# Patient Record
Sex: MALE | Race: WHITE | NOT HISPANIC OR LATINO | Employment: OTHER | URBAN - METROPOLITAN AREA
[De-identification: names, ages, dates, MRNs, and addresses within clinical notes are randomized per-mention and may not be internally consistent; named-entity substitution may affect disease eponyms.]

---

## 2018-01-19 ENCOUNTER — CONVERSION ENCOUNTER (OUTPATIENT)
Dept: SURGERY | Facility: CLINIC | Age: 63
End: 2018-01-19

## 2018-01-19 ENCOUNTER — OFFICE VISIT CONVERTED (OUTPATIENT)
Dept: UROLOGY | Facility: CLINIC | Age: 63
End: 2018-01-19
Attending: UROLOGY

## 2018-07-03 ENCOUNTER — OFFICE VISIT CONVERTED (OUTPATIENT)
Dept: ORTHOPEDIC SURGERY | Facility: CLINIC | Age: 63
End: 2018-07-03
Attending: ORTHOPAEDIC SURGERY

## 2018-07-03 ENCOUNTER — CONVERSION ENCOUNTER (OUTPATIENT)
Dept: ORTHOPEDIC SURGERY | Facility: CLINIC | Age: 63
End: 2018-07-03

## 2018-07-23 ENCOUNTER — OFFICE VISIT CONVERTED (OUTPATIENT)
Dept: UROLOGY | Facility: CLINIC | Age: 63
End: 2018-07-23
Attending: UROLOGY

## 2018-08-27 ENCOUNTER — PROCEDURE VISIT CONVERTED (OUTPATIENT)
Dept: UROLOGY | Facility: CLINIC | Age: 63
End: 2018-08-27
Attending: UROLOGY

## 2018-09-07 ENCOUNTER — CONVERSION ENCOUNTER (OUTPATIENT)
Dept: SURGERY | Facility: CLINIC | Age: 63
End: 2018-09-07

## 2018-09-07 ENCOUNTER — OFFICE VISIT CONVERTED (OUTPATIENT)
Dept: UROLOGY | Facility: CLINIC | Age: 63
End: 2018-09-07
Attending: UROLOGY

## 2018-12-21 ENCOUNTER — OFFICE VISIT CONVERTED (OUTPATIENT)
Dept: UROLOGY | Facility: CLINIC | Age: 63
End: 2018-12-21
Attending: UROLOGY

## 2019-02-04 ENCOUNTER — OFFICE VISIT CONVERTED (OUTPATIENT)
Dept: GASTROENTEROLOGY | Facility: CLINIC | Age: 64
End: 2019-02-04
Attending: INTERNAL MEDICINE

## 2019-02-22 ENCOUNTER — HOSPITAL ENCOUNTER (OUTPATIENT)
Dept: GASTROENTEROLOGY | Facility: HOSPITAL | Age: 64
Setting detail: HOSPITAL OUTPATIENT SURGERY
Discharge: HOME | End: 2019-02-22
Attending: INTERNAL MEDICINE

## 2019-03-18 ENCOUNTER — HOSPITAL ENCOUNTER (OUTPATIENT)
Dept: LAB | Facility: HOSPITAL | Age: 64
Discharge: HOME | End: 2019-03-18
Attending: UROLOGY

## 2019-03-18 LAB
ALBUMIN SERPL-MCNC: 3.7 G/DL (ref 3.5–5)
ALBUMIN/GLOB SERPL: 1.3 {RATIO} (ref 1.4–2.6)
ALP SERPL-CCNC: 59 U/L (ref 56–155)
ALT SERPL-CCNC: 39 U/L (ref 10–40)
ANION GAP SERPL CALC-SCNC: 16 MMOL/L (ref 8–19)
AST SERPL-CCNC: 30 U/L (ref 15–50)
BASOPHILS # BLD AUTO: 0.05 10*3/UL (ref 0–0.2)
BASOPHILS NFR BLD AUTO: 0.6 % (ref 0–3)
BILIRUB SERPL-MCNC: 0.29 MG/DL (ref 0.2–1.3)
BUN SERPL-MCNC: 14 MG/DL (ref 5–25)
BUN/CREAT SERPL: 13 {RATIO} (ref 6–20)
CALCIUM SERPL-MCNC: 9.1 MG/DL (ref 8.7–10.4)
CHLORIDE SERPL-SCNC: 98 MMOL/L (ref 99–111)
CONV ABS IMM GRAN: 0.24 10*3/UL (ref 0–0.2)
CONV CO2: 26 MMOL/L (ref 22–32)
CONV IMMATURE GRAN: 3.1 % (ref 0–1.8)
CONV TOTAL PROTEIN: 6.6 G/DL (ref 6.3–8.2)
CREAT UR-MCNC: 1.11 MG/DL (ref 0.7–1.2)
DEPRECATED RDW RBC AUTO: 42.4 FL (ref 35.1–43.9)
EOSINOPHIL # BLD AUTO: 0.12 10*3/UL (ref 0–0.7)
EOSINOPHIL # BLD AUTO: 1.5 % (ref 0–7)
ERYTHROCYTE [DISTWIDTH] IN BLOOD BY AUTOMATED COUNT: 13.5 % (ref 11.6–14.4)
GFR SERPLBLD BASED ON 1.73 SQ M-ARVRAT: >60 ML/MIN/{1.73_M2}
GLOBULIN UR ELPH-MCNC: 2.9 G/DL (ref 2–3.5)
GLUCOSE SERPL-MCNC: 89 MG/DL (ref 70–99)
HBA1C MFR BLD: 15.5 G/DL (ref 14–18)
HCT VFR BLD AUTO: 47.1 % (ref 42–52)
LYMPHOCYTES # BLD AUTO: 2.42 10*3/UL (ref 1–5)
MCH RBC QN AUTO: 28.8 PG (ref 27–31)
MCHC RBC AUTO-ENTMCNC: 32.9 G/DL (ref 33–37)
MCV RBC AUTO: 87.5 FL (ref 80–96)
MONOCYTES # BLD AUTO: 1.13 10*3/UL (ref 0.2–1.2)
MONOCYTES NFR BLD AUTO: 14.4 % (ref 3–10)
NEUTROPHILS # BLD AUTO: 3.9 10*3/UL (ref 2–8)
NEUTROPHILS NFR BLD AUTO: 49.6 % (ref 30–85)
NRBC CBCN: 0 % (ref 0–0.7)
OSMOLALITY SERPL CALC.SUM OF ELEC: 280 MOSM/KG (ref 273–304)
PLATELET # BLD AUTO: 243 10*3/UL (ref 130–400)
PMV BLD AUTO: 10.1 FL (ref 9.4–12.4)
POTASSIUM SERPL-SCNC: 4.7 MMOL/L (ref 3.5–5.3)
PSA SERPL-MCNC: 4.01 NG/ML (ref 0–4)
RBC # BLD AUTO: 5.38 10*6/UL (ref 4.7–6.1)
SODIUM SERPL-SCNC: 135 MMOL/L (ref 135–147)
TESTOST SERPL-MCNC: 826 NG/DL (ref 193–740)
VARIANT LYMPHS NFR BLD MANUAL: 30.8 % (ref 20–45)
WBC # BLD AUTO: 7.86 10*3/UL (ref 4.8–10.8)

## 2019-03-22 ENCOUNTER — OFFICE VISIT CONVERTED (OUTPATIENT)
Dept: UROLOGY | Facility: CLINIC | Age: 64
End: 2019-03-22
Attending: UROLOGY

## 2019-06-17 ENCOUNTER — HOSPITAL ENCOUNTER (OUTPATIENT)
Dept: LAB | Facility: HOSPITAL | Age: 64
Discharge: HOME | End: 2019-06-17
Attending: UROLOGY

## 2019-06-17 LAB
PSA SERPL-MCNC: 4.28 NG/ML (ref 0–4)
TESTOST SERPL-MCNC: 504 NG/DL (ref 193–740)

## 2019-06-21 ENCOUNTER — OFFICE VISIT CONVERTED (OUTPATIENT)
Dept: UROLOGY | Facility: CLINIC | Age: 64
End: 2019-06-21
Attending: UROLOGY

## 2019-07-20 ENCOUNTER — HOSPITAL ENCOUNTER (OUTPATIENT)
Dept: OTHER | Facility: HOSPITAL | Age: 64
Discharge: HOME | End: 2019-07-20
Attending: UROLOGY

## 2019-07-20 LAB — TESTOST SERPL-MCNC: 928 NG/DL (ref 193–740)

## 2019-07-22 ENCOUNTER — OFFICE VISIT CONVERTED (OUTPATIENT)
Dept: UROLOGY | Facility: CLINIC | Age: 64
End: 2019-07-22
Attending: UROLOGY

## 2019-08-30 ENCOUNTER — HOSPITAL ENCOUNTER (OUTPATIENT)
Dept: LAB | Facility: HOSPITAL | Age: 64
Discharge: HOME | End: 2019-08-30
Attending: UROLOGY

## 2019-08-30 LAB — TESTOST SERPL-MCNC: 251 NG/DL (ref 193–740)

## 2019-09-06 ENCOUNTER — OFFICE VISIT CONVERTED (OUTPATIENT)
Dept: UROLOGY | Facility: CLINIC | Age: 64
End: 2019-09-06
Attending: UROLOGY

## 2019-10-07 ENCOUNTER — HOSPITAL ENCOUNTER (OUTPATIENT)
Dept: LAB | Facility: HOSPITAL | Age: 64
Discharge: HOME | End: 2019-10-07
Attending: UROLOGY

## 2019-10-07 LAB — TESTOST SERPL-MCNC: 995 NG/DL (ref 193–740)

## 2019-10-29 ENCOUNTER — OFFICE VISIT CONVERTED (OUTPATIENT)
Dept: NEUROSURGERY | Facility: CLINIC | Age: 64
End: 2019-10-29
Attending: PHYSICIAN ASSISTANT

## 2019-11-11 ENCOUNTER — HOSPITAL ENCOUNTER (OUTPATIENT)
Dept: GENERAL RADIOLOGY | Facility: HOSPITAL | Age: 64
Discharge: HOME | End: 2019-11-11
Attending: PHYSICIAN ASSISTANT

## 2019-11-15 ENCOUNTER — HOSPITAL ENCOUNTER (OUTPATIENT)
Dept: LAB | Facility: HOSPITAL | Age: 64
Discharge: HOME | End: 2019-11-15
Attending: UROLOGY

## 2019-11-15 LAB — TESTOST SERPL-MCNC: 683 NG/DL (ref 193–740)

## 2019-11-26 ENCOUNTER — OFFICE VISIT CONVERTED (OUTPATIENT)
Dept: NEUROSURGERY | Facility: CLINIC | Age: 64
End: 2019-11-26
Attending: PHYSICIAN ASSISTANT

## 2019-12-09 ENCOUNTER — TELEPHONE (OUTPATIENT)
Dept: ENDOCRINOLOGY | Age: 64
End: 2019-12-09

## 2019-12-09 NOTE — TELEPHONE ENCOUNTER
RECORDS FOR NEW PT ENDO REFERRAL HAVE BEEN GIVEN TO DR ROMAN FOR REVIEW. WAITING FOR APPROVAL FROM DR ROMAN TO SCHEDULE. PT REFERRED BY DR THALIA BAIRD FOR HYPOGONADISM. NEW PT RECORDS HAVE BEEN SCANNED INTO PT'S CHART.

## 2019-12-09 NOTE — TELEPHONE ENCOUNTER
FIRST ATTEMPT TO REACH PT TO SCHEDULE NEW PT ENDO APPT W/DR ROMAN. LEFT PT MSG. PER DR ROMAN LEVEL 3 OK TO SCHEDULE.

## 2019-12-13 ENCOUNTER — TELEPHONE (OUTPATIENT)
Dept: ENDOCRINOLOGY | Age: 64
End: 2019-12-13

## 2019-12-13 NOTE — TELEPHONE ENCOUNTER
I S/W PT'S WIFE AND GAVE HER NEW PT ENDO APPT INFO FOR DR ROMAN. PER DR ROMAN LEVEL 3 OK TO SCHEDULE. NEW PT APPT CONFIRMATION FAXED TO REFERRING OFFICE OF DR BAIRD [phone].

## 2020-01-24 ENCOUNTER — HOSPITAL ENCOUNTER (OUTPATIENT)
Dept: OTHER | Facility: HOSPITAL | Age: 65
Discharge: HOME | End: 2020-01-24
Attending: UROLOGY

## 2020-01-24 LAB — TESTOST SERPL-MCNC: 823 NG/DL (ref 193–740)

## 2020-01-31 ENCOUNTER — OFFICE VISIT (OUTPATIENT)
Dept: ENDOCRINOLOGY | Age: 65
End: 2020-01-31

## 2020-01-31 VITALS
SYSTOLIC BLOOD PRESSURE: 122 MMHG | HEIGHT: 71 IN | RESPIRATION RATE: 16 BRPM | DIASTOLIC BLOOD PRESSURE: 72 MMHG | BODY MASS INDEX: 33.94 KG/M2 | WEIGHT: 242.4 LBS

## 2020-01-31 DIAGNOSIS — E66.9 CLASS 1 OBESITY WITHOUT SERIOUS COMORBIDITY WITH BODY MASS INDEX (BMI) OF 33.0 TO 33.9 IN ADULT, UNSPECIFIED OBESITY TYPE: ICD-10-CM

## 2020-01-31 DIAGNOSIS — E55.9 VITAMIN D DEFICIENCY: ICD-10-CM

## 2020-01-31 DIAGNOSIS — R73.9 HYPERGLYCEMIA: ICD-10-CM

## 2020-01-31 DIAGNOSIS — Z78.9 STATIN INTOLERANCE: ICD-10-CM

## 2020-01-31 DIAGNOSIS — R79.89 LOW TESTOSTERONE IN MALE: Primary | ICD-10-CM

## 2020-01-31 DIAGNOSIS — E78.2 MIXED DYSLIPIDEMIA: ICD-10-CM

## 2020-01-31 DIAGNOSIS — I10 ESSENTIAL HYPERTENSION: ICD-10-CM

## 2020-01-31 PROBLEM — E66.811 CLASS 1 OBESITY WITHOUT SERIOUS COMORBIDITY WITH BODY MASS INDEX (BMI) OF 33.0 TO 33.9 IN ADULT: Status: ACTIVE | Noted: 2020-01-31

## 2020-01-31 PROCEDURE — 99204 OFFICE O/P NEW MOD 45 MIN: CPT | Performed by: INTERNAL MEDICINE

## 2020-01-31 RX ORDER — NEEDLES, DISPOSABLE 25GX5/8"
NEEDLE, DISPOSABLE MISCELLANEOUS
COMMUNITY
Start: 2019-10-31 | End: 2021-10-20

## 2020-01-31 RX ORDER — EVOLOCUMAB 420 MG/3.5
420 KIT SUBCUTANEOUS
Qty: 3 CARTRIDGE | Refills: 3 | Status: SHIPPED | OUTPATIENT
Start: 2020-01-31 | End: 2020-01-31 | Stop reason: SDUPTHER

## 2020-01-31 RX ORDER — VERAPAMIL HYDROCHLORIDE 360 MG/1
360 CAPSULE, DELAYED RELEASE PELLETS ORAL DAILY
COMMUNITY
Start: 2020-01-19 | End: 2021-11-19

## 2020-01-31 RX ORDER — TESTOSTERONE CYPIONATE 200 MG/ML
INJECTION, SOLUTION INTRAMUSCULAR
COMMUNITY
Start: 2020-01-19 | End: 2021-06-15

## 2020-01-31 RX ORDER — EVOLOCUMAB 420 MG/3.5
420 KIT SUBCUTANEOUS
Qty: 3 CARTRIDGE | Refills: 3 | Status: SHIPPED | OUTPATIENT
Start: 2020-01-31 | End: 2020-02-27 | Stop reason: SDUPTHER

## 2020-01-31 RX ORDER — ENALAPRIL MALEATE 20 MG/1
20 TABLET ORAL DAILY
COMMUNITY
Start: 2019-11-29 | End: 2021-07-13

## 2020-01-31 NOTE — PROGRESS NOTES
"Cat Jamil Jr. is a 64 y.o. male seen as a new patient for hypogonadism. He states that his urologist takes care of his testosterone and he would like to continue going to that provider. He also states that he was told his glucose was over 200 but there is no records so he thinks his PCP meant cholesterol. He has tried previous cholesterol medications and has joint pain and muscle aches.     History of Present Illness is a 64-year-old gentleman who is accompanied by his wife and is here for further evaluation of multiple medical and metabolic problems including hypogonadism dyslipidemia and statin intolerance.  He is also a known patient with hypertension and diastolic dysfunction.  His testosterone is being taken 0.25 mL every 4 days.  He is working and  [email or web address] and he is an RN [employer].  His family history is significant for his mother had a heart attack and  of Alzheimer's.  His father  of colon cancer however he also had a CVA.  For his dyslipidemia has taken Crestor which gave him severe myalgia as well as taken Livalo which also gave him bone pain.    /72   Resp 16   Ht 180.3 cm (71\")   Wt 110 kg (242 lb 6.4 oz)   BMI 33.81 kg/m²     Allergies   Allergen Reactions   • Beta Adrenergic Blockers Unknown - High Severity   • Crestor [Rosuvastatin Calcium] Myalgia   • Keflex [Cephalexin] Other (See Comments)     hiccups   • Livalo [Pitavastatin] Myalgia   • Naproxen Other (See Comments)     Chest pain       Current Outpatient Medications:   •  BD DISP NEEDLES 22G X 1-1/2\" misc, , Disp: , Rfl:   •  enalapril (VASOTEC) 20 MG tablet, Take 40 mg by mouth Daily., Disp: , Rfl:   •  Testosterone Cypionate (DEPOTESTOTERONE CYPIONATE) 200 MG/ML injection, INJECT 0.25 ML SUB Q EVERY 4 DAYS, Disp: , Rfl:   •  verapamil ER (VERELAN) 360 MG 24 hr capsule, Take 360 mg by mouth Daily., Disp: , Rfl:       The following portions of the patient's history were reviewed and updated " as appropriate: allergies, current medications, past family history, past medical history, past social history, past surgical history and problem list.    Review of Systems   Constitutional: Negative.    HENT: Negative.    Eyes: Negative.    Respiratory: Negative.    Cardiovascular: Negative.    Gastrointestinal: Negative.    Endocrine: Negative.    Genitourinary: Negative.    Musculoskeletal: Negative.    Skin: Negative.    Allergic/Immunologic: Negative.    Neurological: Negative.    Hematological: Negative.    Psychiatric/Behavioral: Negative.    The above-mentioned review of system was reviewed, corroborated and accepted.    Objective   Physical Exam   Constitutional: He is oriented to person, place, and time. He appears well-developed and well-nourished. No distress.   HENT:   Head: Normocephalic and atraumatic.   Right Ear: External ear normal.   Left Ear: External ear normal.   Nose: Nose normal.   Mouth/Throat: Oropharynx is clear and moist. No oropharyngeal exudate.   Eyes: Pupils are equal, round, and reactive to light. Conjunctivae and EOM are normal. Right eye exhibits no discharge. Left eye exhibits no discharge. No scleral icterus.   Neck: Normal range of motion. Neck supple. No JVD present. No tracheal deviation present. No thyromegaly present.   Cardiovascular: Normal rate, regular rhythm, normal heart sounds and intact distal pulses. Exam reveals no gallop and no friction rub.   No murmur heard.  Pulmonary/Chest: Effort normal and breath sounds normal. No stridor. No respiratory distress. He has no wheezes. He has no rales. He exhibits no tenderness.   Abdominal: Soft. Bowel sounds are normal. He exhibits no distension and no mass. There is no tenderness. There is no rebound and no guarding. No hernia.   Musculoskeletal: Normal range of motion. He exhibits no edema, tenderness or deformity.   Lymphadenopathy:     He has no cervical adenopathy.   Neurological: He is alert and oriented to person,  place, and time. He displays normal reflexes. No cranial nerve deficit or sensory deficit. He exhibits normal muscle tone. Coordination normal.   Skin: Skin is warm and dry. No rash noted. He is not diaphoretic. No erythema. No pallor.   Psychiatric: He has a normal mood and affect. His behavior is normal. Judgment and thought content normal.   Nursing note and vitals reviewed.        Assessment/Plan   Jimmie was seen today for hypogonadism.    Diagnoses and all orders for this visit:    Low testosterone in male  -     T4 & TSH ([facility])  -     T3, Free  -     T4, Free  -     Thyroglobulin With Anti-TG  -     TestT+TestF+SHBG  -     Uric Acid  -     Vitamin D 25 Hydroxy  -     CBC & Differential  -     Comprehensive Metabolic Panel  -     C-Peptide  -     Follicle Stimulating Hormone  -     Hemoglobin A1c  -     Cancel: Lipid Panel  -     Luteinizing Hormone  -     Prolactin  -     PSA DIAGNOSTIC  -     ACTH  -     Cortisol  -     T4 & TSH ([facility]); Future  -     TestT+TestF+SHBG; Future  -     Uric Acid; Future  -     Comprehensive Metabolic Panel; Future  -     C-Peptide; Future  -     Hemoglobin A1c; Future  -     NMR LipoProfile; Future  -     Hemoglobin & Hematocrit, Blood; Future  -     NMR LipoProfile    Essential hypertension  -     T4 & TSH ([facility])  -     T3, Free  -     T4, Free  -     Thyroglobulin With Anti-TG  -     TestT+TestF+SHBG  -     Uric Acid  -     Vitamin D 25 Hydroxy  -     CBC & Differential  -     Comprehensive Metabolic Panel  -     C-Peptide  -     Follicle Stimulating Hormone  -     Hemoglobin A1c  -     Cancel: Lipid Panel  -     Luteinizing Hormone  -     Prolactin  -     PSA DIAGNOSTIC  -     ACTH  -     Cortisol  -     T4 & TSH ([facility]); Future  -     TestT+TestF+SHBG; Future  -     Uric Acid; Future  -     Comprehensive Metabolic Panel; Future  -     C-Peptide; Future  -     Hemoglobin A1c; Future  -     NMR LipoProfile; Future  -     Hemoglobin & Hematocrit, Blood; Future  -      NMR LipoProfile    Mixed dyslipidemia  -     T4 & TSH ([facility])  -     T3, Free  -     T4, Free  -     Thyroglobulin With Anti-TG  -     TestT+TestF+SHBG  -     Uric Acid  -     Vitamin D 25 Hydroxy  -     CBC & Differential  -     Comprehensive Metabolic Panel  -     C-Peptide  -     Follicle Stimulating Hormone  -     Hemoglobin A1c  -     Cancel: Lipid Panel  -     Luteinizing Hormone  -     Prolactin  -     PSA DIAGNOSTIC  -     ACTH  -     Cortisol  -     T4 & TSH ([facility]); Future  -     TestT+TestF+SHBG; Future  -     Uric Acid; Future  -     Comprehensive Metabolic Panel; Future  -     C-Peptide; Future  -     Hemoglobin A1c; Future  -     NMR LipoProfile; Future  -     Hemoglobin & Hematocrit, Blood; Future  -     NMR LipoProfile    Hyperglycemia  -     T4 & TSH ([facility])  -     T3, Free  -     T4, Free  -     Thyroglobulin With Anti-TG  -     TestT+TestF+SHBG  -     Uric Acid  -     Vitamin D 25 Hydroxy  -     CBC & Differential  -     Comprehensive Metabolic Panel  -     C-Peptide  -     Follicle Stimulating Hormone  -     Hemoglobin A1c  -     Cancel: Lipid Panel  -     Luteinizing Hormone  -     Prolactin  -     PSA DIAGNOSTIC  -     ACTH  -     Cortisol  -     T4 & TSH ([facility]); Future  -     TestT+TestF+SHBG; Future  -     Uric Acid; Future  -     Comprehensive Metabolic Panel; Future  -     C-Peptide; Future  -     Hemoglobin A1c; Future  -     NMR LipoProfile; Future  -     Hemoglobin & Hematocrit, Blood; Future  -     NMR LipoProfile    Vitamin D deficiency  -     T4 & TSH ([facility])  -     T3, Free  -     T4, Free  -     Thyroglobulin With Anti-TG  -     TestT+TestF+SHBG  -     Uric Acid  -     Vitamin D 25 Hydroxy  -     CBC & Differential  -     Comprehensive Metabolic Panel  -     C-Peptide  -     Follicle Stimulating Hormone  -     Hemoglobin A1c  -     Cancel: Lipid Panel  -     Luteinizing Hormone  -     Prolactin  -     PSA DIAGNOSTIC  -     ACTH  -     Cortisol  -     T4 & TSH ([facility]);  Future  -     TestT+TestF+SHBG; Future  -     Uric Acid; Future  -     Comprehensive Metabolic Panel; Future  -     C-Peptide; Future  -     Hemoglobin A1c; Future  -     NMR LipoProfile; Future  -     Hemoglobin & Hematocrit, Blood; Future  -     NMR LipoProfile    Class 1 obesity without serious comorbidity with body mass index (BMI) of 33.0 to 33.9 in adult, unspecified obesity type  -     T4 & TSH ([facility])  -     T3, Free  -     T4, Free  -     Thyroglobulin With Anti-TG  -     TestT+TestF+SHBG  -     Uric Acid  -     Vitamin D 25 Hydroxy  -     CBC & Differential  -     Comprehensive Metabolic Panel  -     C-Peptide  -     Follicle Stimulating Hormone  -     Hemoglobin A1c  -     Cancel: Lipid Panel  -     Luteinizing Hormone  -     Prolactin  -     PSA DIAGNOSTIC  -     ACTH  -     Cortisol  -     T4 & TSH ([facility]); Future  -     TestT+TestF+SHBG; Future  -     Uric Acid; Future  -     Comprehensive Metabolic Panel; Future  -     C-Peptide; Future  -     Hemoglobin A1c; Future  -     NMR LipoProfile; Future  -     Hemoglobin & Hematocrit, Blood; Future  -     NMR LipoProfile    Statin intolerance  -     T4 & TSH ([facility]); Future  -     TestT+TestF+SHBG; Future  -     Uric Acid; Future  -     Comprehensive Metabolic Panel; Future  -     C-Peptide; Future  -     Hemoglobin A1c; Future  -     NMR LipoProfile; Future  -     Hemoglobin & Hematocrit, Blood; Future  -     NMR LipoProfile    Other orders  -     REPATHA PUSHTRONEX SYSTEM 420 MG/3.5ML solution cartridge; Inject 420 mg under the skin into the appropriate area as directed Every 30 (Thirty) Days.      In summary I saw and examined this 64-year-old gentleman for above-mentioned problems.  I reviewed his laboratory evaluations from 2016 until December 1, 2019 and went over that with him and his wife and at this time we will go ahead and order extensive laboratory evaluation and once the results come back we will go ahead and call for any possible  modification or new medications.  Because of his statin intolerance as well as a high risk of cardiovascular disease in both parents we will go ahead and request Repatha 420 mg once monthly.  He will see Ms. Starr Chen in 6 months or sooner if needed but laboratory evaluation prior to each office visit.

## 2020-02-03 LAB
25(OH)D3+25(OH)D2 SERPL-MCNC: 41.7 NG/ML (ref 30–100)
ACTH PLAS-MCNC: 19.8 PG/ML (ref 7.2–63.3)
ALBUMIN SERPL-MCNC: 4.4 G/DL (ref 3.5–5.2)
ALBUMIN/GLOB SERPL: 2.1 G/DL
ALP SERPL-CCNC: 68 U/L (ref 39–117)
ALT SERPL-CCNC: 31 U/L (ref 1–41)
AST SERPL-CCNC: 26 U/L (ref 1–40)
BASOPHILS # BLD AUTO: 0.04 10*3/MM3 (ref 0–0.2)
BASOPHILS NFR BLD AUTO: 0.5 % (ref 0–1.5)
BILIRUB SERPL-MCNC: 0.4 MG/DL (ref 0.2–1.2)
BUN SERPL-MCNC: 11 MG/DL (ref 8–23)
BUN/CREAT SERPL: 8 (ref 7–25)
C PEPTIDE SERPL-MCNC: 8.3 NG/ML (ref 1.1–4.4)
CALCIUM SERPL-MCNC: 9.7 MG/DL (ref 8.6–10.5)
CHLORIDE SERPL-SCNC: 101 MMOL/L (ref 98–107)
CHOLEST SERPL-MCNC: 291 MG/DL (ref 0–200)
CHOLEST SERPL-MCNC: 321 MG/DL (ref 100–199)
CO2 SERPL-SCNC: 28.7 MMOL/L (ref 22–29)
CORTIS SERPL-MCNC: 6.7 UG/DL
CREAT SERPL-MCNC: 1.37 MG/DL (ref 0.76–1.27)
EOSINOPHIL # BLD AUTO: 0.05 10*3/MM3 (ref 0–0.4)
EOSINOPHIL NFR BLD AUTO: 0.7 % (ref 0.3–6.2)
ERYTHROCYTE [DISTWIDTH] IN BLOOD BY AUTOMATED COUNT: 13.5 % (ref 12.3–15.4)
FSH SERPL-ACNC: 0.4 MIU/ML (ref 1.5–12.4)
GLOBULIN SER CALC-MCNC: 2.1 GM/DL
GLUCOSE SERPL-MCNC: 86 MG/DL (ref 65–99)
HBA1C MFR BLD: 5.5 % (ref 4.8–5.6)
HCT VFR BLD AUTO: 55.2 % (ref 37.5–51)
HDL SERPL-SCNC: 23.4 UMOL/L
HDLC SERPL-MCNC: 37 MG/DL (ref 40–60)
HDLC SERPL-MCNC: 43 MG/DL
HGB BLD-MCNC: 19.2 G/DL (ref 13–17.7)
IMM GRANULOCYTES # BLD AUTO: 0.1 10*3/MM3 (ref 0–0.05)
IMM GRANULOCYTES NFR BLD AUTO: 1.3 % (ref 0–0.5)
INTERPRETATION: NORMAL
LDL SERPL QN: 21.3 NM
LDL SERPL-SCNC: 2909 NMOL/L
LDL SMALL SERPL-SCNC: 999 NMOL/L
LDLC SERPL CALC-MCNC: 203 MG/DL (ref 0–100)
LDLC SERPL CALC-MCNC: 222 MG/DL (ref 0–99)
LH SERPL-ACNC: 0.1 MIU/ML (ref 1.7–8.6)
LYMPHOCYTES # BLD AUTO: 2.14 10*3/MM3 (ref 0.7–3.1)
LYMPHOCYTES NFR BLD AUTO: 28.6 % (ref 19.6–45.3)
Lab: NORMAL
MCH RBC QN AUTO: 29.3 PG (ref 26.6–33)
MCHC RBC AUTO-ENTMCNC: 34.8 G/DL (ref 31.5–35.7)
MCV RBC AUTO: 84.1 FL (ref 79–97)
MONOCYTES # BLD AUTO: 1.27 10*3/MM3 (ref 0.1–0.9)
MONOCYTES NFR BLD AUTO: 17 % (ref 5–12)
NEUTROPHILS # BLD AUTO: 3.89 10*3/MM3 (ref 1.7–7)
NEUTROPHILS NFR BLD AUTO: 51.9 % (ref 42.7–76)
NRBC BLD AUTO-RTO: 0 /100 WBC (ref 0–0.2)
PLATELET # BLD AUTO: 243 10*3/MM3 (ref 140–450)
POTASSIUM SERPL-SCNC: 4.7 MMOL/L (ref 3.5–5.2)
PROLACTIN SERPL-MCNC: 8 NG/ML (ref 4–15.2)
PROT SERPL-MCNC: 6.5 G/DL (ref 6–8.5)
PSA SERPL-MCNC: 4.89 NG/ML (ref 0–4)
RBC # BLD AUTO: 6.56 10*6/MM3 (ref 4.14–5.8)
SHBG SERPL-SCNC: 40 NMOL/L (ref 19.3–76.4)
SODIUM SERPL-SCNC: 139 MMOL/L (ref 136–145)
T3FREE SERPL-MCNC: 3.2 PG/ML (ref 2–4.4)
T4 FREE SERPL-MCNC: 1.03 NG/DL (ref 0.93–1.7)
T4 SERPL-MCNC: 4.89 MCG/DL (ref 4.5–11.7)
TESTOST FREE SERPL-MCNC: 7.1 PG/ML (ref 6.6–18.1)
TESTOST SERPL-MCNC: 306 NG/DL (ref 264–916)
THYROGLOB AB SERPL-ACNC: <1 IU/ML (ref 0–0.9)
THYROGLOB SERPL-MCNC: 9.2 NG/ML (ref 1.4–29.2)
TRIGL SERPL-MCNC: 256 MG/DL (ref 0–150)
TRIGL SERPL-MCNC: 280 MG/DL (ref 0–149)
TSH SERPL DL<=0.005 MIU/L-ACNC: 1.61 UIU/ML (ref 0.27–4.2)
URATE SERPL-MCNC: 4.9 MG/DL (ref 3.4–7)
VLDLC SERPL CALC-MCNC: 51.2 MG/DL
WBC # BLD AUTO: 7.49 10*3/MM3 (ref 3.4–10.8)

## 2020-02-04 DIAGNOSIS — D75.1 POLYCYTHEMIA: Primary | ICD-10-CM

## 2020-02-04 RX ORDER — ICOSAPENT ETHYL 1000 MG/1
2 CAPSULE ORAL 2 TIMES DAILY WITH MEALS
Qty: 360 CAPSULE | Refills: 3 | Status: SHIPPED | OUTPATIENT
Start: 2020-02-04

## 2020-02-06 PROBLEM — D75.1 POLYCYTHEMIA: Status: ACTIVE | Noted: 2020-02-06

## 2020-02-06 RX ORDER — SODIUM CHLORIDE 9 MG/ML
250 INJECTION, SOLUTION INTRAVENOUS ONCE
Status: CANCELLED | OUTPATIENT
Start: 2020-02-06

## 2020-02-14 ENCOUNTER — HOSPITAL ENCOUNTER (OUTPATIENT)
Dept: INFUSION THERAPY | Facility: HOSPITAL | Age: 65
Discharge: HOME | End: 2020-02-14
Admitting: INTERNAL MEDICINE

## 2020-02-14 VITALS
WEIGHT: 235 LBS | DIASTOLIC BLOOD PRESSURE: 68 MMHG | RESPIRATION RATE: 20 BRPM | TEMPERATURE: 96.9 F | SYSTOLIC BLOOD PRESSURE: 126 MMHG | BODY MASS INDEX: 32.78 KG/M2 | HEART RATE: 66 BPM | OXYGEN SATURATION: 96 %

## 2020-02-14 DIAGNOSIS — D75.1 POLYCYTHEMIA: Primary | ICD-10-CM

## 2020-02-14 LAB
FERRITIN SERPL-MCNC: 156 NG/ML (ref 30–400)
HCT VFR BLD AUTO: 58 % (ref 37.5–51)
HGB BLD-MCNC: 19.4 G/DL (ref 13–17.7)

## 2020-02-14 PROCEDURE — 82728 ASSAY OF FERRITIN: CPT | Performed by: INTERNAL MEDICINE

## 2020-02-14 PROCEDURE — 36415 COLL VENOUS BLD VENIPUNCTURE: CPT

## 2020-02-14 PROCEDURE — 99195 PHLEBOTOMY: CPT

## 2020-02-14 PROCEDURE — 85018 HEMOGLOBIN: CPT | Performed by: INTERNAL MEDICINE

## 2020-02-14 PROCEDURE — 85014 HEMATOCRIT: CPT | Performed by: INTERNAL MEDICINE

## 2020-02-14 NOTE — PROGRESS NOTES
Prior Phlebotomy: Yes ?  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  no    Lot # of Phlebotomy bag:[id number]    Start Time   1225    Stop Time:  1235    Amount removed: 571GMS    Grams / 1.06 =  538.6 cc’s   HGB result noted and phlebotomy indicated per physician order parameter.  VSS before and after procedure.  Tolerated treatment well.  PO water after.  Right arm wrapped with co-flex pressure dressing after procedure.   AVS printed and given.  Patient discharged ambulatory with his wife after appointment completed.

## 2020-02-14 NOTE — PATIENT INSTRUCTIONS
Therapeutic Phlebotomy, Care After  This sheet gives you information about how to care for yourself after your procedure. Your health care provider may also give you more specific instructions. If you have problems or questions, contact your health care provider.  What can I expect after the procedure?  After the procedure, it is common to have:  · Light-headedness or dizziness. You may feel faint.  · Nausea.  · Tiredness (fatigue).  Follow these instructions at home:  Eating and drinking  · Be sure to eat well-balanced meals for the next 24 hours.  · Drink enough fluid to keep your urine pale yellow.  · Avoid drinking alcohol on the day that you had the procedure.  Activity    · Return to your normal activities as told by your health care provider. Most people can go back to their normal activities right away.  · Avoid activities that take a lot of effort for about 5 hours after the procedure. Athletes should avoid strenuous exercise for at least 12 hours.  · Avoid heavy lifting or pulling for about 5 hours after the procedure. Do not lift anything that is heavier than 10 lb (4.5 kg).  · Change positions slowly for the remainder of the day. This will help to prevent light-headedness or fainting.  · If you feel light-headed, lie down until the feeling goes away.  Needle insertion site care    · Keep your bandage (dressing) dry. You can remove the bandage after about 5 hours or as told by your health care provider.  · If you have bleeding from the needle insertion site, raise (elevate) your arm and press firmly on the site until the bleeding stops.  · If you have bruising at the site, apply ice to the area:  ? Remove the dressing.  ? Put ice in a plastic bag.  ? Place a towel between your skin and the bag.  ? Leave the ice on for 20 minutes, 2-3 times a day for the first 24 hours.  · If the swelling does not go away after 24 hours, apply a warm, moist cloth (warm compress) to the area for 20 minutes, 2-3 times a  day.  General instructions  · Do not use any products that contain nicotine or tobacco, such as cigarettes and e-cigarettes, for at least 30 minutes after the procedure.  · Keep all follow-up visits as told by your health care provider. This is important. You may need to continue having regular therapeutic phlebotomy treatments as directed.  Contact a health care provider if you:  · Have redness, swelling, or pain at the needle insertion site.  · Have fluid or blood coming from the needle insertion site.  · Have pus or a bad smell coming from the needle insertion site.  · Notice that the needle insertion site feels warm to the touch.  · Feel light-headed, dizzy, or nauseous, and the feeling does not go away.  · Have new bruising at the needle insertion site.  · Feel weaker than normal.  · Have a fever or chills.  Get help right away if:  · You faint.  · You have chest pain.  · You have trouble breathing.  · You have severe nausea or vomiting.  Summary  · After the procedure, it is common to have some light-headedness, dizziness, nausea, or tiredness (fatigue).  · Be sure to eat well-balanced meals for the next 24 hours. Drink enough fluid to keep your urine pale yellow.  · Return to your normal activities as told by your health care provider.  · Keep all follow-up visits as told by your health care provider. You may need to continue having regular therapeutic phlebotomy treatments as directed.  This information is not intended to replace advice given to you by your health care provider. Make sure you discuss any questions you have with your health care provider.  Document Released: 05/21/2012 Document Revised: 01/03/2019 Document Reviewed: 01/03/2019  [employer] Interactive Patient Education © 2019 [employer]  Polycythemia Vera  Polycythemia vera (PV), or myeloproliferative disease, is a form of blood cancer in which the bone marrow makes too many (overproduces) red blood cells. The bone marrow may also make too  many clotting cells (platelets) and white blood cells. Bone marrow is the spongy center of bones where blood cells are produced. Sometimes, there may be an overproduction of blood cells in the liver and spleen, causing those organs to become enlarged. Additionally, people who have PV are at a higher risk for stroke or heart attack because their blood may clot more easily. PV is a long-term disease.  What are the causes?  Almost all people who have PV have an abnormal gene (genetic mutation) that causes changes in the way that the bone marrow makes blood cells. This gene, which is called JAK2, is not passed along from parent to child (is not hereditary). It is not known what triggers the genetic mutation that causes the body to produce too many red blood cells.  What increases the risk?  This condition is more likely to develop in:  · Males.  · People who are 60 years of age or older.  What are the signs or symptoms?  You may not have any symptoms in the early stage of PV. When symptoms develop, they may include:  · Shortness of breath.  · Dizziness.  · Hot and flushed skin.  · Itchy skin.  · Sweats, especially night sweats.  · Headache.  · Tiredness.  · Ringing in the ears.  · Blurred vision or blind spots.  · Bone pain.  · Weight loss.  · Fever.  · Blood-tinged vomit or bowel movements.  How is this diagnosed?  This condition may be diagnosed during a routine physical exam if you have a blood test called a complete blood count (CBC). Your health care provider also may suspect PV if you have symptoms. During the physical exam, your provider may find that you have an enlarged liver or spleen. You may also have tests to confirm the diagnosis. These may include:  · A procedure to remove a sample of bone marrow for testing (bone marrow biopsy).  · Blood tests to check for:  ? The JAK2 gene.  ? Low levels of a hormone that helps to regulate blood production (erythropoietin).  How is this treated?  There is no cure for  PV, but treatment can help to control the disease. There are several types of treatment. No single treatment works for everyone. You will need to work with a blood cancer specialist (hematologist) to find the treatment that is best for you. Options include:  · Periodically having some blood removed with a needle (drawn) to lower the number of red blood cells (phlebotomy).  · Medicine. Your health care provider may recommend:  ? Low-dose aspirin to lower your risk for blood clots.  ? A medicine to reduce red blood cell production (hydroxyurea).  ? A medicine to lower the number of red blood cells (interferon).  ? A medicine that slows down the effects of JAK2 (ruxolitinib).    Follow these instructions at home:  · Take over-the-counter and prescription medicines only as told by your health care provider.  · Return to your normal activities as told by your health care provider. Ask your health care provider what activities are safe for you.  · Do not use tobacco products, including cigarettes, chewing tobacco, or e-cigarettes. If you need help quitting, ask your health care provider.  · Keep all follow-up visits as told by your health care provider. This is important.  Contact a health care provider if:  · You have side effects from your medicines.  · Your symptoms change or get worse at home.  · You have blood in your stool or you vomit blood.  Get help right away if:  · You have sudden and severe pain in your abdomen.  · You have chest pain or difficulty breathing.  · You have signs of stroke, such as:  ? Sudden numbness.  ? Weakness of your face or arm.  ? Confusion.  ? Difficulty speaking or understanding speech.  These symptoms may represent a serious problem that is an emergency. Do not wait to see if the symptoms will go away. Get medical help right away. Call your local emergency services (911 in the [place] Do not drive yourself to the hospital.  This information is not intended to replace advice given to you  by your health care provider. Make sure you discuss any questions you have with your health care provider.  Document Released: 09/12/2002 Document Revised: 05/25/2017 Document Reviewed: 06/29/2016  [employer] Interactive Patient Education © 2019 [employer]

## 2020-02-15 DIAGNOSIS — D75.1 POLYCYTHEMIA, SECONDARY: Primary | ICD-10-CM

## 2020-02-27 ENCOUNTER — TELEPHONE (OUTPATIENT)
Dept: ENDOCRINOLOGY | Age: 65
End: 2020-02-27

## 2020-02-27 NOTE — TELEPHONE ENCOUNTER
pts wife called the PA for his repatha has not been sent in and he is due to get his next  injection on Saturday. We gave him the sample last month

## 2020-02-28 ENCOUNTER — HOSPITAL ENCOUNTER (OUTPATIENT)
Dept: OTHER | Facility: HOSPITAL | Age: 65
Discharge: HOME | End: 2020-02-28
Attending: UROLOGY

## 2020-02-28 ENCOUNTER — HOSPITAL ENCOUNTER (OUTPATIENT)
Dept: INFUSION THERAPY | Facility: HOSPITAL | Age: 65
Discharge: HOME | End: 2020-02-28
Admitting: INTERNAL MEDICINE

## 2020-02-28 VITALS
SYSTOLIC BLOOD PRESSURE: 132 MMHG | DIASTOLIC BLOOD PRESSURE: 75 MMHG | TEMPERATURE: 97.4 F | RESPIRATION RATE: 20 BRPM | OXYGEN SATURATION: 95 % | HEART RATE: 69 BPM

## 2020-02-28 DIAGNOSIS — D75.1 POLYCYTHEMIA: ICD-10-CM

## 2020-02-28 LAB
ALBUMIN SERPL-MCNC: 3.8 G/DL (ref 3.5–5)
ALBUMIN/GLOB SERPL: 1.3 {RATIO} (ref 1.4–2.6)
ALP SERPL-CCNC: 72 U/L (ref 56–155)
ALT SERPL-CCNC: 36 U/L (ref 10–40)
ANION GAP SERPL CALC-SCNC: 21 MMOL/L (ref 8–19)
AST SERPL-CCNC: 24 U/L (ref 15–50)
BASOPHILS # BLD AUTO: 0.07 10*3/UL (ref 0–0.2)
BASOPHILS NFR BLD AUTO: 0.6 % (ref 0–3)
BILIRUB SERPL-MCNC: 0.33 MG/DL (ref 0.2–1.3)
BUN SERPL-MCNC: 19 MG/DL (ref 5–25)
BUN/CREAT SERPL: 15 {RATIO} (ref 6–20)
CALCIUM SERPL-MCNC: 9.5 MG/DL (ref 8.7–10.4)
CHLORIDE SERPL-SCNC: 96 MMOL/L (ref 99–111)
CONV ABS IMM GRAN: 0.32 10*3/UL (ref 0–0.2)
CONV CO2: 22 MMOL/L (ref 22–32)
CONV IMMATURE GRAN: 2.6 % (ref 0–1.8)
CONV TOTAL PROTEIN: 6.8 G/DL (ref 6.3–8.2)
CREAT UR-MCNC: 1.28 MG/DL (ref 0.7–1.2)
DEPRECATED RDW RBC AUTO: 42.6 FL (ref 35.1–43.9)
EOSINOPHIL # BLD AUTO: 0.09 10*3/UL (ref 0–0.7)
EOSINOPHIL # BLD AUTO: 0.7 % (ref 0–7)
ERYTHROCYTE [DISTWIDTH] IN BLOOD BY AUTOMATED COUNT: 13.5 % (ref 11.6–14.4)
FERRITIN SERPL-MCNC: 114 NG/ML (ref 30–400)
GFR SERPLBLD BASED ON 1.73 SQ M-ARVRAT: 59 ML/MIN/{1.73_M2}
GLOBULIN UR ELPH-MCNC: 3 G/DL (ref 2–3.5)
GLUCOSE SERPL-MCNC: 134 MG/DL (ref 70–99)
HCT VFR BLD AUTO: 50.1 % (ref 37.5–51)
HCT VFR BLD AUTO: 52.5 % (ref 42–52)
HGB BLD-MCNC: 17.8 G/DL (ref 14–18)
HGB BLD-MCNC: 17.9 G/DL (ref 13–17.7)
LYMPHOCYTES # BLD AUTO: 3.01 10*3/UL (ref 1–5)
LYMPHOCYTES NFR BLD AUTO: 24.2 % (ref 20–45)
MCH RBC QN AUTO: 29.5 PG (ref 27–31)
MCHC RBC AUTO-ENTMCNC: 33.9 G/DL (ref 33–37)
MCV RBC AUTO: 86.9 FL (ref 80–96)
MONOCYTES # BLD AUTO: 1.23 10*3/UL (ref 0.2–1.2)
MONOCYTES NFR BLD AUTO: 9.9 % (ref 3–10)
NEUTROPHILS # BLD AUTO: 7.74 10*3/UL (ref 2–8)
NEUTROPHILS NFR BLD AUTO: 62 % (ref 30–85)
NRBC CBCN: 0 % (ref 0–0.7)
OSMOLALITY SERPL CALC.SUM OF ELEC: 284 MOSM/KG (ref 273–304)
PLATELET # BLD AUTO: 279 10*3/UL (ref 130–400)
PMV BLD AUTO: 10.1 FL (ref 9.4–12.4)
POTASSIUM SERPL-SCNC: 3.8 MMOL/L (ref 3.5–5.3)
RBC # BLD AUTO: 6.04 10*6/UL (ref 4.7–6.1)
SODIUM SERPL-SCNC: 135 MMOL/L (ref 135–147)
TESTOST SERPL-MCNC: 806 NG/DL (ref 193–740)
WBC # BLD AUTO: 12.46 10*3/UL (ref 4.8–10.8)

## 2020-02-28 PROCEDURE — 85018 HEMOGLOBIN: CPT | Performed by: INTERNAL MEDICINE

## 2020-02-28 PROCEDURE — 36415 COLL VENOUS BLD VENIPUNCTURE: CPT

## 2020-02-28 PROCEDURE — 82728 ASSAY OF FERRITIN: CPT | Performed by: INTERNAL MEDICINE

## 2020-02-28 PROCEDURE — 99195 PHLEBOTOMY: CPT

## 2020-02-28 PROCEDURE — 85014 HEMATOCRIT: CPT | Performed by: INTERNAL MEDICINE

## 2020-02-28 RX ORDER — EVOLOCUMAB 420 MG/3.5
420 KIT SUBCUTANEOUS
Qty: 3 CARTRIDGE | Refills: 3 | Status: SHIPPED | OUTPATIENT
Start: 2020-02-28 | End: 2021-10-20

## 2020-03-02 ENCOUNTER — TELEPHONE (OUTPATIENT)
Dept: ENDOCRINOLOGY | Age: 65
End: 2020-03-02

## 2020-03-02 NOTE — TELEPHONE ENCOUNTER
[facility] is calling concerning PA on Repatha. They will be sending clinical review fax today, needs to be completed and returned for approval/denial.

## 2020-03-06 ENCOUNTER — CONVERSION ENCOUNTER (OUTPATIENT)
Dept: SURGERY | Facility: CLINIC | Age: 65
End: 2020-03-06

## 2020-03-06 ENCOUNTER — OFFICE VISIT CONVERTED (OUTPATIENT)
Dept: UROLOGY | Facility: CLINIC | Age: 65
End: 2020-03-06
Attending: UROLOGY

## 2020-03-16 ENCOUNTER — HOSPITAL ENCOUNTER (OUTPATIENT)
Dept: INFUSION THERAPY | Facility: HOSPITAL | Age: 65
Discharge: HOME | End: 2020-03-16
Admitting: INTERNAL MEDICINE

## 2020-03-16 VITALS
OXYGEN SATURATION: 98 % | RESPIRATION RATE: 20 BRPM | HEART RATE: 59 BPM | SYSTOLIC BLOOD PRESSURE: 133 MMHG | DIASTOLIC BLOOD PRESSURE: 68 MMHG | TEMPERATURE: 97.5 F

## 2020-03-16 DIAGNOSIS — D75.1 POLYCYTHEMIA: Primary | ICD-10-CM

## 2020-03-16 LAB
FERRITIN SERPL-MCNC: 89.6 NG/ML (ref 30–400)
HCT VFR BLD AUTO: 48.3 % (ref 37.5–51)
HGB BLD-MCNC: 16.5 G/DL (ref 13–17.7)

## 2020-03-16 PROCEDURE — 82728 ASSAY OF FERRITIN: CPT | Performed by: INTERNAL MEDICINE

## 2020-03-16 PROCEDURE — 85018 HEMOGLOBIN: CPT | Performed by: INTERNAL MEDICINE

## 2020-03-16 PROCEDURE — 85014 HEMATOCRIT: CPT | Performed by: INTERNAL MEDICINE

## 2020-03-16 PROCEDURE — G0463 HOSPITAL OUTPT CLINIC VISIT: HCPCS

## 2020-03-16 PROCEDURE — 36415 COLL VENOUS BLD VENIPUNCTURE: CPT

## 2020-03-16 RX ORDER — ASPIRIN 81 MG/1
81 TABLET, CHEWABLE ORAL DAILY
COMMUNITY
End: 2021-11-12 | Stop reason: SDUPTHER

## 2020-03-16 RX ORDER — METAXALONE 800 MG/1
800 TABLET ORAL DAILY PRN
COMMUNITY

## 2020-03-16 RX ORDER — DICLOFENAC SODIUM 75 MG/1
75 TABLET, DELAYED RELEASE ORAL 2 TIMES DAILY PRN
COMMUNITY
Start: 2020-02-09

## 2020-03-16 NOTE — PROGRESS NOTES
Patient did not need therapeutic phlebotomy per MD parameters. Patient declined AVS. RN instructed patient that message was left at Dr. Charles's office regarding when to make next appointment per patient request. Patient requests call when MD returns call.     -Call placed to MD office again regarding when patient should return, message left at office.    -Message left via cell phone given to RN per patient to notify patient that message not yet received from MD office, message left for patient to follow-up with MD if he hasn't already gotten a message from MD office.

## 2020-05-26 ENCOUNTER — HOSPITAL ENCOUNTER (OUTPATIENT)
Dept: LAB | Facility: HOSPITAL | Age: 65
Discharge: HOME | End: 2020-05-26
Attending: UROLOGY

## 2020-05-26 LAB — TESTOST SERPL-MCNC: 710 NG/DL (ref 193–740)

## 2020-07-20 ENCOUNTER — RESULTS ENCOUNTER (OUTPATIENT)
Dept: ENDOCRINOLOGY | Age: 65
End: 2020-07-20

## 2020-07-20 DIAGNOSIS — Z78.9 STATIN INTOLERANCE: ICD-10-CM

## 2020-07-20 DIAGNOSIS — E55.9 VITAMIN D DEFICIENCY: ICD-10-CM

## 2020-07-20 DIAGNOSIS — E66.9 CLASS 1 OBESITY WITHOUT SERIOUS COMORBIDITY WITH BODY MASS INDEX (BMI) OF 33.0 TO 33.9 IN ADULT, UNSPECIFIED OBESITY TYPE: ICD-10-CM

## 2020-07-20 DIAGNOSIS — R79.89 LOW TESTOSTERONE IN MALE: ICD-10-CM

## 2020-07-20 DIAGNOSIS — E78.2 MIXED DYSLIPIDEMIA: ICD-10-CM

## 2020-07-20 DIAGNOSIS — R73.9 HYPERGLYCEMIA: ICD-10-CM

## 2020-07-20 DIAGNOSIS — I10 ESSENTIAL HYPERTENSION: ICD-10-CM

## 2020-08-04 ENCOUNTER — HOSPITAL ENCOUNTER (OUTPATIENT)
Dept: LAB | Facility: HOSPITAL | Age: 65
Discharge: HOME | End: 2020-08-04
Attending: UROLOGY

## 2020-08-05 LAB — TESTOST SERPL-MCNC: 668 NG/DL (ref 193–740)

## 2020-08-07 LAB
ALBUMIN SERPL-MCNC: 4.1 G/DL (ref 3.5–5.2)
ALBUMIN/GLOB SERPL: 2 G/DL
ALP SERPL-CCNC: 60 U/L (ref 39–117)
ALT SERPL-CCNC: 28 U/L (ref 1–41)
AST SERPL-CCNC: 21 U/L (ref 1–40)
BILIRUB SERPL-MCNC: 0.3 MG/DL (ref 0–1.2)
BUN SERPL-MCNC: 10 MG/DL (ref 8–23)
BUN/CREAT SERPL: 8.7 (ref 7–25)
C PEPTIDE SERPL-MCNC: 10.8 NG/ML (ref 1.1–4.4)
CALCIUM SERPL-MCNC: 9.2 MG/DL (ref 8.6–10.5)
CHLORIDE SERPL-SCNC: 102 MMOL/L (ref 98–107)
CHOLEST SERPL-MCNC: 229 MG/DL (ref 100–199)
CO2 SERPL-SCNC: 27 MMOL/L (ref 22–29)
CREAT SERPL-MCNC: 1.15 MG/DL (ref 0.76–1.27)
GLOBULIN SER CALC-MCNC: 2.1 GM/DL
GLUCOSE SERPL-MCNC: 78 MG/DL (ref 65–99)
HBA1C MFR BLD: 5.3 % (ref 4.8–5.6)
HCT VFR BLD AUTO: 52.4 % (ref 37.5–51)
HDL SERPL-SCNC: 27.2 UMOL/L
HDLC SERPL-MCNC: 40 MG/DL
HGB BLD-MCNC: 18.2 G/DL (ref 13–17.7)
LDL SERPL QN: 20.9 NM
LDL SERPL-SCNC: 2014 NMOL/L
LDL SMALL SERPL-SCNC: 788 NMOL/L
LDLC SERPL CALC-MCNC: 141 MG/DL (ref 0–99)
POTASSIUM SERPL-SCNC: 4.2 MMOL/L (ref 3.5–5.2)
PROT SERPL-MCNC: 6.2 G/DL (ref 6–8.5)
SHBG SERPL-SCNC: 39.2 NMOL/L (ref 19.3–76.4)
SODIUM SERPL-SCNC: 139 MMOL/L (ref 136–145)
T4 SERPL-MCNC: 5.57 MCG/DL (ref 4.5–11.7)
TESTOST FREE SERPL-MCNC: 11.6 PG/ML (ref 6.6–18.1)
TESTOST SERPL-MCNC: 624 NG/DL (ref 264–916)
TRIGL SERPL-MCNC: 238 MG/DL (ref 0–149)
TSH SERPL DL<=0.005 MIU/L-ACNC: 1.68 UIU/ML (ref 0.27–4.2)
URATE SERPL-MCNC: 4.5 MG/DL (ref 3.4–7)

## 2020-08-24 ENCOUNTER — TELEPHONE CONVERTED (OUTPATIENT)
Dept: UROLOGY | Facility: CLINIC | Age: 65
End: 2020-08-24
Attending: UROLOGY

## 2020-12-15 ENCOUNTER — HOSPITAL ENCOUNTER (OUTPATIENT)
Dept: OTHER | Facility: HOSPITAL | Age: 65
Discharge: HOME | End: 2020-12-15
Attending: UROLOGY

## 2020-12-15 LAB
ALBUMIN SERPL-MCNC: 4.1 G/DL (ref 3.5–5)
ALBUMIN/GLOB SERPL: 1.4 {RATIO} (ref 1.4–2.6)
ALP SERPL-CCNC: 72 U/L (ref 56–155)
ALT SERPL-CCNC: 28 U/L (ref 10–40)
AMPHETAMINES UR QL SCN: NEGATIVE
ANION GAP SERPL CALC-SCNC: 19 MMOL/L (ref 8–19)
AST SERPL-CCNC: 23 U/L (ref 15–50)
BARBITURATES UR QL SCN: NEGATIVE
BASOPHILS # BLD AUTO: 0.08 10*3/UL (ref 0–0.2)
BASOPHILS NFR BLD AUTO: 0.9 % (ref 0–3)
BENZODIAZ UR QL SCN: NEGATIVE
BILIRUB SERPL-MCNC: 0.31 MG/DL (ref 0.2–1.3)
BUN SERPL-MCNC: 16 MG/DL (ref 5–25)
BUN/CREAT SERPL: 12 {RATIO} (ref 6–20)
CALCIUM SERPL-MCNC: 9.4 MG/DL (ref 8.7–10.4)
CHLORIDE SERPL-SCNC: 102 MMOL/L (ref 99–111)
CONV ABS IMM GRAN: 0.15 10*3/UL (ref 0–0.2)
CONV CO2: 23 MMOL/L (ref 22–32)
CONV COCAINE, UR: NEGATIVE
CONV IMMATURE GRAN: 1.7 % (ref 0–1.8)
CONV TOTAL PROTEIN: 7.1 G/DL (ref 6.3–8.2)
CREAT UR-MCNC: 1.32 MG/DL (ref 0.7–1.2)
DEPRECATED RDW RBC AUTO: 39.9 FL (ref 35.1–43.9)
EOSINOPHIL # BLD AUTO: 0.1 10*3/UL (ref 0–0.7)
EOSINOPHIL # BLD AUTO: 1.1 % (ref 0–7)
ERYTHROCYTE [DISTWIDTH] IN BLOOD BY AUTOMATED COUNT: 13.4 % (ref 11.6–14.4)
GFR SERPLBLD BASED ON 1.73 SQ M-ARVRAT: 57 ML/MIN/{1.73_M2}
GLOBULIN UR ELPH-MCNC: 3 G/DL (ref 2–3.5)
GLUCOSE SERPL-MCNC: 73 MG/DL (ref 70–99)
HCT VFR BLD AUTO: 58.6 % (ref 42–52)
HGB BLD-MCNC: 19.4 G/DL (ref 14–18)
LYMPHOCYTES # BLD AUTO: 2.56 10*3/UL (ref 1–5)
LYMPHOCYTES NFR BLD AUTO: 28.3 % (ref 20–45)
MCH RBC QN AUTO: 28.4 PG (ref 27–31)
MCHC RBC AUTO-ENTMCNC: 33.1 G/DL (ref 33–37)
MCV RBC AUTO: 85.7 FL (ref 80–96)
METHADONE UR QL SCN: NEGATIVE
MONOCYTES # BLD AUTO: 1.59 10*3/UL (ref 0.2–1.2)
MONOCYTES NFR BLD AUTO: 17.6 % (ref 3–10)
NEUTROPHILS # BLD AUTO: 4.56 10*3/UL (ref 2–8)
NEUTROPHILS NFR BLD AUTO: 50.4 % (ref 30–85)
NRBC CBCN: 0 % (ref 0–0.7)
OPIATES TESTED UR SCN: NEGATIVE
OSMOLALITY SERPL CALC.SUM OF ELEC: 290 MOSM/KG (ref 273–304)
OXYCODONE UR QL SCN: NEGATIVE
PCP UR QL: NEGATIVE
PLATELET # BLD AUTO: 259 10*3/UL (ref 130–400)
PMV BLD AUTO: 9.9 FL (ref 9.4–12.4)
POTASSIUM SERPL-SCNC: 4.4 MMOL/L (ref 3.5–5.3)
PSA SERPL-MCNC: 5.46 NG/ML (ref 0–4)
RBC # BLD AUTO: 6.84 10*6/UL (ref 4.7–6.1)
SODIUM SERPL-SCNC: 140 MMOL/L (ref 135–147)
TESTOST SERPL-MCNC: 824 NG/DL (ref 193–740)
THC SERPLBLD CFM-MCNC: NEGATIVE NG/ML
WBC # BLD AUTO: 9.04 10*3/UL (ref 4.8–10.8)

## 2020-12-21 ENCOUNTER — OFFICE VISIT CONVERTED (OUTPATIENT)
Dept: UROLOGY | Facility: CLINIC | Age: 65
End: 2020-12-21
Attending: UROLOGY

## 2020-12-30 ENCOUNTER — HOSPITAL ENCOUNTER (OUTPATIENT)
Dept: LAB | Facility: HOSPITAL | Age: 65
Discharge: HOME | End: 2020-12-30
Attending: UROLOGY

## 2020-12-30 LAB
BASOPHILS # BLD AUTO: 0.09 10*3/UL (ref 0–0.2)
BASOPHILS NFR BLD AUTO: 0.8 % (ref 0–3)
CONV ABS IMM GRAN: 0.13 10*3/UL (ref 0–0.2)
CONV IMMATURE GRAN: 1.1 % (ref 0–1.8)
DEPRECATED RDW RBC AUTO: 39.3 FL (ref 35.1–43.9)
EOSINOPHIL # BLD AUTO: 0.06 10*3/UL (ref 0–0.7)
EOSINOPHIL # BLD AUTO: 0.5 % (ref 0–7)
ERYTHROCYTE [DISTWIDTH] IN BLOOD BY AUTOMATED COUNT: 12.8 % (ref 11.6–14.4)
HCT VFR BLD AUTO: 51.5 % (ref 42–52)
HGB BLD-MCNC: 17.4 G/DL (ref 14–18)
LYMPHOCYTES # BLD AUTO: 2.38 10*3/UL (ref 1–5)
LYMPHOCYTES NFR BLD AUTO: 20.5 % (ref 20–45)
MCH RBC QN AUTO: 28.6 PG (ref 27–31)
MCHC RBC AUTO-ENTMCNC: 33.8 G/DL (ref 33–37)
MCV RBC AUTO: 84.7 FL (ref 80–96)
MONOCYTES # BLD AUTO: 1.59 10*3/UL (ref 0.2–1.2)
MONOCYTES NFR BLD AUTO: 13.7 % (ref 3–10)
NEUTROPHILS # BLD AUTO: 7.35 10*3/UL (ref 2–8)
NEUTROPHILS NFR BLD AUTO: 63.4 % (ref 30–85)
NRBC CBCN: 0 % (ref 0–0.7)
PLATELET # BLD AUTO: 297 10*3/UL (ref 130–400)
PMV BLD AUTO: 10.6 FL (ref 9.4–12.4)
PSA SERPL-MCNC: 4.92 NG/ML (ref 0–4)
RBC # BLD AUTO: 6.08 10*6/UL (ref 4.7–6.1)
TESTOST SERPL-MCNC: 159 NG/DL (ref 193–740)
WBC # BLD AUTO: 11.6 10*3/UL (ref 4.8–10.8)

## 2021-01-08 ENCOUNTER — TELEPHONE CONVERTED (OUTPATIENT)
Dept: UROLOGY | Facility: CLINIC | Age: 66
End: 2021-01-08
Attending: UROLOGY

## 2021-02-01 ENCOUNTER — HOSPITAL ENCOUNTER (OUTPATIENT)
Dept: OTHER | Facility: HOSPITAL | Age: 66
Discharge: HOME | End: 2021-02-01

## 2021-02-08 ENCOUNTER — TELEPHONE CONVERTED (OUTPATIENT)
Dept: UROLOGY | Facility: CLINIC | Age: 66
End: 2021-02-08
Attending: UROLOGY

## 2021-04-22 ENCOUNTER — CONVERSION ENCOUNTER (OUTPATIENT)
Dept: NEUROLOGY | Facility: CLINIC | Age: 66
End: 2021-04-22

## 2021-04-22 ENCOUNTER — OFFICE VISIT CONVERTED (OUTPATIENT)
Dept: NEUROSURGERY | Facility: CLINIC | Age: 66
End: 2021-04-22
Attending: PHYSICIAN ASSISTANT

## 2021-05-05 ENCOUNTER — HOSPITAL ENCOUNTER (OUTPATIENT)
Dept: GENERAL RADIOLOGY | Facility: HOSPITAL | Age: 66
Discharge: HOME | End: 2021-05-05
Attending: PHYSICIAN ASSISTANT

## 2021-05-13 NOTE — PROGRESS NOTES
"   Progress Note      Patient Name: Jimmie Jamil   Patient ID: [id number]   Sex: Male   YOB: 1955    Primary Care Provider: Emelyn Santillan MD   Referring Provider: Emelyn Santillan MD    Visit Date: August 24, 2020    Provider: Zechariah Clay MD   Location: [facility]   Location Address: [address] [place] [address]  [id number]   Location Phone: [phone]          Chief Complaint  · pt here for urologic issues      History Of Present Illness  TELEHEALTH TELEPHONE VISIT  Jimmie Jamil is a 65 year old /White male who is presenting for evaluation via telehealth telephone visit. Verbal consent obtained before beginning visit.   Provider spent 11 minutes with the patient during the telehealth visit.   The following staff were present during this visit: tavares kelly   Past Medical History/ Overview of Patient Symptoms     1500 -  dose was cut in half    PSA    6/19  4.28  3/19  4.0  8/18 prostate biopsy 43 g negative  7/18  6.75  2/16  2.54\">65-year-old  gentleman who follows up for hypogonadism, BPH and ED    on 0.25 mL Depo testosterone subcu every 4 days.  No labs today.    Nocturia X 2-3, post void dribbing, not bothering him too bad.  Flomax did help in the past but could not tolerate    No GH.    Recently saw endocrinologist had labs drawn, H/H was 18.2/52, was counseled to give blood.    Previous    Was on 0.5 mL IM Depo-Testosterone every 9 days before.     Patient is seeing endocrinology and getting periodic phlebotomy to increase H/H    Flomax-  Patient did notice a stronger stream and felt he was doing better with urination, but he cannot tolerate secondary to side effects.  Nasal stuffiness, unusual feeling    using Cialis 20 mg.  Has not needed recently.    Has been on injections for several years    Sildenafil could not tolerate secondary to  side effects    No FH of prostate CA      No cardiopulmonary history.  Patient does not smoke.  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  Removed.  Normal bladder otherwise and normal retrogrades  Pathology negative    started initially for decreased libido and fatigue.  Did try AndroGel and patches in the past and did not like these b/c of side effects.    Total testosterone    8/20    668  2/20    806  10/19  995  11/19 683  8/19   251  7/19   928  6/19   504 - every 10 days  3/19   826  12/18  713  7/18  1069  7/18  933      6/17 563  6/16 246  2/16 >1500 -  dose was cut in half    PSA    6/19  4.28  3/19  4.0  8/18 prostate biopsy 43 g negative  7/18  6.75  2/16  2.54       Past Medical History  Bladder problem; Cervical radiculopathy; Cervical spinal stenosis; Cervicalgia; Cervicalgia; ED; Greater trochanteric bursitis of right hip; High blood pressure; High cholesterol; Hyperlipidemia; Hypertension, Benign Essential; Leg pain; Leg swelling; Lumbago; Paresthesia; Right hip pain; Urinary bladder incontinence         Past Surgical History  Cervical fusion using anterior approach; Colonoscopy; Cystoscopy; Prostate Biopsy; Shoulder surgery; Vasectomy         Medication List  aspirin 81 mg oral tablet,delayed release (DR/EC); enalapril maleate 20 mg oral tablet; Fish Oil 1,000 mg (120 mg-180 mg) oral capsule; Syringe 3cc/25Gx1\" 3 mL 25 gauge x 1\" miscellaneous syringe; testosterone cypionate 200 mg/mL intramuscular oil; verapamil 360 mg oral capsule,ext rel. pellets 24 hr         Allergy List  BETA-BLOCKERS (BETA-ADRENERGIC BLOCKING AGTS); Keflex; losartan; naproxen         Family Medical History  Colon Neoplasm, Malignant; Alzheimer's Disease; Hypertension; Family history of cancer; Family history of stroke         Social History  Alcohol (Current some day); lives with spouse; ; Tobacco (Never); Working         Review of Systems  · Constitutional  o Denies  o : chills, fever  · Gastrointestinal  o Denies  o : nausea, " "vomiting      Vitals  Date Time BP Position Site L\R Cuff Size HR RR TEMP (F) WT  HT  BMI kg/m2 BSA m2 O2 Sat HC       03/06/2020 01:05 PM       17  241lbs 16oz 5'  11\" 33.75 2.34                   Assessment  · Hypogonadism in male     257.2/E29.1  · Erectile dysfunction, unspecified erectile dysfunction type     607.84/N52.9  · Elevated PSA     790.93/R97.20  · Therapeutic drug monitoring     V58.83/Z51.81            Problems Reconciled  Plan  · Orders  o Physician Telephone Evaluation, 11-20 minutes (97248) - 257.2/E29.1, 607.84/N52.9, 790.93/R97.20 - 08/24/2020  o CMP Non-fasting [facility] (01279) - 257.2/E29.1 - 01/24/2021  o CBC with Auto Diff [facility] (34584) - 257.2/E29.1 - 01/24/2021  o Total testosterone (66440) - 257.2/E29.1 - 01/24/2021  o Urine Drug Screen ([facility]) (96763) - V58.83/Z51.81 - 01/24/2021  o PSA ultrasensitive DIAGNOSTIC [facility] (08349) - 790.93/R97.20 - 01/24/2021  · Medications  o Medications have been Reconciled  o Transition of Care or Provider Policy  · Instructions  o Plan Of Care:   o Electronically Identified Patient Education Materials Provided Electronically     Continue Depo-Testosterone 0.25 mL subcutaneously every 4 days.    10 mL vial get him about 5 months.      Patient had labs done reviewed, within normal limits other than a/H little high, he was going to get blood per endocrinology recommendations.    Follow-up in 5 months with CMP, CBC, total testosterone and PSA             Electronically Signed by: Zechariah Clay MD -Author on August 24, 2020 09:45:33 AM  "

## 2021-05-14 VITALS
SYSTOLIC BLOOD PRESSURE: 141 MMHG | RESPIRATION RATE: 73 BRPM | OXYGEN SATURATION: 100 % | BODY MASS INDEX: 34.19 KG/M2 | HEIGHT: 71 IN | WEIGHT: 244.19 LBS | TEMPERATURE: 97.5 F | DIASTOLIC BLOOD PRESSURE: 67 MMHG | HEART RATE: 83 BPM

## 2021-05-14 VITALS — RESPIRATION RATE: 16 BRPM | WEIGHT: 235.12 LBS | HEIGHT: 71 IN | BODY MASS INDEX: 32.92 KG/M2

## 2021-05-14 NOTE — PROGRESS NOTES
Progress Note      Patient Name: Jimmie Jamil   Patient ID: [id number]   Sex: Male   YOB: 1955    Primary Care Provider: Emelyn Santillan MD   Referring Provider: Emelyn Santillan MD    Visit Date: April 22, 2021    Provider: Zoë Welch PA-C   Location: [facility]   Location Address: [address] [place] [address]  [id number]   Location Phone: [phone]          Chief Complaint     Patient following up with chronic low back pain. Was referred to pain management at last appointment in 11/2019 and has kept treatment there since. Patient states after his last ablation he did not feel any improvement and has started having a significant increase in his pain. No new imaging       History Of Present Illness     Has had two lumbar RFAs since his last visit here in 2019.  Has not had a new MRI since 2019 that showed disc/osteophyte complexes and ddd with facet arthropathy.  Lifting makes pain worse up to a 7/10 and pain several days afterwards.  Pain is worse in the lower lumbar region.  Sitting up straight causes right leg pain in the posterior thigh down to the knee and then stands up and pain improves.  Pain causes his gait to be altered.  Just retired and pain effects quality of life.  Taking Skelaxin and diclofenac as needed, which helps some.  Home PT exercises given to him by his daughter who is an OT.       Past Medical History  Bladder problem; Cervical radiculopathy; Cervical spinal stenosis; Cervicalgia; Cervicalgia; ED; Greater trochanteric bursitis of right hip; High blood pressure; High cholesterol; Hyperlipidemia; Hypertension, Benign Essential; Leg pain; Leg swelling; Lumbago; Paresthesia; Right hip pain; Urinary bladder incontinence         Past Surgical History  Cervical fusion using anterior approach; Colonoscopy; Cystoscopy; Prostate Biopsy; Shoulder surgery; Vasectomy         Medication List  aspirin 81 mg oral tablet,delayed release (DR/EC);  "enalapril maleate 20 mg oral tablet; Fish Oil 1,000 mg (120 mg-180 mg) oral capsule; Syringe 3cc/25Gx1\" 3 mL 25 gauge x 1\" miscellaneous syringe; testosterone cypionate 200 mg/mL intramuscular oil; verapamil 360 mg oral capsule,ext rel. pellets 24 hr         Allergy List  BETA-BLOCKERS (BETA-ADRENERGIC BLOCKING AGTS); Keflex; losartan; naproxen       Allergies Reconciled  Family Medical History  Colon Neoplasm, Malignant; Alzheimer's Disease; Hypertension; Family history of cancer; Family history of stroke         Social History  Alcohol (Current some day); lives with spouse; ; Tobacco (Never); Working         Review of Systems  · Constitutional  o Denies  o : chills, excessive sweating, fatigue, fever, sycope/passing out, weight gain, weight loss  · Eyes  o Denies  o : changes in vision, blurry vision, double vision  · HENT  o Denies  o : loss of hearing, ringing in the ears, ear aches, sore throat, nasal congestion, sinus pain, nose bleeds, seasonal allergies  · Cardiovascular  o Denies  o : blood clots, swollen legs, anemia, easy burising or bleeding, transfusions  · Respiratory  o Denies  o : shortness of breath, dry cough, productive cough, pneumonia, COPD  · Gastrointestinal  o Denies  o : difficulty swallowing, reflux  · Genitourinary  o Denies  o : incontinence  · Neurologic  o Denies  o : headache, seizure, stroke, tremor, loss of balance, falls, dizziness/vertigo, difficulty with sleep, numbness/tingling/paresthesia , difficulty with coordination, difficulty with dexterity, weakness  · Musculoskeletal  o Admits  o : weakness, sciatica, pain radiating in leg, low back pain  o Denies  o : neck stiffness/pain, swollen lymph nodes, muscle aches, joint pain, spasms, pain radiating in arm  · Endocrine  o Denies  o : diabetes, thyroid disorder  · Psychiatric  o Denies  o : anxiety, depression  · All Others Negative      Vitals  Date Time BP Position Site L\R Cuff Size HR RR TEMP (F) WT  HT  BMI kg/m2 BSA " "m2 O2 Sat FR L/min FiO2 HC       04/22/2021 01:01 /67 Sitting    83 - R 73 97.5 244lbs 3oz 5'  11\" 34.06 2.36 100 %            Physical Examination  · Constitutional  o Appearance  o : well-nourished, well developed, alert, in no acute distress  · Respiratory  o Respiratory Effort  o : breathing unlabored  · Cardiovascular  o Peripheral Vascular System  o :   § Extremities  § : no cyanosis, clubbing or edema; less than 2 second refill noted  · Neurologic  o Mental Status Examination  o :   § Orientation  § : grossly oriented to person, place and time  o Motor Examination  o :   § RLE Strength  § : strength normal  § RLE Motor Function  § : tone normal, no atrophy, no abnormal movements noted  § LLE Strength  § : strength normal  § LLE Motor Function  § : tone normal, no atrophy, no abnormal movements noted  o Reflexes  o :   § RLE  § : 1/4  § LLE  § : 1/4  o Sensation  o :   § Light Touch  § : sensation intact to light touch in extremities  · Psychiatric  o Mood and Affect  o : mood normal, affect appropriate     ttp in the lower lumbar region           Assessment  · Lumbago     724.2/M54.5  · Lumbar facet arthropathy     721.3/M47.816  · Lumbar degenerative disc disease     722.52/M51.36      Plan  · Orders  o MRI lumbar spine wo contrast ([id number]) - 724.2/M54.5, 721.3/M47.816, 722.52/M51.36 - 04/22/2021   CHELSEA  · Medications  o Medications have been Reconciled  o Transition of Care or Provider Policy  · Instructions  o He will have a new MRI lumbar spine and f/u to discuss results. The lumbar RFA is no longer helping the pain.             Electronically Signed by: Zoë Welch PA-C -Author on April 22, 2021 01:16:21 PM  "

## 2021-05-14 NOTE — PROGRESS NOTES
"   Progress Note      Patient Name: Jimmie Jamil   Patient ID: [id number]   Sex: Male   YOB: 1955    Primary Care Provider: Emelyn Santillan MD   Referring Provider: Emelyn Santillan MD    Visit Date: January 8, 2021    Provider: Zechariah Clay MD   Location: [facility]   Location Address: [address] [place] [address]  [id number]   Location Phone: [phone]          Chief Complaint  · pt here for urologic issues      History Of Present Illness     1500 -  dose was cut in half    PSA    12/20  4.92  12/20 5.46  6/19  4.28  3/19  4.0  8/18 prostate biopsy 43 g - negative  7/18  6.75  2/16  2.54\">Telephone call    12 minutes used.    A [place] present.    65-year-old  gentleman who follows up for hypogonadism, BPH and ED    pt donated blood.      12/30/20  H/H  17/51  12/20 H/H     19.4/58    on 0.25 mL Depo testosterone subcu every 4 days.  Gets a 10 mL vial about every 5 months.      Has held this since we called him the other day    Nocturia X 1, improved., post void dribbing, not bothering him too bad.     Endocrinologist that he saw earlier that  had recommended therapeutic phlebotomy has retired.  He had recommend he do this every so often, but at this point has not done this in a while because of Covid.    No GH.    Previous    Flomax  - could not tolerate    Was on 0.5 mL IM Depo-Testosterone every 9 days before.     Patient is seeing endocrinology and getting periodic phlebotomy to increase H/H    Flomax-  Patient did notice a stronger stream and felt he was doing better with urination, but he cannot tolerate secondary to side effects.  Nasal stuffiness, unusual feeling    using Cialis 20 mg.  Has not needed recently.    Has been on injections for several years    Sildenafil could not tolerate secondary to  side effects    No FH of prostate CA      No cardiopulmonary history.  Patient does not smoke.  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  Removed.  Normal bladder otherwise and normal retrogrades  Pathology negative    started initially for decreased libido and fatigue.  Did try AndroGel and patches in the past and did not like these b/c of side effects.    Total testosterone    12/30/20  159  12/20  824  8/20    668  2/20    806  10/19  995  11/19 683  8/19   251  7/19   928  6/19   504 - every 10 days  3/19   826  12/18  713  7/18  1069  7/18  933      6/17 563  6/16 246  2/16 >1500 -  dose was cut in half    PSA    12/20  4.92  12/20 5.46  6/19  4.28  3/19  4.0  8/18 prostate biopsy 43 g - negative  7/18  6.75  2/16  2.54       Past Medical History  Bladder problem; Cervical radiculopathy; Cervical spinal stenosis; Cervicalgia; Cervicalgia; ED; Greater trochanteric bursitis of right hip; High blood pressure; High cholesterol; Hyperlipidemia; Hypertension, Benign Essential; Leg pain; Leg swelling; Lumbago; Paresthesia; Right hip pain; Urinary bladder incontinence         Past Surgical History  Cervical fusion using anterior approach; Colonoscopy; Cystoscopy; Prostate Biopsy; Shoulder surgery; Vasectomy         Medication List  aspirin 81 mg oral tablet,delayed release (DR/EC); enalapril maleate 20 mg oral tablet; Fish Oil 1,000 mg (120 mg-180 mg) oral capsule; Syringe 3cc/25Gx1\" 3 mL 25 gauge x 1\" miscellaneous syringe; testosterone cypionate 200 mg/mL intramuscular oil; verapamil 360 mg oral capsule,ext rel. pellets 24 hr         Allergy List  BETA-BLOCKERS (BETA-ADRENERGIC BLOCKING AGTS); Keflex; losartan; naproxen         Family Medical History  Colon Neoplasm, Malignant; Alzheimer's Disease; Hypertension; Family history of cancer; Family history of stroke         Social History  Alcohol (Current some day); lives with spouse; ; Tobacco (Never); Working         Review of Systems  · Constitutional  o Denies  o : chills, fever  · Gastrointestinal  o Denies  o : nausea, " vomiting          Assessment  · Hypogonadism in male     257.2/E29.1  · Erectile dysfunction, unspecified erectile dysfunction type     607.84/N52.9  · Elevated PSA     790.93/R97.20  · Therapeutic drug monitoring     V58.83/Z51.81              Plan  · Orders  o Physician Telephone Evaluation, 11-20 minutes (30018) - 257.2/E29.1, 607.84/N52.9, 790.93/R97.20 - 01/08/2021  o MRI prostate wo then w contrast (64976) - 257.2/E29.1, 790.93/R97.20 - 01/20/2021   Scheduled 1/20/2021 at 4:40PM, arrive at 4:10PM at [facility]. [address] [facility], 2nd floor registration desk  · Medications  o Medications have been Reconciled  o Transition of Care or Provider Policy  · Instructions  o Electronically Identified Patient Education Materials Provided Electronically       Hypogonadism    Patient will spread out  his doses because of a recent increase in H/H.  Patient will start doing 0.25 mL Depo testosterone subcu every 5 days.  Patient likes to use 1  10 mL vial and get a refill after    Increased H/H    This has improved, we will recheck in a few months.    Patient is still supposed to follow-up with endocrinology to see if he can do therapeutic phlebotomy    Elevated PSA    PSA still elevated, we did discuss her some risk of the prostate cancer after discussion  -  MRI prostate.  Patient understands we are ruling out a malignancy and he must follow-up.                 Electronically Signed by: Zechariah Clay MD -Author on January 9, 2021 07:31:43 AM

## 2021-05-14 NOTE — PROGRESS NOTES
"   Progress Note      Patient Name: Jimmie Jamil   Patient ID: [id number]   Sex: Male   YOB: 1955    Primary Care Provider: Emelyn Santillan MD   Referring Provider: Emelyn Santillan MD    Visit Date: December 21, 2020    Provider: Zechariah Clay MD   Location: [facility]   Location Address: [address] [place] [address]  [id number]   Location Phone: [phone]          Chief Complaint  · urologic issues      History Of Present Illness     1500 -  dose was cut in half    PSA    12/20 5.46  6/19  4.28  3/19  4.0  8/18 prostate biopsy 43 g negative  7/18  6.75  2/16  2.54\">65-year-old  gentleman who follows up for hypogonadism, BPH and ED    Patient comes in today after having had elevated H/H on recent labs, also testosterone was high    12/20 H/H   19.4/58    on 0.25 mL Depo testosterone subcu every 4 days.  No labs today.  Gets a 10 mL vial about every 5 months.    Has held this since we called him the other day    Nocturia X 1, improved., post void dribbing, not bothering him too bad.     Endocrinologist that he saw earlier that  had recommended therapeutic phlebotomy has retired.  He had recommend he do this every so often, but at this point has not done this in a while because of Covid.    No GH.    Previous    Flomax  - could not tolerate    Was on 0.5 mL IM Depo-Testosterone every 9 days before.     Patient is seeing endocrinology and getting periodic phlebotomy to increase H/H    Flomax-  Patient did notice a stronger stream and felt he was doing better with urination, but he cannot tolerate secondary to side effects.  Nasal stuffiness, unusual feeling    using Cialis 20 mg.  Has not needed recently.    Has been on injections for several years    Sildenafil could not tolerate secondary to  side effects    No FH of prostate CA      No cardiopulmonary history.  Patient does not smoke.  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  Removed.  Normal bladder otherwise and normal retrogrades  Pathology negative    started initially for decreased libido and fatigue.  Did try AndroGel and patches in the past and did not like these b/c of side effects.    Total testosterone    12/20  824  8/20    668  2/20    806  10/19  995  11/19 683  8/19   251  7/19   928  6/19   504 - every 10 days  3/19   826  12/18  713  7/18  1069  7/18  933      6/17 563  6/16 246  2/16 >1500 -  dose was cut in half    PSA    12/20 5.46  6/19  4.28  3/19  4.0  8/18 prostate biopsy 43 g negative  7/18  6.75  2/16  2.54       Past Medical History  Bladder problem; Cervical radiculopathy; Cervical spinal stenosis; Cervicalgia; Cervicalgia; ED; Greater trochanteric bursitis of right hip; High blood pressure; High cholesterol; Hyperlipidemia; Hypertension, Benign Essential; Leg pain; Leg swelling; Lumbago; Paresthesia; Right hip pain; Urinary bladder incontinence         Past Surgical History  Cervical fusion using anterior approach; Colonoscopy; Cystoscopy; Prostate Biopsy; Shoulder surgery; Vasectomy         Medication List  aspirin 81 mg oral tablet,delayed release (DR/EC); enalapril maleate 20 mg oral tablet; Fish Oil 1,000 mg (120 mg-180 mg) oral capsule; Syringe 3cc/25Gx1\" 3 mL 25 gauge x 1\" miscellaneous syringe; testosterone cypionate 200 mg/mL intramuscular oil; verapamil 360 mg oral capsule,ext rel. pellets 24 hr         Allergy List  BETA-BLOCKERS (BETA-ADRENERGIC BLOCKING AGTS); Keflex; losartan; naproxen         Family Medical History  Colon Neoplasm, Malignant; Alzheimer's Disease; Hypertension; Family history of cancer; Family history of stroke         Social History  Alcohol (Current some day); lives with spouse; ; Tobacco (Never); Working         Review of Systems  · Constitutional  o Denies  o : chills  · Respiratory  o Denies  o : cough  · Gastrointestinal  o Denies  o : nausea      Vitals  Date Time BP " "Position Site L\R Cuff Size HR RR TEMP (F) WT  HT  BMI kg/m2 BSA m2 O2 Sat FR L/min FiO2        12/21/2020 10:40 AM       16  235lbs 2oz 5'  11\" 32.79 2.31             Physical Examination  · Constitutional  o Appearance  o : Well-appearing, well-developed, in no acute distress  · Respiratory  o Respiratory Effort  o : Unlabored breathing  · Cardiovascular  o Heart  o :   § Auscultation of Heart  § : Regular rate and rhythm, no murmurs  · Neurologic  o Mental Status Examination  o :   § Orientation  § : Grossly oriented to person, place and time, judgment and insight intact, normal mood and affect              Assessment  · Hypogonadism in male     257.2/E29.1  · Erectile dysfunction, unspecified erectile dysfunction type     607.84/N52.9  · Elevated PSA     790.93/R97.20  · Therapeutic drug monitoring     V58.83/Z51.81              Plan  · Orders  o CBC with Auto Diff [facility] (50082) - 257.2/E29.1 - 01/04/2021  o PSA ultrasensitive DIAGNOSTIC [facility] (52951) - 790.93/R97.20 - 01/04/2021  o Total testosterone (64031) - 257.2/E29.1 - 01/04/2021  · Medications  o Medications have been Reconciled  o Transition of Care or Provider Policy  · Instructions  o Electronically Identified Patient Education Materials Provided Electronically     Hypogonadism    Patient will stop testosterone at this time, we discussed that continuing testosterone replacement with a high H&H can be detrimental to his health or cause death.  Patient has  voiced understanding and will stop T replacement at this time    Did discuss with the patient once he starts back on testosterone replacement we will consider him doing 0.25 mL subcu every 5 days instead of every 4 days as testosterone has been running borderline high at times    Elevated PSA    Since PSA higher than last check, I will repeat in a few days and we will discuss, if this is this high we may consider MRI prostate.  Did discuss being on testosterone with elevated PSA can increase risk " theoretically of prostate cancer or worsening prostate cancer.    Patient will donate blood and we will repeat his labs.  Because his endocrinologist has retired he will go ahead make an appoint with one of the partners to get in to see if this is something that he can continue to do long-term.  I did discuss with the patient that I am not comfortable giving him order for therapeutic phlebotomy, this must come through an endocrinologist or hematologist/oncologist.    Follow-up after labs.    Greater than 15 minutes was used in counseling and coordination of care, with greater than 51% of this in face-to-face counseling                 Electronically Signed by: Zechariah Clay MD -Author on December 21, 2020 12:35:27 PM

## 2021-05-14 NOTE — PROGRESS NOTES
"   Progress Note      Patient Name: Jimmie Jamil   Patient ID: [id number]   Sex: Male   YOB: 1955    Primary Care Provider: Emelyn Santillan MD   Referring Provider: Emelyn Santillan MD    Visit Date: February 8, 2021    Provider: Zechariah Clay MD   Location: [facility]   Location Address: [address] [place] [address]  [id number]   Location Phone: [phone]          Chief Complaint  · urological issues      History Of Present Illness     1500 -  dose was cut in half    PSA    2/21 MRI prostate -49 g -negative, chronic prostatitis  12/20  4.92  12/20 5.46  6/19  4.28  3/19  4.0  8/18 prostate biopsy 43 g - negative  7/18  6.75  2/16  2.54\">Telephone call    8 minutes used.    A Giovanny present.    65-year-old  gentleman who follows up for hypogonadism, BPH and ED    Patient has had some trouble with increased testosterone and also increased H/H.    We decreased his doses last visit.  He also donated blood.  Since then his HSA just come back lower    12/30/20  H/H  17/51  12/20 H/H     19.4/58    changed to doing 0.25 mL Depo testosterone subcu every 5 days in 1/21.  Gets a 10 mL vial about every 5 months.    Testosterone was running high.    Patient cannot really tell any difference as far as libido when fatigue.  He is doing okay    Voiding okay at this time.  Nocturia X 1, not bothersome    Previous    Endocrinologist that he saw earlier that  had recommended therapeutic phlebotomy has retired.    Flomax  - could not tolerate, helped urination.  Nasal stuffiness, unusual feeling    Was on 0.5 mL IM Depo-Testosterone every 9 days before.     using Cialis 20 mg.  Has not needed recently.    Has been on injections for several years    Sildenafil could not tolerate secondary to  side effects    No FH of prostate CA      No cardiopulmonary history.  Patient does not smoke.  Aspirin 325 daily    1/14  cystoscopy with bilateral retrograde pyelograms and urethral " Telephone Encounter by Hiral Guerra at 08/02/17 04:55 PM     Author:  Hiral Guerra Service:  (none) Author Type:       Filed:  08/02/17 04:55 PM Encounter Date:  8/1/2017 Status:  Signed     :  Hiral Guerra ()            Patient returning call below. 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  Removed.  Normal bladder otherwise and normal retrogrades  Pathology negative    started initially for decreased libido and fatigue.  Did try AndroGel and patches in the past and did not like these b/c of side effects.    Total testosterone    12/30/20  159  12/20  824  8/20    668  2/20    806  10/19  995  11/19 683  8/19   251  7/19   928  6/19   504 - every 10 days  3/19   826  12/18  713  7/18  1069  7/18  933      6/17 563  6/16 246  2/16 >1500 -  dose was cut in half    PSA    2/21 MRI prostate -49 g -negative, chronic prostatitis  12/20  4.92  12/20 5.46  6/19  4.28  3/19  4.0  8/18 prostate biopsy 43 g - negative  7/18  6.75  2/16  2.54       Past Medical History  Bladder problem; Cervical radiculopathy; Cervical spinal stenosis; Cervicalgia; Cervicalgia; ED; Greater trochanteric bursitis of right hip; High blood pressure; High cholesterol; Hyperlipidemia; Hypertension, Benign Essential; Leg pain; Leg swelling; Lumbago; Paresthesia; Right hip pain; Urinary bladder incontinence         Past Surgical History  Cervical fusion using anterior approach; Colonoscopy; Cystoscopy; Prostate Biopsy; Shoulder surgery; Vasectomy         Medication List  aspirin 81 mg oral tablet,delayed release (DR/EC); enalapril maleate 20 mg oral tablet; Fish Oil 1,000 mg (120 mg-180 mg) oral capsule; Syringe 3cc/25Gx1\" 3 mL 25 gauge x 1\" miscellaneous syringe; testosterone cypionate 200 mg/mL intramuscular oil; verapamil 360 mg oral capsule,ext rel. pellets 24 hr         Allergy List  BETA-BLOCKERS (BETA-ADRENERGIC BLOCKING AGTS); Keflex; losartan; naproxen         Family Medical History  Colon Neoplasm, Malignant; Alzheimer's Disease; Hypertension; Family history of cancer; Family history of stroke         Social History  Alcohol (Current some day); lives with spouse; ; Tobacco (Never); Working         Review of Systems  · Constitutional  o Denies  o : fatigue, night " sweats  · Eyes  o Denies  o : double vision, blurred vision  · HENT  o Denies  o : vertigo, recent head injury  · Breasts  o Denies  o : abnormal changes in breast size, additional breast symptoms except as noted in the HPI  · Cardiovascular  o Denies  o : chest pain, irregular heart beats  · Respiratory  o Denies  o : shortness of breath, productive cough  · Gastrointestinal  o Denies  o : nausea, vomiting  · Genitourinary  o Denies  o : dysuria, urinary retention  · Integument  o Denies  o : hair growth change, new skin lesions  · Neurologic  o Denies  o : altered mental status, seizures  · Musculoskeletal  o Denies  o : joint swelling, limitation of motion  · Endocrine  o Denies  o : cold intolerance, heat intolerance  · Heme-Lymph  o Denies  o : petechiae, lymph node enlargement or tenderness  · Allergic-Immunologic  o Denies  o : frequent illnesses          Assessment  · Hypogonadism in male     257.2/E29.1  · Erectile dysfunction, unspecified erectile dysfunction type     607.84/N52.9  · Elevated PSA     790.93/R97.20  · Therapeutic drug monitoring     V58.83/Z51.81              Plan  · Orders  o Physican Telephone evaluation, 5-10 min (71644) - 257.2/E29.1, 607.84/N52.9, 790.93/R97.20 - 02/08/2021  o Total testosterone (29594) - 257.2/E29.1, V58.83/Z51.81 - 05/08/2021  o Total testosterone (32214) - 257.2/E29.1, V58.83/Z51.81 - 08/08/2021  o CBC with Auto Diff [facility] (06734) - 257.2/E29.1, 607.84/N52.9, 790.93/R97.20 - 08/08/2021  o CMP Non-fasting [facility] (54761) - 257.2/E29.1, 607.84/N52.9, 790.93/R97.20 - 08/08/2021  o PSA ultrasensitive DIAGNOSTIC [facility] (67278) - 790.93/R97.20 - 08/08/2021  · Medications  o Medications have been Reconciled  o Transition of Care or Provider Policy  · Instructions  o Electronically Identified Patient Education Materials Provided Electronically     Hypogonadism    Continue 0.25 mL Depo testosterone subcu every 5 days.  Patient likes to use 1 - 10 mL vial and get refill  after    Patient will do total testosterone in 3 months, he will follow-up in the office in 6 months with total testosterone, CBC, CMP and PSA    Elevated PSA    MRI negative, patient given reassurance.  At this time we will follow his PSA conservatively.  We discussed this is likely from asymptomatic prostatitis.             Electronically Signed by: Zechariah Clay MD -Author on February 8, 2021 08:19:07 AM

## 2021-05-15 VITALS
HEIGHT: 71 IN | BODY MASS INDEX: 33.1 KG/M2 | WEIGHT: 236.44 LBS | SYSTOLIC BLOOD PRESSURE: 132 MMHG | DIASTOLIC BLOOD PRESSURE: 64 MMHG

## 2021-05-15 VITALS — HEIGHT: 71 IN | RESPIRATION RATE: 16 BRPM | BODY MASS INDEX: 33.04 KG/M2 | WEIGHT: 236 LBS

## 2021-05-15 VITALS — BODY MASS INDEX: 33.46 KG/M2 | HEIGHT: 71 IN | RESPIRATION RATE: 16 BRPM | WEIGHT: 239 LBS

## 2021-05-15 VITALS — BODY MASS INDEX: 33.04 KG/M2 | HEIGHT: 71 IN | WEIGHT: 236 LBS | RESPIRATION RATE: 16 BRPM

## 2021-05-15 VITALS
SYSTOLIC BLOOD PRESSURE: 133 MMHG | BODY MASS INDEX: 32.98 KG/M2 | HEIGHT: 71 IN | WEIGHT: 235.56 LBS | DIASTOLIC BLOOD PRESSURE: 76 MMHG

## 2021-05-15 VITALS — WEIGHT: 242 LBS | RESPIRATION RATE: 17 BRPM | HEIGHT: 71 IN | BODY MASS INDEX: 33.88 KG/M2

## 2021-05-15 VITALS — WEIGHT: 245 LBS | HEIGHT: 71 IN | RESPIRATION RATE: 16 BRPM | BODY MASS INDEX: 34.3 KG/M2

## 2021-05-16 VITALS — HEIGHT: 71 IN | BODY MASS INDEX: 35.42 KG/M2 | WEIGHT: 253 LBS | RESPIRATION RATE: 16 BRPM

## 2021-05-16 VITALS — BODY MASS INDEX: 35.56 KG/M2 | WEIGHT: 254 LBS | RESPIRATION RATE: 14 BRPM | HEIGHT: 71 IN

## 2021-05-16 VITALS — BODY MASS INDEX: 35.56 KG/M2 | RESPIRATION RATE: 14 BRPM | HEIGHT: 71 IN | WEIGHT: 254 LBS

## 2021-05-16 VITALS — BODY MASS INDEX: 35 KG/M2 | WEIGHT: 250 LBS | HEIGHT: 71 IN | RESPIRATION RATE: 16 BRPM

## 2021-05-16 VITALS — RESPIRATION RATE: 14 BRPM | WEIGHT: 254 LBS | BODY MASS INDEX: 35.56 KG/M2 | HEIGHT: 71 IN

## 2021-05-16 VITALS — BODY MASS INDEX: 35 KG/M2 | RESPIRATION RATE: 16 BRPM | HEIGHT: 71 IN | WEIGHT: 250 LBS

## 2021-06-01 ENCOUNTER — OFFICE VISIT CONVERTED (OUTPATIENT)
Dept: NEUROLOGY | Facility: CLINIC | Age: 66
End: 2021-06-01
Attending: NURSE PRACTITIONER

## 2021-06-05 NOTE — PROGRESS NOTES
Progress Note      Patient Name: Jimmie Jamil   Patient ID: [id number]   Sex: Male   YOB: 1955    Primary Care Provider: Emelyn Santillan MD   Referring Provider: Emelyn Santillan MD    Visit Date: June 1, 2021    Provider: HAN Salgado   Location: [facility]   Location Address: [address] [place] [address]  [id number]   Location Phone: [phone]          Chief Complaint     Patient following up after recent MRI of Lumbar Spine conducted at [facility]       History Of Present Illness     MRI Lumbar Spine shows severe DDD at L5/S1 without canal stenosis, mild bilateral foraminal narrowing. Disc bulging and facet arthropathy causing mild canal and foraminal narrowing at L2/3 and L3/4.    His pain is primarily across the low back, worse with heavy lifting and bending and twisting. He is also having some right lateral thigh pain and hip pain.    Interval History with SUZANNE Lerma  Has had two lumbar RFAs since his last visit here in 2019.  Has not had a new MRI since 2019 that showed disc/osteophyte complexes and ddd with facet arthropathy.  Lifting makes pain worse up to a 7/10 and pain several days afterwards.  Pain is worse in the lower lumbar region.  Sitting up straight causes right leg pain in the posterior thigh down to the knee and then stands up and pain improves.  Pain causes his gait to be altered.  Just retired and pain effects quality of life.  Taking Skelaxin and diclofenac as needed, which helps some.  Home PT exercises given to him by his daughter who is an OT.       Past Medical History  Bladder problem; Cervical radiculopathy; Cervical spinal stenosis; Cervicalgia; Cervicalgia; ED; Greater trochanteric bursitis of right hip; High blood pressure; High cholesterol; Hyperlipidemia; Hypertension, Benign Essential; Leg pain; Leg swelling; Lumbago; Paresthesia; Right hip pain; Urinary bladder incontinence         Past Surgical  "History  Cervical fusion using anterior approach; Colonoscopy; Cystoscopy; Prostate Biopsy; Shoulder surgery; Vasectomy         Medication List  aspirin 81 mg oral tablet,delayed release (DR/EC); diclofenac sodium 50 mg oral tablet,delayed release (DR/EC); enalapril maleate 20 mg oral tablet; Fish Oil 1,000 mg (120 mg-180 mg) oral capsule; Skelaxin 800 mg oral tablet; Syringe 3cc/25Gx1\" 3 mL 25 gauge x 1\" miscellaneous syringe; testosterone cypionate 200 mg/mL intramuscular oil; Vascepa 1 gram oral capsule; verapamil 360 mg oral capsule,ext rel. pellets 24 hr         Allergy List  BETA-BLOCKERS (BETA-ADRENERGIC BLOCKING AGTS); Keflex; losartan; naproxen       Allergies Reconciled  Family Medical History  Colon Neoplasm, Malignant; Alzheimer's Disease; Hypertension; Family history of cancer; Family history of stroke         Social History  Alcohol (Current some day); lives with spouse; ; Tobacco (Never); Working         Review of Systems  · Constitutional  o Denies  o : chills, excessive sweating, fatigue, fever, sycope/passing out, weight gain, weight loss  · Eyes  o Denies  o : changes in vision, blurry vision, double vision  · HENT  o Denies  o : loss of hearing, ringing in the ears, ear aches, sore throat, nasal congestion, sinus pain, nose bleeds, seasonal allergies  · Cardiovascular  o Denies  o : blood clots, swollen legs, anemia, easy burising or bleeding, transfusions  · Respiratory  o Denies  o : shortness of breath, dry cough, productive cough, pneumonia, COPD  · Gastrointestinal  o Denies  o : difficulty swallowing, reflux  · Genitourinary  o Denies  o : incontinence  · Neurologic  o Denies  o : headache, seizure, stroke, tremor, loss of balance, falls, dizziness/vertigo, difficulty with sleep, numbness/tingling/paresthesia , difficulty with coordination, difficulty with dexterity, weakness  · Musculoskeletal  o Admits  o : weakness, sciatica, pain radiating in leg, low back pain  o Denies  o : " "neck stiffness/pain, swollen lymph nodes, muscle aches, joint pain, spasms, pain radiating in arm  · Endocrine  o Denies  o : diabetes, thyroid disorder  · Psychiatric  o Denies  o : anxiety, depression  · All Others Negative      Vitals  Date Time BP Position Site L\R Cuff Size HR RR TEMP (F) WT  HT  BMI kg/m2 BSA m2 O2 Sat FR L/min FiO2 HC       06/01/2021 10:57 /68 Sitting    61 - R 84 97.5 235lbs 7oz 5'  11\" 32.84 2.31 98 %            Physical Examination  · Constitutional  o Appearance  o : well-nourished, well developed, alert, in no acute distress  · Respiratory  o Respiratory Effort  o : breathing unlabored  · Cardiovascular  o Peripheral Vascular System  o :   § Extremities  § : no cyanosis, clubbing or edema; less than 2 second refill noted  · Neurologic  o Mental Status Examination  o :   § Orientation  § : grossly oriented to person, place and time  o Motor Examination  o :   § RLE Strength  § : strength normal  § RLE Motor Function  § : tone normal, no atrophy, no abnormal movements noted  § LLE Strength  § : strength normal  § LLE Motor Function  § : tone normal, no atrophy, no abnormal movements noted  o Reflexes  o :   § RLE  § : 0/4  § LLE  § : 0/4  o Sensation  o :   § Light Touch  § : sensation intact to light touch in extremities  · Psychiatric  o Mood and Affect  o : mood normal, affect appropriate     ttp in the lower lumbar region  TTP in the right hip           Assessment  · Lumbago     724.2/M54.5  · Lumbar facet arthropathy     721.3/M47.816  · Lumbar degenerative disc disease     722.52/M51.36  · Hip pain     719.45/M25.559  · Lumbosacral radiculopathy at S1     724.4/M54.17      Plan  · Orders  o PAIN MANAGEMENT CONSULTATION (PAINM) - 724.2/M54.5, 721.3/M47.816, 722.52/M51.36, 724.4/M54.17 - 06/01/2021   [facility] in [place]. Eval for LESI L5/S1.  · Medications  o Medrol (Gonzalo) 4 mg oral tablets,dose pack   SIG: take by oral route as directed per package instructions   DISP: (1) Blister with " 0 refills  Prescribed on 06/01/2021     o Skelaxin 800 mg oral tablet   SIG: take 1 tablet (800 mg) by oral route 3-4 times daily as needed   DISP: (120) Tablet with 2 refills  Prescribed on 06/01/2021     · Instructions  o Refer to Pain Management of LESI L5/S1.  o Refill Skelaxin 800 mg 3-4 times a day as needed.  o Medrol Dospak.  o Follow up in 6 weeks.            Electronically Signed by: HAN Salgado -Author on June 1, 2021 11:50:56 AM

## 2021-06-14 NOTE — TELEPHONE ENCOUNTER
Patient wife Stephanie called and said pt needs a refill on the testosterone please. One bottle short til his appt. Pharmacy right in chart. Appt 8-16-21.

## 2021-06-15 ENCOUNTER — LAB (OUTPATIENT)
Dept: LAB | Facility: HOSPITAL | Age: 66
End: 2021-06-15

## 2021-06-15 ENCOUNTER — TRANSCRIBE ORDERS (OUTPATIENT)
Dept: LAB | Facility: HOSPITAL | Age: 66
End: 2021-06-15

## 2021-06-15 DIAGNOSIS — Z51.81 ENCOUNTER FOR THERAPEUTIC DRUG MONITORING: ICD-10-CM

## 2021-06-15 DIAGNOSIS — E29.1 HYPOGONADISM MALE: ICD-10-CM

## 2021-06-15 DIAGNOSIS — N52.9 ERECTILE DYSFUNCTION, UNSPECIFIED ERECTILE DYSFUNCTION TYPE: ICD-10-CM

## 2021-06-15 DIAGNOSIS — R97.20 ELEVATED PSA: ICD-10-CM

## 2021-06-15 DIAGNOSIS — E29.1 HYPOGONADISM MALE: Primary | ICD-10-CM

## 2021-06-15 DIAGNOSIS — E29.1 HYPOGONADISM IN MALE: Primary | ICD-10-CM

## 2021-06-15 LAB
ALBUMIN SERPL-MCNC: 4.1 G/DL (ref 3.5–5.2)
ALBUMIN/GLOB SERPL: 1.6 G/DL
ALP SERPL-CCNC: 70 U/L (ref 39–117)
ALT SERPL W P-5'-P-CCNC: 27 U/L (ref 1–41)
ANION GAP SERPL CALCULATED.3IONS-SCNC: 9.3 MMOL/L (ref 5–15)
AST SERPL-CCNC: 26 U/L (ref 1–40)
BASOPHILS # BLD AUTO: 0.05 10*3/MM3 (ref 0–0.2)
BASOPHILS NFR BLD AUTO: 0.8 % (ref 0–1.5)
BILIRUB SERPL-MCNC: 0.4 MG/DL (ref 0–1.2)
BUN SERPL-MCNC: 14 MG/DL (ref 8–23)
BUN/CREAT SERPL: 9.9 (ref 7–25)
CALCIUM SPEC-SCNC: 9.2 MG/DL (ref 8.6–10.5)
CHLORIDE SERPL-SCNC: 100 MMOL/L (ref 98–107)
CO2 SERPL-SCNC: 28.7 MMOL/L (ref 22–29)
CREAT SERPL-MCNC: 1.42 MG/DL (ref 0.76–1.27)
DEPRECATED RDW RBC AUTO: 39.9 FL (ref 37–54)
EOSINOPHIL # BLD AUTO: 0.07 10*3/MM3 (ref 0–0.4)
EOSINOPHIL NFR BLD AUTO: 1.1 % (ref 0.3–6.2)
ERYTHROCYTE [DISTWIDTH] IN BLOOD BY AUTOMATED COUNT: 13.3 % (ref 12.3–15.4)
GFR SERPL CREATININE-BSD FRML MDRD: 50 ML/MIN/1.73
GLOBULIN UR ELPH-MCNC: 2.6 GM/DL
GLUCOSE SERPL-MCNC: 81 MG/DL (ref 65–99)
HCT VFR BLD AUTO: 50.3 % (ref 37.5–51)
HGB BLD-MCNC: 17.5 G/DL (ref 13–17.7)
IMM GRANULOCYTES # BLD AUTO: 0.08 10*3/MM3 (ref 0–0.05)
IMM GRANULOCYTES NFR BLD AUTO: 1.2 % (ref 0–0.5)
LYMPHOCYTES # BLD AUTO: 1.56 10*3/MM3 (ref 0.7–3.1)
LYMPHOCYTES NFR BLD AUTO: 24.3 % (ref 19.6–45.3)
MCH RBC QN AUTO: 29.2 PG (ref 26.6–33)
MCHC RBC AUTO-ENTMCNC: 34.8 G/DL (ref 31.5–35.7)
MCV RBC AUTO: 83.8 FL (ref 79–97)
MONOCYTES # BLD AUTO: 1.02 10*3/MM3 (ref 0.1–0.9)
MONOCYTES NFR BLD AUTO: 15.9 % (ref 5–12)
NEUTROPHILS NFR BLD AUTO: 3.64 10*3/MM3 (ref 1.7–7)
NEUTROPHILS NFR BLD AUTO: 56.7 % (ref 42.7–76)
NRBC BLD AUTO-RTO: 0.2 /100 WBC (ref 0–0.2)
PLATELET # BLD AUTO: 246 10*3/MM3 (ref 140–450)
PMV BLD AUTO: 10.7 FL (ref 6–12)
POTASSIUM SERPL-SCNC: 4.1 MMOL/L (ref 3.5–5.2)
PROT SERPL-MCNC: 6.7 G/DL (ref 6–8.5)
PSA SERPL-MCNC: 6.15 NG/ML (ref 0–4)
RBC # BLD AUTO: 6 10*6/MM3 (ref 4.14–5.8)
SODIUM SERPL-SCNC: 138 MMOL/L (ref 136–145)
TESTOST SERPL-MCNC: 506 NG/DL (ref 193–740)
WBC # BLD AUTO: 6.42 10*3/MM3 (ref 3.4–10.8)

## 2021-06-15 PROCEDURE — 84403 ASSAY OF TOTAL TESTOSTERONE: CPT

## 2021-06-15 PROCEDURE — 80053 COMPREHEN METABOLIC PANEL: CPT

## 2021-06-15 PROCEDURE — 84153 ASSAY OF PSA TOTAL: CPT

## 2021-06-15 PROCEDURE — 85025 COMPLETE CBC W/AUTO DIFF WBC: CPT

## 2021-06-15 PROCEDURE — 36415 COLL VENOUS BLD VENIPUNCTURE: CPT

## 2021-06-15 RX ORDER — TESTOSTERONE CYPIONATE 200 MG/ML
INJECTION, SOLUTION INTRAMUSCULAR
Qty: 10 ML | Refills: 0 | Status: SHIPPED | OUTPATIENT
Start: 2021-06-15 | End: 2021-09-28 | Stop reason: SDUPTHER

## 2021-07-13 ENCOUNTER — OFFICE VISIT (OUTPATIENT)
Dept: NEUROSURGERY | Facility: CLINIC | Age: 66
End: 2021-07-13

## 2021-07-13 VITALS
DIASTOLIC BLOOD PRESSURE: 75 MMHG | BODY MASS INDEX: 33.77 KG/M2 | HEART RATE: 66 BPM | WEIGHT: 241.2 LBS | HEIGHT: 71 IN | SYSTOLIC BLOOD PRESSURE: 157 MMHG | TEMPERATURE: 97.8 F | OXYGEN SATURATION: 98 %

## 2021-07-13 DIAGNOSIS — M54.50 CHRONIC BILATERAL LOW BACK PAIN WITHOUT SCIATICA: ICD-10-CM

## 2021-07-13 DIAGNOSIS — M51.36 DEGENERATIVE DISC DISEASE, LUMBAR: ICD-10-CM

## 2021-07-13 DIAGNOSIS — M47.816 LUMBAR FACET ARTHROPATHY: Primary | ICD-10-CM

## 2021-07-13 DIAGNOSIS — G89.29 CHRONIC BILATERAL LOW BACK PAIN WITHOUT SCIATICA: ICD-10-CM

## 2021-07-13 PROCEDURE — 99212 OFFICE O/P EST SF 10 MIN: CPT | Performed by: NURSE PRACTITIONER

## 2021-07-13 RX ORDER — LOSARTAN POTASSIUM 25 MG/1
25 TABLET ORAL DAILY
COMMUNITY
End: 2021-07-13

## 2021-07-13 RX ORDER — TADALAFIL 20 MG/1
TABLET ORAL
COMMUNITY
End: 2021-10-20

## 2021-07-13 RX ORDER — SIMVASTATIN 40 MG
TABLET ORAL
COMMUNITY
End: 2021-07-13

## 2021-07-13 RX ORDER — ROSUVASTATIN CALCIUM 40 MG/1
TABLET, COATED ORAL
COMMUNITY
End: 2021-07-13

## 2021-07-13 RX ORDER — TESTOSTERONE 100 MG
PELLET (EA) IMPLANTATION
COMMUNITY
End: 2021-10-14 | Stop reason: HOSPADM

## 2021-07-13 RX ORDER — LISINOPRIL 40 MG/1
TABLET ORAL
COMMUNITY
End: 2021-07-13

## 2021-07-13 RX ORDER — INDOMETHACIN 75 MG/1
75 CAPSULE, EXTENDED RELEASE ORAL
COMMUNITY
End: 2021-07-13

## 2021-07-13 NOTE — PROGRESS NOTES
"Chief Complaint  Back Pain and Follow-up (has not seen pain management)    Subjective          Jimmie Jamil who is a 65 y.o. year old male who presents to [facility] for his back pain.    His pain continues to be primarily across the low back pain. At his last visit we referred him to pain management, although he has not seen them yet. He is scheduled to see pain management for trial for RFA vs LESI tomorrow.       Interval History 6/1/21  MRI Lumbar Spine shows severe DDD at L5/S1 without canal stenosis, mild bilateral foraminal narrowing. Disc bulging and facet arthropathy causing mild canal and foraminal narrowing at L2/3 and L3/4.    His pain is primarily across the low back, worse with heavy lifting and bending and twisting. He is also having some right lateral thigh pain and hip pain.    Recent Interventions: prior RFAs      Review of Systems   Musculoskeletal: Positive for arthralgias, back pain, myalgias, neck pain and neck stiffness.   Neurological: Positive for weakness, numbness and headache.   All other systems reviewed and are negative.       Objective   Vital Signs:   /75   Pulse 66   Temp 97.8 °F (36.6 °C)   Ht 180.3 cm (71\")   Wt 109 kg (241 lb 3.2 oz)   SpO2 98%   BMI 33.64 kg/m²       Physical Exam  Vitals reviewed.   Constitutional:       Appearance: Normal appearance.   Neurological:      Mental Status: He is alert and oriented to person, place, and time.      Gait: Gait is intact.        Neurologic Exam     Mental Status   Oriented to person, place, and time.   Level of consciousness: alert    Motor Exam   Muscle bulk: normal  Overall muscle tone: normal    Gait, Coordination, and Reflexes     Gait  Gait: normal       Result Review :                 Assessment and Plan    Diagnoses and all orders for this visit:    1. Lumbar facet arthropathy (Primary)    2. Chronic bilateral low back pain without sciatica    3. Degenerative disc " disease, lumbar    He is scheduled to see pain management. No surgical recommendations at this time. He will follow up in 3 months.       Follow Up   Return in about 3 months (around 10/13/2021).  Patient was given instructions and counseling regarding his condition or for health maintenance advice.

## 2021-07-15 VITALS
WEIGHT: 235.44 LBS | BODY MASS INDEX: 32.96 KG/M2 | SYSTOLIC BLOOD PRESSURE: 144 MMHG | HEART RATE: 61 BPM | HEIGHT: 71 IN | TEMPERATURE: 97.5 F | OXYGEN SATURATION: 98 % | DIASTOLIC BLOOD PRESSURE: 68 MMHG

## 2021-07-16 PROBLEM — M48.02 CERVICAL SPINAL STENOSIS: Status: ACTIVE | Noted: 2017-03-16

## 2021-07-16 PROBLEM — I10 HIGH BLOOD PRESSURE: Status: ACTIVE | Noted: 2021-07-16

## 2021-07-16 PROBLEM — M54.50 LUMBAGO: Status: ACTIVE | Noted: 2017-03-16

## 2021-07-16 PROBLEM — N52.9 ED (ERECTILE DYSFUNCTION): Status: ACTIVE | Noted: 2021-07-16

## 2021-07-16 PROBLEM — M70.61 GREATER TROCHANTERIC BURSITIS OF RIGHT HIP: Status: ACTIVE | Noted: 2018-07-19

## 2021-07-16 PROBLEM — R32 URINARY BLADDER INCONTINENCE: Status: ACTIVE | Noted: 2017-03-16

## 2021-07-16 PROBLEM — M54.2 CERVICALGIA: Status: ACTIVE | Noted: 2017-03-16

## 2021-07-16 PROBLEM — R20.2 PARESTHESIA: Status: ACTIVE | Noted: 2017-03-16

## 2021-07-16 PROBLEM — E78.00 HIGH CHOLESTEROL: Status: ACTIVE | Noted: 2021-07-16

## 2021-07-16 PROBLEM — M54.12 CERVICAL RADICULOPATHY: Status: ACTIVE | Noted: 2017-03-16

## 2021-09-20 ENCOUNTER — LAB (OUTPATIENT)
Dept: LAB | Facility: HOSPITAL | Age: 66
End: 2021-09-20

## 2021-09-20 ENCOUNTER — TRANSCRIBE ORDERS (OUTPATIENT)
Dept: UROLOGY | Facility: CLINIC | Age: 66
End: 2021-09-20

## 2021-09-20 DIAGNOSIS — Z51.81 ENCOUNTER FOR THERAPEUTIC DRUG MONITORING: ICD-10-CM

## 2021-09-20 DIAGNOSIS — N52.9 ERECTILE DYSFUNCTION, UNSPECIFIED ERECTILE DYSFUNCTION TYPE: ICD-10-CM

## 2021-09-20 DIAGNOSIS — R97.20 ELEVATED PROSTATE SPECIFIC ANTIGEN (PSA): ICD-10-CM

## 2021-09-20 DIAGNOSIS — E29.1 TESTICULAR HYPOGONADISM: ICD-10-CM

## 2021-09-20 DIAGNOSIS — E29.1 TESTICULAR HYPOGONADISM: Primary | ICD-10-CM

## 2021-09-20 LAB
BASOPHILS # BLD AUTO: 0.06 10*3/MM3 (ref 0–0.2)
BASOPHILS NFR BLD AUTO: 0.7 % (ref 0–1.5)
DEPRECATED RDW RBC AUTO: 43.3 FL (ref 37–54)
EOSINOPHIL # BLD AUTO: 0.11 10*3/MM3 (ref 0–0.4)
EOSINOPHIL NFR BLD AUTO: 1.2 % (ref 0.3–6.2)
ERYTHROCYTE [DISTWIDTH] IN BLOOD BY AUTOMATED COUNT: 13.2 % (ref 12.3–15.4)
HCT VFR BLD AUTO: 52.1 % (ref 37.5–51)
HGB BLD-MCNC: 17 G/DL (ref 13–17.7)
IMM GRANULOCYTES # BLD AUTO: 0.18 10*3/MM3 (ref 0–0.05)
IMM GRANULOCYTES NFR BLD AUTO: 2 % (ref 0–0.5)
LYMPHOCYTES # BLD AUTO: 2.87 10*3/MM3 (ref 0.7–3.1)
LYMPHOCYTES NFR BLD AUTO: 31.7 % (ref 19.6–45.3)
MCH RBC QN AUTO: 28.9 PG (ref 26.6–33)
MCHC RBC AUTO-ENTMCNC: 32.6 G/DL (ref 31.5–35.7)
MCV RBC AUTO: 88.6 FL (ref 79–97)
MONOCYTES # BLD AUTO: 1.41 10*3/MM3 (ref 0.1–0.9)
MONOCYTES NFR BLD AUTO: 15.6 % (ref 5–12)
NEUTROPHILS NFR BLD AUTO: 4.41 10*3/MM3 (ref 1.7–7)
NEUTROPHILS NFR BLD AUTO: 48.8 % (ref 42.7–76)
NRBC BLD AUTO-RTO: 0 /100 WBC (ref 0–0.2)
PLATELET # BLD AUTO: 240 10*3/MM3 (ref 140–450)
PMV BLD AUTO: 11 FL (ref 6–12)
RBC # BLD AUTO: 5.88 10*6/MM3 (ref 4.14–5.8)
WBC # BLD AUTO: 9.04 10*3/MM3 (ref 3.4–10.8)

## 2021-09-20 PROCEDURE — 85025 COMPLETE CBC W/AUTO DIFF WBC: CPT

## 2021-09-20 PROCEDURE — 84410 TESTOSTERONE BIOAVAILABLE: CPT

## 2021-09-20 PROCEDURE — 80053 COMPREHEN METABOLIC PANEL: CPT

## 2021-09-20 PROCEDURE — 84153 ASSAY OF PSA TOTAL: CPT

## 2021-09-20 PROCEDURE — 36415 COLL VENOUS BLD VENIPUNCTURE: CPT

## 2021-09-21 LAB
ALBUMIN SERPL-MCNC: 4 G/DL (ref 3.5–5.2)
ALBUMIN/GLOB SERPL: 1.5 G/DL
ALP SERPL-CCNC: 62 U/L (ref 39–117)
ALT SERPL W P-5'-P-CCNC: 28 U/L (ref 1–41)
ANION GAP SERPL CALCULATED.3IONS-SCNC: 10.6 MMOL/L (ref 5–15)
AST SERPL-CCNC: 46 U/L (ref 1–40)
BILIRUB SERPL-MCNC: 0.2 MG/DL (ref 0–1.2)
BUN SERPL-MCNC: 12 MG/DL (ref 8–23)
BUN/CREAT SERPL: 8.5 (ref 7–25)
CALCIUM SPEC-SCNC: 8.9 MG/DL (ref 8.6–10.5)
CHLORIDE SERPL-SCNC: 98 MMOL/L (ref 98–107)
CO2 SERPL-SCNC: 26.4 MMOL/L (ref 22–29)
CREAT SERPL-MCNC: 1.41 MG/DL (ref 0.76–1.27)
GFR SERPL CREATININE-BSD FRML MDRD: 51 ML/MIN/1.73
GLOBULIN UR ELPH-MCNC: 2.6 GM/DL
GLUCOSE SERPL-MCNC: 75 MG/DL (ref 65–99)
POTASSIUM SERPL-SCNC: 4.4 MMOL/L (ref 3.5–5.2)
PROT SERPL-MCNC: 6.6 G/DL (ref 6–8.5)
PSA SERPL-MCNC: 4.73 NG/ML (ref 0–4)
SODIUM SERPL-SCNC: 135 MMOL/L (ref 136–145)

## 2021-09-27 PROBLEM — R97.20 ELEVATED PSA: Status: ACTIVE | Noted: 2021-09-27

## 2021-09-27 PROBLEM — E29.1 HYPOGONADISM IN MALE: Status: ACTIVE | Noted: 2021-09-27

## 2021-09-27 NOTE — PROGRESS NOTES
Chief Complaint    Urologic complaint    Subjective          Jimmie Jamil presents to [facility]  History of Present Illness        66-year-old  gentleman who follows up for hypogonadism, BPH and ED    Patient has had some trouble with increased testosterone and also increased H/H.    Patient has been doing well, no trouble with fatigue libido.    Voiding without issue.  No change nocturia x1.    We decreased his doses last visit.  He also donated blood.  Since then his HSA just come back lower    He is doing phlebotomy/donating blood about every 6 months.    H/H    9/21 17/52 12/30/20 17/51 12/20  19.4/58    doing 0.25 mL Depo testosterone subcu every 5 days in 1/21.  Gets a 10 mL vial about every 5 months.      Has used Cialis 20 mg in the past, is having no trouble currently.    Previous    Testosterone has run high in the past.  We have decreased his dose from 0.5 x 1 point.    Endocrinologist that he saw earlier that  had recommended therapeutic phlebotomy has retired.    Flomax  - could not tolerate, helped urination.  Nasal stuffiness, unusual feeling    Was on 0.5 mL IM Depo-Testosterone every 9 days before.     Has been on injections for several years    Sildenafil could not tolerate secondary to  side effects    No FH of prostate CA      No cardiopulmonary history.  Patient does not smoke.  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  Removed.  Normal bladder otherwise and normal retrogrades  Pathology negative    started initially for decreased libido and fatigue.  Did try AndroGel and patches in the past and did not like these b/c of side effects.    9/21 creatinine 1.4, GFR 51    Total testosterone    9/21    596  12/30/20  159  12/20  824  8/20    668  2/20    806  10/19  995  11/19 683  8/19   251  7/19   928  6/19   504 - every 10 days  3/19   826  12/18  713  7/18   "1069    933       563   246   >1500 -  dose was cut in half    PSA       4.7   MRI prostate -49 g -negative, chronic prostatitis    4.92   5.46    4.28  3/19  4.0   prostate biopsy 43 g - negative    6.75    2.54          Past History:  Medical History: has a past medical history of Benign essential hypertension, Bladder problem, Cervical radiculopathy (2017), Cervical spinal stenosis (2017), Cervicalgia (2017), Coronary artery disease, ED (erectile dysfunction), Greater trochanteric bursitis of right hip (2018), High blood pressure, Hyperlipidemia, Hypertension, Leg pain, Leg swelling, Low back pain, Lumbago (2017), Neck pain, Paresthesia (2017), Polycythemia, Right hip pain, and Urinary bladder incontinence (2017).   Surgical History: has a past surgical history that includes Cardiac catheterization; Cervical disc surgery; Lumbar epidural injection; Vasectomy; Circumcision, non-; Shoulder arthroscopy w/ labral repair (Left); Colonoscopy; [place] tooth extraction; Anterior Cervical Fusion (2017); Cystoscopy (01/10/2014); and Prostate biopsy ().   Family History: family history includes Alzheimer's disease (age of onset: 80) in his mother; Arthritis in his father and mother; Cancer in his father; Colon cancer (age of onset: 85) in his father; Heart disease in his father; Hypertension (age of onset: 80) in his father; Stroke in his father.   Social History: reports that he has never smoked. He has never used smokeless tobacco. He reports current alcohol use. He reports that he does not use drugs.  Allergies: Beta adrenergic blockers, Crestor [rosuvastatin calcium], Keflex [cephalexin], Livalo [pitavastatin], Losartan, and Naproxen       Current Outpatient Medications:   •  aspirin 81 MG chewable tablet, Chew 81 mg Daily., Disp: , Rfl:   •  BD DISP NEEDLES 22G X 1-1/2\" misc, , Disp: , Rfl:   •  diclofenac " (VOLTAREN) 75 MG EC tablet, Take 75 mg by mouth 2 (Two) Times a Day As Needed., Disp: , Rfl:   •  metaxalone (SKELAXIN) 800 MG tablet, Take 800 mg by mouth Daily As Needed., Disp: , Rfl:   •  REPATHA PUSHTRONEX SYSTEM 420 MG/3.5ML solution cartridge, Inject 420 mg under the skin into the appropriate area as directed Every 30 (Thirty) Days., Disp: 3 cartridge., Rfl: 3  •  tadalafil (Cialis) 20 MG tablet, Cialis 20 mg oral tablet take 1 tablet (20 mg) by oral route once daily   Suspended, Disp: , Rfl:   •  Testosterone 100 MG pellet, by Other route., Disp: , Rfl:   •  Testosterone Cypionate (DEPOTESTOTERONE CYPIONATE) 200 MG/ML injection, 0.25 mL IM q. 5 days, Disp: 10 mL, Rfl: 0  •  VASCEPA 1 g capsule capsule, Take 2 g by mouth 2 (Two) Times a Day With Meals., Disp: 360 capsule, Rfl: 3  •  verapamil ER (VERELAN) 360 MG 24 hr capsule, Take 360 mg by mouth Daily., Disp: , Rfl:      Physical exam       Alert and orient x3  Well appearing, well developed, in no acute distress   Unlabored respirations      Grossly oriented to person, place and time, judgment is intact, normal mood and affect         Objective     Vital Signs:   There were no vitals taken for this visit.             Assessment and Plan    Diagnoses and all orders for this visit:    1. Elevated PSA (Primary)    2. Hypogonadism in male      Hypogonadism    Continue 0.25 mL Depo testosterone subcu every 5 days.  Patient likes to use 1 - 10 mL vial and get refill after.  Refilled today     follow-up in 6 months with Tot testosterone, CBC, CMP      Elevated PSA     PSA stable, will go back to yearly prostate cancer screening.

## 2021-09-28 ENCOUNTER — OFFICE VISIT (OUTPATIENT)
Dept: UROLOGY | Facility: CLINIC | Age: 66
End: 2021-09-28

## 2021-09-28 VITALS — WEIGHT: 241 LBS | HEIGHT: 71 IN | BODY MASS INDEX: 33.74 KG/M2 | RESPIRATION RATE: 17 BRPM

## 2021-09-28 DIAGNOSIS — R97.20 ELEVATED PSA: Primary | ICD-10-CM

## 2021-09-28 DIAGNOSIS — E29.1 HYPOGONADISM IN MALE: ICD-10-CM

## 2021-09-28 LAB
TESTOST SERPL-MCNC: 596 NG/DL
TESTOSTERONE.FREE+WB MFR SERPL: 30.2 %
TESTOSTERONE.FREE+WB SERPL-MCNC: 180 NG/DL

## 2021-09-28 PROCEDURE — 99214 OFFICE O/P EST MOD 30 MIN: CPT | Performed by: UROLOGY

## 2021-09-28 RX ORDER — TESTOSTERONE CYPIONATE 200 MG/ML
INJECTION, SOLUTION INTRAMUSCULAR
Qty: 10 ML | Refills: 0 | Status: SHIPPED | OUTPATIENT
Start: 2021-09-28 | End: 2021-10-18

## 2021-10-12 ENCOUNTER — HOSPITAL ENCOUNTER (OUTPATIENT)
Facility: HOSPITAL | Age: 66
Discharge: HOME | End: 2021-10-14
Attending: EMERGENCY MEDICINE | Admitting: STUDENT IN AN ORGANIZED HEALTH CARE EDUCATION/TRAINING PROGRAM

## 2021-10-12 ENCOUNTER — APPOINTMENT (OUTPATIENT)
Dept: GENERAL RADIOLOGY | Facility: HOSPITAL | Age: 66
End: 2021-10-12

## 2021-10-12 ENCOUNTER — APPOINTMENT (OUTPATIENT)
Dept: CT IMAGING | Facility: HOSPITAL | Age: 66
End: 2021-10-12

## 2021-10-12 DIAGNOSIS — R55 SYNCOPE AND COLLAPSE: Primary | ICD-10-CM

## 2021-10-12 DIAGNOSIS — R07.9 CHEST PAIN, UNSPECIFIED TYPE: ICD-10-CM

## 2021-10-12 DIAGNOSIS — R56.9 SEIZURE (HCC): ICD-10-CM

## 2021-10-12 LAB
ALBUMIN SERPL-MCNC: 4.2 G/DL (ref 3.5–5.2)
ALBUMIN/GLOB SERPL: 1.5 G/DL
ALP SERPL-CCNC: 70 U/L (ref 39–117)
ALT SERPL W P-5'-P-CCNC: 41 U/L (ref 1–41)
ANION GAP SERPL CALCULATED.3IONS-SCNC: 11.3 MMOL/L (ref 5–15)
AST SERPL-CCNC: 27 U/L (ref 1–40)
BASOPHILS # BLD AUTO: 0.12 10*3/MM3 (ref 0–0.2)
BASOPHILS NFR BLD AUTO: 0.8 % (ref 0–1.5)
BILIRUB SERPL-MCNC: 0.5 MG/DL (ref 0–1.2)
BILIRUB UR QL STRIP: NEGATIVE
BUN SERPL-MCNC: 19 MG/DL (ref 8–23)
BUN/CREAT SERPL: 13.4 (ref 7–25)
CALCIUM SPEC-SCNC: 9.8 MG/DL (ref 8.6–10.5)
CHLORIDE SERPL-SCNC: 104 MMOL/L (ref 98–107)
CLARITY UR: CLEAR
CO2 SERPL-SCNC: 21.7 MMOL/L (ref 22–29)
COLOR UR: YELLOW
CREAT SERPL-MCNC: 1.42 MG/DL (ref 0.76–1.27)
D-LACTATE SERPL-SCNC: 0.9 MMOL/L (ref 0.5–2)
DEPRECATED RDW RBC AUTO: 41.5 FL (ref 37–54)
EOSINOPHIL # BLD AUTO: 0.1 10*3/MM3 (ref 0–0.4)
EOSINOPHIL NFR BLD AUTO: 0.7 % (ref 0.3–6.2)
ERYTHROCYTE [DISTWIDTH] IN BLOOD BY AUTOMATED COUNT: 13.7 % (ref 12.3–15.4)
GFR SERPL CREATININE-BSD FRML MDRD: 50 ML/MIN/1.73
GLOBULIN UR ELPH-MCNC: 2.8 GM/DL
GLUCOSE SERPL-MCNC: 97 MG/DL (ref 65–99)
GLUCOSE UR STRIP-MCNC: NEGATIVE MG/DL
HCT VFR BLD AUTO: 54.9 % (ref 37.5–51)
HGB BLD-MCNC: 18.4 G/DL (ref 13–17.7)
HGB UR QL STRIP.AUTO: NEGATIVE
HOLD SPECIMEN: NORMAL
HOLD SPECIMEN: NORMAL
IMM GRANULOCYTES # BLD AUTO: 0.22 10*3/MM3 (ref 0–0.05)
IMM GRANULOCYTES NFR BLD AUTO: 1.6 % (ref 0–0.5)
KETONES UR QL STRIP: NEGATIVE
LEUKOCYTE ESTERASE UR QL STRIP.AUTO: NEGATIVE
LYMPHOCYTES # BLD AUTO: 2.41 10*3/MM3 (ref 0.7–3.1)
LYMPHOCYTES NFR BLD AUTO: 17 % (ref 19.6–45.3)
MCH RBC QN AUTO: 28.7 PG (ref 26.6–33)
MCHC RBC AUTO-ENTMCNC: 33.5 G/DL (ref 31.5–35.7)
MCV RBC AUTO: 85.6 FL (ref 79–97)
MONOCYTES # BLD AUTO: 2.05 10*3/MM3 (ref 0.1–0.9)
MONOCYTES NFR BLD AUTO: 14.5 % (ref 5–12)
NEUTROPHILS NFR BLD AUTO: 65.4 % (ref 42.7–76)
NEUTROPHILS NFR BLD AUTO: 9.25 10*3/MM3 (ref 1.7–7)
NITRITE UR QL STRIP: NEGATIVE
NRBC BLD AUTO-RTO: 0 /100 WBC (ref 0–0.2)
PH UR STRIP.AUTO: 5.5 [PH] (ref 5–8)
PLATELET # BLD AUTO: 274 10*3/MM3 (ref 140–450)
PMV BLD AUTO: 9.9 FL (ref 6–12)
POTASSIUM SERPL-SCNC: 4.5 MMOL/L (ref 3.5–5.2)
PROCALCITONIN SERPL-MCNC: 0.09 NG/ML (ref 0–0.25)
PROT SERPL-MCNC: 7 G/DL (ref 6–8.5)
PROT UR QL STRIP: NEGATIVE
QT INTERVAL: 373 MS
RBC # BLD AUTO: 6.41 10*6/MM3 (ref 4.14–5.8)
SARS-COV-2 ORF1AB RESP QL NAA+PROBE: NOT DETECTED
SODIUM SERPL-SCNC: 137 MMOL/L (ref 136–145)
SP GR UR STRIP: 1.01 (ref 1–1.03)
TROPONIN T SERPL-MCNC: <0.01 NG/ML (ref 0–0.03)
TROPONIN T SERPL-MCNC: <0.01 NG/ML (ref 0–0.03)
UROBILINOGEN UR QL STRIP: NORMAL
WBC # BLD AUTO: 14.15 10*3/MM3 (ref 3.4–10.8)
WHOLE BLOOD HOLD SPECIMEN: NORMAL
WHOLE BLOOD HOLD SPECIMEN: NORMAL

## 2021-10-12 PROCEDURE — 84145 PROCALCITONIN (PCT): CPT | Performed by: NURSE PRACTITIONER

## 2021-10-12 PROCEDURE — C9803 HOPD COVID-19 SPEC COLLECT: HCPCS

## 2021-10-12 PROCEDURE — 36415 COLL VENOUS BLD VENIPUNCTURE: CPT

## 2021-10-12 PROCEDURE — 87040 BLOOD CULTURE FOR BACTERIA: CPT | Performed by: NURSE PRACTITIONER

## 2021-10-12 PROCEDURE — 93005 ELECTROCARDIOGRAM TRACING: CPT

## 2021-10-12 PROCEDURE — 99284 EMERGENCY DEPT VISIT MOD MDM: CPT

## 2021-10-12 PROCEDURE — 93005 ELECTROCARDIOGRAM TRACING: CPT | Performed by: EMERGENCY MEDICINE

## 2021-10-12 PROCEDURE — 81003 URINALYSIS AUTO W/O SCOPE: CPT | Performed by: NURSE PRACTITIONER

## 2021-10-12 PROCEDURE — 84484 ASSAY OF TROPONIN QUANT: CPT

## 2021-10-12 PROCEDURE — 84484 ASSAY OF TROPONIN QUANT: CPT | Performed by: EMERGENCY MEDICINE

## 2021-10-12 PROCEDURE — 83605 ASSAY OF LACTIC ACID: CPT | Performed by: NURSE PRACTITIONER

## 2021-10-12 PROCEDURE — G0378 HOSPITAL OBSERVATION PER HR: HCPCS

## 2021-10-12 PROCEDURE — 0 IOPAMIDOL PER 1 ML: Performed by: EMERGENCY MEDICINE

## 2021-10-12 PROCEDURE — U0004 COV-19 TEST NON-CDC HGH THRU: HCPCS | Performed by: NURSE PRACTITIONER

## 2021-10-12 PROCEDURE — 70450 CT HEAD/BRAIN W/O DYE: CPT

## 2021-10-12 PROCEDURE — 85025 COMPLETE CBC W/AUTO DIFF WBC: CPT

## 2021-10-12 PROCEDURE — 71046 X-RAY EXAM CHEST 2 VIEWS: CPT

## 2021-10-12 PROCEDURE — 74177 CT ABD & PELVIS W/CONTRAST: CPT

## 2021-10-12 PROCEDURE — 71275 CT ANGIOGRAPHY CHEST: CPT

## 2021-10-12 PROCEDURE — 80053 COMPREHEN METABOLIC PANEL: CPT

## 2021-10-12 PROCEDURE — 93010 ELECTROCARDIOGRAM REPORT: CPT | Performed by: INTERNAL MEDICINE

## 2021-10-12 RX ORDER — DEXTROSE MONOHYDRATE 25 G/50ML
25 INJECTION, SOLUTION INTRAVENOUS
Status: DISCONTINUED | OUTPATIENT
Start: 2021-10-12 | End: 2021-10-14 | Stop reason: HOSPADM

## 2021-10-12 RX ORDER — ONDANSETRON 4 MG/1
4 TABLET, FILM COATED ORAL EVERY 6 HOURS PRN
Status: DISCONTINUED | OUTPATIENT
Start: 2021-10-12 | End: 2021-10-14 | Stop reason: HOSPADM

## 2021-10-12 RX ORDER — SODIUM CHLORIDE 0.9 % (FLUSH) 0.9 %
10 SYRINGE (ML) INJECTION AS NEEDED
Status: DISCONTINUED | OUTPATIENT
Start: 2021-10-12 | End: 2021-10-14 | Stop reason: HOSPADM

## 2021-10-12 RX ORDER — ACETAMINOPHEN 325 MG/1
650 TABLET ORAL EVERY 4 HOURS PRN
Status: DISCONTINUED | OUTPATIENT
Start: 2021-10-12 | End: 2021-10-14 | Stop reason: SDUPTHER

## 2021-10-12 RX ORDER — NICOTINE POLACRILEX 4 MG
15 LOZENGE BUCCAL
Status: DISCONTINUED | OUTPATIENT
Start: 2021-10-12 | End: 2021-10-14 | Stop reason: HOSPADM

## 2021-10-12 RX ORDER — ONDANSETRON 2 MG/ML
4 INJECTION INTRAMUSCULAR; INTRAVENOUS EVERY 6 HOURS PRN
Status: DISCONTINUED | OUTPATIENT
Start: 2021-10-12 | End: 2021-10-14 | Stop reason: HOSPADM

## 2021-10-12 RX ORDER — ENALAPRIL MALEATE 10 MG/1
10 TABLET ORAL DAILY
COMMUNITY
End: 2021-11-12 | Stop reason: SDUPTHER

## 2021-10-12 RX ORDER — INSULIN LISPRO 100 [IU]/ML
0-9 INJECTION, SOLUTION INTRAVENOUS; SUBCUTANEOUS
Status: DISCONTINUED | OUTPATIENT
Start: 2021-10-13 | End: 2021-10-14 | Stop reason: HOSPADM

## 2021-10-12 RX ORDER — NITROGLYCERIN 0.4 MG/1
0.4 TABLET SUBLINGUAL
Status: DISCONTINUED | OUTPATIENT
Start: 2021-10-12 | End: 2021-10-14 | Stop reason: HOSPADM

## 2021-10-12 RX ORDER — MULTIVIT WITH MINERALS/LUTEIN
250 TABLET ORAL DAILY
COMMUNITY
End: 2021-11-12 | Stop reason: SDUPTHER

## 2021-10-12 RX ORDER — UREA 10 %
3 LOTION (ML) TOPICAL NIGHTLY PRN
Status: DISCONTINUED | OUTPATIENT
Start: 2021-10-12 | End: 2021-10-14 | Stop reason: HOSPADM

## 2021-10-12 RX ADMIN — SODIUM CHLORIDE 1000 ML: 9 INJECTION, SOLUTION INTRAVENOUS at 16:43

## 2021-10-12 RX ADMIN — IOPAMIDOL 95 ML: 755 INJECTION, SOLUTION INTRAVENOUS at 16:08

## 2021-10-12 NOTE — ED TRIAGE NOTES
Pt had a syncopal episode this am.  Wife reports he had a 2 minute sz.  He was diaphoretic andpale this am.  He is having chest pressure and soa since this am.  He also c/o abd pain.      Patient was placed in face mask during first look triage.  Patient was wearing a face mask throughout encounter.  I wore personal protective equipment throughout the encounter.  Hand hygiene was performed before and after patient encounter.

## 2021-10-12 NOTE — ED NOTES
"Pt reports syncopal episode while eating breakfast this AM. Pt reports soa worse on exertion and cp. Wife reports pt was \"having a seizure\" and it took him 10 minutes to start talking to her again. Pt denies hitting his head. Pt has no history of seizures.    Pt given mask in triage, staff in PPE.       Joanne Titus, RN  10/12/21 [id number]    "

## 2021-10-12 NOTE — ED PROVIDER NOTES
Pt presents to the ED from home after having left-sided chest pain that started around 9 AM while eating breakfast.  He states he went to sit down and had a witnessed syncopal episode with approximately 10 seconds of seizure-like activity.  His wife states that he was incontinent of urine during this time, diaphoretic and pale.  Patient states she has no history of seizure disorder.  Patient states she did have a single cassette about 8 years ago.  Since that time the patient is continue to describe left-sided chest discomfort mild headache, shortness of breath and abdominal discomfort.    On exam, pt is A&Ox3. NAD  PERRL, moist mucous membranes.  Normocephalic and atraumatic  Heart is RRR. Lungs are CTAB.   Abd is soft, nontender, nondistended, bowel sounds positive.   No pedal edema.  No calf tenderness.  Nonfocal neuro exam      I agree with midlevel plan to check EKG, chest x-ray, labs, head CT, CTA chest, CT abdomen pelvis and place the patient on a monitor    PPE  Pt does not present with symptoms for COVID19; however, I was wearing a 95 mask and goggles throughout all patient interaction.    EKG    EKG time: 1220  Rhythm/Rate: Normal sinus rhythm at 72  No Acute Ischemia  Non-Specific ST-T changes  No old EKG    Interpreted Contemporaneously by me.  Independently viewed by me      The patient's 2 troponins are negative.  His lactic acid is normal.  The patient's CT head, CTA chest and CT abdomen pelvis are all negative except for a possible costochondral fracture at his fifth left rib and some cholelithiasis but no signs of cholecystitis.  On repeat examination the patient has no tenderness in his chest wall or right upper quadrant.  He has just walked to the bathroom and feels fine.  His vital signs are stable.  I advised him that his EKG, 2 troponins, CT head, CTA chest and CT abdomen pelvis did not show stroke, tumor, dissection, aneurysm, pulmonary embolism, pneumonia, esophageal rupture abdominal  aneurysm rupture.  I believe the patient's seizure-like activity is secondary to the chest pain, shortness of breath and syncopal episode that he had.  I advised him and his wife that we will admit him to the hospital and have the hospitalist consult cardiology and neurology.  The patient and wife understand and agree with the plan.    The CAMERON and I have discussed this patient's history, physical exam, and treatment plan.  I have reviewed the documentation and personally had a face to face interaction with the patient. I affirm the documentation and agree with the treatment and plan.  The attached note describes my personal findings.           Ben Sherman MD  10/12/21 9175

## 2021-10-12 NOTE — ED PROVIDER NOTES
EMERGENCY DEPARTMENT ENCOUNTER    Room Number:  06/06  Date of encounter:  10/12/2021  PCP: Samanta Martínez APRN  Historian: Patient      PPE    Patient was placed in face mask in first look. Patient was wearing facemask when I entered the room and throughout our encounter. I wore full protective equipment throughout this patient encounter including a CAPR face mask, and gloves. Hand hygiene was performed before donning protective equipment and after removal when leaving the room.          HPI:  Chief Complaint: Syncopal episode  A complete HPI/ROS/PMH/PSH/SH/FH are unobtainable due to: Nothing    Context: Jimmie Jamil is a 66 y.o. male who arrives to the ED via private vehicle from home.  Patient presents with c/o left-sided chest pressure that began this morning and has been pretty constant since.  Patient states that around 9 AM this morning he was eating breakfast when he started having the left-sided chest pressure, he states he sat his way down until was like he was really down for a minute.  His wife states that he had a syncopal episode during this time with approximately 10 seconds of seizure-like activity.  His wife states that he was incontinent of urine during this episode, diaphoretic and pale.  Patient states he is never had a seizure in the past he has had a syncopal episode but it has been 8 years ago.  He states when he woke up he did feel short of breath, still had left-sided chest pressure and still has a mild headache.  Patient denies ever, chills, cough, nausea, vomiting.  Patient states that the left-sided chest pressure is better at this time but it is still there.  He states that he did have a cardiac cath about 6 years ago prior to having a cervical fusion that was normal.  Patient states that nothing makes the symptoms better and nothing worsens symptoms.          PAST MEDICAL HISTORY  Active Ambulatory Problems     Diagnosis Date Noted   • Class 1 obesity without serious  comorbidity with body mass index (BMI) of 33.0 to 33.9 in adult 01/31/2020   • Vitamin D deficiency 01/31/2020   • Hyperglycemia 01/31/2020   • Essential hypertension 01/31/2020   • Mixed dyslipidemia 01/31/2020   • Low testosterone in male 01/31/2020   • Statin intolerance 01/31/2020   • Polycythemia 02/06/2020   • Cervical radiculopathy 03/16/2017   • Cervical spinal stenosis 03/16/2017   • Cervicalgia 03/16/2017   • ED (erectile dysfunction) 07/16/2021   • Greater trochanteric bursitis of right hip 07/19/2018   • High blood pressure 07/16/2021   • High cholesterol 07/16/2021   • Lumbago 03/16/2017   • Paresthesia 03/16/2017   • Urinary bladder incontinence 03/16/2017   • Elevated PSA 09/27/2021   • Hypogonadism in male 09/27/2021     Resolved Ambulatory Problems     Diagnosis Date Noted   • No Resolved Ambulatory Problems     Past Medical History:   Diagnosis Date   • Benign essential hypertension    • Bladder problem    • Coronary artery disease    • Hyperlipidemia    • Hypertension    • Leg pain    • Leg swelling    • Low back pain    • Neck pain    • Right hip pain          PAST SURGICAL HISTORY  Past Surgical History:   Procedure Laterality Date   • ANTERIOR CERVICAL FUSION  04/12/2017    C3-4   • CARDIAC CATHETERIZATION      everything okay clearance for neck surgery   • CERVICAL DISC SURGERY      C2-3 fusion   • CIRCUMCISION     • COLONOSCOPY     • CYSTOSCOPY  01/10/2014    Cysto ivan retrograde pyelogram urthral bx.   • LUMBAR EPIDURAL INJECTION      L2-S2  going to try ablasion in future   • PROSTATE BIOPSY  2018   • SHOULDER ARTHROSCOPY W/ LABRAL REPAIR Left    • VASECTOMY     • WISDOM TOOTH EXTRACTION           FAMILY HISTORY  Family History   Problem Relation Age of Onset   • Arthritis Mother    • Alzheimer's disease Mother 80   • Arthritis Father    • Heart disease Father    • Colon cancer Father 85   • Hypertension Father 80   • Cancer Father    • Stroke Father          SOCIAL HISTORY  Social History      Socioeconomic History   • Marital status:    Tobacco Use   • Smoking status: Never Smoker   • Smokeless tobacco: Never Used   Substance and Sexual Activity   • Alcohol use: Yes     Comment: rare   • Drug use: Never   • Sexual activity: Yes     Partners: Female         ALLERGIES  Beta adrenergic blockers, Crestor [rosuvastatin calcium], Keflex [cephalexin], Livalo [pitavastatin], Losartan, and Naproxen        REVIEW OF SYSTEMS  Review of Systems     All systems reviewed and negative except for those discussed in HPI.        PHYSICAL EXAM    ED Triage Vitals   Temp Heart Rate Resp BP SpO2   10/12/21 1211 10/12/21 1211 10/12/21 1211 10/12/21 1236 10/12/21 1211   97.7 °F (36.5 °C) 79 16 128/70 96 %       Physical Exam  GENERAL: Well appearing, nontoxic appearing, not distressed  HENT: normocephalic, atraumatic  EYES: no scleral icterus, PERRL  CV: regular rhythm, regular rate, no murmur  RESPIRATORY: normal effort, CTAB  ABDOMEN: soft, nontender  MUSCULOSKELETAL: no deformity  NEURO: alert, moves all extremities, follows commands, mental status normal/baseline  Recent and remote memory functions are normal  Patient is attentive with normal concentration  Language is fluent  Speech is clear  Speech is non-dysarthric  Symmetric smile with no facial droop  Eyes close shut strongly bilaterally  Symmetric eyebrow raise bilaterally  EOMI, PERRL  CN II-XII grossly normal otherwise  5/5 strength to bilateral upper and lower extremities  No pronator drift  Intact FNF   SKIN: warm, dry, no rash   Psych: Appropriate mood and affect  Nursing notes and vital signs reviewed      LAB RESULTS  Recent Results (from the past 24 hour(s))   ECG 12 Lead    Collection Time: 10/12/21 12:20 PM   Result Value Ref Range    QT Interval 373 ms   Comprehensive Metabolic Panel    Collection Time: 10/12/21 12:43 PM    Specimen: Blood   Result Value Ref Range    Glucose 97 65 - 99 mg/dL    BUN 19 8 - 23 mg/dL    Creatinine 1.42 (H) 0.76 -  1.27 mg/dL    Sodium 137 136 - 145 mmol/L    Potassium 4.5 3.5 - 5.2 mmol/L    Chloride 104 98 - 107 mmol/L    CO2 21.7 (L) 22.0 - 29.0 mmol/L    Calcium 9.8 8.6 - 10.5 mg/dL    Total Protein 7.0 6.0 - 8.5 g/dL    Albumin 4.20 3.50 - 5.20 g/dL    ALT (SGPT) 41 1 - 41 U/L    AST (SGOT) 27 1 - 40 U/L    Alkaline Phosphatase 70 39 - 117 U/L    Total Bilirubin 0.5 0.0 - 1.2 mg/dL    eGFR Non African Amer 50 (L) >60 mL/min/1.73    Globulin 2.8 gm/dL    A/G Ratio 1.5 g/dL    BUN/Creatinine Ratio 13.4 7.0 - 25.0    Anion Gap 11.3 5.0 - 15.0 mmol/L   Troponin    Collection Time: 10/12/21 12:43 PM    Specimen: Blood   Result Value Ref Range    Troponin T <0.010 0.000 - 0.030 ng/mL   Green Top (Gel)    Collection Time: 10/12/21 12:43 PM   Result Value Ref Range    Extra Tube Hold for add-ons.    Lavender Top    Collection Time: 10/12/21 12:43 PM   Result Value Ref Range    Extra Tube hold for add-on    Gold Top - SST    Collection Time: 10/12/21 12:43 PM   Result Value Ref Range    Extra Tube Hold for add-ons.    Light Blue Top    Collection Time: 10/12/21 12:43 PM   Result Value Ref Range    Extra Tube hold for add-on    CBC Auto Differential    Collection Time: 10/12/21 12:43 PM    Specimen: Blood   Result Value Ref Range    WBC 14.15 (H) 3.40 - 10.80 10*3/mm3    RBC 6.41 (H) 4.14 - 5.80 10*6/mm3    Hemoglobin 18.4 (H) 13.0 - 17.7 g/dL    Hematocrit 54.9 (H) 37.5 - 51.0 %    MCV 85.6 79.0 - 97.0 fL    MCH 28.7 26.6 - 33.0 pg    MCHC 33.5 31.5 - 35.7 g/dL    RDW 13.7 12.3 - 15.4 %    RDW-SD 41.5 37.0 - 54.0 fl    MPV 9.9 6.0 - 12.0 fL    Platelets 274 140 - 450 10*3/mm3    Neutrophil % 65.4 42.7 - 76.0 %    Lymphocyte % 17.0 (L) 19.6 - 45.3 %    Monocyte % 14.5 (H) 5.0 - 12.0 %    Eosinophil % 0.7 0.3 - 6.2 %    Basophil % 0.8 0.0 - 1.5 %    Immature Grans % 1.6 (H) 0.0 - 0.5 %    Neutrophils, Absolute 9.25 (H) 1.70 - 7.00 10*3/mm3    Lymphocytes, Absolute 2.41 0.70 - 3.10 10*3/mm3    Monocytes, Absolute 2.05 (H) 0.10 - 0.90  10*3/mm3    Eosinophils, Absolute 0.10 0.00 - 0.40 10*3/mm3    Basophils, Absolute 0.12 0.00 - 0.20 10*3/mm3    Immature Grans, Absolute 0.22 (H) 0.00 - 0.05 10*3/mm3    nRBC 0.0 0.0 - 0.2 /100 WBC   Troponin    Collection Time: 10/12/21  3:21 PM    Specimen: Blood   Result Value Ref Range    Troponin T <0.010 0.000 - 0.030 ng/mL   Procalcitonin    Collection Time: 10/12/21  3:21 PM    Specimen: Blood   Result Value Ref Range    Procalcitonin 0.09 0.00 - 0.25 ng/mL   Urinalysis With Microscopic If Indicated (No Culture) - Urine, Clean Catch    Collection Time: 10/12/21  3:31 PM    Specimen: Urine, Clean Catch   Result Value Ref Range    Color, UA Yellow Yellow, Straw    Appearance, UA Clear Clear    pH, UA 5.5 5.0 - 8.0    Specific Gravity, UA 1.008 1.005 - 1.030    Glucose, UA Negative Negative    Ketones, UA Negative Negative    Bilirubin, UA Negative Negative    Blood, UA Negative Negative    Protein, UA Negative Negative    Leuk Esterase, UA Negative Negative    Nitrite, UA Negative Negative    Urobilinogen, UA 0.2 E.U./dL 0.2 - 1.0 E.U./dL   Lactic Acid, Plasma    Collection Time: 10/12/21  4:41 PM    Specimen: Blood   Result Value Ref Range    Lactate 0.9 0.5 - 2.0 mmol/L       Ordered the above labs and independently reviewed the results.      RADIOLOGY  XR Chest 2 View    Result Date: 10/12/2021  PA AND LATERAL CHEST  CLINICAL HISTORY: Chest pain  The lungs are well-expanded and appear free of focal infiltrates. There are no pleural effusions. The cardiomediastinal silhouette is unremarkable.  IMPRESSIONS: No evidence of active disease within the chest.  This report was finalized on 10/12/2021 2:13 PM by Dr. Ramesh Agee M.D.      CT Head Without Contrast    Result Date: 10/12/2021  CT HEAD WITHOUT CONTRAST  CLINICAL HISTORY: Syncopal episode.  TECHNIQUE: CT scan of the head was obtained with 3 mm axial images. No intravenous contrast was administered. Sagittal and coronal reconstructions were obtained.   COMPARISON: No previous similar studies are available for comparison.  FINDINGS:   The ventricles, sulci, and cisterns are age-appropriate. The gray-white matter differentiation is within normal limits. The basal ganglia and thalami are unremarkable. The posterior fossa structures are within normal limits. Incidental atherosclerotic changes are noted within the intracranial vasculature.  Note is made of subtotal opacification of the right mastoid air cells and middle ear cavity.       No CT evidence for acute intracranial pathology.  Note is made of subtotal opacification of the right mastoid air cells and middle ear cavity.    Radiation dose reduction techniques were utilized, including automated exposure control and exposure modulation based on body size.  This report was finalized on 10/12/2021 4:08 PM by Dr. Rio Dominguez M.D.      CT Angiogram Chest, CT Abdomen Pelvis With Contrast    Result Date: 10/12/2021  CT ANGIOGRAM CHEST WITH IV CONTRAST, CT ABDOMEN AND PELVIS WITH IV CONTRAST  HISTORY: Shortness of air, chest pain, syncopal episode. Evaluate for aortic dissection.  TECHNIQUE: CT angiogram chest includes axial imaging from the thoracic inlet to the upper abdomen with IV contrast and data reconstructed in coronal and sagittal planes and 3-D volume rendering was performed. CT abdomen and pelvis includes axial imaging from the lung base through the trochanters with IV contrast.  COMPARISON: None.  FINDINGS: CT ANGIOGRAM CHEST: Sinus of Valsalva measures approximately 4 cm. The ascending thoracic aorta measures 3.5 cm and tapers smoothly to the level of the top of the aortic arch. The ascending thoracic aorta measures 2.2 cm. There is no aortic dissection. There are mild coronary arterial calcifications. Calcified mediastinal and bilateral hilar lymph nodes are present associated with old granulomatous disease. There is no pericardial effusion. There is no evidence for pulmonary embolus. No infiltrate or  pleural effusion is evident. There can be a great deal of variability in the pattern of calcification of costal cartilage though there are 2 linear defects within the anterior left 5th rib costal cartilage  by 6 cm suspicious for a left costal cartilage fracture. There is a blind-ending 4.6 cm segment costicartilage between the 3rd and 4th ribs that articulates with the sternum though is blind ending laterally.  ABDOMEN/PELVIS: Small gallstones layer dependently within the gallbladder. The liver, spleen, adrenal glands, pancreas appear within normal limits. There are tiny renal low-density foci that are most likely cysts though too small to definitely characterize. There is no hydronephrosis.  Within the mid appendix there is an appendicolith measuring approximately 15 mm in diameter distending the appendix though there is no evidence for surrounding inflammation or appendicitis. There is no bowel dilatation or evidence for bowel obstruction. There is no ascites. Mild prostate gland enlargement is present. Degenerative disc disease is present L5-S1.      1. No evidence for dissection 2. Potential nondisplaced left 5th costochondral cartilage segmented fracture. 3. Cholelithiasis. 4. Prostate gland enlargement with median lobe hypertrophy. 5. 1.5 cm appendicolith without evidence for appendicitis.  Discussed with Smiley Sanchez in the ED on 10/12/2021 at 5:05 PM.  This report was finalized on 10/12/2021 5:08 PM by Dr. Ben Grover M.D.        I ordered the above noted radiological studies and viewed the images on the PACS system.       EKG      Independently viewed by me and interpreted by Dr Sherman         MEDICAL RECORD REVIEW  No relevant medical records reviewed in epic      PROCEDURES    Procedures        DIFFERENTIAL DIAGNOSIS  Differential Diagnosis for Syncope include but are not limited to the following:    Vasovagal Reflex- Situational stimulus, defecation, cough, sneezing,  swallowing    Vascular- Prolonged Recumbence, Sudden postural changes, prolonged standing, Hypovolemia, Vasodilator drugs, PE    Cardiac- Arrhythmia, Heart Block, MI, Aortic Dissection, Pacemaker Failure    CNS- Seizure, Hypoxia, Hypoglycemia, TIA, Hydrocephalus           PROGRESS, DATA ANALYSIS, CONSULTS, AND MEDICAL DECISION MAKING        ED Course as of 10/12/21 1846   e Oct 12, 2021   1515 Discussed with patient that we will get a CT of the head, CTA chest and CT Abdomen and Pelvis to evaluate for dissection/PE or other abnormalities that could have caused his syncopal episode.  His first troponin was negative, his chest x-ray does not show any abnormalities.  His BUN and creatinine are baseline for him.  He verbalized understanding and is agreeable fall to this plan. [MS]   1638 Reviewed pt's history and workup with Dr. Sherman.  After a bedside evaluation, they agree with the plan of care.       [MS]   1700 Specific Gravity, UA: 1.008 [MS]   1700  Discussed with Dr. Grover regarding the CTA Chest and CT Abdomen and Pelvis results which revealed no dissection, nondisplaced 5th costochondral cartilage fracture, abdomen showed no acute abnormalities  See dictation for official radiology interpretation.     [MS]   1747 Consult Note    Discussed care with Dr Haddad  Reviewed patient's history, exam, results and need for admission secondary to syncope, seizure, chest pain  Dr. Haddad accepts the patient to be admitted to telemetry inpatient bed.     [MS]      ED Course User Index  [MS] Smiley Sanchez, APRN     ADMISSION    Discussed treatment plan and reason for admission with pt/family and admitting physician.  Pt/family voiced understanding of the plan for admission for further testing/treatment as needed.      DIAGNOSIS  Final diagnoses:   Syncope and collapse   Seizure (HCC)   Chest pain, unspecified type           MEDICATIONS GIVEN IN ED    Medications   sodium chloride 0.9 % flush 10 mL (has no  administration in time range)   sodium chloride 0.9 % bolus 1,000 mL (0 mL Intravenous Stopped 10/12/21 8555)   iopamidol (ISOVUE-370) 76 % injection 100 mL (95 mL Intravenous Given 10/12/21 1608)           COURSE & MEDICAL DECISION MAKING  Any/All labs and Any/All Imaging studies that were ordered were reviewed and are noted above.  Results were reviewed/discussed with the patient and they were also made aware of online access.    Pt also made aware that some labs, such as cultures, will not be resulted during ER visit and follow up with PMD is necessary.        Smiley Sanchez, APRN  10/12/21 9133

## 2021-10-13 ENCOUNTER — APPOINTMENT (OUTPATIENT)
Dept: CARDIOLOGY | Facility: HOSPITAL | Age: 66
End: 2021-10-13

## 2021-10-13 LAB
ANION GAP SERPL CALCULATED.3IONS-SCNC: 11.1 MMOL/L (ref 5–15)
AORTIC DIMENSIONLESS INDEX: 0.5 (DI)
BH CV ECHO MEAS - AI DEC SLOPE: 123 CM/SEC^2
BH CV ECHO MEAS - AI MAX PG: 62.7 MMHG
BH CV ECHO MEAS - AI MAX VEL: 396 CM/SEC
BH CV ECHO MEAS - AI P1/2T: 943 MSEC
BH CV ECHO MEAS - AO MAX PG (FULL): 17.6 MMHG
BH CV ECHO MEAS - AO MAX PG: 22.7 MMHG
BH CV ECHO MEAS - AO MEAN PG (FULL): 8 MMHG
BH CV ECHO MEAS - AO MEAN PG: 10 MMHG
BH CV ECHO MEAS - AO ROOT AREA (BSA CORRECTED): 1.5
BH CV ECHO MEAS - AO ROOT AREA: 9.1 CM^2
BH CV ECHO MEAS - AO ROOT DIAM: 3.4 CM
BH CV ECHO MEAS - AO V2 MAX: 238 CM/SEC
BH CV ECHO MEAS - AO V2 MEAN: 150 CM/SEC
BH CV ECHO MEAS - AO V2 VTI: 45.6 CM
BH CV ECHO MEAS - ASC AORTA: 3.6 CM
BH CV ECHO MEAS - AVA(I,A): 1.8 CM^2
BH CV ECHO MEAS - AVA(I,D): 1.8 CM^2
BH CV ECHO MEAS - AVA(V,A): 1.6 CM^2
BH CV ECHO MEAS - AVA(V,D): 1.6 CM^2
BH CV ECHO MEAS - BSA(HAYCOCK): 2.3 M^2
BH CV ECHO MEAS - BSA: 2.3 M^2
BH CV ECHO MEAS - BZI_BMI: 32.8 KILOGRAMS/M^2
BH CV ECHO MEAS - BZI_METRIC_HEIGHT: 180.3 CM
BH CV ECHO MEAS - BZI_METRIC_WEIGHT: 106.6 KG
BH CV ECHO MEAS - EDV(CUBED): 97.3 ML
BH CV ECHO MEAS - EDV(MOD-SP2): 140 ML
BH CV ECHO MEAS - EDV(MOD-SP4): 145 ML
BH CV ECHO MEAS - EDV(TEICH): 97.3 ML
BH CV ECHO MEAS - EF(CUBED): 66.3 %
BH CV ECHO MEAS - EF(MOD-BP): 57.8 %
BH CV ECHO MEAS - EF(MOD-SP2): 59.3 %
BH CV ECHO MEAS - EF(MOD-SP4): 57.9 %
BH CV ECHO MEAS - EF(TEICH): 57.9 %
BH CV ECHO MEAS - ESV(CUBED): 32.8 ML
BH CV ECHO MEAS - ESV(MOD-SP2): 57 ML
BH CV ECHO MEAS - ESV(MOD-SP4): 61 ML
BH CV ECHO MEAS - ESV(TEICH): 41 ML
BH CV ECHO MEAS - FS: 30.4 %
BH CV ECHO MEAS - IVS/LVPW: 1.1
BH CV ECHO MEAS - IVSD: 1.1 CM
BH CV ECHO MEAS - LAT PEAK E' VEL: 8.3 CM/SEC
BH CV ECHO MEAS - LV DIASTOLIC VOL/BSA (35-75): 64.2 ML/M^2
BH CV ECHO MEAS - LV MASS(C)D: 169.9 GRAMS
BH CV ECHO MEAS - LV MASS(C)DI: 75.2 GRAMS/M^2
BH CV ECHO MEAS - LV MAX PG: 5 MMHG
BH CV ECHO MEAS - LV MEAN PG: 2 MMHG
BH CV ECHO MEAS - LV SYSTOLIC VOL/BSA (12-30): 27 ML/M^2
BH CV ECHO MEAS - LV V1 MAX: 112 CM/SEC
BH CV ECHO MEAS - LV V1 MEAN: 71.6 CM/SEC
BH CV ECHO MEAS - LV V1 VTI: 23.3 CM
BH CV ECHO MEAS - LVIDD: 4.6 CM
BH CV ECHO MEAS - LVIDS: 3.2 CM
BH CV ECHO MEAS - LVLD AP2: 9.1 CM
BH CV ECHO MEAS - LVLD AP4: 9.1 CM
BH CV ECHO MEAS - LVLS AP2: 7.6 CM
BH CV ECHO MEAS - LVLS AP4: 7.2 CM
BH CV ECHO MEAS - LVOT AREA (M): 3.5 CM^2
BH CV ECHO MEAS - LVOT AREA: 3.5 CM^2
BH CV ECHO MEAS - LVOT DIAM: 2.1 CM
BH CV ECHO MEAS - LVPWD: 1 CM
BH CV ECHO MEAS - MED PEAK E' VEL: 7.7 CM/SEC
BH CV ECHO MEAS - MV A DUR: 0.11 SEC
BH CV ECHO MEAS - MV A MAX VEL: 96 CM/SEC
BH CV ECHO MEAS - MV DEC SLOPE: 385.5 CM/SEC^2
BH CV ECHO MEAS - MV DEC TIME: 223 SEC
BH CV ECHO MEAS - MV E MAX VEL: 77.1 CM/SEC
BH CV ECHO MEAS - MV E/A: 0.8
BH CV ECHO MEAS - MV MAX PG: 4.8 MMHG
BH CV ECHO MEAS - MV MEAN PG: 2 MMHG
BH CV ECHO MEAS - MV P1/2T MAX VEL: 96 CM/SEC
BH CV ECHO MEAS - MV P1/2T: 72.9 MSEC
BH CV ECHO MEAS - MV V2 MAX: 110 CM/SEC
BH CV ECHO MEAS - MV V2 MEAN: 66.9 CM/SEC
BH CV ECHO MEAS - MV V2 VTI: 35.7 CM
BH CV ECHO MEAS - MVA P1/2T LCG: 2.3 CM^2
BH CV ECHO MEAS - MVA(P1/2T): 3 CM^2
BH CV ECHO MEAS - MVA(VTI): 2.3 CM^2
BH CV ECHO MEAS - PA ACC TIME: 0.11 SEC
BH CV ECHO MEAS - PA MAX PG (FULL): 4 MMHG
BH CV ECHO MEAS - PA MAX PG: 5.3 MMHG
BH CV ECHO MEAS - PA PR(ACCEL): 30.4 MMHG
BH CV ECHO MEAS - PA V2 MAX: 115 CM/SEC
BH CV ECHO MEAS - PULM A REVS DUR: 0.13 SEC
BH CV ECHO MEAS - PULM A REVS VEL: 31.5 CM/SEC
BH CV ECHO MEAS - PULM DIAS VEL: 36.3 CM/SEC
BH CV ECHO MEAS - PULM S/D: 1.3
BH CV ECHO MEAS - PULM SYS VEL: 45.7 CM/SEC
BH CV ECHO MEAS - PVA(V,A): 3.3 CM^2
BH CV ECHO MEAS - PVA(V,D): 3.3 CM^2
BH CV ECHO MEAS - QP/QS: 1
BH CV ECHO MEAS - RAP SYSTOLE: 3 MMHG
BH CV ECHO MEAS - RV MAX PG: 1.3 MMHG
BH CV ECHO MEAS - RV MEAN PG: 1 MMHG
BH CV ECHO MEAS - RV V1 MAX: 57.4 CM/SEC
BH CV ECHO MEAS - RV V1 MEAN: 40.6 CM/SEC
BH CV ECHO MEAS - RV V1 VTI: 12.5 CM
BH CV ECHO MEAS - RVOT AREA: 6.6 CM^2
BH CV ECHO MEAS - RVOT DIAM: 2.9 CM
BH CV ECHO MEAS - SI(AO): 183.3 ML/M^2
BH CV ECHO MEAS - SI(CUBED): 28.6 ML/M^2
BH CV ECHO MEAS - SI(LVOT): 35.7 ML/M^2
BH CV ECHO MEAS - SI(MOD-SP2): 36.7 ML/M^2
BH CV ECHO MEAS - SI(MOD-SP4): 37.2 ML/M^2
BH CV ECHO MEAS - SI(TEICH): 25 ML/M^2
BH CV ECHO MEAS - SV(AO): 414 ML
BH CV ECHO MEAS - SV(CUBED): 64.6 ML
BH CV ECHO MEAS - SV(LVOT): 80.7 ML
BH CV ECHO MEAS - SV(MOD-SP2): 83 ML
BH CV ECHO MEAS - SV(MOD-SP4): 84 ML
BH CV ECHO MEAS - SV(RVOT): 82.6 ML
BH CV ECHO MEAS - SV(TEICH): 56.4 ML
BH CV ECHO MEAS - TAPSE (>1.6): 2.4 CM
BH CV ECHO MEASUREMENTS AVERAGE E/E' RATIO: 9.64
BH CV XLRA - RV BASE: 3.7 CM
BH CV XLRA - RV LENGTH: 7.7 CM
BH CV XLRA - RV MID: 2.9 CM
BH CV XLRA - TDI S': 11.5 CM/SEC
BUN SERPL-MCNC: 18 MG/DL (ref 8–23)
BUN/CREAT SERPL: 14.3 (ref 7–25)
CALCIUM SPEC-SCNC: 9.2 MG/DL (ref 8.6–10.5)
CHLORIDE SERPL-SCNC: 103 MMOL/L (ref 98–107)
CO2 SERPL-SCNC: 23.9 MMOL/L (ref 22–29)
CREAT SERPL-MCNC: 1.26 MG/DL (ref 0.76–1.27)
DEPRECATED RDW RBC AUTO: 41.6 FL (ref 37–54)
ERYTHROCYTE [DISTWIDTH] IN BLOOD BY AUTOMATED COUNT: 13.6 % (ref 12.3–15.4)
GFR SERPL CREATININE-BSD FRML MDRD: 58 ML/MIN/1.73
GLUCOSE BLDC GLUCOMTR-MCNC: 147 MG/DL (ref 70–130)
GLUCOSE BLDC GLUCOMTR-MCNC: 94 MG/DL (ref 70–130)
GLUCOSE SERPL-MCNC: 67 MG/DL (ref 65–99)
HBA1C MFR BLD: 5.3 % (ref 4.8–5.6)
HCT VFR BLD AUTO: 52.7 % (ref 37.5–51)
HGB BLD-MCNC: 17.5 G/DL (ref 13–17.7)
LEFT ATRIUM VOLUME INDEX: 22.9 ML/M2
MCH RBC QN AUTO: 28.6 PG (ref 26.6–33)
MCHC RBC AUTO-ENTMCNC: 33.2 G/DL (ref 31.5–35.7)
MCV RBC AUTO: 86.1 FL (ref 79–97)
PLATELET # BLD AUTO: 252 10*3/MM3 (ref 140–450)
PMV BLD AUTO: 10.3 FL (ref 6–12)
POTASSIUM SERPL-SCNC: 4.4 MMOL/L (ref 3.5–5.2)
QT INTERVAL: 372 MS
RBC # BLD AUTO: 6.12 10*6/MM3 (ref 4.14–5.8)
SODIUM SERPL-SCNC: 138 MMOL/L (ref 136–145)
TROPONIN T SERPL-MCNC: <0.01 NG/ML (ref 0–0.03)
WBC # BLD AUTO: 11.13 10*3/MM3 (ref 3.4–10.8)

## 2021-10-13 PROCEDURE — 93458 L HRT ARTERY/VENTRICLE ANGIO: CPT | Performed by: INTERNAL MEDICINE

## 2021-10-13 PROCEDURE — 25010000002 HEPARIN (PORCINE) PER 1000 UNITS: Performed by: INTERNAL MEDICINE

## 2021-10-13 PROCEDURE — 99205 OFFICE O/P NEW HI 60 MIN: CPT | Performed by: INTERNAL MEDICINE

## 2021-10-13 PROCEDURE — 82962 GLUCOSE BLOOD TEST: CPT

## 2021-10-13 PROCEDURE — 93306 TTE W/DOPPLER COMPLETE: CPT | Performed by: INTERNAL MEDICINE

## 2021-10-13 PROCEDURE — 84484 ASSAY OF TROPONIN QUANT: CPT | Performed by: INTERNAL MEDICINE

## 2021-10-13 PROCEDURE — C1769 GUIDE WIRE: HCPCS | Performed by: INTERNAL MEDICINE

## 2021-10-13 PROCEDURE — 85027 COMPLETE CBC AUTOMATED: CPT | Performed by: INTERNAL MEDICINE

## 2021-10-13 PROCEDURE — 96372 THER/PROPH/DIAG INJ SC/IM: CPT

## 2021-10-13 PROCEDURE — 83036 HEMOGLOBIN GLYCOSYLATED A1C: CPT | Performed by: INTERNAL MEDICINE

## 2021-10-13 PROCEDURE — G0378 HOSPITAL OBSERVATION PER HR: HCPCS

## 2021-10-13 PROCEDURE — 25010000002 ENOXAPARIN PER 10 MG: Performed by: INTERNAL MEDICINE

## 2021-10-13 PROCEDURE — C1894 INTRO/SHEATH, NON-LASER: HCPCS | Performed by: INTERNAL MEDICINE

## 2021-10-13 PROCEDURE — 25010000002 PERFLUTREN (DEFINITY) 8.476 MG IN SODIUM CHLORIDE (PF) 0.9 % 10 ML INJECTION: Performed by: INTERNAL MEDICINE

## 2021-10-13 PROCEDURE — 99204 OFFICE O/P NEW MOD 45 MIN: CPT | Performed by: INTERNAL MEDICINE

## 2021-10-13 PROCEDURE — 93005 ELECTROCARDIOGRAM TRACING: CPT | Performed by: INTERNAL MEDICINE

## 2021-10-13 PROCEDURE — 80048 BASIC METABOLIC PNL TOTAL CA: CPT | Performed by: INTERNAL MEDICINE

## 2021-10-13 PROCEDURE — 0 IOPAMIDOL PER 1 ML: Performed by: INTERNAL MEDICINE

## 2021-10-13 PROCEDURE — 99204 OFFICE O/P NEW MOD 45 MIN: CPT | Performed by: STUDENT IN AN ORGANIZED HEALTH CARE EDUCATION/TRAINING PROGRAM

## 2021-10-13 PROCEDURE — 93010 ELECTROCARDIOGRAM REPORT: CPT | Performed by: INTERNAL MEDICINE

## 2021-10-13 PROCEDURE — 93306 TTE W/DOPPLER COMPLETE: CPT

## 2021-10-13 RX ORDER — SODIUM CHLORIDE 9 MG/ML
75 INJECTION, SOLUTION INTRAVENOUS CONTINUOUS
Status: DISCONTINUED | OUTPATIENT
Start: 2021-10-13 | End: 2021-10-14

## 2021-10-13 RX ORDER — ENALAPRIL MALEATE 10 MG/1
10 TABLET ORAL DAILY
Status: DISCONTINUED | OUTPATIENT
Start: 2021-10-13 | End: 2021-10-14 | Stop reason: HOSPADM

## 2021-10-13 RX ORDER — ICOSAPENT ETHYL 1000 MG/1
2 CAPSULE ORAL 2 TIMES DAILY WITH MEALS
Status: DISCONTINUED | OUTPATIENT
Start: 2021-10-13 | End: 2021-10-14 | Stop reason: HOSPADM

## 2021-10-13 RX ORDER — SODIUM CHLORIDE 9 MG/ML
50 INJECTION, SOLUTION INTRAVENOUS CONTINUOUS
Status: DISCONTINUED | OUTPATIENT
Start: 2021-10-13 | End: 2021-10-14

## 2021-10-13 RX ORDER — ASPIRIN 81 MG/1
81 TABLET, CHEWABLE ORAL DAILY
Status: DISCONTINUED | OUTPATIENT
Start: 2021-10-13 | End: 2021-10-14 | Stop reason: HOSPADM

## 2021-10-13 RX ORDER — ASCORBIC ACID 500 MG
250 TABLET ORAL DAILY
Status: DISCONTINUED | OUTPATIENT
Start: 2021-10-13 | End: 2021-10-14 | Stop reason: HOSPADM

## 2021-10-13 RX ORDER — METAXALONE 800 MG/1
800 TABLET ORAL DAILY PRN
Status: DISCONTINUED | OUTPATIENT
Start: 2021-10-13 | End: 2021-10-14 | Stop reason: HOSPADM

## 2021-10-13 RX ORDER — LIDOCAINE HYDROCHLORIDE 20 MG/ML
INJECTION, SOLUTION INFILTRATION; PERINEURAL AS NEEDED
Status: DISCONTINUED | OUTPATIENT
Start: 2021-10-13 | End: 2021-10-13 | Stop reason: HOSPADM

## 2021-10-13 RX ADMIN — NITROGLYCERIN 0.4 MG: 0.4 TABLET SUBLINGUAL at 11:58

## 2021-10-13 RX ADMIN — ACETAMINOPHEN 650 MG: 325 TABLET, FILM COATED ORAL at 09:34

## 2021-10-13 RX ADMIN — OXYCODONE HYDROCHLORIDE AND ACETAMINOPHEN 250 MG: 500 TABLET ORAL at 09:12

## 2021-10-13 RX ADMIN — SODIUM CHLORIDE 50 ML/HR: 9 INJECTION, SOLUTION INTRAVENOUS at 11:59

## 2021-10-13 RX ADMIN — PERFLUTREN 2 ML: 6.52 INJECTION, SUSPENSION INTRAVENOUS at 12:22

## 2021-10-13 RX ADMIN — ASPIRIN 81 MG: 81 TABLET, CHEWABLE ORAL at 09:11

## 2021-10-13 RX ADMIN — ENOXAPARIN SODIUM 40 MG: 40 INJECTION SUBCUTANEOUS at 09:11

## 2021-10-13 RX ADMIN — ENALAPRIL MALEATE 10 MG: 10 TABLET ORAL at 09:11

## 2021-10-13 NOTE — H&P
HISTORY AND PHYSICAL   [facility]        Date of Admission: 10/12/2021  Patient Identification:  Name: Jimmie Jamil  Age: 66 y.o.  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    History of Present Illness:   As above    Past Medical History:  Past Medical History:   Diagnosis Date   • Benign essential hypertension    • Bladder problem    • Cervical radiculopathy 2017   • Cervical spinal stenosis 2017   • Cervicalgia 2017   • Coronary artery disease     diastolic dysfunction   • ED (erectile dysfunction)    • Greater trochanteric bursitis of right hip 2018   • High blood pressure    • Hyperlipidemia    • Hypertension    • Leg pain    • Leg swelling    • Low back pain    • Lumbago 2017   • Neck pain    • Paresthesia 2017    left hand/leg   • Polycythemia    • Right hip pain    • Urinary bladder incontinence 2017     Past Surgical History:  Past Surgical History:   Procedure Laterality Date   • ANTERIOR CERVICAL FUSION  2017    C3-4   • CARDIAC CATHETERIZATION      everything okay clearance for neck surgery   • CERVICAL DISC SURGERY      C2-3 fusion   • CIRCUMCISION     • COLONOSCOPY     • CYSTOSCOPY  01/10/2014    Cysto ivan retrograde pyelogram urthral bx.   • LUMBAR EPIDURAL INJECTION      L2-S2  going to try ablasion in future   • PROSTATE BIOPSY     • SHOULDER ARTHROSCOPY W/ LABRAL REPAIR Left    • VASECTOMY     • WISDOM TOOTH EXTRACTION        Home Meds:  (Not in a hospital  admission)      Allergies:  Allergies   Allergen Reactions   • Beta Adrenergic Blockers Unknown - High Severity   • Crestor [Rosuvastatin Calcium] Myalgia   • Keflex [Cephalexin] Other (See Comments)     hiccups   • Livalo [Pitavastatin] Myalgia   • Losartan Other (See Comments)     Intolerance due to fatigue, depression   • Naproxen Other (See Comments)     Chest pain     Immunizations:  Immunization History   Administered Date(s) Administered   • COVID-19 (PFIZER) 03/18/2021, 04/13/2021   • FluLaval/Fluarix/Fluzone >6 09/20/2019   • Pneumococcal Polysaccharide (PPSV23) 09/20/2019   • Shingrix 09/20/2019, 11/15/2019     Social History:   Social History     Social History Narrative   • Not on file     Social History     Socioeconomic History   • Marital status:    Tobacco Use   • Smoking status: Never Smoker   • Smokeless tobacco: Never Used   Substance and Sexual Activity   • Alcohol use: Yes     Comment: rare   • Drug use: Never   • Sexual activity: Yes     Partners: Female       Family History:  Family History   Problem Relation Age of Onset   • Arthritis Mother    • Alzheimer's disease Mother 80   • Arthritis Father    • Heart disease Father    • Colon cancer Father 85   • Hypertension Father 80   • Cancer Father    • Stroke Father         Review of Systems  See history of present illness and past medical history.  Patient denies headache, dizziness, syncope, falls, trauma, change in vision, change in hearing, change in taste, changes in weight, changes in appetite, focal weakness, numbness, or paresthesia.  Patient denies chest pain, palpitations, dyspnea, orthopnea, PND, cough, sinus pressure, rhinorrhea, epistaxis, hemoptysis, nausea, vomiting,hematemesis, diarrhea, constipation or hematchezia.  Denies cold or heat intolerance, polydipsia, polyuria, polyphagia. Denies hematuria, pyuria, dysuria, hesitancy, frequency or urgency. Denies consumption of raw and under cooked meats foods or change in water  "source.  Denies fever, chills, sweats, night sweats.  Denies missing any routine medications. Remainder of ROS is negative.    Objective:  T Max 24 hrs: Temp (24hrs), Av.7 °F (36.5 °C), Min:97.7 °F (36.5 °C), Max:97.7 °F (36.5 °C)    Vitals Ranges:   Temp:  [97.7 °F (36.5 °C)] 97.7 °F (36.5 °C)  Heart Rate:  [68-79] 68  Resp:  [16] 16  BP: (128-141)/(70-76) 141/76      Exam:  /76   Pulse 68   Temp 97.7 °F (36.5 °C) (Tympanic)   Resp 16   Ht 180.3 cm (71\")   Wt 107 kg (235 lb)   SpO2 94%   BMI 32.78 kg/m²     General Appearance:    Alert, cooperative, no distress, appears stated age   Head:    Normocephalic, without obvious abnormality, atraumatic   Eyes:    PERRL, conjunctivae/corneas clear, EOM's intact, both eyes   Ears:    Normal external ear canals, both ears   Nose:   Nares normal, septum midline, mucosa normal, no drainage    or sinus tenderness   Throat:   Lips, mucosa, and tongue normal   Neck:   Supple, symmetrical, trachea midline, no adenopathy;     thyroid:  no enlargement/tenderness/nodules; no carotid    bruit or JVD   Back:     Symmetric, no curvature, ROM normal, no CVA tenderness   Lungs:     Clear to auscultation bilaterally, respirations unlabored   Chest Wall:    No tenderness or deformity    Heart:    Regular rate and rhythm, S1 and S2 normal, no murmur, rub   or gallop   Abdomen:     Soft, nontender, bowel sounds active all four quadrants,     no masses, no hepatomegaly, no splenomegaly   Extremities:   Extremities normal, atraumatic, no cyanosis or edema   Pulses:   2+ and symmetric all extremities   Skin:   Skin color, texture, turgor normal, no rashes or lesions   Lymph nodes:   Cervical, supraclavicular, and axillary nodes normal   Neurologic:   CNII-XII intact, normal strength, sensation intact throughout      .    Data Review:  Labs in chart were reviewed.  WBC   Date Value Ref Range Status   10/12/2021 14.15 (H) 3.40 - 10.80 10*3/mm3 Final     Hemoglobin   Date Value Ref " Range Status   10/12/2021 18.4 (H) 13.0 - 17.7 g/dL Final     Hematocrit   Date Value Ref Range Status   10/12/2021 54.9 (H) 37.5 - 51.0 % Final     Platelets   Date Value Ref Range Status   10/12/2021 274 140 - 450 10*3/mm3 Final     Sodium   Date Value Ref Range Status   10/12/2021 137 136 - 145 mmol/L Final     Potassium   Date Value Ref Range Status   10/12/2021 4.5 3.5 - 5.2 mmol/L Final     Chloride   Date Value Ref Range Status   10/12/2021 104 98 - 107 mmol/L Final     CO2   Date Value Ref Range Status   10/12/2021 21.7 (L) 22.0 - 29.0 mmol/L Final     BUN   Date Value Ref Range Status   10/12/2021 19 8 - 23 mg/dL Final     Creatinine   Date Value Ref Range Status   10/12/2021 1.42 (H) 0.76 - 1.27 mg/dL Final     Glucose   Date Value Ref Range Status   10/12/2021 97 65 - 99 mg/dL Final     Calcium   Date Value Ref Range Status   10/12/2021 9.8 8.6 - 10.5 mg/dL Final     AST (SGOT)   Date Value Ref Range Status   10/12/2021 27 1 - 40 U/L Final     ALT (SGPT)   Date Value Ref Range Status   10/12/2021 41 1 - 41 U/L Final     Alkaline Phosphatase   Date Value Ref Range Status   10/12/2021 70 39 - 117 U/L Final                Imaging Results (All)     Procedure Component Value Units Date/Time    CT Angiogram Chest [[id number]] Collected: 10/12/21 1700     Updated: 10/12/21 1711    Narrative:      CT ANGIOGRAM CHEST WITH IV CONTRAST, CT ABDOMEN AND PELVIS WITH IV  CONTRAST     HISTORY: Shortness of air, chest pain, syncopal episode. Evaluate for  aortic dissection.     TECHNIQUE: CT angiogram chest includes axial imaging from the thoracic  inlet to the upper abdomen with IV contrast and data reconstructed in  coronal and sagittal planes and 3-D volume rendering was performed. CT  abdomen and pelvis includes axial imaging from the lung base through the  trochanters with IV contrast.     COMPARISON: None.     FINDINGS:  CT ANGIOGRAM CHEST: Sinus of Valsalva measures approximately 4 cm. The  ascending thoracic aorta  measures 3.5 cm and tapers smoothly to the  level of the top of the aortic arch. The ascending thoracic aorta  measures 2.2 cm. There is no aortic dissection. There are mild coronary  arterial calcifications. Calcified mediastinal and bilateral hilar lymph  nodes are present associated with old granulomatous disease. There is no  pericardial effusion. There is no evidence for pulmonary embolus. No  infiltrate or pleural effusion is evident. There can be a great deal of  variability in the pattern of calcification of costal cartilage though  there are 2 linear defects within the anterior left 5th rib costal  cartilage  by 6 cm suspicious for a left costal cartilage  fracture. There is a blind-ending 4.6 cm segment costicartilage between  the 3rd and 4th ribs that articulates with the sternum though is blind  ending laterally.     ABDOMEN/PELVIS: Small gallstones layer dependently within the  gallbladder. The liver, spleen, adrenal glands, pancreas appear within  normal limits. There are tiny renal low-density foci that are most  likely cysts though too small to definitely characterize. There is no  hydronephrosis.     Within the mid appendix there is an appendicolith measuring  approximately 15 mm in diameter distending the appendix though there is  no evidence for surrounding inflammation or appendicitis. There is no  bowel dilatation or evidence for bowel obstruction. There is no ascites.  Mild prostate gland enlargement is present. Degenerative disc disease is  present L5-S1.       Impression:      1. No evidence for dissection  2. Potential nondisplaced left 5th costochondral cartilage segmented  fracture.  3. Cholelithiasis.  4. Prostate gland enlargement with median lobe hypertrophy.  5. 1.5 cm appendicolith without evidence for appendicitis.     Discussed with Smiley Sanchez in the ED on 10/12/2021 at 5:05 PM.     This report was finalized on 10/12/2021 5:08 PM by Dr. Ben Grover M.D.        CT Abdomen Pelvis With Contrast [[id number]] Collected: 10/12/21 1700     Updated: 10/12/21 1711    Narrative:      CT ANGIOGRAM CHEST WITH IV CONTRAST, CT ABDOMEN AND PELVIS WITH IV  CONTRAST     HISTORY: Shortness of air, chest pain, syncopal episode. Evaluate for  aortic dissection.     TECHNIQUE: CT angiogram chest includes axial imaging from the thoracic  inlet to the upper abdomen with IV contrast and data reconstructed in  coronal and sagittal planes and 3-D volume rendering was performed. CT  abdomen and pelvis includes axial imaging from the lung base through the  trochanters with IV contrast.     COMPARISON: None.     FINDINGS:  CT ANGIOGRAM CHEST: Sinus of Valsalva measures approximately 4 cm. The  ascending thoracic aorta measures 3.5 cm and tapers smoothly to the  level of the top of the aortic arch. The ascending thoracic aorta  measures 2.2 cm. There is no aortic dissection. There are mild coronary  arterial calcifications. Calcified mediastinal and bilateral hilar lymph  nodes are present associated with old granulomatous disease. There is no  pericardial effusion. There is no evidence for pulmonary embolus. No  infiltrate or pleural effusion is evident. There can be a great deal of  variability in the pattern of calcification of costal cartilage though  there are 2 linear defects within the anterior left 5th rib costal  cartilage  by 6 cm suspicious for a left costal cartilage  fracture. There is a blind-ending 4.6 cm segment costicartilage between  the 3rd and 4th ribs that articulates with the sternum though is blind  ending laterally.     ABDOMEN/PELVIS: Small gallstones layer dependently within the  gallbladder. The liver, spleen, adrenal glands, pancreas appear within  normal limits. There are tiny renal low-density foci that are most  likely cysts though too small to definitely characterize. There is no  hydronephrosis.     Within the mid appendix there is an appendicolith  measuring  approximately 15 mm in diameter distending the appendix though there is  no evidence for surrounding inflammation or appendicitis. There is no  bowel dilatation or evidence for bowel obstruction. There is no ascites.  Mild prostate gland enlargement is present. Degenerative disc disease is  present L5-S1.       Impression:      1. No evidence for dissection  2. Potential nondisplaced left 5th costochondral cartilage segmented  fracture.  3. Cholelithiasis.  4. Prostate gland enlargement with median lobe hypertrophy.  5. 1.5 cm appendicolith without evidence for appendicitis.     Discussed with Smiley Sanchez in the ED on 10/12/2021 at 5:05 PM.     This report was finalized on 10/12/2021 5:08 PM by Dr. Ben Grover M.D.       CT Head Without Contrast [[id number]] Collected: 10/12/21 1601     Updated: 10/12/21 1614    Narrative:      CT HEAD WITHOUT CONTRAST     CLINICAL HISTORY: Syncopal episode.     TECHNIQUE: CT scan of the head was obtained with 3 mm axial images. No  intravenous contrast was administered. Sagittal and coronal  reconstructions were obtained.     COMPARISON: No previous similar studies are available for comparison.     FINDINGS:       The ventricles, sulci, and cisterns are age-appropriate. The gray-white  matter differentiation is within normal limits. The basal ganglia and  thalami are unremarkable. The posterior fossa structures are within  normal limits. Incidental atherosclerotic changes are noted within the  intracranial vasculature.     Note is made of subtotal opacification of the right mastoid air cells  and middle ear cavity.       Impression:         No CT evidence for acute intracranial pathology.     Note is made of subtotal opacification of the right mastoid air cells  and middle ear cavity.           Radiation dose reduction techniques were utilized, including automated  exposure control and exposure modulation based on body size.     This report was finalized on  10/12/2021 4:08 PM by Dr. Rio Dominguez M.D.       XR Chest 2 View [[id number]] Collected: 10/12/21 1412     Updated: 10/12/21 1416    Narrative:      PA AND LATERAL CHEST     CLINICAL HISTORY: Chest pain     The lungs are well-expanded and appear free of focal infiltrates. There  are no pleural effusions. The cardiomediastinal silhouette is  unremarkable.     IMPRESSIONS: No evidence of active disease within the chest.     This report was finalized on 10/12/2021 2:13 PM by Dr. Ramesh Agee M.D.           Patient Active Problem List   Diagnosis Code   • Class 1 obesity without serious comorbidity with body mass index (BMI) of 33.0 to 33.9 in adult E66.9, Z68.33   • Vitamin D deficiency E55.9   • Hyperglycemia R73.9   • Essential hypertension I10   • Mixed dyslipidemia E78.2   • Low testosterone in male R79.89   • Statin intolerance Z78.9   • Polycythemia D75.1   • Cervical radiculopathy M54.12   • Cervical spinal stenosis M48.02   • Cervicalgia M54.2   • ED (erectile dysfunction) N52.9   • Greater trochanteric bursitis of right hip M70.61   • High blood pressure I10   • High cholesterol E78.00   • Lumbago M54.50   • Paresthesia R20.2   • Urinary bladder incontinence R32   • Elevated PSA R97.20   • Hypogonadism in male E29.1   • Syncope and collapse R55       Assessment:    Syncope and collapse  hypertension  hyperglycemia  ckd3  Obesity  Seizure  Hyperlipidemia  polycythemia    Plan:  Will monitor on telemetry  Cardiology to see  Neurology to see  Trend wbc  Seizure precautions  Consult hematology about polycythemia may be contributing  D.w patient and ED Provider    Shantel Haddad MD  10/12/2021  21:36 EDT

## 2021-10-13 NOTE — PROGRESS NOTES
To patient and wife regarding echo results also cleared by neurology and hematology.  Aortic stenosis not causing chest pain.  Will repeat echo in 1 year to follow-up on this.  Recommend he proceed with cardiac catheterization.  Risks and benefits of this including risk of myocardial infarction, death, renal failure, stroke, local injury to artery nerve vein and hand were discussed in detail.  In addition I reviewed increased risk of nephrotoxicity with prior underlying renal insufficiency.  He understands and wishes to proceed.  We will continue with IV fluids.  Nikki Duncan MD

## 2021-10-13 NOTE — CONSULTS
"Neurology Consult Note    Consult Date: 10/13/2021    Referring MD: Shantel Haddad, *    Reason for Consult I have been asked to see the patient in neurological consultation to render advice and opinion regarding syncope    Jimmie Jamil is a 66 y.o. white male with past medical history of CAD, hypertension, hyperlipidemia who presented to the hospital after an episode of syncope that happened yesterday while taking breakfast with his wife.  Patient stated that he felt chest heaviness, dizzy lightheaded then he does not remember what happened.  His wife witnessed the episode and she stated that the patient was profusely sweating with pallor, loss consciousness with the eyes closed and start shaking for about 1 minute and followed by 10 minutes of confusion, during this episode patient lost control on bladder.  Denied fecal incontinence or tongue bite.  Denied prior history of epilepsy.  Denied prior similar episode.    Past Medical History:   Diagnosis Date   • Benign essential hypertension    • Bladder problem    • Cervical radiculopathy 03/16/2017   • Cervical spinal stenosis 03/16/2017   • Cervicalgia 03/16/2017   • Coronary artery disease     diastolic dysfunction   • ED (erectile dysfunction)    • Greater trochanteric bursitis of right hip 07/09/2018   • High blood pressure    • Hyperlipidemia    • Hypertension    • Leg pain    • Leg swelling    • Low back pain    • Lumbago 03/16/2017   • Neck pain    • Paresthesia 03/16/2017    left hand/leg   • Polycythemia    • Right hip pain    • Urinary bladder incontinence 03/16/2017       ROS:  No fevers, chills  No weakness, numbness    Exam  /79 (BP Location: Left arm, Patient Position: Lying)   Pulse 80   Temp 98.2 °F (36.8 °C) (Oral)   Resp 24   Ht 180.3 cm (71\")   Wt 107 kg (235 lb)   SpO2 93%   BMI 32.78 kg/m²   Gen: NAD, vitals reviewed  MS: oriented x3, recent/remote memory intact, normal attention/concentration, language intact, no " neglect.  CN: visual acuity grossly normal, PERRL, EOMI, no facial droop, no dysarthria  Motor: 5/5 throughout upper and lower extremities, normal tone    DATA:    Lab Results   Component Value Date    GLUCOSE 67 10/13/2021    CALCIUM 9.2 10/13/2021     10/13/2021    K 4.4 10/13/2021    CO2 23.9 10/13/2021     10/13/2021    BUN 18 10/13/2021    CREATININE 1.26 10/13/2021    EGFRIFAFRI 78 08/05/2020    EGFRIFNONA 58 (L) 10/13/2021    BCR 14.3 10/13/2021    ANIONGAP 11.1 10/13/2021     Lab Results   Component Value Date    WBC 11.13 (H) 10/13/2021    HGB 17.5 10/13/2021    HCT 52.7 (H) 10/13/2021    MCV 86.1 10/13/2021     10/13/2021       Lab review: Glucose 67, sodium 138, BUN 18, creatinine 1.26    Imaging review: Reviewed his CT head which showed no acute abnormality    Diagnoses:  Syncopal convulsion less likely to be epilepsy        PLAN:  1.  We will get 1 hour EEG  2.  Okay from neurology standpoint to get the cardiac cath per cardiology.    We will peripherally follow up on the EEG results.  No further work-up needed from neurology standpoint.    Discussed with the patient, wife and nursing staff.

## 2021-10-13 NOTE — PROGRESS NOTES
I attempted to see the patient x2, while he was in echo/Cath Lab.  Chart and labs reviewed, events noted.  A will continue to follow the patient tomorrow.

## 2021-10-13 NOTE — CONSULTS
[facility]  Consult Note                                                                              10/13/2021  Shantel Haddad MD    Patient Identification:  Jimmie Jamil:   66 y.o.  male  1955     Date of Admission:10/12/2021    CC:   Consult requested by Dr Mathews for syncopal episode and chest pain    History of Present Illness:   Jimmie Jamil is a 66 year old pt with a history of rheumatic fever, HTN, HLD, CKD III, and obesity.  He has a history of rheumatic fever at age 14 and was treated with penicillin to age 18.  He was not restricted in his activities.  He was noted to have a murmur at that time which subsequently was felt to have resolved.  He did undergo cardiac catheterization approximately 6 years ago when he had an abnormal preoperative stress test.  This was in [place].  It was negative.  He did have an echocardiogram and was told he had diastolic dysfunction.  He was started on verapamil and aspirin at the time.  He states he is usually very active around his home lifting carrying heavy equipment and has not felt well.  His blood pressure he believes has been under control.  He has not missed any medications he is not had any fever chills or shortness of breath.  He does have a chronic cough with lisinopril.    He notes that 2 days prior to admission he started having dyspnea on exertion.  Yesterday he woke up feeling like he usually does.  Had no chest pain or shortness of breath.  His wife brought him breakfast while he was sitting at the couch.  After he took 3 bites he developed severe chest pain and shortness of breath.  He appears pale.  His wife took his plate away he laid down and then passed out as best he can tell.  There was a bladder incontinence without bowel incontinence.  Wife who is a retired nurse felt he had seizures. According to his wife he had a syncopal episode with approximately 10 seconds of seizure like activity.  He reported when he woke  up he felt short of breath, had left chest pressure and mild headache.  He describes midsternal chest pressure that was 8 out of 10 associate with severe shortness of breath at onset after the syncopal episode yesterday.  Since then it has improved slowly and it is now 1 out of 10 chest pressure.  It has not been radiating pressure.  He does still have quite severe shortness of breath with walking to the bathroom and back.  In ER CR 1.42, troponin negative x2, EKG without acute changes, WBC 14.15, HGB 18.4, CXR showed nothing active, CT of head showed nothing acute. CTA of chest, ABD/pelvis showed no pulmonary emboli borderline dilated aortic root and ascending aorta.  There was no aortic dissection.  Mild coronary artery calcification was noted.  There was felt to be a potential nondisplaced left 5th costochondral cartilage segmented fracture.  Cholelithiasis and enlarged prostate was noted.      Patient notes that 8 years ago had a syncopal episodes but specifics not available.  Has also had elevated PSA but has ha follow-up with his PCP and has had biopsies that were negative.  He has a history of elevated hemoglobin attributed to testosterone injections which he has been on for several years.  He states he gets phlebotomies every 2 years for this.  His hemoglobin had been up to 20 recently.  He had 1 unit blood 3 weeks ago and a week later his hemoglobin was 15.  On admission today it is a fair bit higher.  He stays well-hydrated.    Past Medical History:  Past Medical History:   Diagnosis Date   • Benign essential hypertension    • Bladder problem    • Cervical radiculopathy 03/16/2017   • Cervical spinal stenosis 03/16/2017   • Cervicalgia 03/16/2017   • Coronary artery disease     diastolic dysfunction   • ED (erectile dysfunction)    • Greater trochanteric bursitis of right hip 07/09/2018   • High blood pressure    • Hyperlipidemia    • Hypertension    • Leg pain    • Leg swelling    • Low back pain    •  Lumbago 03/16/2017   • Neck pain    • Paresthesia 03/16/2017    left hand/leg   • Polycythemia    • Right hip pain    • Urinary bladder incontinence 03/16/2017       Past Surgical History:  Past Surgical History:   Procedure Laterality Date   • ANTERIOR CERVICAL FUSION  04/12/2017    C3-4   • CARDIAC CATHETERIZATION      everything okay clearance for neck surgery   • CERVICAL DISC SURGERY      C2-3 fusion   • CIRCUMCISION     • COLONOSCOPY     • CYSTOSCOPY  01/10/2014    Cysto ivan retrograde pyelogram urthral bx.   • LUMBAR EPIDURAL INJECTION      L2-S2  going to try ablasion in future   • PROSTATE BIOPSY  2018   • SHOULDER ARTHROSCOPY W/ LABRAL REPAIR Left    • VASECTOMY     • WISDOM TOOTH EXTRACTION         Allergies:  Allergies   Allergen Reactions   • Beta Adrenergic Blockers Unknown - High Severity   • Crestor [Rosuvastatin Calcium] Myalgia   • Keflex [Cephalexin] Other (See Comments)     hiccups   • Livalo [Pitavastatin] Myalgia   • Losartan Other (See Comments)     Intolerance due to fatigue, depression   • Naproxen Other (See Comments)     Chest pain       Home Meds:  Medications Prior to Admission   Medication Sig Dispense Refill Last Dose   • aspirin 81 MG chewable tablet Chew 81 mg Daily.   10/11/2021 at 2100   • diclofenac (VOLTAREN) 75 MG EC tablet Take 75 mg by mouth 2 (Two) Times a Day As Needed.   Past Month at Unknown time   • enalapril (VASOTEC) 10 MG tablet Take 10 mg by mouth Daily.   10/11/2021 at 2100   • metaxalone (SKELAXIN) 800 MG tablet Take 800 mg by mouth Daily As Needed.   Past Month at Unknown time   • Testosterone Cypionate (DEPOTESTOTERONE CYPIONATE) 200 MG/ML injection 0.25 mL IM q. 5 days (Patient taking differently: 0.25 mL IM q. 5 days. DUE TOMORROW FOR DOSE) 10 mL 0 10/8/21 at Unknown time   • VASCEPA 1 g capsule capsule Take 2 g by mouth 2 (Two) Times a Day With Meals. 360 capsule 3 10/11/2021 at 2100   • verapamil ER (VERELAN) 360 MG 24 hr capsule Take 360 mg by mouth Daily.    "10/11/2021 at 2100   • vitamin C (ASCORBIC ACID) 250 MG tablet Take 250 mg by mouth Daily.   10/11/2021 at 2100   • BD DISP NEEDLES 22G X 1-1/2\" misc       • REPATHA PUSHTRONEX SYSTEM 420 MG/3.5ML solution cartridge Inject 420 mg under the skin into the appropriate area as directed Every 30 (Thirty) Days. 3 cartridge. 3    • tadalafil (Cialis) 20 MG tablet Cialis 20 mg oral tablet take 1 tablet (20 mg) by oral route once daily   Suspended      • Testosterone 100 MG pellet by Other route.          Current Meds  Scheduled Meds:vitamin C, 250 mg, Oral, Daily  aspirin, 81 mg, Oral, Daily  enalapril, 10 mg, Oral, Daily  enoxaparin, 40 mg, Subcutaneous, Q24H  icosapent ethyl, 2 g, Oral, BID With Meals  insulin lispro, 0-9 Units, Subcutaneous, TID With Meals  verapamil SR, 360 mg, Oral, Daily      Continuous Infusions:Pharmacy to Dose enoxaparin (LOVENOX),       PRN Meds:.•  acetaminophen  •  dextrose  •  dextrose  •  glucagon (human recombinant)  •  melatonin  •  metaxalone  •  nitroglycerin  •  ondansetron **OR** ondansetron  •  Pharmacy to Dose enoxaparin (LOVENOX)  •  sodium chloride    Social History:   Social History     Socioeconomic History   • Marital status:    Tobacco Use   • Smoking status: Never Smoker   • Smokeless tobacco: Never Used   Substance and Sexual Activity   • Alcohol use: Yes     Comment: rare   • Drug use: Never   • Sexual activity: Yes     Partners: Female       Family History:  Family History   Problem Relation Age of Onset   • Arthritis Mother    • Alzheimer's disease Mother 80   • Arthritis Father    • Heart disease Father    • Colon cancer Father 85   • Hypertension Father 80   • Cancer Father    • Stroke Father        REVIEW OF SYSTEMS:   CONSTITUTIONAL: No weight loss, fever, chills, weakness or fatigue.   HEENT: Eyes: No visual loss, blurred vision, double vision or yellow sclerae. Ears, Nose, Throat: No hearing loss, sneezing, congestion, runny nose or sore throat.   SKIN: No rash " "or itching.     RESPIRATORY: No shortness of breath, hemoptysis, cough or sputum.   GASTROINTESTINAL: No anorexia, nausea, vomiting or diarrhea. No abdominal pain, bright red blood per rectum or melena.  GENITOURINARY: No burning on urination, hematuria or increased frequency.  NEUROLOGICAL: No paralysis, ataxia, numbness or tingling in the extremities. No change in bowel or bladder control.   MUSCULOSKELETAL: No muscle, back pain, joint pain or stiffness.   HEMATOLOGIC: No anemia, bleeding or bruising.   ENDOCRINOLOGIC: No reports of sweating, cold or heat intolerance. No polyuria or polydipsia.     Physical Exam    /69 (BP Location: Right arm, Patient Position: Lying)   Pulse 70   Temp 98.3 °F (36.8 °C) (Oral)   Resp 18   Ht 180.3 cm (71\")   Wt 107 kg (235 lb)   SpO2 92%   BMI 32.78 kg/m²     General Appearance Well developed, cooperative and well nourished and no acute distress   Head Normocephalic, without abnormality, atraumatic   Ears Ears appear intact with no abnormalities noted   Throat No oral lesions, no thrush, oral mucosa moist   Neck No adenopathy, supple, trachea midline, no thyromegaly, no carotid bruit, no JVD   Back No skin lesions, erythema or scars, no tenderness to percussion or palpation,range of motion is normal   Lungs Clear to auscultation,respirations regular, even and unlabored   Heart Regular rhythm and normal rate, normal S1 and S2, 2 out of 6 systolic ejection murmur that increases slightly with Valsalva present, no gallop, no rub, no click   Chest wall No abnormalities observed   Abdomen Normal bowel sounds, no masses, no hepatomegaly,    Extremities Moves all extremities well, no edema, no cyanosis, no redness   Pulses Pulses palpable and equal bilaterally. Normal radial, carotid, femoral, dorsalis pedis and posterior tibial pulses bilaterally. Normal abdominal aorta   Skin No bleeding, bruising or rash   Psychiatric Alert and oriented x 3, normal mood and affect "     Results from last 7 days   Lab Units 10/12/21  1243   SODIUM mmol/L 137   POTASSIUM mmol/L 4.5   CHLORIDE mmol/L 104   CO2 mmol/L 21.7*   BUN mg/dL 19   CREATININE mg/dL 1.42*   CALCIUM mg/dL 9.8   BILIRUBIN mg/dL 0.5   ALK PHOS U/L 70   ALT (SGPT) U/L 41   AST (SGOT) U/L 27   GLUCOSE mg/dL 97     Results from last 7 days   Lab Units 10/12/21  1521 10/12/21  1243   TROPONIN T ng/mL <0.010 <0.010     Results from last 7 days   Lab Units 10/12/21  1243   WBC 10*3/mm3 14.15*   HEMOGLOBIN g/dL 18.4*   HEMATOCRIT % 54.9*   PLATELETS 10*3/mm3 274           I personally viewed and interpreted the patient's EKG/Telemetry data  I have reviewed HPI and ROS above.    Assessment and plan:  1.  Chest chest discomfort and dyspnea.  Was quite severe and associated with severe dyspnea.  No pulmonary emboli on CT angiogram.  Borderline dilated aortic root that should not be creating his symptoms.  We will check an echocardiogram especially as he does have a murmur.  We will give nitroglycerin.  Once we have neurology consult I think it is best to proceed with cardiac catheterization given severity of his symptoms.  Risks and benefits of cardiac catheterization close to risk of microinfarction death renal failure and trauma stroke were discussed he understands and wished to proceed.  Will need to hydrate with underlying renal insufficiency.  2.  Syncope concerning for seizure type activity.  As above in addition agree with neurology evaluation certainly sounds like he did have a seizure.  At dismissal would consider Zio patch depending on results of inpatient testing  3.  History of rheumatic fever with murmur suspicious for some degree of outflow tract obstruction.  Await echocardiogram results.    4.  Hypertension.  Observe on home regimen.  5.  Coronary artery disease.  Mild calcification noted on CT scan performed recently.  Patient denies any prior history with negative cath several years 6 years ago.  However with symptoms  above needs further coronary evaluation as above  6.  Borderline dilated aortic root measuring 4 cm by CT scan.  7.  Polycythemia.  Hematology consult pending  8.  Renal insufficiency.  Continue with hydration with plans for probable  cath next 24 hours  9.  History of elevated PSA and an enlarged prostate.  Followed by urology with biopsy negative previously  10.  Dyslipidemia.  Did not tolerate multiple statins.  Now on Vascepa.  He is not taking Repatha      Mini Duncan  10/13/2021  07:17 EDT    65min spent in reviewing records, discussion and examination of the patient and discussion with other members of the patient's medical team.     Dictated utilizing Dragon dictation

## 2021-10-13 NOTE — PLAN OF CARE
Goal Outcome Evaluation:              Outcome Summary: Pt is a 65 y/o male admitted to unit for syncope, chest pain, seizure activity.  Pt conscious, alert, oriented, no obvious distress.  Pt stated chest pain left of sternum 3/10 pressure in nature.  Pt stated sharp pain near xyphoid process (pt stated this had all but resolved at 0400 neuro check).  Vital signs stable.  Pt slept majority of shift w/o incident or change.  Will continue to monitor.

## 2021-10-13 NOTE — PROGRESS NOTES
"Pharmacy Consult - Enoxaparin Dosing    Jimmie Jamil has been consulted for pharmacy to dose enoxaparin.    Indication: VTE prophylaxis  Consulting Provider: Dr. Haddad    Relevant clinical data and objective history reviewed:  66 y.o. male 180.3 cm (71\") 107 kg (235 lb)    Estimated Creatinine Clearance: 65.4 mL/min (A) (by C-G formula based on SCr of 1.42 mg/dL (H)).  Creatinine   Date Value Ref Range Status   10/12/2021 1.42 (H) 0.76 - 1.27 mg/dL Final     Platelets   Date Value Ref Range Status   10/12/2021 274 140 - 450 10*3/mm3 Final     Lab Results   Component Value Date    HGB 18.4 (H) 10/12/2021    HGB 17.0 09/20/2021    HGB 17.5 06/15/2021     No results found for: INR    Assessment/Plan:  Estimated CrCl >30 mL/min at this time;  kg; BMI 32.78  Will start enoxaparin 40 mg SC every 24 hours  Monitor renal function and s/sx of HIT or bleeding      Pharmacy will continue to follow daily and adjust as needed.   Thank you for this consult.    Lucio \"Alvaro\" Raúl GREGORY, PharmD  Clinical Pharmacist  10/12/21 22:51 EDT    "

## 2021-10-13 NOTE — CONSULTS
Subjective     REASON FOR CONSULTATION: Chronic mild polycythemia  Provide an opinion on any further workup or treatment                             REQUESTING PHYSICIAN:  Catie    RECORDS OBTAINED:  Records of the patients history including those obtained from the referring provider were reviewed and summarized in detail.    HISTORY OF PRESENT ILLNESS:  The patient is a 66 y.o. year old male who is here for an opinion about the above issue.    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  CT angiogram of the chest in the ER showed no evidence of dissection or pulmonary embolism.    The patient on admission was noted to be polycythemic and this is a fact well-known to the patient.  His hematocrit tends to run anywhere from normal to as high as 58.6% on 12/15/2020.  He is on testosterone therapy for hypogonadism approximately 10 years and as a side effect of testosterone has had fluctuating, intermittent levels of polycythemia.  He typically donates blood at the [facility] 2-3 times annually.  He reports that he was evaluated for obstructive sleep apnea a few years ago with negative results.  The patient had mild leukocytosis on admission with neutrophilia 9.25, monocytes 2.05, no lymphocytosis and mild increase in immature granulocytes 0.2.    Past Medical History:   Diagnosis Date   • Benign essential hypertension    • Bladder problem    • Cervical radiculopathy 03/16/2017   • Cervical spinal stenosis 03/16/2017   • Cervicalgia 03/16/2017   • Coronary artery disease     diastolic dysfunction   • ED (erectile dysfunction)    • Greater trochanteric bursitis of right hip 07/09/2018   • High blood pressure    • Hyperlipidemia    • Hypertension    • Leg pain    • Leg swelling    • Low back pain    • Lumbago 03/16/2017   • Neck  "pain    • Paresthesia 03/16/2017    left hand/leg   • Polycythemia    • Right hip pain    • Urinary bladder incontinence 03/16/2017        Past Surgical History:   Procedure Laterality Date   • ANTERIOR CERVICAL FUSION  04/12/2017    C3-4   • CARDIAC CATHETERIZATION      everything okay clearance for neck surgery   • CERVICAL DISC SURGERY      C2-3 fusion   • CIRCUMCISION     • COLONOSCOPY     • CYSTOSCOPY  01/10/2014    Cysto ivan retrograde pyelogram urthral bx.   • LUMBAR EPIDURAL INJECTION      L2-S2  going to try ablasion in future   • PROSTATE BIOPSY  2018   • SHOULDER ARTHROSCOPY W/ LABRAL REPAIR Left    • VASECTOMY     • WISDOM TOOTH EXTRACTION          No current facility-administered medications on file prior to encounter.     Current Outpatient Medications on File Prior to Encounter   Medication Sig Dispense Refill   • aspirin 81 MG chewable tablet Chew 81 mg Daily.     • diclofenac (VOLTAREN) 75 MG EC tablet Take 75 mg by mouth 2 (Two) Times a Day As Needed.     • enalapril (VASOTEC) 10 MG tablet Take 10 mg by mouth Daily.     • metaxalone (SKELAXIN) 800 MG tablet Take 800 mg by mouth Daily As Needed.     • Testosterone Cypionate (DEPOTESTOTERONE CYPIONATE) 200 MG/ML injection 0.25 mL IM q. 5 days (Patient taking differently: 0.25 mL IM q. 5 days. DUE TOMORROW FOR DOSE) 10 mL 0   • VASCEPA 1 g capsule capsule Take 2 g by mouth 2 (Two) Times a Day With Meals. 360 capsule 3   • verapamil ER (VERELAN) 360 MG 24 hr capsule Take 360 mg by mouth Daily.     • vitamin C (ASCORBIC ACID) 250 MG tablet Take 250 mg by mouth Daily.     • BD DISP NEEDLES 22G X 1-1/2\" misc      • REPATHA PUSHTRONEX SYSTEM 420 MG/3.5ML solution cartridge Inject 420 mg under the skin into the appropriate area as directed Every 30 (Thirty) Days. 3 cartridge. 3   • tadalafil (Cialis) 20 MG tablet Cialis 20 mg oral tablet take 1 tablet (20 mg) by oral route once daily   Suspended     • Testosterone 100 MG pellet by Other route.      " "    ALLERGIES:    Allergies   Allergen Reactions   • Beta Adrenergic Blockers Unknown - High Severity   • Crestor [Rosuvastatin Calcium] Myalgia   • Keflex [Cephalexin] Other (See Comments)     hiccups   • Livalo [Pitavastatin] Myalgia   • Losartan Other (See Comments)     Intolerance due to fatigue, depression   • Naproxen Other (See Comments)     Chest pain        Social History     Socioeconomic History   • Marital status:    Tobacco Use   • Smoking status: Never Smoker   • Smokeless tobacco: Never Used   Substance and Sexual Activity   • Alcohol use: Yes     Comment: rare   • Drug use: Never   • Sexual activity: Yes     Partners: Female        Family History   Problem Relation Age of Onset   • Arthritis Mother    • Alzheimer's disease Mother 80   • Arthritis Father    • Heart disease Father    • Colon cancer Father 85   • Hypertension Father 80   • Cancer Father    • Stroke Father         Review of Systems   Constitutional: Negative.    HENT: Negative.    Respiratory: Positive for chest tightness.    Cardiovascular: Negative.    Gastrointestinal: Negative.    Genitourinary: Negative.         Nocturia   Musculoskeletal: Negative.    Skin: Negative.    Neurological: Positive for syncope.   Hematological: Negative.    Psychiatric/Behavioral: Negative.           Objective     Vitals:    10/12/21 1236 10/12/21 1406 10/13/21 0546 10/13/21 0908   BP: 128/70 141/76 134/69 148/79   BP Location:   Right arm Left arm   Patient Position:   Lying Lying   Pulse:  68 70 80   Resp:   18 24   Temp:   98.3 °F (36.8 °C) 98.2 °F (36.8 °C)   TempSrc:   Oral Oral   SpO2:  94% 92% 93%   Weight: 107 kg (235 lb)      Height: 180.3 cm (71\")        No flowsheet data found.    Physical Exam    CONSTITUTIONAL: pleasant well-developed adult man  HEENT: no icterus, no thrush, moist membranes  NECK: no jvd  LYMPH: no cervical or supraclavicular lad  CV: RRR, S1S2, no murmur  RESP: cta bilat, no wheezing, no rales  GI: soft, non-tender, " no splenomegaly, +bs  MUSC: no edema, normal gait  NEURO: alert and oriented x3, normal strength  PSYCH: normal mood and affect      RECENT LABS:  Hematology WBC   Date Value Ref Range Status   10/13/2021 11.13 (H) 3.40 - 10.80 10*3/mm3 Final     RBC   Date Value Ref Range Status   10/13/2021 6.12 (H) 4.14 - 5.80 10*6/mm3 Final     Hemoglobin   Date Value Ref Range Status   10/13/2021 17.5 13.0 - 17.7 g/dL Final     Hematocrit   Date Value Ref Range Status   10/13/2021 52.7 (H) 37.5 - 51.0 % Final     Platelets   Date Value Ref Range Status   10/13/2021 252 140 - 450 10*3/mm3 Final        Lab Results   Component Value Date    GLUCOSE 67 10/13/2021    BUN 18 10/13/2021    CREATININE 1.26 10/13/2021    EGFRIFNONA 58 (L) 10/13/2021    EGFRIFAFRI 78 08/05/2020    BCR 14.3 10/13/2021    K 4.4 10/13/2021    CO2 23.9 10/13/2021    CALCIUM 9.2 10/13/2021    PROTENTOTREF 6.2 08/05/2020    ALBUMIN 4.20 10/12/2021    LABIL2 1.4 12/15/2020    AST 27 10/12/2021    ALT 41 10/12/2021     CT angiogram chest no dissection or PE    Assessment/Plan     *Chronic mild to moderate polycythemia  · The patient has been on testosterone therapy for approximately 10 years and polycythemia most likely is secondary to testosterone.  The patient undergoes phlebotomy at the [facility] 2-3 times a year.  I will check an erythropoietin level just to make sure not elevated or alternatively significantly suppressed to suggest PV but my suspicion for myeloproliferative disorder is low.  Current hematocrit is 52.7%.  I do not suspect the patient's mild polycythemia contributed to his admitting symptoms.    *Hypogonadism on testosterone replacement    *Chest pressure, syncope/?  Seizure undergoing cardiology and awaiting neurology evaluation

## 2021-10-13 NOTE — DISCHARGE INSTRUCTIONS
[facility]  [address] [place] [address]    Coronary Angiogram (Radial/Ulnar Approach) After Care    Refer to this sheet in the next few weeks. These instructions provide you with information on caring for yourself after your procedure. Your caregiver may also give you more specific instructions. Your treatment has been planned according to current medical practices, but problems sometimes occur. Call your caregiver if you have any problems or questions after your procedure.    Home Care Instructions:  · You may shower the day after the procedure. Remove the bandage (dressing) and gently wash the site with plain soap and water. Gently pat the site dry. You may apply a band aid daily for 2 days if desired.    · Do not apply powder or lotion to the site.  · Do not submerge the affected site in water for 3 to 5 days or until the site is completely healed.   · Do not lift, push or pull anything over 5 pounds for 5 days after your procedure or as directed by your physician.  As a reference, a gallon of milk weighs 8 pounds.   · Inspect the site at least twice daily. You may notice some bruising at the site and it may be tender for 1 to 2 weeks.     · Increase your fluid intake for the next 2 days.    · Keep arm elevated for 24 hours. For the remainder of the day, keep your arm in “Pledge of Allegiance” position when up and about.     · You may drive 24 hours after the procedure unless otherwise instructed by your caregiver.  · Do not operate machinery or power tools for 24 hours.  · A responsible adult should be with you for the first 24 hours after you arrive home. Do not make any important legal decisions or sign legal papers for 24 hours.  Do not drink alcohol for 24 hours.    · Metformin or any medications containing Metformin should not be taken for 48 hours after your procedure.      Call Your Doctor if:   · You have unusual pain at the radial/ulnar (wrist) site.  · You have redness, warmth,  swelling, or pain at the radial/ulnar (wrist) site.  · You have drainage (other than a small amount of blood on the dressing).  · `You have chills or a fever > 101.  · Your arm becomes pale or dark, cool, tingly, or numb.  · You develop chest pain, shortness of breath, feel faint or pass out.    · You have any symptoms of a stroke.  Remember BE FAST  · B-balance. Sudden trouble walking or loss of balance.  · E-eyes.  Sudden changes in how you see or a sudden onset of a very bad headache.   · F-face. Sudden weakness or loss of feeling of the face or facial droop on one side.   · A-arms Sudden weakness or numbness in one arm.  One arm drifts down if they are both held out in front of you.   · S-speech.  Sudden trouble speaking, slurred speech or trouble understanding what are saying.   · T-time  Time to call emergency services.  Write down the symptoms and the time they started.   of loss of feeling in an arm.  This happens suddenly and usually on one side of the body.  · You have heavy bleeding from the site, hold pressure on the site for 20 minutes.  If the bleeding stops, apply a fresh bandage and call your cardiologist.  However, if you continue to have bleeding, call 911.

## 2021-10-14 ENCOUNTER — APPOINTMENT (OUTPATIENT)
Dept: NEUROLOGY | Facility: HOSPITAL | Age: 66
End: 2021-10-14

## 2021-10-14 VITALS
RESPIRATION RATE: 18 BRPM | HEIGHT: 71 IN | WEIGHT: 249.12 LBS | BODY MASS INDEX: 34.88 KG/M2 | HEART RATE: 70 BPM | DIASTOLIC BLOOD PRESSURE: 79 MMHG | SYSTOLIC BLOOD PRESSURE: 147 MMHG | OXYGEN SATURATION: 97 % | TEMPERATURE: 98.2 F

## 2021-10-14 LAB
ANION GAP SERPL CALCULATED.3IONS-SCNC: 10.5 MMOL/L (ref 5–15)
BASOPHILS # BLD AUTO: 0.06 10*3/MM3 (ref 0–0.2)
BASOPHILS NFR BLD AUTO: 0.8 % (ref 0–1.5)
BUN SERPL-MCNC: 16 MG/DL (ref 8–23)
BUN/CREAT SERPL: 15.1 (ref 7–25)
CALCIUM SPEC-SCNC: 8.8 MG/DL (ref 8.6–10.5)
CHLORIDE SERPL-SCNC: 104 MMOL/L (ref 98–107)
CO2 SERPL-SCNC: 23.5 MMOL/L (ref 22–29)
CREAT SERPL-MCNC: 1.06 MG/DL (ref 0.76–1.27)
DEPRECATED RDW RBC AUTO: 41.5 FL (ref 37–54)
EOSINOPHIL # BLD AUTO: 0.15 10*3/MM3 (ref 0–0.4)
EOSINOPHIL NFR BLD AUTO: 2.1 % (ref 0.3–6.2)
ERYTHROCYTE [DISTWIDTH] IN BLOOD BY AUTOMATED COUNT: 13.4 % (ref 12.3–15.4)
GFR SERPL CREATININE-BSD FRML MDRD: 70 ML/MIN/1.73
GLUCOSE BLDC GLUCOMTR-MCNC: 89 MG/DL (ref 70–130)
GLUCOSE BLDC GLUCOMTR-MCNC: 92 MG/DL (ref 70–130)
GLUCOSE SERPL-MCNC: 148 MG/DL (ref 65–99)
HCT VFR BLD AUTO: 48 % (ref 37.5–51)
HGB BLD-MCNC: 16.2 G/DL (ref 13–17.7)
IMM GRANULOCYTES # BLD AUTO: 0.1 10*3/MM3 (ref 0–0.05)
IMM GRANULOCYTES NFR BLD AUTO: 1.4 % (ref 0–0.5)
LYMPHOCYTES # BLD AUTO: 1.97 10*3/MM3 (ref 0.7–3.1)
LYMPHOCYTES NFR BLD AUTO: 27.7 % (ref 19.6–45.3)
MAGNESIUM SERPL-MCNC: 2.2 MG/DL (ref 1.6–2.4)
MCH RBC QN AUTO: 29.1 PG (ref 26.6–33)
MCHC RBC AUTO-ENTMCNC: 33.8 G/DL (ref 31.5–35.7)
MCV RBC AUTO: 86.3 FL (ref 79–97)
MONOCYTES # BLD AUTO: 0.93 10*3/MM3 (ref 0.1–0.9)
MONOCYTES NFR BLD AUTO: 13.1 % (ref 5–12)
NEUTROPHILS NFR BLD AUTO: 3.91 10*3/MM3 (ref 1.7–7)
NEUTROPHILS NFR BLD AUTO: 54.9 % (ref 42.7–76)
NRBC BLD AUTO-RTO: 0 /100 WBC (ref 0–0.2)
PHOSPHATE SERPL-MCNC: 3.2 MG/DL (ref 2.5–4.5)
PLATELET # BLD AUTO: 211 10*3/MM3 (ref 140–450)
PMV BLD AUTO: 10 FL (ref 6–12)
POTASSIUM SERPL-SCNC: 4 MMOL/L (ref 3.5–5.2)
RBC # BLD AUTO: 5.56 10*6/MM3 (ref 4.14–5.8)
SODIUM SERPL-SCNC: 138 MMOL/L (ref 136–145)
WBC # BLD AUTO: 7.12 10*3/MM3 (ref 3.4–10.8)

## 2021-10-14 PROCEDURE — 95819 EEG AWAKE AND ASLEEP: CPT

## 2021-10-14 PROCEDURE — 80048 BASIC METABOLIC PNL TOTAL CA: CPT | Performed by: INTERNAL MEDICINE

## 2021-10-14 PROCEDURE — 25010000002 ENOXAPARIN PER 10 MG: Performed by: INTERNAL MEDICINE

## 2021-10-14 PROCEDURE — 84100 ASSAY OF PHOSPHORUS: CPT | Performed by: STUDENT IN AN ORGANIZED HEALTH CARE EDUCATION/TRAINING PROGRAM

## 2021-10-14 PROCEDURE — 99213 OFFICE O/P EST LOW 20 MIN: CPT | Performed by: INTERNAL MEDICINE

## 2021-10-14 PROCEDURE — 85025 COMPLETE CBC W/AUTO DIFF WBC: CPT | Performed by: STUDENT IN AN ORGANIZED HEALTH CARE EDUCATION/TRAINING PROGRAM

## 2021-10-14 PROCEDURE — 82962 GLUCOSE BLOOD TEST: CPT

## 2021-10-14 PROCEDURE — 83735 ASSAY OF MAGNESIUM: CPT | Performed by: STUDENT IN AN ORGANIZED HEALTH CARE EDUCATION/TRAINING PROGRAM

## 2021-10-14 PROCEDURE — 95819 EEG AWAKE AND ASLEEP: CPT | Performed by: PSYCHIATRY & NEUROLOGY

## 2021-10-14 PROCEDURE — G0378 HOSPITAL OBSERVATION PER HR: HCPCS

## 2021-10-14 PROCEDURE — 99215 OFFICE O/P EST HI 40 MIN: CPT | Performed by: INTERNAL MEDICINE

## 2021-10-14 PROCEDURE — 82668 ASSAY OF ERYTHROPOIETIN: CPT | Performed by: INTERNAL MEDICINE

## 2021-10-14 RX ORDER — HYDROCODONE BITARTRATE AND ACETAMINOPHEN 5; 325 MG/1; MG/1
1 TABLET ORAL EVERY 4 HOURS PRN
Status: DISCONTINUED | OUTPATIENT
Start: 2021-10-14 | End: 2021-10-14 | Stop reason: HOSPADM

## 2021-10-14 RX ORDER — ALUMINA, MAGNESIA, AND SIMETHICONE 2400; 2400; 240 MG/30ML; MG/30ML; MG/30ML
15 SUSPENSION ORAL ONCE
Status: COMPLETED | OUTPATIENT
Start: 2021-10-14 | End: 2021-10-14

## 2021-10-14 RX ORDER — SODIUM CHLORIDE 9 MG/ML
75 INJECTION, SOLUTION INTRAVENOUS CONTINUOUS
Status: DISCONTINUED | OUTPATIENT
Start: 2021-10-14 | End: 2021-10-14 | Stop reason: SDUPTHER

## 2021-10-14 RX ORDER — ONDANSETRON 4 MG/1
4 TABLET, FILM COATED ORAL EVERY 6 HOURS PRN
Status: DISCONTINUED | OUTPATIENT
Start: 2021-10-14 | End: 2021-10-14 | Stop reason: SDUPTHER

## 2021-10-14 RX ORDER — NALOXONE HCL 0.4 MG/ML
0.4 VIAL (ML) INJECTION
Status: DISCONTINUED | OUTPATIENT
Start: 2021-10-14 | End: 2021-10-14 | Stop reason: HOSPADM

## 2021-10-14 RX ORDER — MORPHINE SULFATE 2 MG/ML
1 INJECTION, SOLUTION INTRAMUSCULAR; INTRAVENOUS EVERY 4 HOURS PRN
Status: DISCONTINUED | OUTPATIENT
Start: 2021-10-14 | End: 2021-10-14 | Stop reason: HOSPADM

## 2021-10-14 RX ORDER — ONDANSETRON 2 MG/ML
4 INJECTION INTRAMUSCULAR; INTRAVENOUS EVERY 6 HOURS PRN
Status: DISCONTINUED | OUTPATIENT
Start: 2021-10-14 | End: 2021-10-14 | Stop reason: SDUPTHER

## 2021-10-14 RX ORDER — ACETAMINOPHEN 325 MG/1
650 TABLET ORAL EVERY 4 HOURS PRN
Status: DISCONTINUED | OUTPATIENT
Start: 2021-10-14 | End: 2021-10-14 | Stop reason: HOSPADM

## 2021-10-14 RX ADMIN — ASPIRIN 81 MG: 81 TABLET, CHEWABLE ORAL at 10:27

## 2021-10-14 RX ADMIN — VERAPAMIL HYDROCHLORIDE 360 MG: 180 TABLET, FILM COATED, EXTENDED RELEASE ORAL at 10:27

## 2021-10-14 RX ADMIN — ENALAPRIL MALEATE 10 MG: 10 TABLET ORAL at 10:27

## 2021-10-14 RX ADMIN — OXYCODONE HYDROCHLORIDE AND ACETAMINOPHEN 250 MG: 500 TABLET ORAL at 10:26

## 2021-10-14 RX ADMIN — ENOXAPARIN SODIUM 40 MG: 40 INJECTION SUBCUTANEOUS at 10:27

## 2021-10-14 RX ADMIN — MAGNESIUM HYDROXIDE,ALUMINUM HYDROXICE,SIMETHICONE 15 ML: 240; 2400; 2400 SUSPENSION ORAL at 16:10

## 2021-10-14 RX ADMIN — SODIUM CHLORIDE 50 ML/HR: 9 INJECTION, SOLUTION INTRAVENOUS at 00:33

## 2021-10-14 NOTE — CASE MANAGEMENT/SOCIAL WORK
Discharge Planning Assessment  [facility]     Patient Name: Jimmie Jamil  MRN: [id number]  Today's Date: 10/14/2021    Admit Date: 10/12/2021     Discharge Needs Assessment     Row Name 10/14/21 1534       Living Environment    Lives With spouse    Current Living Arrangements home/apartment/condo    Potentially Unsafe Housing Conditions other (see comments)  no concerns    Primary Care Provided by self    Provides Primary Care For no one    Family Caregiver if Needed spouse    Quality of Family Relationships helpful; involved       Transition Planning    Patient/Family Anticipates Transition to home    Patient/Family Anticipated Services at Transition none    Transportation Anticipated family or friend will provide       Discharge Needs Assessment    Readmission Within the Last 30 Days no previous admission in last 30 days    Equipment Currently Used at Home none               Discharge Plan     Row Name 10/14/21 1535       Plan    Plan Home    Plan Comments CCP met with patient at bedside. CCP role explained. Face sheet verified. Patient is independent with ADLs. Patient has no history of HH/SNF and does not use DME. Patient plans to return home and does not anticipate any d.c needs. CCP will follow for discharge home. Anna VILLEGAS              Continued Care and Services - Admitted Since 10/12/2021    Coordination has not been started for this encounter.       Expected Discharge Date and Time     Expected Discharge Date Expected Discharge Time    Oct 14, 2021          Demographic Summary     Row Name 10/14/21 1534       General Information    Admission Type observation    Referral Source admission list    Reason for Consult discharge planning    Preferred Language English     Used During This Interaction no               Functional Status     Row Name 10/14/21 1534       Functional Status    Usual Activity Tolerance good    Current Activity Tolerance good       Functional Status, IADL     Medications independent    Meal Preparation independent    Housekeeping independent    Laundry independent    Shopping independent       Mental Status    General Appearance WDL WDL       Mental Status Summary    Recent Changes in Mental Status/Cognitive Functioning no changes               Psychosocial    No documentation.                Abuse/Neglect    No documentation.                Legal    No documentation.                Substance Abuse    No documentation.                Patient Forms    No documentation.                   NELLY Qiu

## 2021-10-14 NOTE — PLAN OF CARE
Goal Outcome Evaluation:     Outcome Summary: VSS. No c/o pain or SOA. R rad dressing clean, dry, and intact. NS @ 50 mL/hr. To go for EEG. Will continue to monitor.

## 2021-10-14 NOTE — PROGRESS NOTES
[facility]  Progress note: 10/14/2021    Patient Identification:  Name:Jimmie Jamil  Age:66 y.o.  Sex: male  :  1955  MRN: [id number]           CC:  Chest pain and syncope.    Interval history:  No no arrhythmias overnight, no chest pain or shortness of breath.  Vital stable and blood pressure improved    Vital Signs:   Temp:  [97.2 °F (36.2 °C)-98.4 °F (36.9 °C)] 98.2 °F (36.8 °C)  Heart Rate:  [63-77] 70  Resp:  [16-19] 18  BP: (102-153)/(66-83) 120/69    Intake/Output Summary (Last 24 hours) at 10/14/2021 1314  Last data filed at 10/14/2021 0520  Gross per 24 hour   Intake 1210 ml   Output 400 ml   Net 810 ml       Physical Examination:    General Appearance No acute distress   Neck No adenopathy, supple, trachea midline, no thyromegaly, no carotid bruit, no JVD   Lungs Clear to auscultation,respirations regular, even and unlabored   Heart Regular rhythm and normal rate, normal S1 and S2, no murmur, no gallop, no rub, no click   Chest wall No abnormalities observed   Abdomen Normal bowel sounds, no masses, no hepatomegaly, soft   Extremities  right radial artery cath site stable.  Moves all extremities well, no edema, no cyanosis, no redness   Neurological Alert and oriented x 3     Lab Review:  Personally reviewed the labs, radiology imaging and other cardiac procedures.   Results from last 7 days   Lab Units 10/14/21  0538 10/13/21  0557 10/12/21  1243   SODIUM mmol/L 138   < > 137   POTASSIUM mmol/L 4.0   < > 4.5   CHLORIDE mmol/L 104   < > 104   CO2 mmol/L 23.5   < > 21.7*   BUN mg/dL 16   < > 19   CREATININE mg/dL 1.06   < > 1.42*   CALCIUM mg/dL 8.8   < > 9.8   BILIRUBIN mg/dL  --   --  0.5   ALK PHOS U/L  --   --  70   ALT (SGPT) U/L  --   --  41   AST (SGOT) U/L  --   --  27   GLUCOSE mg/dL 148*   < > 97    < > = values in this interval not displayed.     Results from last 7 days   Lab Units 10/13/21  0557 10/12/21  1521 10/12/21  1243   TROPONIN T ng/mL <0.010 <0.010 <0.010      Results from last 7 days   Lab Units 10/14/21  0538 10/13/21  0557 10/12/21  1243   WBC 10*3/mm3 7.12 11.13* 14.15*   HEMOGLOBIN g/dL 16.2 17.5 18.4*   HEMATOCRIT % 48.0 52.7* 54.9*   PLATELETS 10*3/mm3 211 252 274         Medication Review:   Meds reviewed  Scheduled Meds:vitamin C, 250 mg, Oral, Daily  aspirin, 81 mg, Oral, Daily  enalapril, 10 mg, Oral, Daily  enoxaparin, 40 mg, Subcutaneous, Q24H  icosapent ethyl, 2 g, Oral, BID With Meals  insulin lispro, 0-9 Units, Subcutaneous, TID With Meals  verapamil SR, 360 mg, Oral, Daily      Continuous Infusions:sodium chloride, 50 mL/hr, Last Rate: 50 mL/hr (10/14/21 0800)  sodium chloride, 75 mL/hr, Last Rate: 75 mL/hr (10/13/21 1528)      I personally viewed and interpreted the patient's EKG/Telemetry data    Assessment and Plan  1.  Syncope.  Likely due to vasovagal etiology but given this would place a 2-week Zio patch at dismissal.  2.  Chest pain.  Cardiac cath with normal coronary arteries and echocardiogram without significant disease.  Emboli on CT scan.  Recommend further valuation for noncardiac etiology.  3.  Coronary artery disease by CT scan.  With no obstructive lesion.  Continue risk factor modification.  4.  Aortic valve disease with mild stenosis, planning follow-up in 1 year with repeat echo  5.  Seizure type activity  6.  Hypertension, controlled  7.  Diabetes  8.  Polycythemia felt to be due to testerone shots  9.  Borderline dilated aortic root.  Plan on repeat CT imaging in 6 months  10.  Renal insufficiency  11.  Repatha  11.  Elevated PSA    CV status stable.  Will sign off.  Please arrange for follow-up with me in 4 weeks    Nikki Duncan  10/14/074422:14 EDT  25min spent in reviewing records, discussion and examination of the patient and discussion with other members of the patient's medical team.     Dictated utilizing Dragon dictation

## 2021-10-14 NOTE — PROGRESS NOTES
See neurology consult note from 10/13.     Low suspicion for neurological source of spell/seizure. >1 hour EEG normal. Neurology will sign off. Please call with questions/concerns.

## 2021-10-14 NOTE — PROGRESS NOTES
REASON FOR FOLLOWUP/CHIEF COMPLAINT:    Chronic mild polycythemia  HISTORY OF PRESENT ILLNESS:   No new events overnight.  He hopes to go home today.  HCT down to 48 today.  No complaints of bleeding or other issues.    Past Medical History, Past Surgical History, Social History, Family History have been reviewed and are without significant changes except as mentioned.    Review of Systems   Review of Systems   Constitutional: Negative for activity change.   HENT: Negative for nosebleeds and trouble swallowing.    Respiratory: Negative for shortness of breath and wheezing.    Cardiovascular: Negative for chest pain and palpitations.   Gastrointestinal: Negative for constipation, diarrhea and nausea.   Genitourinary: Negative for dysuria and hematuria.   Musculoskeletal: Negative for arthralgias and myalgias.   Neurological: Negative for seizures and syncope.   Hematological: Negative for adenopathy. Does not bruise/bleed easily.   Psychiatric/Behavioral: Negative for confusion.       Medications:  The current medication list was reviewed in the EMR    ALLERGIES:    Allergies   Allergen Reactions   • Beta Adrenergic Blockers Unknown - High Severity   • Crestor [Rosuvastatin Calcium] Myalgia   • Keflex [Cephalexin] Other (See Comments)     hiccups   • Livalo [Pitavastatin] Myalgia   • Losartan Other (See Comments)     Intolerance due to fatigue, depression   • Naproxen Other (See Comments)     Chest pain              Vitals:    10/13/21 1952 10/13/21 2337 10/14/21 0520 10/14/21 0728   BP: 144/74 130/69  120/69   BP Location: Left arm Left arm  Left arm   Patient Position: Sitting Sitting  Lying   Pulse: 69 72  70   Resp: 18 16  18   Temp: 97.4 °F (36.3 °C) 97.5 °F (36.4 °C)  98.2 °F (36.8 °C)   TempSrc: Oral Oral  Oral   SpO2: 98% 97%  97%   Weight:   113 kg (249 lb 1.9 oz)    Height:         Physical Exam    CONSTITUTIONAL:  Vital signs reviewed.  No distress, looks comfortable.  EYES:  Conjunctivae and lids  unremarkable.  PERRLA  EARS,NOSE,MOUTH,THROAT:  Ears and nose appear unremarkable.  Lips, teeth, gums appear unremarkable.  RESPIRATORY:  Normal respiratory effort.  Lungs clear to auscultation bilaterally.  CARDIOVASCULAR:  Normal S1, S2.  No murmurs rubs or gallops.  No significant lower extremity edema.  GASTROINTESTINAL: Abdomen appears unremarkable.  Nontender.  No hepatomegaly.  No splenomegaly.  NEURO: cranial nerves 2-12 grossly intact.  No focal deficits.  Appears to have equal strength all 4 extremities.  MUSCULOSKELETAL:  Unremarkable digits/nails.  No cyanosis or clubbing.  SKIN:  Warm.  No rashes.  PSYCHIATRIC:  Normal judgment and insight.  Normal mood and affect.       RECENT LABS:  WBC   Date Value Ref Range Status   10/14/2021 7.12 3.40 - 10.80 10*3/mm3 Final   10/13/2021 11.13 (H) 3.40 - 10.80 10*3/mm3 Final   10/12/2021 14.15 (H) 3.40 - 10.80 10*3/mm3 Final     Hemoglobin   Date Value Ref Range Status   10/14/2021 16.2 13.0 - 17.7 g/dL Final   10/13/2021 17.5 13.0 - 17.7 g/dL Final   10/12/2021 18.4 (H) 13.0 - 17.7 g/dL Final     Platelets   Date Value Ref Range Status   10/14/2021 211 140 - 450 10*3/mm3 Final   10/13/2021 252 140 - 450 10*3/mm3 Final   10/12/2021 274 140 - 450 10*3/mm3 Final       ASSESSMENT/PLAN:  Jimmie Jamil E452/1        *Chronic mild to moderate polycythemia  · Testosterone x10 years  · He donates blood 2-3 times per year  · With this history, Dr. Barber who saw him in consultation at a low level of suspicion for myeloproliferative disorder.  Therefore, he just ordered an erythropoietin level which is pending currently.  · HCT better today, down to 48 (normal).     *Hypogonadism on testosterone replacement     *Chest pressure, syncope/?  Seizure undergoing cardiology and awaiting neurology evaluation    *Class I obesity.  Being overweight can lead to cytopenias through hepatic steatosis.  Ideally, lose weight  Body mass index is 34.75 kg/m².  BMI 25 to <30 is  overweight  BMI 30 to <35 is class 1 obesity  BMI 35 to <40 is class 2 obesity  BMI 40 or higher is class 3 obesity     Plan  Await erythropoietin level  If he goes home before the erythropoietin level results, he can follow-up with either me or Dr. Barber in the office    Chart reviewed and summarized including Dr. Estee Hilton note from yesterday, recommending cardiac cath.  This was my first time meeting the patient.  All issues new to me today.

## 2021-10-14 NOTE — DISCHARGE SUMMARY
"    Patient Name: Jimmie Jamil  : 1955  MRN: [id number]    Date of Admission: 10/12/2021  Date of Discharge:  10/14/2021  Primary Care Physician: Samanta Martínez APRN      Chief Complaint:   Shortness of Breath and Chest Pain      Discharge Diagnoses     Active Hospital Problems    Diagnosis  POA   • Syncope and collapse [R55]  Yes      Resolved Hospital Problems   No resolved problems to display.        Hospital Course     Mr. Jamil is a 66 y.o. male with a history of prior rheumatic fever, HTN, HLD who presented to [facility] initially complaining of syncope and chest pain.  Please see the admitting history and physical for further details.  He was admitted to the hospital for further evaluation and treatment.  Cardiology consulted for chest pain evaluation/syncope. He underwent an echo which revealed an LVEF of 57%, mild aortic stenosis, moderate aortic calcification. He underwent cath which showed normal coronary arteries. Neurology also consulted for syncope, a 1 hour EEG was ordered and normal indicating a low suspicion for neurologic cause of syncope. Oncology consulted for polycythemia, workup in progress and patient can follow up with Dr. Tello or Dr. Barber as an outpatient. Of note patient did have a CT which showed possible \"nondisplaced left 5th costochondral cartilage segmented fracture\" overlying the area of chest pressure.    Day of Discharge     Subjective:  Doing well today. Patient is relieved he has normal coronary arteries, still complaining of chest pressure and shortness of breath but is on room air and would like to go home.     Review of Systems   Constitutional: Negative for fatigue and fever.   HENT: Negative for sinus pressure and sneezing.    Eyes: Negative for pain and redness.   Respiratory: Positive for shortness of breath. Negative for cough.    Cardiovascular: Negative for chest pain and leg swelling.        Chest pressure.    Gastrointestinal: " Negative for abdominal pain, nausea and vomiting.   Skin: Negative for rash and wound.   Neurological: Negative for seizures and headaches.       Physical Exam:  Temp:  [97.4 °F (36.3 °C)-98.4 °F (36.9 °C)] 98.2 °F (36.8 °C)  Heart Rate:  [69-73] 70  Resp:  [16-18] 18  BP: (120-153)/(69-82) 147/79  Body mass index is 34.75 kg/m².  Physical Exam  Constitutional:       General: He is not in acute distress.     Appearance: Normal appearance. He is not toxic-appearing.   HENT:      Head: Normocephalic and atraumatic.      Mouth/Throat:      Mouth: Mucous membranes are moist.   Eyes:      Extraocular Movements: Extraocular movements intact.      Conjunctiva/sclera: Conjunctivae normal.      Pupils: Pupils are equal, round, and reactive to light.   Cardiovascular:      Rate and Rhythm: Normal rate and regular rhythm.      Pulses: Normal pulses.      Heart sounds: No murmur heard.      Pulmonary:      Effort: Pulmonary effort is normal. No respiratory distress.      Breath sounds: Normal breath sounds. No wheezing.   Abdominal:      General: Abdomen is flat. Bowel sounds are normal. There is no distension.      Palpations: Abdomen is soft.      Tenderness: There is no abdominal tenderness.   Musculoskeletal:         General: No swelling or tenderness. Normal range of motion.      Cervical back: Normal range of motion and neck supple.      Right lower leg: No edema.      Left lower leg: No edema.   Skin:     General: Skin is warm and dry.   Neurological:      General: No focal deficit present.      Mental Status: He is alert and oriented to person, place, and time. Mental status is at baseline.      Motor: No weakness.   Psychiatric:         Mood and Affect: Mood normal.         Behavior: Behavior normal.         Thought Content: Thought content normal.         Judgment: Judgment normal.         Consultants     Consult Orders (all) (From admission, onward)     Start     Ordered    10/12/21 6250  Inpatient Hematology &  Oncology Consult  Once        Specialty:  Hematology and Oncology  Provider:  Jitendra Deleon MD    10/12/21 2144    10/12/21 2137  Inpatient Neurology Consult General  Once        Specialty:  Neurology  Provider:  Jerson Fortune MD    10/12/21 2136    10/12/21 2136  Inpatient Cardiology Consult  Once        Specialty:  Cardiology  Provider:  Emelyn Burns MD    10/12/21 2136    10/12/21 1733  [facility] (on-call MD unless specified) Details  Once,   Status:  Canceled        Specialty:  Hospitalist  Provider:  (Not yet assigned)    10/12/21 1732              Procedures     Imaging Results (All)     Procedure Component Value Units Date/Time    CT Angiogram Chest [[id number]] Collected: 10/12/21 1700     Updated: 10/12/21 1711    Narrative:      CT ANGIOGRAM CHEST WITH IV CONTRAST, CT ABDOMEN AND PELVIS WITH IV  CONTRAST     HISTORY: Shortness of air, chest pain, syncopal episode. Evaluate for  aortic dissection.     TECHNIQUE: CT angiogram chest includes axial imaging from the thoracic  inlet to the upper abdomen with IV contrast and data reconstructed in  coronal and sagittal planes and 3-D volume rendering was performed. CT  abdomen and pelvis includes axial imaging from the lung base through the  trochanters with IV contrast.     COMPARISON: None.     FINDINGS:  CT ANGIOGRAM CHEST: Sinus of Valsalva measures approximately 4 cm. The  ascending thoracic aorta measures 3.5 cm and tapers smoothly to the  level of the top of the aortic arch. The ascending thoracic aorta  measures 2.2 cm. There is no aortic dissection. There are mild coronary  arterial calcifications. Calcified mediastinal and bilateral hilar lymph  nodes are present associated with old granulomatous disease. There is no  pericardial effusion. There is no evidence for pulmonary embolus. No  infiltrate or pleural effusion is evident. There can be a great deal of  variability in the pattern of calcification of costal cartilage though  there  are 2 linear defects within the anterior left 5th rib costal  cartilage  by 6 cm suspicious for a left costal cartilage  fracture. There is a blind-ending 4.6 cm segment costicartilage between  the 3rd and 4th ribs that articulates with the sternum though is blind  ending laterally.     ABDOMEN/PELVIS: Small gallstones layer dependently within the  gallbladder. The liver, spleen, adrenal glands, pancreas appear within  normal limits. There are tiny renal low-density foci that are most  likely cysts though too small to definitely characterize. There is no  hydronephrosis.     Within the mid appendix there is an appendicolith measuring  approximately 15 mm in diameter distending the appendix though there is  no evidence for surrounding inflammation or appendicitis. There is no  bowel dilatation or evidence for bowel obstruction. There is no ascites.  Mild prostate gland enlargement is present. Degenerative disc disease is  present L5-S1.       Impression:      1. No evidence for dissection  2. Potential nondisplaced left 5th costochondral cartilage segmented  fracture.  3. Cholelithiasis.  4. Prostate gland enlargement with median lobe hypertrophy.  5. 1.5 cm appendicolith without evidence for appendicitis.     Discussed with Smiley Sanchez in the ED on 10/12/2021 at 5:05 PM.     This report was finalized on 10/12/2021 5:08 PM by Dr. Ben Grover M.D.       CT Abdomen Pelvis With Contrast [[id number]] Collected: 10/12/21 1700     Updated: 10/12/21 1711    Narrative:      CT ANGIOGRAM CHEST WITH IV CONTRAST, CT ABDOMEN AND PELVIS WITH IV  CONTRAST     HISTORY: Shortness of air, chest pain, syncopal episode. Evaluate for  aortic dissection.     TECHNIQUE: CT angiogram chest includes axial imaging from the thoracic  inlet to the upper abdomen with IV contrast and data reconstructed in  coronal and sagittal planes and 3-D volume rendering was performed. CT  abdomen and pelvis includes axial imaging from  the lung base through the  trochanters with IV contrast.     COMPARISON: None.     FINDINGS:  CT ANGIOGRAM CHEST: Sinus of Valsalva measures approximately 4 cm. The  ascending thoracic aorta measures 3.5 cm and tapers smoothly to the  level of the top of the aortic arch. The ascending thoracic aorta  measures 2.2 cm. There is no aortic dissection. There are mild coronary  arterial calcifications. Calcified mediastinal and bilateral hilar lymph  nodes are present associated with old granulomatous disease. There is no  pericardial effusion. There is no evidence for pulmonary embolus. No  infiltrate or pleural effusion is evident. There can be a great deal of  variability in the pattern of calcification of costal cartilage though  there are 2 linear defects within the anterior left 5th rib costal  cartilage  by 6 cm suspicious for a left costal cartilage  fracture. There is a blind-ending 4.6 cm segment costicartilage between  the 3rd and 4th ribs that articulates with the sternum though is blind  ending laterally.     ABDOMEN/PELVIS: Small gallstones layer dependently within the  gallbladder. The liver, spleen, adrenal glands, pancreas appear within  normal limits. There are tiny renal low-density foci that are most  likely cysts though too small to definitely characterize. There is no  hydronephrosis.     Within the mid appendix there is an appendicolith measuring  approximately 15 mm in diameter distending the appendix though there is  no evidence for surrounding inflammation or appendicitis. There is no  bowel dilatation or evidence for bowel obstruction. There is no ascites.  Mild prostate gland enlargement is present. Degenerative disc disease is  present L5-S1.       Impression:      1. No evidence for dissection  2. Potential nondisplaced left 5th costochondral cartilage segmented  fracture.  3. Cholelithiasis.  4. Prostate gland enlargement with median lobe hypertrophy.  5. 1.5 cm appendicolith  without evidence for appendicitis.     Discussed with Smiley Sanchez in the ED on 10/12/2021 at 5:05 PM.     This report was finalized on 10/12/2021 5:08 PM by Dr. Ben Grover M.D.       CT Head Without Contrast [[id number]] Collected: 10/12/21 1601     Updated: 10/12/21 1614    Narrative:      CT HEAD WITHOUT CONTRAST     CLINICAL HISTORY: Syncopal episode.     TECHNIQUE: CT scan of the head was obtained with 3 mm axial images. No  intravenous contrast was administered. Sagittal and coronal  reconstructions were obtained.     COMPARISON: No previous similar studies are available for comparison.     FINDINGS:       The ventricles, sulci, and cisterns are age-appropriate. The gray-white  matter differentiation is within normal limits. The basal ganglia and  thalami are unremarkable. The posterior fossa structures are within  normal limits. Incidental atherosclerotic changes are noted within the  intracranial vasculature.     Note is made of subtotal opacification of the right mastoid air cells  and middle ear cavity.       Impression:         No CT evidence for acute intracranial pathology.     Note is made of subtotal opacification of the right mastoid air cells  and middle ear cavity.           Radiation dose reduction techniques were utilized, including automated  exposure control and exposure modulation based on body size.     This report was finalized on 10/12/2021 4:08 PM by Dr. Rio Dominguez M.D.       XR Chest 2 View [[id number]] Collected: 10/12/21 1412     Updated: 10/12/21 1416    Narrative:      PA AND LATERAL CHEST     CLINICAL HISTORY: Chest pain     The lungs are well-expanded and appear free of focal infiltrates. There  are no pleural effusions. The cardiomediastinal silhouette is  unremarkable.     IMPRESSIONS: No evidence of active disease within the chest.     This report was finalized on 10/12/2021 2:13 PM by Dr. Ramesh Agee M.D.             Pertinent Labs     Results from last 7  "days   Lab Units 10/14/21  0538 10/13/21  0557 10/12/21  1243   WBC 10*3/mm3 7.12 11.13* 14.15*   HEMOGLOBIN g/dL 16.2 17.5 18.4*   PLATELETS 10*3/mm3 211 252 274     Results from last 7 days   Lab Units 10/14/21  0538 10/13/21  0557 10/12/21  1243   SODIUM mmol/L 138 138 137   POTASSIUM mmol/L 4.0 4.4 4.5   CHLORIDE mmol/L 104 103 104   CO2 mmol/L 23.5 23.9 21.7*   BUN mg/dL 16 18 19   CREATININE mg/dL 1.06 1.26 1.42*   GLUCOSE mg/dL 148* 67 97   Estimated Creatinine Clearance: 90 mL/min (by C-G formula based on SCr of 1.06 mg/dL).  Results from last 7 days   Lab Units 10/12/21  1243   ALBUMIN g/dL 4.20   BILIRUBIN mg/dL 0.5   ALK PHOS U/L 70   AST (SGOT) U/L 27   ALT (SGPT) U/L 41     Results from last 7 days   Lab Units 10/14/21  0538 10/13/21  0557 10/12/21  1243   CALCIUM mg/dL 8.8 9.2 9.8   ALBUMIN g/dL  --   --  4.20   MAGNESIUM mg/dL 2.2  --   --    PHOSPHORUS mg/dL 3.2  --   --        Results from last 7 days   Lab Units 10/13/21  0557 10/12/21  1521 10/12/21  1243   TROPONIN T ng/mL <0.010 <0.010 <0.010           Invalid input(s): LDLCALC  Results from last 7 days   Lab Units 10/12/21  1642 10/12/21  1641   BLOODCX  No growth at 2 days No growth at 2 days       Test Results Pending at Discharge     Pending Labs     Order Current Status    Erythropoietin In process    Blood Culture - Blood, Arm, Left Preliminary result    Blood Culture - Blood, Arm, Right Preliminary result          Discharge Details        Discharge Medications      Changes to Medications      Instructions Start Date   Testosterone Cypionate 200 MG/ML injection  Commonly known as: DEPOTESTOTERONE CYPIONATE  What changed: additional instructions   0.25 mL IM q. 5 days         Continue These Medications      Instructions Start Date   aspirin 81 MG chewable tablet   81 mg, Oral, Daily      BD Disp Needles 22G X 1-1/2\" misc  Generic drug: Needle (Disp)   No dose, route, or frequency recorded.      Cialis 20 MG tablet  Generic drug: tadalafil   " Cialis 20 mg oral tablet take 1 tablet (20 mg) by oral route once daily   Suspended      diclofenac 75 MG EC tablet  Commonly known as: VOLTAREN   75 mg, Oral, 2 Times Daily PRN      enalapril 10 MG tablet  Commonly known as: VASOTEC   10 mg, Oral, Daily      metaxalone 800 MG tablet  Commonly known as: SKELAXIN   800 mg, Oral, Daily PRN      Repatha Pushtronex System solution cartridge  Generic drug: Evolocumab with Infusor   420 mg, Subcutaneous, Every 30 Days      Vascepa 1 g capsule capsule  Generic drug: icosapent ethyl   2 g, Oral, 2 Times Daily With Meals      verapamil  MG 24 hr capsule  Commonly known as: VERELAN   360 mg, Oral, Daily      vitamin C 250 MG tablet  Commonly known as: ASCORBIC ACID   250 mg, Oral, Daily         Stop These Medications    Testosterone 100 MG pellet            Allergies   Allergen Reactions   • Beta Adrenergic Blockers Unknown - High Severity   • Crestor [Rosuvastatin Calcium] Myalgia   • Keflex [Cephalexin] Other (See Comments)     hiccups   • Livalo [Pitavastatin] Myalgia   • Losartan Other (See Comments)     Intolerance due to fatigue, depression   • Naproxen Other (See Comments)     Chest pain         Discharge Disposition:  Home or Self Care    Discharge Diet:  Diet Order   Procedures   • Diet Regular; Consistent Carbohydrate, Cardiac       Discharge Activity:       CODE STATUS:    Code Status and Medical Interventions:   Ordered at: 10/12/21 2135     Code Status:    CPR     Medical Interventions (Level of Support Prior to Arrest):    Full       Future Appointments   Date Time Provider Department Center   10/20/2021  1:15 PM Misa Hameed APRN [facility] JOSE D CHÁVEZ [facility]   3/28/2022  9:45 AM Zechariah Clay MD [facility] U VALERIY [facility]      Follow-up Information     Samanta Martínez APRN .    Specialty: Nurse Practitioner  Contact information:  [facility] [address] [address] [phone]                         Time Spent on Discharge:  Greater  than 30 minutes      Nicolasa Mathews MD  [facility]  10/14/21  18:42 EDT

## 2021-10-15 ENCOUNTER — TELEPHONE (OUTPATIENT)
Dept: CARDIOLOGY | Facility: CLINIC | Age: 66
End: 2021-10-15

## 2021-10-15 DIAGNOSIS — R55 SYNCOPE AND COLLAPSE: Primary | ICD-10-CM

## 2021-10-15 LAB — EPO SERPL-ACNC: 11.1 MIU/ML (ref 2.6–18.5)

## 2021-10-15 NOTE — TELEPHONE ENCOUNTER
Pt's wife (Dayanara) at [phone] called and LMM re: Discharged last night and was told she needed Zio Patch.  Reviewed [facility] and it was listed in her plan.      I called Marj and she will be able mail the pt a Zio patch as the pt [place] away.  She will watch for the order.      I have placed the order for Dr Duncan and will have her sign off.      Spoke with pt and advised her that Marj will watch for the order and have Zio mail to them.  Ok with pt's wife.  (Done)    Clau and Dr Perales: JUST MARIA FERNANDA Mcmahan: I will get MKD to sign off but order should be available.   Let me know if you need anything.    Dr Duncan: Please co-sign.

## 2021-10-15 NOTE — CASE MANAGEMENT/SOCIAL WORK
Case Management Discharge Note      Final Note: Home; self-care. Annaurban Whitten CSW         Selected Continued Care - Discharged on 10/14/2021 Admission date: 10/12/2021 - Discharge disposition: Home or Self Care    Destination    No services have been selected for the patient.              Durable Medical Equipment    No services have been selected for the patient.              Dialysis/Infusion    No services have been selected for the patient.              Home Medical Care    No services have been selected for the patient.              Therapy    No services have been selected for the patient.              Community Resources    No services have been selected for the patient.              Community & DME    No services have been selected for the patient.                       Final Discharge Disposition Code: 01 - home or self-care

## 2021-10-17 DIAGNOSIS — E29.1 HYPOGONADISM IN MALE: ICD-10-CM

## 2021-10-17 LAB
BACTERIA SPEC AEROBE CULT: NORMAL
BACTERIA SPEC AEROBE CULT: NORMAL

## 2021-10-18 RX ORDER — TESTOSTERONE CYPIONATE 200 MG/ML
INJECTION, SOLUTION INTRAMUSCULAR
Qty: 10 ML | Refills: 0 | Status: SHIPPED | OUTPATIENT
Start: 2021-10-18 | End: 2021-10-20

## 2021-10-19 DIAGNOSIS — E29.1 HYPOGONADISM IN MALE: ICD-10-CM

## 2021-10-20 ENCOUNTER — OFFICE VISIT (OUTPATIENT)
Dept: NEUROSURGERY | Facility: CLINIC | Age: 66
End: 2021-10-20

## 2021-10-20 VITALS — BODY MASS INDEX: 35 KG/M2 | HEIGHT: 71 IN | WEIGHT: 250 LBS | HEART RATE: 87 BPM

## 2021-10-20 DIAGNOSIS — M47.816 LUMBAR FACET ARTHROPATHY: ICD-10-CM

## 2021-10-20 DIAGNOSIS — M51.36 DEGENERATIVE DISC DISEASE, LUMBAR: Primary | ICD-10-CM

## 2021-10-20 DIAGNOSIS — M54.50 CHRONIC BILATERAL LOW BACK PAIN WITHOUT SCIATICA: ICD-10-CM

## 2021-10-20 DIAGNOSIS — G89.29 CHRONIC BILATERAL LOW BACK PAIN WITHOUT SCIATICA: ICD-10-CM

## 2021-10-20 PROCEDURE — 99212 OFFICE O/P EST SF 10 MIN: CPT | Performed by: NURSE PRACTITIONER

## 2021-10-20 RX ORDER — TESTOSTERONE CYPIONATE 200 MG/ML
INJECTION, SOLUTION INTRAMUSCULAR
Qty: 10 ML | Refills: 0 | Status: SHIPPED | OUTPATIENT
Start: 2021-10-20 | End: 2022-03-21 | Stop reason: SDUPTHER

## 2021-10-20 NOTE — PROGRESS NOTES
"Chief Complaint  Back Pain    Subjective          Jimmie Jamil who is a 66 y.o. year old male who presents to [facility] for follow up of his low back pain.     Pt received first LESB one month ago. He is unsure how much this helped his pain. Plan is to receive a series of three injections. Pain is primarily across the low back. No longer having radiating pain symptoms into the legs. Last Lumbar RFA in February of this year.       Interval History 7/13/21    Jimmie Jamil who is a 65 y.o. year old male who presents to [facility] for his back pain.     His pain continues to be primarily across the low back pain. At his last visit we referred him to pain management, although he has not seen them yet. He is scheduled to see pain management for trial for RFA vs LESI tomorrow.         Interval History 6/1/21  MRI Lumbar Spine shows severe DDD at L5/S1 without canal stenosis, mild bilateral foraminal narrowing. Disc bulging and facet arthropathy causing mild canal and foraminal narrowing at L2/3 and L3/4.    His pain is primarily across the low back, worse with heavy lifting and bending and twisting. He is also having some right lateral thigh pain and hip pain.     Recent Interventions: LESB, Lumbar RFA      Review of Systems   Musculoskeletal: Positive for arthralgias and back pain.   All other systems reviewed and are negative.       Objective   Vital Signs:   Pulse 87   Ht 180.3 cm (71\")   Wt 113 kg (250 lb)   BMI 34.87 kg/m²       Physical Exam  Vitals reviewed.   Constitutional:       Appearance: Normal appearance.   Neurological:      Mental Status: He is alert and oriented to person, place, and time.      Gait: Gait is intact.      Deep Tendon Reflexes: Strength normal.        Neurologic Exam     Mental Status   Oriented to person, place, and time.   Level of consciousness: alert    Motor Exam   Muscle bulk: " normal  Overall muscle tone: normal    Strength   Strength 5/5 throughout.     Gait, Coordination, and Reflexes     Gait  Gait: normal       Result Review :                 Assessment and Plan    Diagnoses and all orders for this visit:    1. Degenerative disc disease, lumbar (Primary)    2. Chronic bilateral low back pain without sciatica    3. Lumbar facet arthropathy    Pt with chronic low back pain. He is being followed by pain management for interventional management. No surgical recommendations at this time. He can follow up in our office on an as needed basis.       Follow Up   No follow-ups on file.  Patient was given instructions and counseling regarding his condition or for health maintenance advice.     -Continue with Pain management  -Follow up as needed

## 2021-10-23 DIAGNOSIS — R55 SYNCOPE AND COLLAPSE: Primary | ICD-10-CM

## 2021-11-12 RX ORDER — ENALAPRIL MALEATE AND HYDROCHLOROTHIAZIDE 10; 25 MG/1; MG/1
TABLET ORAL
COMMUNITY
Start: 2021-10-27 | End: 2022-03-28

## 2021-11-12 RX ORDER — ENALAPRIL MALEATE 20 MG/1
40 TABLET ORAL DAILY
COMMUNITY
Start: 2021-10-20 | End: 2022-12-30 | Stop reason: SDUPTHER

## 2021-11-12 RX ORDER — ASPIRIN 81 MG/1
81 TABLET ORAL DAILY
COMMUNITY

## 2021-11-17 ENCOUNTER — TELEPHONE (OUTPATIENT)
Dept: CARDIOLOGY | Facility: CLINIC | Age: 66
End: 2021-11-17

## 2021-11-17 NOTE — TELEPHONE ENCOUNTER
Can you let the patient know that his heart monitor did not show any evidence of any arrhythmias.  No changes to be made at this time based off of this study.  He also is due for a follow-up appointment if we can set this up for him.

## 2021-11-19 ENCOUNTER — OFFICE VISIT (OUTPATIENT)
Dept: OTOLARYNGOLOGY | Facility: CLINIC | Age: 66
End: 2021-11-19

## 2021-11-19 VITALS — TEMPERATURE: 97.3 F | WEIGHT: 256.2 LBS | HEIGHT: 71 IN | BODY MASS INDEX: 35.87 KG/M2

## 2021-11-19 DIAGNOSIS — H69.81 ETD (EUSTACHIAN TUBE DYSFUNCTION), RIGHT: ICD-10-CM

## 2021-11-19 DIAGNOSIS — H65.21 CHRONIC SEROUS OTITIS MEDIA OF RIGHT EAR: Primary | ICD-10-CM

## 2021-11-19 PROCEDURE — 69433 CREATE EARDRUM OPENING: CPT | Performed by: OTOLARYNGOLOGY

## 2021-11-19 PROCEDURE — 99203 OFFICE O/P NEW LOW 30 MIN: CPT | Performed by: OTOLARYNGOLOGY

## 2021-11-19 RX ORDER — VERAPAMIL HYDROCHLORIDE 360 MG/1
360 CAPSULE, DELAYED RELEASE PELLETS ORAL NIGHTLY
COMMUNITY
Start: 2021-10-31 | End: 2022-12-30 | Stop reason: SDUPTHER

## 2021-11-19 RX ORDER — OFLOXACIN 3 MG/ML
SOLUTION/ DROPS OPHTHALMIC
Qty: 10 ML | Refills: 0 | Status: SHIPPED | OUTPATIENT
Start: 2021-11-19 | End: 2022-03-28

## 2021-11-19 NOTE — PROGRESS NOTES
Patient Name: Jimmie Jamil   Visit Date: 11/19/2021   Patient ID: [id number]  Provider: Frank Solomon MD    Sex: male  Location: [facility]   YOB: 1955  Location Address: [address] [place] [address], [address] [place] [address],?[place]?[id number]    Primary Care Provider Samanta Martínez APRN  Location Phone: [phone]    Referring Provider: HAN Helm        Chief Complaint  Otitis Media    History of Present Illness  Jimmie Jamil is a 66 y.o. male who presents to [facility] today as a consult from HAN Helm for evaluation of his ears.  He tells me that he has had issues with predominantly left-sided serous effusions over the last 10 to 15 years.  They typically occur after upper respiratory tract infection.  He has seen 2 separate ENTs in the past and has undergone multiple ear tube placements.  He currently has a tube present in the left ear.  He tells me that 2 to 3 months ago he noticed his right ear became muffled and has felt like there was fluid in the right side since.  He denies any otalgia, otorrhea, tinnitus, or vertigo.  He denies any baseline hearing loss.  He has been tried on azithromycin and prednisone without improvement.  He has tried a nasal steroid spray in the past which was not helpful. CT scan of the head without contrast on 10/12/2021 revealed a partial right mastoid middle ear effusion.  The sinuses were unremarkable.     Past Medical History:   Diagnosis Date   • Benign essential hypertension    • Bladder problem    • Cervical radiculopathy 03/16/2017   • Cervical spinal stenosis 03/16/2017   • Cervicalgia 03/16/2017   • Coronary artery disease     diastolic dysfunction   • ED (erectile dysfunction)    • Greater trochanteric bursitis of right hip 07/09/2018   • High blood pressure    • Hyperlipidemia    • Hypertension    • Leg pain    • Leg swelling    • Low back pain    • Lumbago 03/16/2017   •  Neck pain    • Paresthesia 03/16/2017    left hand/leg   • Polycythemia    • Right hip pain    • Urinary bladder incontinence 03/16/2017       Past Surgical History:   Procedure Laterality Date   • ANTERIOR CERVICAL FUSION  04/12/2017    C3-4   • CARDIAC CATHETERIZATION      everything okay clearance for neck surgery   • CARDIAC CATHETERIZATION N/A 10/13/2021    Procedure: Left Heart Cath;  Surgeon: Anahi Perales MD;  Location:  [facility];  Service: Cardiovascular;  Laterality: N/A;   • CARDIAC CATHETERIZATION N/A 10/13/2021    Procedure: Coronary angiography;  Surgeon: Anahi Perales MD;  Location:  [facility];  Service: Cardiovascular;  Laterality: N/A;   • CARDIAC CATHETERIZATION N/A 10/13/2021    Procedure: Left ventriculography;  Surgeon: Anahi Perales MD;  Location:  [facility];  Service: Cardiovascular;  Laterality: N/A;   • CERVICAL DISC SURGERY      C2-3 fusion   • CIRCUMCISION     • COLONOSCOPY     • CYSTOSCOPY  01/10/2014    Cysto ivan retrograde pyelogram urthral bx.   • LUMBAR EPIDURAL INJECTION      L2-S2  going to try ablasion in future   • PROSTATE BIOPSY  2018   • SHOULDER ARTHROSCOPY W/ LABRAL REPAIR Left    • VASECTOMY     • WISDOM TOOTH EXTRACTION           Current Outpatient Medications:   •  ascorbic acid (VITAMIN C) 1000 MG tablet, Take 1,000 mg by mouth Daily., Disp: , Rfl:   •  aspirin (aspirin) 81 MG EC tablet, Take 81 mg by mouth Daily., Disp: , Rfl:   •  diclofenac (VOLTAREN) 75 MG EC tablet, Take 75 mg by mouth 2 (Two) Times a Day As Needed., Disp: , Rfl:   •  enalapril (VASOTEC) 20 MG tablet, , Disp: , Rfl:   •  enalapril-hydrochlorothiazide (VASERETIC) 10-25 MG per tablet, , Disp: , Rfl:   •  metaxalone (SKELAXIN) 800 MG tablet, Take 800 mg by mouth Daily As Needed., Disp: , Rfl:   •  Testosterone Cypionate (DEPOTESTOTERONE CYPIONATE) 200 MG/ML injection, INJECT 0.25 ML INTRAMUSCULARLY EVERY 5 DAYS, Disp: 10 mL, Rfl: 0  •   "VASCEPA 1 g capsule capsule, Take 2 g by mouth 2 (Two) Times a Day With Meals., Disp: 360 capsule, Rfl: 3  •  verapamil SR (CALAN-SR) 180 MG CR tablet, , Disp: , Rfl:      Allergies   Allergen Reactions   • Beta Adrenergic Blockers Unknown - High Severity   • Crestor [Rosuvastatin Calcium] Myalgia   • Keflex [Cephalexin] Other (See Comments)     hiccups   • Livalo [Pitavastatin] Myalgia   • Losartan Other (See Comments)     Intolerance due to fatigue, depression   • Naproxen Other (See Comments)     Chest pain       Social History     Tobacco Use   • Smoking status: Never Smoker   • Smokeless tobacco: Never Used   Substance Use Topics   • Alcohol use: Yes     Comment: rare   • Drug use: Never        Objective     Vital Signs:   Temp 97.3 °F (36.3 °C) (Temporal)   Ht 180.3 cm (71\")   Wt 116 kg (256 lb 3.2 oz)   BMI 35.73 kg/m²       Physical Exam    General: Well developed, well nourished patient of stated age in no acute distress. Voice is strong and clear.   Head: Normocephalic and atraumatic.  Face: No lesions.  Bilateral parotid and submandibular glands are unremarkable.  Stensen's and Warthin's ducts are productive of clear saliva bilaterally.  House-Brackmann I/VI     bilaterally.   muscles and temporomandibular joint nontender to palpation.  No TMJ crepitus.  Eyes: PERRLA, sclerae anicteric, no conjunctival injection. Extra ocular movements are intact and full. No nystagmus.   Ears: Auricles are normal in appearance. Bilateral external auditory canals are unremarkable.  Right tympanic membrane is slightly retracted with a serous effusion.  Left tympanic membrane with T-tube in place and patent.  Hearing normal to conversational voice.   Nose: External nose is normal in appearance. Bilateral nares are patent with normal appearing mucosa. Septum midline. Turbinates are unremarkable. No lesions.   Oral Cavity: Lips are normal in appearance. Oral mucosa is unremarkable. Gingiva is unremarkable. Normal " dentition for age. Tongue is unremarkable with good movement. Hard palate is unremarkable.   Oropharynx: Soft palate is unremarkable with full movement. Uvula is unremarkable. Bilateral tonsils are unremarkable. Posterior oropharynx is unremarkable.    Larynx and hypopharynx: Deferred secondary to gag reflex.  Neck: Supple.  No mass.  Nontender to palpation.  Trachea midline. Thyroid normal size and without nodules to palpation.   Lymphatic: No lymphadenopathy upon palpation.  Respiratory: Clear to auscultation bilaterally, nonlabored respirations    Cardiovascular: RRR, no murmurs, rubs, or gallops,   Psychiatric: Appropriate affect, cooperative   Neurologic: Oriented x 3, strength symmetric in all extremities, Cranial Nerves II-XII are grossly intact to confrontation   Skin: Warm and dry. No rashes.    Procedures     Right myringotomy and tube placement:    Indications: Chronic right serous otitis media.    The patient was brought back to the microscope room and placed reclined in the chair. The microscope was moved into position to view the patient's right ear revealing a normal-appearing external auditory canal and tympanic membrane with serous effusion. Phenol was used to anesthetize the tympanic membrane and a radial myringotomy performed in the posterioinferior quadrant. A serous effusion was suctioned and a pressure equalization tube was placed without difficulty. The patient tolerated the procedure well.      Result Review :               Assessment and Plan    Diagnoses and all orders for this visit:    1. Chronic serous otitis media of right ear (Primary)    2. ETD (Eustachian tube dysfunction), right    Impressions and findings were discussed.  Currently, he is seen for evaluation of chronic serous otitis media and eustachian tube dysfunction for which he has previously undergone left T-tube placement.  His right ear has been bothering him now for 2 to 3 months without improvement.  Examination today  reveals a slightly retracted right tympanic membrane with a serous effusion and a left T-tube in place and patent.  We discussed the pathophysiology and natural history of this condition.  Options for management including continued medical management versus myringotomy and tube placement bilaterally was discussed. The risks, benefits, and alternatives were discussed. The risks include persistent drainage from the tubes occasionally requiring removal, blockage of the tubes by drainage, early displacement of the tubes, and tympanic membrane perforation.  After thorough discussion he elected to proceed right myringotomy and tube placement which was performed today in clinic.  He will follow up in 1 year or sooner if needed.      Follow Up   No follow-ups on file.  Patient was given instructions and counseling regarding his condition or for health maintenance advice. Please see specific information pulled into the AVS if appropriate.

## 2021-12-20 ENCOUNTER — TELEPHONE (OUTPATIENT)
Dept: UROLOGY | Facility: CLINIC | Age: 66
End: 2021-12-20

## 2021-12-20 NOTE — TELEPHONE ENCOUNTER
----- Message from Lita Lagunas sent at 12/20/2021  1:56 PM EST -----  Regarding: FW: Rezum  Can you get patient scheduled for a cystoscopy late January for Rezum, put him in a 15 minute spot, but make a note for him not to be double booked please, as we will have to get his Rezum scheduled  ----- Message -----  From: Zechariah Clay MD  Sent: 12/20/2021   1:55 PM EST  To: Lita Lagunas  Subject: Rezum                                              He can be set up for cystoscopy in preparation and we will get him scheduled that day  ----- Message -----  From: Lita Lagunas  Sent: 12/17/2021   2:10 PM EST  To: Zechariah Clay MD    Patients insurance approved Rezum for patient, do we need to set him up for an office visit or just a cysto to then be scheduled for rezum? He cannot be scheduled until 1/11/22 at the earliest.

## 2022-01-05 ENCOUNTER — OFFICE VISIT (OUTPATIENT)
Dept: ORTHOPEDIC SURGERY | Facility: CLINIC | Age: 67
End: 2022-01-05

## 2022-01-05 VITALS — BODY MASS INDEX: 35 KG/M2 | HEIGHT: 71 IN | OXYGEN SATURATION: 96 % | WEIGHT: 250 LBS | HEART RATE: 72 BPM

## 2022-01-05 DIAGNOSIS — M19.012 PRIMARY OSTEOARTHRITIS OF LEFT SHOULDER: Primary | ICD-10-CM

## 2022-01-05 PROCEDURE — 99203 OFFICE O/P NEW LOW 30 MIN: CPT | Performed by: STUDENT IN AN ORGANIZED HEALTH CARE EDUCATION/TRAINING PROGRAM

## 2022-01-05 PROCEDURE — 20610 DRAIN/INJ JOINT/BURSA W/O US: CPT | Performed by: STUDENT IN AN ORGANIZED HEALTH CARE EDUCATION/TRAINING PROGRAM

## 2022-01-05 RX ADMIN — TRIAMCINOLONE ACETONIDE 40 MG: 40 INJECTION, SUSPENSION INTRA-ARTICULAR; INTRAMUSCULAR at 13:50

## 2022-01-05 RX ADMIN — BUPIVACAINE HYDROCHLORIDE 5 ML: 2.5 INJECTION, SOLUTION INFILTRATION; PERINEURAL at 13:50

## 2022-01-05 NOTE — PROGRESS NOTES
"Chief Complaint  Pain of the Left Shoulder    Subjective          Jimmie Jamil presents to [facility] for his left shoulder     History of Present Illness    Patient is here today for evaluation for his left shoulder. He reports no injury to it but reports he had SLAP tear surgery over 15 years ago. He reports some burning in the shoulder and pain. Patient has already had an MRI done, which was done at [facility]. He states he has dropped things from time to time. He states he was helping his daughter and believes that caused his shoulder to flare up.     Allergies   Allergen Reactions   • Beta Adrenergic Blockers Unknown - High Severity   • Crestor [Rosuvastatin Calcium] Myalgia   • Keflex [Cephalexin] Other (See Comments)     hiccups   • Livalo [Pitavastatin] Myalgia   • Losartan Other (See Comments)     Intolerance due to fatigue, depression   • Naproxen Other (See Comments)     Chest pain        Social History     Socioeconomic History   • Marital status:    Tobacco Use   • Smoking status: Never Smoker   • Smokeless tobacco: Never Used   Substance and Sexual Activity   • Alcohol use: Yes     Alcohol/week: 4.0 standard drinks     Types: 4 Cans of beer per week     Comment: rare   • Drug use: Never   • Sexual activity: Yes     Partners: Female     Birth control/protection: Surgical        I reviewed the patient's chief complaint, history of present illness, review of systems, past medical history, surgical history, family history, social history, medications, and allergy list.     REVIEW OF SYSTEMS    Constitutional: Denies fevers, chills, weight loss  Cardiovascular: Denies chest pain, shortness of breath  Skin: Denies rashes, acute skin changes  Neurologic: Denies headache, loss of consciousness  MSK: Left shoulder pain       Objective   Vital Signs:   Pulse 72   Ht 180.3 cm (71\")   Wt 113 kg (250 lb)   SpO2 96%   BMI 34.87 kg/m²     Body mass index is 34.87 " kg/m².    Physical Exam    General: Alert. No acute distress.   Left upper extremity: active elevation 145 degrees, external rotation 45 internal rotation to the lower lumbar spine. 5/5 rotator cuff testing. Mild pain with impigment testing. Non tender over the biceps. Sensation intact medial, radial, ulnar and axillary. Palpable radial pulse.        Large Joint Arthrocentesis  Date/Time: 1/5/2022 1:50 PM  Consent given by: patient  Site marked: site marked  Timeout: Immediately prior to procedure a time out was called to verify the correct patient, procedure, equipment, support staff and site/side marked as required   Supporting Documentation  Indications: pain   Procedure Details  Location: shoulder (LEFT) -   Needle gauge: 21 G.  Medications administered: 40 mg triamcinolone acetonide 40 MG/ML; 5 mL bupivacaine 0.25 %  Patient tolerance: patient tolerated the procedure well with no immediate complications          Imaging Results (Most Recent)     None                   Assessment and Plan    Diagnoses and all orders for this visit:    1. Primary osteoarthritis of left shoulder (Primary)        Patient was evaluated for his left shoulder. His MRI was reviewed today with the patient. We discussed anti inflammatories, however, he is already taking Diclofenac. We informed him he could also take Tylenol as well. We discussed the risks and benefits with the patient regarding left shoulder steroid injection. Patient tolerated injection well with no complications. We will provide him with a home exercise program.  We will follow up with him in 6 weeks. He will call us with any problems.       Call or return if symptoms worsen or patient has any concerns.       Scribed for Peter Gomes MD by Kathy Lawson  01/05/2022   13:38 EST         Follow Up   Return in about 6 weeks (around 2/16/2022).  Patient was given instructions and counseling regarding his condition or for health maintenance advice. Please see specific  information pulled into the AVS if appropriate.       I have personally performed the services described in this document as scribed by the above individual and it is both accurate and complete.     Peter Gomes MD  01/05/22  13:46 EST

## 2022-01-06 RX ORDER — BUPIVACAINE HYDROCHLORIDE 2.5 MG/ML
5 INJECTION, SOLUTION INFILTRATION; PERINEURAL
Status: COMPLETED | OUTPATIENT
Start: 2022-01-05 | End: 2022-01-05

## 2022-01-06 RX ORDER — TRIAMCINOLONE ACETONIDE 40 MG/ML
40 INJECTION, SUSPENSION INTRA-ARTICULAR; INTRAMUSCULAR
Status: COMPLETED | OUTPATIENT
Start: 2022-01-05 | End: 2022-01-05

## 2022-01-23 NOTE — PROGRESS NOTES
Procedures       Urinalysis was checked today and was negative for signs of infection      Cytoscopy Procedure:     Procedure: Flexible cytoscope was passed per urethra into the bladder without difficulty after proper consent. The bladder was inspected in a systematic meridian fashion.     4 cm prostate    Large bladder with some minor trabeculations.      There were no tumors, lesions, stones, or other abnormalities noted within the bladder. Of note, there was no increased vascularity as well. Both ureteral orifices were identified and were normal in appearance. The flexible cytoscope was removed. The patient tolerated the procedure well.       PLAN      Hypogonadism    Continue 0.25 mL Depo testosterone subcu every 5 days.  Patient likes to use 1 - 10 mL vial and get refill after.  Refilled today     follow-up   3/22   months with Tot testosterone, CBC, CMP       Elevated PSA     PSA stable, will go back to yearly prostate cancer screening.       BPH    Wanting Rezum, risk and benefits discussed today. Patient would like to proceed.  Patient will start ciprofloxacin 500 mg p.o. twice daily 3 days before procedure for a  total of 5 days.    Stop anticoagulation 5 days before          This document has been electronically signed by Zechariah Clay MD  January 23, 2022 06:32 EST

## 2022-01-24 ENCOUNTER — OFFICE VISIT (OUTPATIENT)
Dept: UROLOGY | Facility: CLINIC | Age: 67
End: 2022-01-24

## 2022-01-24 VITALS — HEIGHT: 71 IN | BODY MASS INDEX: 35 KG/M2 | RESPIRATION RATE: 18 BRPM | WEIGHT: 250 LBS

## 2022-01-24 DIAGNOSIS — N40.1 BENIGN PROSTATIC HYPERPLASIA WITH URINARY FREQUENCY: Primary | ICD-10-CM

## 2022-01-24 DIAGNOSIS — R35.0 BENIGN PROSTATIC HYPERPLASIA WITH URINARY FREQUENCY: Primary | ICD-10-CM

## 2022-01-24 PROCEDURE — 52000 CYSTOURETHROSCOPY: CPT | Performed by: UROLOGY

## 2022-01-24 RX ORDER — CIPROFLOXACIN 500 MG/1
500 TABLET, FILM COATED ORAL 2 TIMES DAILY
Qty: 10 TABLET | Refills: 0 | Status: SHIPPED | OUTPATIENT
Start: 2022-02-12 | End: 2022-02-17

## 2022-02-14 PROBLEM — R35.0 BENIGN PROSTATIC HYPERPLASIA WITH URINARY FREQUENCY: Status: ACTIVE | Noted: 2022-02-14

## 2022-02-14 PROBLEM — N40.1 BENIGN PROSTATIC HYPERPLASIA WITH URINARY FREQUENCY: Status: ACTIVE | Noted: 2022-02-14

## 2022-02-15 ENCOUNTER — OFFICE VISIT (OUTPATIENT)
Dept: UROLOGY | Facility: CLINIC | Age: 67
End: 2022-02-15

## 2022-02-15 DIAGNOSIS — N40.1 BENIGN PROSTATIC HYPERPLASIA WITH URINARY FREQUENCY: Primary | ICD-10-CM

## 2022-02-15 DIAGNOSIS — R35.0 BENIGN PROSTATIC HYPERPLASIA WITH URINARY FREQUENCY: Primary | ICD-10-CM

## 2022-02-15 PROCEDURE — 53854 TRURL DSTRJ PRST8 TISS RF WV: CPT | Performed by: UROLOGY

## 2022-02-15 NOTE — PROGRESS NOTES
REZUM PROCEDURE    After informed consent patient was taken to the procedure room.      Patient was laid with his left side down in fetal position.  Ultrasound probe was placed into the rectum.  Prostate was identified and found to be       37    cm³.  1% lidocaine plain was used to give a prostatic block.  I gave 20 mL around the prostatic junction between the seminal vesicle on the prostate right around the nerves divided between the sides for a prostatic block.  Patient tolerated this part of the procedure well.  Ultrasound probe was removed      Patient was then laid in dorsolithotomy position he was prepped and draped in normal sterile fashion.  Rezum scope was cycled at the bedside and worked well.  Rezum scope was placed into the urethra.  Anterior urethra was normal.  Prostatic urethra was 4 cm long.  At this point I went ahead and did     3    treatments on the left side and then    3   treatments on the right side.  I made sure treatments were 1 cm away from the bladder neck and 1 cm apart.  I made sure to stay up above the verumontanum.    Patient tolerated procedure well, scope was removed.       On passing the rigid cystoscope for the Rezum procedure I did make a false passage anteriorly mid penile urethra.  He had a wide caliber stricture in this area.    I could not get the catheter the past ended up using a flexible cystoscope the scope and a wire without issue and then an 18 [place] tip catheter was passed in  10 cc sterile water placed in balloon displaced straight drainage.    Patient will remove his catheter at home in 1 week.    Follow-up in clinic in 1 month      PVR follow-up

## 2022-02-16 ENCOUNTER — OFFICE VISIT (OUTPATIENT)
Dept: ORTHOPEDIC SURGERY | Facility: CLINIC | Age: 67
End: 2022-02-16

## 2022-02-16 VITALS — OXYGEN SATURATION: 97 % | HEART RATE: 89 BPM

## 2022-02-16 DIAGNOSIS — M19.012 PRIMARY OSTEOARTHRITIS OF LEFT SHOULDER: Primary | ICD-10-CM

## 2022-02-16 PROCEDURE — 99213 OFFICE O/P EST LOW 20 MIN: CPT | Performed by: STUDENT IN AN ORGANIZED HEALTH CARE EDUCATION/TRAINING PROGRAM

## 2022-02-16 RX ORDER — CHLORHEXIDINE GLUCONATE 0.12 MG/ML
RINSE ORAL
COMMUNITY
Start: 2022-02-14 | End: 2022-03-28

## 2022-02-16 NOTE — PROGRESS NOTES
Chief Complaint  Pain of the Left Shoulder    Subjective          Jimmie Jamil presents to [facility] for   History of Present Illness    Patient is here today for follow up evaluation for his left shoulder. He reports no injury to it but reports he had SLAP tear surgery over 15 years ago. He reports some burning in the shoulder and pain. The patient had a steroid injection about 6 weeks ago. He takes diclofenac. He reports he got about 70% of relief with the injection. He takes diclofenac as needed. He has no new complaints.     Allergies   Allergen Reactions   • Beta Adrenergic Blockers Unknown - High Severity   • Crestor [Rosuvastatin Calcium] Myalgia   • Keflex [Cephalexin] Other (See Comments)     hiccups   • Livalo [Pitavastatin] Myalgia   • Losartan Other (See Comments)     Intolerance due to fatigue, depression   • Naproxen Other (See Comments)     Chest pain        Social History     Socioeconomic History   • Marital status:    Tobacco Use   • Smoking status: Never Smoker   • Smokeless tobacco: Never Used   Vaping Use   • Vaping Use: Never used   Substance and Sexual Activity   • Alcohol use: Yes     Alcohol/week: 4.0 standard drinks     Types: 4 Cans of beer per week     Comment: rare   • Drug use: Never   • Sexual activity: Yes     Partners: Female     Birth control/protection: Surgical        I reviewed the patient's chief complaint, history of present illness, review of systems, past medical history, surgical history, family history, social history, medications, and allergy list.     REVIEW OF SYSTEMS    Constitutional: Denies fevers, chills, weight loss  Cardiovascular: Denies chest pain, shortness of breath  Skin: Denies rashes, acute skin changes  Neurologic: Denies headache, loss of consciousness  MSK: Left shoulder pain      Objective   Vital Signs:   Pulse 89   SpO2 97%     There is no height or weight on file to calculate BMI.    Physical  Exam    General: Alert. No acute distress.   Left shoulder- non-tender. Forward elevation 180. External Rotation 60. Internal rotation to lower lumbar spine. Neurovascularly intact. Sensation to light touch median, radial, ulnar nerve. Positive AIN, PIN, ulnar nerve motor function. Positive pulses. 5/5 rotator cuff testing. Negative impingement testing. Full active finger ROM.     Procedures    Imaging Results (Most Recent)     None                   Assessment and Plan    Diagnoses and all orders for this visit:    1. Primary osteoarthritis of left shoulder (Primary)        Discussed the treatment plan with the patient.  Plan to continue with conservative treatment. Plan to continue with home exercises and diclofenac as needed. He can follow up for periodic injections.      Call or return if symptoms worsen or patient has any concerns.       Scribed for Peter Gomes MD by Mell Duran  02/16/2022   13:41 EST         Follow Up   Return if symptoms worsen or fail to improve.  Patient was given instructions and counseling regarding his condition or for health maintenance advice. Please see specific information pulled into the AVS if appropriate.       I have personally performed the services described in this document as scribed by the above individual and it is both accurate and complete.     Peter Gomes MD  02/16/22  13:54 EST

## 2022-03-16 ENCOUNTER — LAB (OUTPATIENT)
Dept: LAB | Facility: HOSPITAL | Age: 67
End: 2022-03-16

## 2022-03-16 DIAGNOSIS — E29.1 HYPOGONADISM IN MALE: ICD-10-CM

## 2022-03-16 LAB
ALBUMIN SERPL-MCNC: 4.2 G/DL (ref 3.5–5.2)
ALBUMIN/GLOB SERPL: 1.6 G/DL
ALP SERPL-CCNC: 66 U/L (ref 39–117)
ALT SERPL W P-5'-P-CCNC: 40 U/L (ref 1–41)
AMPHET+METHAMPHET UR QL: NEGATIVE
ANION GAP SERPL CALCULATED.3IONS-SCNC: 8.1 MMOL/L (ref 5–15)
AST SERPL-CCNC: 30 U/L (ref 1–40)
BARBITURATES UR QL SCN: NEGATIVE
BASOPHILS # BLD AUTO: 0.06 10*3/MM3 (ref 0–0.2)
BASOPHILS NFR BLD AUTO: 0.6 % (ref 0–1.5)
BENZODIAZ UR QL SCN: NEGATIVE
BILIRUB SERPL-MCNC: 0.5 MG/DL (ref 0–1.2)
BUN SERPL-MCNC: 11 MG/DL (ref 8–23)
BUN/CREAT SERPL: 8.7 (ref 7–25)
CALCIUM SPEC-SCNC: 9.4 MG/DL (ref 8.6–10.5)
CANNABINOIDS SERPL QL: NEGATIVE
CHLORIDE SERPL-SCNC: 103 MMOL/L (ref 98–107)
CO2 SERPL-SCNC: 24.9 MMOL/L (ref 22–29)
COCAINE UR QL: NEGATIVE
CREAT SERPL-MCNC: 1.27 MG/DL (ref 0.76–1.27)
DEPRECATED RDW RBC AUTO: 40.9 FL (ref 37–54)
EGFRCR SERPLBLD CKD-EPI 2021: 63.1 ML/MIN/1.73
EOSINOPHIL # BLD AUTO: 0.09 10*3/MM3 (ref 0–0.4)
EOSINOPHIL NFR BLD AUTO: 0.9 % (ref 0.3–6.2)
ERYTHROCYTE [DISTWIDTH] IN BLOOD BY AUTOMATED COUNT: 13.5 % (ref 12.3–15.4)
GLOBULIN UR ELPH-MCNC: 2.6 GM/DL
GLUCOSE SERPL-MCNC: 90 MG/DL (ref 65–99)
HCT VFR BLD AUTO: 53.2 % (ref 37.5–51)
HGB BLD-MCNC: 18.3 G/DL (ref 13–17.7)
IMM GRANULOCYTES # BLD AUTO: 0.19 10*3/MM3 (ref 0–0.05)
IMM GRANULOCYTES NFR BLD AUTO: 2 % (ref 0–0.5)
LYMPHOCYTES # BLD AUTO: 2.32 10*3/MM3 (ref 0.7–3.1)
LYMPHOCYTES NFR BLD AUTO: 24.2 % (ref 19.6–45.3)
MCH RBC QN AUTO: 29 PG (ref 26.6–33)
MCHC RBC AUTO-ENTMCNC: 34.4 G/DL (ref 31.5–35.7)
MCV RBC AUTO: 84.2 FL (ref 79–97)
METHADONE UR QL SCN: NEGATIVE
MONOCYTES # BLD AUTO: 1.73 10*3/MM3 (ref 0.1–0.9)
MONOCYTES NFR BLD AUTO: 18 % (ref 5–12)
NEUTROPHILS NFR BLD AUTO: 5.2 10*3/MM3 (ref 1.7–7)
NEUTROPHILS NFR BLD AUTO: 54.3 % (ref 42.7–76)
NRBC BLD AUTO-RTO: 0 /100 WBC (ref 0–0.2)
OPIATES UR QL: NEGATIVE
OXYCODONE UR QL SCN: NEGATIVE
PLATELET # BLD AUTO: 293 10*3/MM3 (ref 140–450)
PMV BLD AUTO: 10.1 FL (ref 6–12)
POTASSIUM SERPL-SCNC: 4.3 MMOL/L (ref 3.5–5.2)
PROT SERPL-MCNC: 6.8 G/DL (ref 6–8.5)
RBC # BLD AUTO: 6.32 10*6/MM3 (ref 4.14–5.8)
SODIUM SERPL-SCNC: 136 MMOL/L (ref 136–145)
WBC NRBC COR # BLD: 9.59 10*3/MM3 (ref 3.4–10.8)

## 2022-03-16 PROCEDURE — 80307 DRUG TEST PRSMV CHEM ANLYZR: CPT

## 2022-03-16 PROCEDURE — 36415 COLL VENOUS BLD VENIPUNCTURE: CPT

## 2022-03-16 PROCEDURE — 84403 ASSAY OF TOTAL TESTOSTERONE: CPT

## 2022-03-16 PROCEDURE — 84402 ASSAY OF FREE TESTOSTERONE: CPT

## 2022-03-16 PROCEDURE — 85025 COMPLETE CBC W/AUTO DIFF WBC: CPT

## 2022-03-16 PROCEDURE — 80053 COMPREHEN METABOLIC PANEL: CPT

## 2022-03-19 LAB
TESTOST FREE SERPL-MCNC: 9 PG/ML (ref 6.6–18.1)
TESTOST SERPL-MCNC: 587 NG/DL (ref 264–916)

## 2022-03-20 NOTE — PROGRESS NOTES
Chief Complaint    Urologic complaint    Subjective          Jimmie Jamil presents to [facility] UROLOGY  History of Present Illness        66-year-old  gentleman     Hypogonadism   BPH   ED    Patient voiding a little better each day.  Still having little burning.  No gross hematuria.    has had some trouble with increased testosterone and also increased H/H.    Patient H&H was a little increased at this trial, he did feel like he was little dehydrated at the blood draw.    Patient has been doing well, no trouble with fatigue libido.    Voiding okay not straining.    We decreased his doses last visit.  He also donated blood.  Since then his HSA just come back lower    doing phlebotomy/donating blood about every 6 months.    2/15/2022  Rezum - 4 cm prostate    H/H    3/22 H/H   18/53, increased from 16/48 5 months ago  9/21 17/52 12/30/20 17/51 12/20  19.4/58    doing 0.25 mL Depo testosterone subcu every 5 days in 1/21.  Gets a 10 mL vial about every 5 months.      Has used Cialis 20 mg in the past, is having no trouble currently.    Previous    Testosterone has run high in the past.  We have decreased his dose from 0.5 x 1 point.    Endocrinologist that he saw earlier that  had recommended therapeutic phlebotomy has retired.    Flomax  - could not tolerate, helped urination.  Nasal stuffiness, unusual feeling    Was on 0.5 mL IM Depo-Testosterone every 9 days before.     Has been on injections for several years    Sildenafil could not tolerate secondary to  side effects    No FH of prostate CA      No cardiopulmonary history.  Patient does not smoke.  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  Removed.  Normal bladder otherwise and normal retrogrades  Pathology negative    started initially for decreased libido and fatigue.  Did try AndroGel and patches in the past and did not like these b/c  of side effects.     creatinine 1.4, GFR 51    Total testosterone    3/22   587      596  20  159    824      668      806  10/19  995   683     251     928     504 - every 10 days  3/19   826    713    1069    933       563   246   >1500 -  dose was cut in half    PSA       4.7   MRI prostate -49 g -negative, chronic prostatitis    4.92   5.46    4.28  3/19  4.0   prostate biopsy 43 g - negative    6.75    2.54          Past History:  Medical History: has a past medical history of Arthritis of back (), Benign essential hypertension, Bladder problem, Cervical disc disorder (), Cervical radiculopathy (2017), Cervical spinal stenosis (2017), Cervicalgia (2017), Coronary artery disease, ED (erectile dysfunction), Greater trochanteric bursitis of right hip (2018), High blood pressure, Hyperlipidemia, Hypertension, Leg pain, Leg swelling, Low back pain, Low back strain (), Lumbago (2017), Lumbosacral disc disease (), Neck pain, Paresthesia (2017), Periarthritis of shoulder (), Polycythemia, Right hip pain, Rotator cuff syndrome (2021), and Urinary bladder incontinence (2017).   Surgical History: has a past surgical history that includes Cardiac catheterization; Cervical disc surgery; Lumbar epidural injection; Vasectomy; Circumcision, non-; Shoulder arthroscopy w/ labral repair (Left); Colonoscopy; Webster tooth extraction; Anterior Cervical Fusion (2017); Cystoscopy (01/10/2014); Prostate biopsy (); Cardiac catheterization (N/A, 10/13/2021); Cardiac catheterization (N/A, 10/13/2021); Cardiac catheterization (N/A, 10/13/2021); Neck surgery (2018?); Shoulder surgery (); and Trigger point injection ().   Family History: family history includes Alzheimer's disease (age of onset: 80) in his mother; Arthritis in his father and mother;  Cancer in his father; Colon cancer (age of onset: 85) in his father; Heart disease in his father; Hypertension (age of onset: 80) in his father; Stroke in his father.   Social History: reports that he has never smoked. He has never used smokeless tobacco. He reports current alcohol use of about 4.0 standard drinks of alcohol per week. He reports that he does not use drugs.  Allergies: Beta adrenergic blockers, Crestor [rosuvastatin calcium], Keflex [cephalexin], Livalo [pitavastatin], Losartan, and Naproxen       Current Outpatient Medications:   •  ascorbic acid (VITAMIN C) 1000 MG tablet, Take 1,000 mg by mouth Daily., Disp: , Rfl:   •  aspirin (aspirin) 81 MG EC tablet, Take 81 mg by mouth Daily., Disp: , Rfl:   •  chlorhexidine (PERIDEX) 0.12 % solution, , Disp: , Rfl:   •  diclofenac (VOLTAREN) 75 MG EC tablet, Take 75 mg by mouth 2 (Two) Times a Day As Needed., Disp: , Rfl:   •  enalapril (VASOTEC) 20 MG tablet, , Disp: , Rfl:   •  enalapril-hydrochlorothiazide (VASERETIC) 10-25 MG per tablet, , Disp: , Rfl:   •  metaxalone (SKELAXIN) 800 MG tablet, Take 800 mg by mouth Daily As Needed., Disp: , Rfl:   •  ofloxacin (Ocuflox) 0.3 % ophthalmic solution, 5 drops to both ears twice daily x 7 day, Disp: 10 mL, Rfl: 0  •  Testosterone Cypionate (DEPOTESTOTERONE CYPIONATE) 200 MG/ML injection, INJECT 0.25 ML INTRAMUSCULARLY EVERY 5 DAYS, Disp: 10 mL, Rfl: 0  •  VASCEPA 1 g capsule capsule, Take 2 g by mouth 2 (Two) Times a Day With Meals., Disp: 360 capsule, Rfl: 3  •  verapamil SR (CALAN-SR) 180 MG CR tablet, , Disp: , Rfl:      Physical exam       Alert and orient x3  Well appearing, well developed, in no acute distress   Unlabored respirations      Grossly oriented to person, place and time, judgment is intact, normal mood and affect         Objective     Vital Signs:   There were no vitals taken for this visit.             Assessment and Plan    Diagnoses and all orders for this visit:    1. Benign prostatic  hyperplasia with urinary frequency (Primary)    2. Hypogonadism in male      BPH    Doing ok after Rezum.    Hypogonadism    H/H is up, discussed this can be dangerous to his health and cause harm if this continue to get higher, I will have patient to repeat labs in next few days    Continue 0.25 mL Depo testosterone subcu every 5 days.  Patient likes to use 1 - 10 mL vial and get refill after.  Refilled today     follow-up in 6 months with Tot testosterone, CBC, CMP, PSA      Elevated PSA     PSA stable, will go back to yearly prostate cancer screening.

## 2022-03-21 ENCOUNTER — OFFICE VISIT (OUTPATIENT)
Dept: UROLOGY | Facility: CLINIC | Age: 67
End: 2022-03-21

## 2022-03-21 VITALS — RESPIRATION RATE: 18 BRPM | BODY MASS INDEX: 35 KG/M2 | WEIGHT: 250 LBS | HEIGHT: 71 IN

## 2022-03-21 DIAGNOSIS — E29.1 HYPOGONADISM IN MALE: ICD-10-CM

## 2022-03-21 DIAGNOSIS — R35.0 BENIGN PROSTATIC HYPERPLASIA WITH URINARY FREQUENCY: Primary | ICD-10-CM

## 2022-03-21 DIAGNOSIS — N40.1 BENIGN PROSTATIC HYPERPLASIA WITH URINARY FREQUENCY: Primary | ICD-10-CM

## 2022-03-21 LAB — SPECIMEN VOL 24H UR: NORMAL L

## 2022-03-21 PROCEDURE — 99024 POSTOP FOLLOW-UP VISIT: CPT | Performed by: UROLOGY

## 2022-03-21 PROCEDURE — 51798 US URINE CAPACITY MEASURE: CPT | Performed by: UROLOGY

## 2022-03-21 RX ORDER — TESTOSTERONE CYPIONATE 200 MG/ML
INJECTION, SOLUTION INTRAMUSCULAR
Qty: 10 ML | Refills: 0 | Status: SHIPPED | OUTPATIENT
Start: 2022-03-21 | End: 2022-06-20

## 2022-03-28 ENCOUNTER — OFFICE VISIT (OUTPATIENT)
Dept: CARDIOLOGY | Facility: CLINIC | Age: 67
End: 2022-03-28

## 2022-03-28 VITALS
BODY MASS INDEX: 35.28 KG/M2 | SYSTOLIC BLOOD PRESSURE: 124 MMHG | HEIGHT: 71 IN | DIASTOLIC BLOOD PRESSURE: 78 MMHG | WEIGHT: 252 LBS | HEART RATE: 63 BPM

## 2022-03-28 DIAGNOSIS — E78.2 HYPERLIPEMIA, MIXED: ICD-10-CM

## 2022-03-28 DIAGNOSIS — I10 ESSENTIAL HYPERTENSION: ICD-10-CM

## 2022-03-28 DIAGNOSIS — I35.0 AORTIC VALVE STENOSIS, ETIOLOGY OF CARDIAC VALVE DISEASE UNSPECIFIED: Primary | ICD-10-CM

## 2022-03-28 PROCEDURE — 99214 OFFICE O/P EST MOD 30 MIN: CPT | Performed by: NURSE PRACTITIONER

## 2022-03-28 PROCEDURE — 93000 ELECTROCARDIOGRAM COMPLETE: CPT | Performed by: NURSE PRACTITIONER

## 2022-03-28 RX ORDER — VITAMIN B COMPLEX
TABLET ORAL
COMMUNITY
Start: 2021-10-01

## 2022-03-28 NOTE — PROGRESS NOTES
[facility] Cardiology   [address], [address]  [place], [place], [address]    [phone]  [email or web address]           OUTPATIENT CLINIC FOLLOW UP NOTE    Patient Care Team:  Patient Care Team:  Nicko Fontaine APRN as PCP - General (Nurse Practitioner)    Subjective:      Chief Complaint   Patient presents with   • Syncope   • Chest Pain       HPI:    Jimmie Jamil is a 66 y.o. male.  Problem list:  1. Chest pain  a. Evaluated at [facility] in 10/2021.  b. LHC 10/2021: Normal coronary arteries  2. Aortic stenosis  a. TTE 10/2021: EF 57.8%, moderately calcified aortic valve.  Mild aortic stenosis.  Normal diastolic function.  3. Syncope  a. 10/2021 felt likely due to vasovagal etiology.  2-week cardiac monitor was without significant arrhythmia.  4. Rheumatic fever  a. Occurred at age 14 and was treated with penicillin until age 18.  5. Abnormal preoperative stress test.  a. Status post cardiac catheterization in approximately 2016 that was negative.  b. TTE at that time with diastolic dysfunction.  6. Hypertension  7. Hyperlipidemia  8. CKD stage III  9. Obesity  10. Diabetes mellitus    The patient presents today for follow-up.  His primary cardiologist is Dr. Duncan.    Since the patient was discharged from the hospital he reports that his been doing well from a cardiac standpoint.  He denies chest pain, palpitations, lower extremity edema, or syncope.  Does have complaints of some dyspnea with exertion.  He notes he recently had COVID-19 and feels like he is recovering from this.  He also has intermittent lightheadedness that has been unchanged for many years.  Blood pressure has been at goal at home.    Review of Systems:  Positive for dyspnea with exertion, lightheadedness  Negative for exertional chest pain, lower extremity edema, palpitations, syncope.     PFSH:  Patient Active Problem List   Diagnosis   • Class 1 obesity without serious comorbidity with body mass  index (BMI) of 33.0 to 33.9 in adult   • Vitamin D deficiency   • Hyperglycemia   • Essential hypertension   • Mixed dyslipidemia   • Low testosterone in male   • Statin intolerance   • Polycythemia   • Cervical radiculopathy   • Cervical spinal stenosis   • Cervicalgia   • ED (erectile dysfunction)   • Greater trochanteric bursitis of right hip   • High blood pressure   • High cholesterol   • Lumbago   • Paresthesia   • Urinary bladder incontinence   • Elevated PSA   • Hypogonadism in male   • Syncope and collapse   • Benign prostatic hyperplasia with urinary frequency         Current Outpatient Medications:   •  ascorbic acid (VITAMIN C) 1000 MG tablet, Take 1,000 mg by mouth Daily., Disp: , Rfl:   •  aspirin 81 MG EC tablet, Take 81 mg by mouth Daily., Disp: , Rfl:   •  Coenzyme Q10 (CoQ10) 100 MG capsule, , Disp: , Rfl:   •  diclofenac (VOLTAREN) 75 MG EC tablet, Take 75 mg by mouth 2 (Two) Times a Day As Needed., Disp: , Rfl:   •  enalapril (VASOTEC) 20 MG tablet, Take 40 mg by mouth Daily., Disp: , Rfl:   •  metaxalone (SKELAXIN) 800 MG tablet, Take 800 mg by mouth Daily As Needed., Disp: , Rfl:   •  Testosterone Cypionate (DEPOTESTOTERONE CYPIONATE) 200 MG/ML injection, Inject 0.25 mL subcu every 5 days, Disp: 10 mL, Rfl: 0  •  VASCEPA 1 g capsule capsule, Take 2 g by mouth 2 (Two) Times a Day With Meals., Disp: 360 capsule, Rfl: 3  •  verapamil ER (VERELAN) 360 MG 24 hr capsule, Take 360 mg by mouth Every Night., Disp: , Rfl:     Allergies   Allergen Reactions   • Beta Adrenergic Blockers Unknown - High Severity   • Crestor [Rosuvastatin Calcium] Myalgia   • Keflex [Cephalexin] Other (See Comments)     hiccups   • Livalo [Pitavastatin] Myalgia   • Losartan Other (See Comments)     Intolerance due to fatigue, depression   • Naproxen Other (See Comments)     Chest pain        reports that he has never smoked. He has never used smokeless tobacco.      Objective:   Physical exam:  /78   Pulse 63   Ht  "180.3 cm (71\")   Wt 114 kg (252 lb)   BMI 35.15 kg/m²   CONSTITUTIONAL: No acute distress  RESPIRATORY: Normal effort. Clear to auscultation bilaterally without wheezing or rales  CARDIOVASCULAR: Carotids with normal upstrokes without bruits.  Regular rate and rhythm with normal S1 and S2. Without murmur, gallop or rub. Normal radial pulse. There is no lower extremity edema bilaterally.    Labs:    BUN   Date Value Ref Range Status   03/16/2022 11 8 - 23 mg/dL Final     Creatinine   Date Value Ref Range Status   03/16/2022 1.27 0.76 - 1.27 mg/dL Final     Potassium   Date Value Ref Range Status   03/16/2022 4.3 3.5 - 5.2 mmol/L Final     ALT (SGPT)   Date Value Ref Range Status   03/16/2022 40 1 - 41 U/L Final     AST (SGOT)   Date Value Ref Range Status   03/16/2022 30 1 - 40 U/L Final       No results found for: CHOL  Lab Results   Component Value Date    TRIG 238 (H) 08/05/2020     Lab Results   Component Value Date    HDL 37 (L) 01/31/2020     Lab Results   Component Value Date     (H) 01/31/2020     No components found for: LDLDIRECTC    Diagnostic Data:      ECG 12 Lead    Date/Time: 3/28/2022 9:43 AM  Performed by: Elodia Knight APRN  Authorized by: Elodia Knight APRN   Comparison: compared with previous ECG from 10/13/2021  Similar to previous ECG  Rhythm: sinus rhythm  Rate: normal  BPM: 63  Comments:  ms,  ms            Results for orders placed during the hospital encounter of 10/12/21    Adult Transthoracic Echo Complete W/ Cont if Necessary Per Protocol    Interpretation Summary  · Calculated left ventricular EF = 57.8% Estimated left ventricular EF was in agreement with the calculated left ventricular EF. Left ventricular systolic function is normal.  · Left ventricular diastolic function was normal.  · Moderately calcified aortic valve noted with restricted mobility of non-coronary cusp. Doppler findings suggestive of presence of mild aortic valve stenosis.Aortic valve " area is 1.8 cm2..Aortic valve mean pressure gradient is 10 mmHg. Aortic valve dimensionless index is 0.5 .    [facility] 10/2021  1. Normal coronary arteries    Cardiac event monitor 10/2021  · A normal monitor study.    Assessment and Plan:   Diagnoses and all orders for this visit:    Aortic valve stenosis, etiology of cardiac valve disease unspecified (Primary)  -Mild aortic stenosis per TTE 10/2021.  -Continue to monitor clinically for now.    Essential hypertension  -At goal, continue enalapril and verapamil.    Hyperlipemia, mixed  -LDL was significantly elevated in 12/2021.  -Patient has been intolerant to multiple statins in the past due to myalgias.  He also notes he trialed Repatha and also had myalgias with that.  -Continue Vascepa  -Encourage continued dietary and lifestyle modifications.      - Return in about 6 months (around 9/28/2022) for Next scheduled follow up with Dr. Duncan.    Electronically signed by HAN Blancas, 03/28/22, 9:46 AM EDT.

## 2022-03-30 ENCOUNTER — LAB (OUTPATIENT)
Dept: LAB | Facility: HOSPITAL | Age: 67
End: 2022-03-30

## 2022-03-30 DIAGNOSIS — N40.1 BENIGN PROSTATIC HYPERPLASIA WITH URINARY FREQUENCY: ICD-10-CM

## 2022-03-30 DIAGNOSIS — R35.0 BENIGN PROSTATIC HYPERPLASIA WITH URINARY FREQUENCY: ICD-10-CM

## 2022-03-30 DIAGNOSIS — E29.1 HYPOGONADISM IN MALE: ICD-10-CM

## 2022-03-30 LAB
BASOPHILS # BLD AUTO: 0.08 10*3/MM3 (ref 0–0.2)
BASOPHILS NFR BLD AUTO: 0.6 % (ref 0–1.5)
DEPRECATED RDW RBC AUTO: 41.6 FL (ref 37–54)
EOSINOPHIL # BLD AUTO: 0.09 10*3/MM3 (ref 0–0.4)
EOSINOPHIL NFR BLD AUTO: 0.7 % (ref 0.3–6.2)
ERYTHROCYTE [DISTWIDTH] IN BLOOD BY AUTOMATED COUNT: 13.4 % (ref 12.3–15.4)
HCT VFR BLD AUTO: 45.6 % (ref 37.5–51)
HGB BLD-MCNC: 15.3 G/DL (ref 13–17.7)
IMM GRANULOCYTES # BLD AUTO: 0.23 10*3/MM3 (ref 0–0.05)
IMM GRANULOCYTES NFR BLD AUTO: 1.9 % (ref 0–0.5)
LYMPHOCYTES # BLD AUTO: 2.5 10*3/MM3 (ref 0.7–3.1)
LYMPHOCYTES NFR BLD AUTO: 20.1 % (ref 19.6–45.3)
MCH RBC QN AUTO: 29 PG (ref 26.6–33)
MCHC RBC AUTO-ENTMCNC: 33.6 G/DL (ref 31.5–35.7)
MCV RBC AUTO: 86.4 FL (ref 79–97)
MONOCYTES # BLD AUTO: 2.07 10*3/MM3 (ref 0.1–0.9)
MONOCYTES NFR BLD AUTO: 16.7 % (ref 5–12)
NEUTROPHILS NFR BLD AUTO: 60 % (ref 42.7–76)
NEUTROPHILS NFR BLD AUTO: 7.44 10*3/MM3 (ref 1.7–7)
NRBC BLD AUTO-RTO: 0.1 /100 WBC (ref 0–0.2)
PLATELET # BLD AUTO: 297 10*3/MM3 (ref 140–450)
PMV BLD AUTO: 10.4 FL (ref 6–12)
RBC # BLD AUTO: 5.28 10*6/MM3 (ref 4.14–5.8)
WBC NRBC COR # BLD: 12.41 10*3/MM3 (ref 3.4–10.8)

## 2022-03-30 PROCEDURE — 36415 COLL VENOUS BLD VENIPUNCTURE: CPT

## 2022-03-30 PROCEDURE — 85025 COMPLETE CBC W/AUTO DIFF WBC: CPT

## 2022-05-06 DIAGNOSIS — E29.1 HYPOGONADISM IN MALE: ICD-10-CM

## 2022-06-15 DIAGNOSIS — E29.1 HYPOGONADISM IN MALE: ICD-10-CM

## 2022-06-20 RX ORDER — TESTOSTERONE CYPIONATE 200 MG/ML
INJECTION, SOLUTION INTRAMUSCULAR
Qty: 10 ML | Refills: 0 | Status: SHIPPED | OUTPATIENT
Start: 2022-06-20 | End: 2022-09-26 | Stop reason: SDUPTHER

## 2022-09-22 ENCOUNTER — TELEPHONE (OUTPATIENT)
Dept: UROLOGY | Facility: CLINIC | Age: 67
End: 2022-09-22

## 2022-09-22 NOTE — TELEPHONE ENCOUNTER
Called patients wife to see if he plans to do labs prior to appt Monday. She said they are going tomorrow.

## 2022-09-23 ENCOUNTER — LAB (OUTPATIENT)
Dept: LAB | Facility: HOSPITAL | Age: 67
End: 2022-09-23

## 2022-09-23 ENCOUNTER — OFFICE VISIT (OUTPATIENT)
Dept: OTOLARYNGOLOGY | Facility: CLINIC | Age: 67
End: 2022-09-23

## 2022-09-23 VITALS — WEIGHT: 242.4 LBS | HEIGHT: 71 IN | BODY MASS INDEX: 33.94 KG/M2 | TEMPERATURE: 98 F

## 2022-09-23 DIAGNOSIS — R35.0 BENIGN PROSTATIC HYPERPLASIA WITH URINARY FREQUENCY: ICD-10-CM

## 2022-09-23 DIAGNOSIS — H69.81 ETD (EUSTACHIAN TUBE DYSFUNCTION), RIGHT: ICD-10-CM

## 2022-09-23 DIAGNOSIS — H65.21 CHRONIC SEROUS OTITIS MEDIA OF RIGHT EAR: Primary | ICD-10-CM

## 2022-09-23 DIAGNOSIS — N40.1 BENIGN PROSTATIC HYPERPLASIA WITH URINARY FREQUENCY: ICD-10-CM

## 2022-09-23 DIAGNOSIS — E29.1 HYPOGONADISM IN MALE: ICD-10-CM

## 2022-09-23 LAB
ALBUMIN SERPL-MCNC: 4.1 G/DL (ref 3.5–5.2)
ALBUMIN/GLOB SERPL: 1.6 G/DL
ALP SERPL-CCNC: 70 U/L (ref 39–117)
ALT SERPL W P-5'-P-CCNC: 29 U/L (ref 1–41)
ANION GAP SERPL CALCULATED.3IONS-SCNC: 9.2 MMOL/L (ref 5–15)
AST SERPL-CCNC: 29 U/L (ref 1–40)
BASOPHILS # BLD AUTO: 0.05 10*3/MM3 (ref 0–0.2)
BASOPHILS NFR BLD AUTO: 0.6 % (ref 0–1.5)
BILIRUB SERPL-MCNC: 0.4 MG/DL (ref 0–1.2)
BUN SERPL-MCNC: 11 MG/DL (ref 8–23)
BUN/CREAT SERPL: 9.6 (ref 7–25)
CALCIUM SPEC-SCNC: 8.9 MG/DL (ref 8.6–10.5)
CHLORIDE SERPL-SCNC: 103 MMOL/L (ref 98–107)
CO2 SERPL-SCNC: 25.8 MMOL/L (ref 22–29)
CREAT SERPL-MCNC: 1.15 MG/DL (ref 0.76–1.27)
DEPRECATED RDW RBC AUTO: 43.9 FL (ref 37–54)
EGFRCR SERPLBLD CKD-EPI 2021: 70.6 ML/MIN/1.73
EOSINOPHIL # BLD AUTO: 0.06 10*3/MM3 (ref 0–0.4)
EOSINOPHIL NFR BLD AUTO: 0.8 % (ref 0.3–6.2)
ERYTHROCYTE [DISTWIDTH] IN BLOOD BY AUTOMATED COUNT: 15.9 % (ref 12.3–15.4)
GLOBULIN UR ELPH-MCNC: 2.5 GM/DL
GLUCOSE SERPL-MCNC: 87 MG/DL (ref 65–99)
HCT VFR BLD AUTO: 49 % (ref 37.5–51)
HGB BLD-MCNC: 16.7 G/DL (ref 13–17.7)
IMM GRANULOCYTES # BLD AUTO: 0.08 10*3/MM3 (ref 0–0.05)
IMM GRANULOCYTES NFR BLD AUTO: 1 % (ref 0–0.5)
LYMPHOCYTES # BLD AUTO: 2.31 10*3/MM3 (ref 0.7–3.1)
LYMPHOCYTES NFR BLD AUTO: 29.8 % (ref 19.6–45.3)
MCH RBC QN AUTO: 27.1 PG (ref 26.6–33)
MCHC RBC AUTO-ENTMCNC: 34.1 G/DL (ref 31.5–35.7)
MCV RBC AUTO: 79.5 FL (ref 79–97)
MONOCYTES # BLD AUTO: 1.39 10*3/MM3 (ref 0.1–0.9)
MONOCYTES NFR BLD AUTO: 17.9 % (ref 5–12)
NEUTROPHILS NFR BLD AUTO: 3.87 10*3/MM3 (ref 1.7–7)
NEUTROPHILS NFR BLD AUTO: 49.9 % (ref 42.7–76)
NRBC BLD AUTO-RTO: 0 /100 WBC (ref 0–0.2)
PLATELET # BLD AUTO: 298 10*3/MM3 (ref 140–450)
PMV BLD AUTO: 10.5 FL (ref 6–12)
POTASSIUM SERPL-SCNC: 4 MMOL/L (ref 3.5–5.2)
PROT SERPL-MCNC: 6.6 G/DL (ref 6–8.5)
PSA SERPL-MCNC: 6.43 NG/ML (ref 0–4)
RBC # BLD AUTO: 6.16 10*6/MM3 (ref 4.14–5.8)
SODIUM SERPL-SCNC: 138 MMOL/L (ref 136–145)
TESTOST SERPL-MCNC: 469 NG/DL (ref 193–740)
WBC NRBC COR # BLD: 7.76 10*3/MM3 (ref 3.4–10.8)

## 2022-09-23 PROCEDURE — 84403 ASSAY OF TOTAL TESTOSTERONE: CPT

## 2022-09-23 PROCEDURE — 85025 COMPLETE CBC W/AUTO DIFF WBC: CPT

## 2022-09-23 PROCEDURE — 69433 CREATE EARDRUM OPENING: CPT | Performed by: OTOLARYNGOLOGY

## 2022-09-23 PROCEDURE — 99212 OFFICE O/P EST SF 10 MIN: CPT | Performed by: OTOLARYNGOLOGY

## 2022-09-23 PROCEDURE — 80053 COMPREHEN METABOLIC PANEL: CPT

## 2022-09-23 PROCEDURE — 84153 ASSAY OF PSA TOTAL: CPT

## 2022-09-23 PROCEDURE — 36415 COLL VENOUS BLD VENIPUNCTURE: CPT

## 2022-09-23 RX ORDER — OFLOXACIN 3 MG/ML
4 SOLUTION/ DROPS OPHTHALMIC 2 TIMES DAILY
Qty: 10 ML | Refills: 2 | Status: SHIPPED | OUTPATIENT
Start: 2022-09-23 | End: 2022-09-30

## 2022-09-23 NOTE — PROGRESS NOTES
Patient Name: Jimmie Jamil   Visit Date: 09/23/2022   Patient ID: [id number]  Provider: Frank Solomon MD    Sex: male  Location[place] [facility]   YOB: 1955  Location Address: [address] [place] [address], [address] [place] [address],?[place]?[id number]    Primary Care Provider Nicko Fontaine APRN  Location Phone: [phone]    Referring Provider: No ref. provider found        Chief Complaint  No chief complaint on file.    History of Present Illness  Jimmie Jamil is a 67 y.o. male who returns today for follow-up of chronic serous otitis media and eustachian tube dysfunction.  He was originally seen on 11/19/2021 at which time he reported predominantly left-sided serous effusions over the last 10 to 15 years.  They typically occur after upper respiratory tract infection.  He has seen 2 separate ENTs in the past and has undergone multiple ear tube placements. CT scan of the head without contrast on 10/12/2021 revealed a partial right mastoid middle ear effusion.  The sinuses were unremarkable.  Examination that day revealed a slightly retracted right tympanic membrane with serous effusion and a left T-tube in place and patent.  After a thorough discussion he elected to pursue right myringotomy and tube placement which was performed in clinic.    He returns today for follow-up. He tells me that around 3 months ago his wife was concerned about a change in his hearing. He has since had covid which was associated with a few days of otorrhea. He tried using ototopical antibiotics without improvement.  He is not having any issues with otalgia, tinnitus, or vertigo.       Past Medical History:   Diagnosis Date   • Arthritis of back 2015   • Benign essential hypertension    • Bladder problem    • Cervical disc disorder 2018   • Cervical radiculopathy 03/16/2017   • Cervical spinal stenosis 03/16/2017   • Cervicalgia 03/16/2017   • Coronary artery disease     diastolic dysfunction   • ED (erectile  dysfunction)    • Greater trochanteric bursitis of right hip 07/09/2018   • High blood pressure    • Hyperlipidemia    • Hypertension    • Leg pain    • Leg swelling    • Low back pain    • Low back strain 2015   • Lumbago 03/16/2017   • Lumbosacral disc disease 2018   • Neck pain    • Paresthesia 03/16/2017    left hand/leg   • Periarthritis of shoulder 2021   • Polycythemia    • Right hip pain    • Rotator cuff syndrome 09/01/2021   • Urinary bladder incontinence 03/16/2017       Past Surgical History:   Procedure Laterality Date   • ANTERIOR CERVICAL FUSION  04/12/2017    C3-4   • CARDIAC CATHETERIZATION      everything okay clearance for neck surgery   • CARDIAC CATHETERIZATION N/A 10/13/2021    Procedure: Left Heart Cath;  Surgeon: Anahi Perales MD;  Location:  [facility];  Service: Cardiovascular;  Laterality: N/A;   • CARDIAC CATHETERIZATION N/A 10/13/2021    Procedure: Coronary angiography;  Surgeon: Anahi Perales MD;  Location:  [facility];  Service: Cardiovascular;  Laterality: N/A;   • CARDIAC CATHETERIZATION N/A 10/13/2021    Procedure: Left ventriculography;  Surgeon: Anahi Perales MD;  Location:  [facility];  Service: Cardiovascular;  Laterality: N/A;   • CERVICAL DISC SURGERY      C2-3 fusion   • CIRCUMCISION     • COLONOSCOPY     • CYSTOSCOPY  01/10/2014    Cysto ivan retrograde pyelogram urthral bx.   • LUMBAR EPIDURAL INJECTION      L2-S2  going to try ablasion in future   • NECK SURGERY  2018?   • PROSTATE BIOPSY  2018   • SHOULDER ARTHROSCOPY W/ LABRAL REPAIR Left    • SHOULDER SURGERY  2008   • TRIGGER POINT INJECTION  2021    L5-S1   • VASECTOMY     • WISDOM TOOTH EXTRACTION           Current Outpatient Medications:   •  ascorbic acid (VITAMIN C) 1000 MG tablet, Take 1,000 mg by mouth Daily., Disp: , Rfl:   •  aspirin 81 MG EC tablet, Take 81 mg by mouth Daily., Disp: , Rfl:   •  Coenzyme Q10 (CoQ10) 100 MG capsule, , Disp: , Rfl:    •  diclofenac (VOLTAREN) 75 MG EC tablet, Take 75 mg by mouth 2 (Two) Times a Day As Needed., Disp: , Rfl:   •  enalapril (VASOTEC) 20 MG tablet, Take 40 mg by mouth Daily., Disp: , Rfl:   •  metaxalone (SKELAXIN) 800 MG tablet, Take 800 mg by mouth Daily As Needed., Disp: , Rfl:   •  Testosterone Cypionate (DEPOTESTOTERONE CYPIONATE) 200 MG/ML injection, INJECT 0.25ML UNDER THE SKIN Q 5 DAYS AS DIRECTED, Disp: 10 mL, Rfl: 0  •  VASCEPA 1 g capsule capsule, Take 2 g by mouth 2 (Two) Times a Day With Meals., Disp: 360 capsule, Rfl: 3  •  verapamil ER (VERELAN) 360 MG 24 hr capsule, Take 360 mg by mouth Every Night., Disp: , Rfl:      Allergies   Allergen Reactions   • Beta Adrenergic Blockers Unknown - High Severity   • Crestor [Rosuvastatin Calcium] Myalgia   • Keflex [Cephalexin] Other (See Comments)     hiccups   • Livalo [Pitavastatin] Myalgia   • Losartan Other (See Comments)     Intolerance due to fatigue, depression   • Naproxen Other (See Comments)     Chest pain       Social History     Tobacco Use   • Smoking status: Never Smoker   • Smokeless tobacco: Never Used   Vaping Use   • Vaping Use: Never used   Substance Use Topics   • Alcohol use: Yes     Alcohol/week: 4.0 standard drinks     Types: 4 Cans of beer per week     Comment: rare   • Drug use: Never        Objective     Vital Signs:   There were no vitals taken for this visit.      Physical Exam    General: Well developed, well nourished patient of stated age in no acute distress. Voice is strong and clear.   Head: Normocephalic and atraumatic.  Face: No lesions.  Bilateral parotid and submandibular glands are unremarkable.  Stensen's and Warthin's ducts are productive of clear saliva bilaterally.  House-Brackmann I/VI     bilaterally.   muscles and temporomandibular joint nontender to palpation.  No TMJ crepitus.  Eyes: PERRLA, sclerae anicteric, no conjunctival injection. Extra ocular movements are intact and full. No nystagmus.   Ears:  Auricles are normal in appearance. Bilateral external auditory canals are unremarkable.  Right tympanic membrane is slightly retracted with a serous effusion.  Left tympanic membrane with T-tube in place and patent.  Hearing normal to conversational voice.   Nose: External nose is normal in appearance. Bilateral nares are patent with normal appearing mucosa. Septum midline. Turbinates are unremarkable. No lesions.   Oral Cavity: Lips are normal in appearance. Oral mucosa is unremarkable. Gingiva is unremarkable. Normal dentition for age. Tongue is unremarkable with good movement. Hard palate is unremarkable.   Oropharynx: Soft palate is unremarkable with full movement. Uvula is unremarkable. Bilateral tonsils are unremarkable. Posterior oropharynx is unremarkable.    Larynx and hypopharynx: Deferred secondary to gag reflex.  Neck: Supple.  No mass.  Nontender to palpation.  Trachea midline. Thyroid normal size and without nodules to palpation.   Lymphatic: No lymphadenopathy upon palpation.  Respiratory: Clear to auscultation bilaterally, nonlabored respirations    Cardiovascular: RRR, no murmurs, rubs, or gallops,   Psychiatric: Appropriate affect, cooperative   Neurologic: Oriented x 3, strength symmetric in all extremities, Cranial Nerves II-XII are grossly intact to confrontation   Skin: Warm and dry. No rashes.    Procedures     Right myringotomy and tube placement:    Indications: Chronic right serous otitis media.    The patient was brought back to the microscope room and placed reclined in the chair. The microscope was moved into position to view the patient's right ear revealing a normal-appearing external auditory canal and tympanic membrane with serous effusion. Phenol was used to anesthetize the tympanic membrane and a radial myringotomy performed in the anterior-inferior quadrant. A serous effusion was suctioned and a modified Leavitt T-tube was placed without difficulty. The patient tolerated the  procedure well.      Result Review :               Assessment and Plan    There are no diagnoses linked to this encounter.Impressions and findings were discussed.  Currently, he returns today for follow-up of chronic serous otitis media.  He feels as though his hearing has changed over the last few months and on examination today the right tube has extruded and the effusion returned.  We again discussed the pathophysiology and natural history of this condition as well as options for management.  Options for management including continued medical management versus myringotomy and tube placement on the right was discussed. The risks, benefits, and alternatives were discussed. The risks include persistent drainage from the tubes occasionally requiring removal, blockage of the tubes by drainage, early displacement of the tubes, and tympanic membrane perforation.  After a thorough discussion he elected to pursue right myringotomy with T-tube placement.  This was successfully performed today in clinic.  He will call in a month if he does not feel like he is hearing better so I may arrange an audiogram.  Otherwise, he will follow up in 1 year or sooner if needed.      Follow Up   No follow-ups on file.  Patient was given instructions and counseling regarding his condition or for health maintenance advice. Please see specific information pulled into the AVS if appropriate.

## 2022-09-24 PROBLEM — N40.0 BENIGN PROSTATIC HYPERPLASIA: Status: ACTIVE | Noted: 2022-09-24

## 2022-09-24 NOTE — PROGRESS NOTES
Chief Complaint    Urologic complaint    Subjective          Jimmie Jamli presents to [facility]  History of Present Illness        67-year-old  gentleman     Hypogonadism   BPH - 2/15/2022  Rezum   ED      Voiding okay.  Nocturia x2, Rezum did improve things.  Doing okay currently.  No prostate meds      Patient has been doing well, no trouble with fatigue libido.      2/15/2022  Rezum - 4 cm prostate      History of increased H/H -has been doing well for a while    doing phlebotomy/donating blood about every 6 months.    doing 0.25 mL Depo testosterone subcu every 5 days in 1/21.  Gets a 10 mL vial about every 5 months.      Has used Cialis 20 mg in the past, is having no trouble currently.      Previous    Testosterone has run high in the past.  We have decreased his dose from 0.5 x 1 point.    Endocrinologist that he saw earlier that  had recommended therapeutic phlebotomy has retired.    Flomax  - could not tolerate, helped urination.  Nasal stuffiness, unusual feeling    Was on 0.5 mL IM Depo-Testosterone every 9 days before.     Has been on injections for several years    Sildenafil could not tolerate secondary to  side effects    No FH of prostate CA      No cardiopulmonary history.  Patient does not smoke.  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  Removed.  Normal bladder otherwise and normal retrogrades  Pathology negative    started initially for decreased libido and fatigue.  Did try AndroGel and patches in the past and did not like these b/c of side effects.    9/21 creatinine 1.4, GFR 51    Total testosterone    9/22    469  3/22   587  9/21    596  12/30/20  159  12/20  824  8/20    668  2/20    806  10/19  995  11/19 683  8/19   251  7/19   928  6/19   504 - every 10 days  3/19   826  12/18  713  7/18  1069  7/18  933      6/17 563  6/16 246  2/16 >1500 -  dose was cut in  half      PSA       6.4     4.7   MRI prostate -49 g -negative, chronic prostatitis    4.92   5.46    4.28  3/19  4.0   prostate biopsy 43 g - negative    6.75    2.54          Past History:  Medical History: has a past medical history of Arthritis of back (), Benign essential hypertension, Bladder problem, Cervical disc disorder (), Cervical radiculopathy (2017), Cervical spinal stenosis (2017), Cervicalgia (2017), Coronary artery disease, ED (erectile dysfunction), Greater trochanteric bursitis of right hip (2018), High blood pressure, Hyperlipidemia, Hypertension, Leg pain, Leg swelling, Low back pain, Low back strain (), Lumbago (2017), Lumbosacral disc disease (), Neck pain, Paresthesia (2017), Periarthritis of shoulder (), Polycythemia, Right hip pain, Rotator cuff syndrome (2021), and Urinary bladder incontinence (2017).   Surgical History: has a past surgical history that includes Cardiac catheterization; Cervical disc surgery; Lumbar epidural injection; Vasectomy; Circumcision, non-; Shoulder arthroscopy w/ labral repair (Left); Colonoscopy; [place] tooth extraction; Anterior Cervical Fusion (2017); Cystoscopy (01/10/2014); Prostate biopsy (); Cardiac catheterization (N/A, 10/13/2021); Cardiac catheterization (N/A, 10/13/2021); Cardiac catheterization (N/A, 10/13/2021); Neck surgery (2018?); Shoulder surgery (); and Trigger point injection ().   Family History: family history includes Alzheimer's disease (age of onset: 80) in his mother; Arthritis in his father and mother; Cancer in his father; Colon cancer (age of onset: 85) in his father; Heart disease in his father; Hypertension (age of onset: 80) in his father; Stroke in his father.   Social History: reports that he has never smoked. He has never used smokeless tobacco. He reports current alcohol use of about 4.0 standard  drinks of alcohol per week. He reports that he does not use drugs.  Allergies: Beta adrenergic blockers, Crestor [rosuvastatin calcium], Keflex [cephalexin], Livalo [pitavastatin], Losartan, and Naproxen       Current Outpatient Medications:   •  ascorbic acid (VITAMIN C) 1000 MG tablet, Take 1,000 mg by mouth Daily., Disp: , Rfl:   •  aspirin 81 MG EC tablet, Take 81 mg by mouth Daily., Disp: , Rfl:   •  Coenzyme Q10 (CoQ10) 100 MG capsule, , Disp: , Rfl:   •  diclofenac (VOLTAREN) 75 MG EC tablet, Take 75 mg by mouth 2 (Two) Times a Day As Needed., Disp: , Rfl:   •  enalapril (VASOTEC) 20 MG tablet, Take 40 mg by mouth Daily., Disp: , Rfl:   •  metaxalone (SKELAXIN) 800 MG tablet, Take 800 mg by mouth Daily As Needed., Disp: , Rfl:   •  ofloxacin (Ocuflox) 0.3 % ophthalmic solution, Administer 4 drops into ear(s) as directed by provider 2 (Two) Times a Day for 7 days., Disp: 10 mL, Rfl: 2  •  Testosterone Cypionate (DEPOTESTOTERONE CYPIONATE) 200 MG/ML injection, INJECT 0.25ML UNDER THE SKIN Q 5 DAYS AS DIRECTED, Disp: 10 mL, Rfl: 0  •  VASCEPA 1 g capsule capsule, Take 2 g by mouth 2 (Two) Times a Day With Meals., Disp: 360 capsule, Rfl: 3  •  verapamil ER (VERELAN) 360 MG 24 hr capsule, Take 360 mg by mouth Every Night., Disp: , Rfl:      Physical exam       Alert and orient x3  Well appearing, well developed, in no acute distress   Unlabored respirations      Grossly oriented to person, place and time, judgment is intact, normal mood and affect         Objective     Vital Signs:   There were no vitals taken for this visit.             Assessment and Plan    Diagnoses and all orders for this visit:    1. Benign prostatic hyperplasia, unspecified whether lower urinary tract symptoms present (Primary)    2. Hypogonadism in male    3. Elevated PSA      BPH    Doing ok after Rezum.      Hypogonadism      Continue 0.25 mL Depo testosterone subcu every 5 days.  Patient likes to use 1 - 10 mL vial and get refill after.   Refilled today     follow-up in 6 months with Tot testosterone, CBC, CMP        Elevated PSA     PSA increased, patient been worked up in years past and is always had labile PSA we will follow this conservatively I will check a PSA in 6 months

## 2022-09-26 ENCOUNTER — OFFICE VISIT (OUTPATIENT)
Dept: UROLOGY | Facility: CLINIC | Age: 67
End: 2022-09-26

## 2022-09-26 VITALS — WEIGHT: 241.2 LBS | RESPIRATION RATE: 16 BRPM | BODY MASS INDEX: 33.77 KG/M2 | HEIGHT: 71 IN

## 2022-09-26 DIAGNOSIS — R97.20 ELEVATED PSA: ICD-10-CM

## 2022-09-26 DIAGNOSIS — N40.0 BENIGN PROSTATIC HYPERPLASIA, UNSPECIFIED WHETHER LOWER URINARY TRACT SYMPTOMS PRESENT: Primary | ICD-10-CM

## 2022-09-26 DIAGNOSIS — E29.1 HYPOGONADISM IN MALE: ICD-10-CM

## 2022-09-26 LAB
BILIRUB BLD-MCNC: NEGATIVE MG/DL
CLARITY, POC: CLEAR
COLOR UR: YELLOW
EXPIRATION DATE: NORMAL
GLUCOSE UR STRIP-MCNC: NEGATIVE MG/DL
KETONES UR QL: NEGATIVE
LEUKOCYTE EST, POC: NEGATIVE
Lab: NORMAL
NITRITE UR-MCNC: NEGATIVE MG/ML
PH UR: 6 [PH] (ref 5–8)
PROT UR STRIP-MCNC: NEGATIVE MG/DL
RBC # UR STRIP: NEGATIVE /UL
SP GR UR: 1.01 (ref 1–1.03)
UROBILINOGEN UR QL: NORMAL

## 2022-09-26 PROCEDURE — 81003 URINALYSIS AUTO W/O SCOPE: CPT | Performed by: UROLOGY

## 2022-09-26 PROCEDURE — 99214 OFFICE O/P EST MOD 30 MIN: CPT | Performed by: UROLOGY

## 2022-09-26 RX ORDER — TESTOSTERONE CYPIONATE 200 MG/ML
INJECTION, SOLUTION INTRAMUSCULAR
Qty: 10 ML | Refills: 0 | Status: SHIPPED | OUTPATIENT
Start: 2022-09-26 | End: 2023-03-27 | Stop reason: SDUPTHER

## 2022-10-13 ENCOUNTER — TRANSCRIBE ORDERS (OUTPATIENT)
Dept: ADMINISTRATIVE | Facility: HOSPITAL | Age: 67
End: 2022-10-13

## 2022-10-13 DIAGNOSIS — M25.519 SHOULDER PAIN, UNSPECIFIED CHRONICITY, UNSPECIFIED LATERALITY: Primary | ICD-10-CM

## 2022-10-24 ENCOUNTER — OFFICE VISIT (OUTPATIENT)
Dept: CARDIOLOGY | Facility: CLINIC | Age: 67
End: 2022-10-24

## 2022-10-24 VITALS — BODY MASS INDEX: 33.74 KG/M2 | HEART RATE: 72 BPM | WEIGHT: 241 LBS | HEIGHT: 71 IN

## 2022-10-24 DIAGNOSIS — I35.0 AORTIC VALVE STENOSIS, ETIOLOGY OF CARDIAC VALVE DISEASE UNSPECIFIED: Primary | ICD-10-CM

## 2022-10-24 DIAGNOSIS — E66.9 CLASS 1 OBESITY WITHOUT SERIOUS COMORBIDITY WITH BODY MASS INDEX (BMI) OF 33.0 TO 33.9 IN ADULT, UNSPECIFIED OBESITY TYPE: ICD-10-CM

## 2022-10-24 DIAGNOSIS — I10 PRIMARY HYPERTENSION: ICD-10-CM

## 2022-10-24 DIAGNOSIS — R09.89 BRUIT OF LEFT CAROTID ARTERY: ICD-10-CM

## 2022-10-24 DIAGNOSIS — I10 ESSENTIAL HYPERTENSION: ICD-10-CM

## 2022-10-24 PROCEDURE — 99214 OFFICE O/P EST MOD 30 MIN: CPT | Performed by: INTERNAL MEDICINE

## 2022-10-24 PROCEDURE — 93000 ELECTROCARDIOGRAM COMPLETE: CPT | Performed by: INTERNAL MEDICINE

## 2022-11-04 ENCOUNTER — HOSPITAL ENCOUNTER (OUTPATIENT)
Dept: CARDIOLOGY | Facility: HOSPITAL | Age: 67
Discharge: HOME | End: 2022-11-04

## 2022-11-04 ENCOUNTER — HOSPITAL ENCOUNTER (OUTPATIENT)
Dept: MRI IMAGING | Facility: HOSPITAL | Age: 67
Discharge: HOME | End: 2022-11-04
Admitting: NURSE PRACTITIONER

## 2022-11-04 VITALS
WEIGHT: 241 LBS | SYSTOLIC BLOOD PRESSURE: 142 MMHG | DIASTOLIC BLOOD PRESSURE: 78 MMHG | HEART RATE: 76 BPM | HEIGHT: 70 IN | BODY MASS INDEX: 34.5 KG/M2

## 2022-11-04 DIAGNOSIS — M25.519 SHOULDER PAIN, UNSPECIFIED CHRONICITY, UNSPECIFIED LATERALITY: ICD-10-CM

## 2022-11-04 DIAGNOSIS — R09.89 BRUIT OF LEFT CAROTID ARTERY: ICD-10-CM

## 2022-11-04 DIAGNOSIS — I35.0 AORTIC VALVE STENOSIS, ETIOLOGY OF CARDIAC VALVE DISEASE UNSPECIFIED: ICD-10-CM

## 2022-11-04 LAB
AORTIC ARCH: 2.8 CM
AORTIC DIMENSIONLESS INDEX: 0.4 (DI)
ASCENDING AORTA: 3.3 CM
BH CV ECHO MEAS - ACS: 1.54 CM
BH CV ECHO MEAS - AI P1/2T: 750.3 MSEC
BH CV ECHO MEAS - AO MAX PG: 32.8 MMHG
BH CV ECHO MEAS - AO MEAN PG: 18.3 MMHG
BH CV ECHO MEAS - AO ROOT DIAM: 3.3 CM
BH CV ECHO MEAS - AO V2 MAX: 286.4 CM/SEC
BH CV ECHO MEAS - AO V2 VTI: 57.6 CM
BH CV ECHO MEAS - AVA(I,D): 1.57 CM2
BH CV ECHO MEAS - EDV(CUBED): 79.6 ML
BH CV ECHO MEAS - EDV(MOD-SP2): 103 ML
BH CV ECHO MEAS - EDV(MOD-SP4): 113 ML
BH CV ECHO MEAS - EF(MOD-BP): 63.2 %
BH CV ECHO MEAS - EF(MOD-SP2): 64.1 %
BH CV ECHO MEAS - EF(MOD-SP4): 61.9 %
BH CV ECHO MEAS - ESV(CUBED): 15.7 ML
BH CV ECHO MEAS - ESV(MOD-SP2): 37 ML
BH CV ECHO MEAS - ESV(MOD-SP4): 43 ML
BH CV ECHO MEAS - FS: 41.7 %
BH CV ECHO MEAS - IVS/LVPW: 1.06 CM
BH CV ECHO MEAS - IVSD: 1.4 CM
BH CV ECHO MEAS - LAT PEAK E' VEL: 7.7 CM/SEC
BH CV ECHO MEAS - LV DIASTOLIC VOL/BSA (35-75): 49.5 CM2
BH CV ECHO MEAS - LV MASS(C)D: 222.3 GRAMS
BH CV ECHO MEAS - LV MAX PG: 4.6 MMHG
BH CV ECHO MEAS - LV MEAN PG: 2.19 MMHG
BH CV ECHO MEAS - LV SYSTOLIC VOL/BSA (12-30): 18.8 CM2
BH CV ECHO MEAS - LV V1 MAX: 107.1 CM/SEC
BH CV ECHO MEAS - LV V1 VTI: 20.4 CM
BH CV ECHO MEAS - LVIDD: 4.3 CM
BH CV ECHO MEAS - LVIDS: 2.5 CM
BH CV ECHO MEAS - LVOT AREA: 4.4 CM2
BH CV ECHO MEAS - LVOT DIAM: 2.38 CM
BH CV ECHO MEAS - LVPWD: 1.32 CM
BH CV ECHO MEAS - MED PEAK E' VEL: 7.1 CM/SEC
BH CV ECHO MEAS - MR MAX PG: 22.1 MMHG
BH CV ECHO MEAS - MR MAX VEL: 234.8 CM/SEC
BH CV ECHO MEAS - MV A DUR: 0.1 SEC
BH CV ECHO MEAS - MV A MAX VEL: 94.3 CM/SEC
BH CV ECHO MEAS - MV DEC SLOPE: 208.1 CM/SEC2
BH CV ECHO MEAS - MV DEC TIME: 0.24 MSEC
BH CV ECHO MEAS - MV E MAX VEL: 80.1 CM/SEC
BH CV ECHO MEAS - MV E/A: 0.85
BH CV ECHO MEAS - MV MAX PG: 3.9 MMHG
BH CV ECHO MEAS - MV MEAN PG: 1.61 MMHG
BH CV ECHO MEAS - MV P1/2T: 137.5 MSEC
BH CV ECHO MEAS - MV V2 VTI: 37.9 CM
BH CV ECHO MEAS - MVA(P1/2T): 1.6 CM2
BH CV ECHO MEAS - MVA(VTI): 2.38 CM2
BH CV ECHO MEAS - PA ACC TIME: 0.12 SEC
BH CV ECHO MEAS - PA PR(ACCEL): 24.3 MMHG
BH CV ECHO MEAS - PA V2 MAX: 112.7 CM/SEC
BH CV ECHO MEAS - PULM A REVS DUR: 0.13 SEC
BH CV ECHO MEAS - PULM A REVS VEL: 30.4 CM/SEC
BH CV ECHO MEAS - PULM DIAS VEL: 40.7 CM/SEC
BH CV ECHO MEAS - PULM S/D: 1.19
BH CV ECHO MEAS - PULM SYS VEL: 48.4 CM/SEC
BH CV ECHO MEAS - QP/QS: 0.63
BH CV ECHO MEAS - RAP SYSTOLE: 3 MMHG
BH CV ECHO MEAS - RV MAX PG: 1.93 MMHG
BH CV ECHO MEAS - RV V1 MAX: 69.4 CM/SEC
BH CV ECHO MEAS - RV V1 VTI: 13.1 CM
BH CV ECHO MEAS - RVOT DIAM: 2.34 CM
BH CV ECHO MEAS - RVSP: 19 MMHG
BH CV ECHO MEAS - SI(MOD-SP2): 28.9 ML/M2
BH CV ECHO MEAS - SI(MOD-SP4): 30.7 ML/M2
BH CV ECHO MEAS - SUP REN AO DIAM: 2.4 CM
BH CV ECHO MEAS - SV(LVOT): 90.4 ML
BH CV ECHO MEAS - SV(MOD-SP2): 66 ML
BH CV ECHO MEAS - SV(MOD-SP4): 70 ML
BH CV ECHO MEAS - SV(RVOT): 56.5 ML
BH CV ECHO MEAS - TAPSE (>1.6): 2.33 CM
BH CV ECHO MEAS - TR MAX PG: 15.7 MMHG
BH CV ECHO MEAS - TR MAX VEL: 198.2 CM/SEC
BH CV ECHO MEASUREMENTS AVERAGE E/E' RATIO: 10.82
BH CV XLRA - RV BASE: 2.7 CM
BH CV XLRA - RV LENGTH: 8.3 CM
BH CV XLRA - RV MID: 2.7 CM
BH CV XLRA - TDI S': 15.3 CM/SEC
LEFT ATRIUM VOLUME INDEX: 31.3 ML/M2
MAXIMAL PREDICTED HEART RATE: 155 BPM
SINUS: 3.1 CM
STJ: 3.1 CM
STRESS TARGET HR: 132 BPM

## 2022-11-04 PROCEDURE — 93306 TTE W/DOPPLER COMPLETE: CPT

## 2022-11-04 PROCEDURE — 93880 EXTRACRANIAL BILAT STUDY: CPT

## 2022-11-04 PROCEDURE — 93880 EXTRACRANIAL BILAT STUDY: CPT | Performed by: INTERNAL MEDICINE

## 2022-11-04 PROCEDURE — 93306 TTE W/DOPPLER COMPLETE: CPT | Performed by: INTERNAL MEDICINE

## 2022-11-04 PROCEDURE — 73221 MRI JOINT UPR EXTREM W/O DYE: CPT

## 2022-11-07 ENCOUNTER — TELEPHONE (OUTPATIENT)
Dept: CARDIOLOGY | Facility: CLINIC | Age: 67
End: 2022-11-07

## 2022-11-07 LAB
BH CV XLRA MEAS LEFT DIST CCA EDV: -13.6 CM/SEC
BH CV XLRA MEAS LEFT DIST CCA PSV: -112.3 CM/SEC
BH CV XLRA MEAS LEFT DIST ICA EDV: -32.9 CM/SEC
BH CV XLRA MEAS LEFT DIST ICA PSV: -116.2 CM/SEC
BH CV XLRA MEAS LEFT ICA/CCA RATIO: 0.88
BH CV XLRA MEAS LEFT MID CCA EDV: -34.9 CM/SEC
BH CV XLRA MEAS LEFT MID CCA PSV: -158.8 CM/SEC
BH CV XLRA MEAS LEFT MID ICA EDV: -29.1 CM/SEC
BH CV XLRA MEAS LEFT MID ICA PSV: -87.2 CM/SEC
BH CV XLRA MEAS LEFT PROX CCA EDV: 23.5 CM/SEC
BH CV XLRA MEAS LEFT PROX CCA PSV: 123.8 CM/SEC
BH CV XLRA MEAS LEFT PROX ECA PSV: -127.8 CM/SEC
BH CV XLRA MEAS LEFT PROX ICA EDV: -21.3 CM/SEC
BH CV XLRA MEAS LEFT PROX ICA PSV: -98.8 CM/SEC
BH CV XLRA MEAS LEFT PROX SCLA PSV: 156 CM/SEC
BH CV XLRA MEAS LEFT VERTEBRAL A PSV: 47.2 CM/SEC
BH CV XLRA MEAS RIGHT DIST CCA EDV: 18.7 CM/SEC
BH CV XLRA MEAS RIGHT DIST CCA PSV: 80.7 CM/SEC
BH CV XLRA MEAS RIGHT DIST ICA EDV: -18 CM/SEC
BH CV XLRA MEAS RIGHT DIST ICA PSV: -69 CM/SEC
BH CV XLRA MEAS RIGHT ICA/CCA RATIO: -1.33
BH CV XLRA MEAS RIGHT MID CCA EDV: -24 CM/SEC
BH CV XLRA MEAS RIGHT MID CCA PSV: -108.7 CM/SEC
BH CV XLRA MEAS RIGHT MID ICA EDV: -19.6 CM/SEC
BH CV XLRA MEAS RIGHT MID ICA PSV: -96.4 CM/SEC
BH CV XLRA MEAS RIGHT PROX CCA EDV: -24 CM/SEC
BH CV XLRA MEAS RIGHT PROX CCA PSV: -121.4 CM/SEC
BH CV XLRA MEAS RIGHT PROX ECA PSV: 156 CM/SEC
BH CV XLRA MEAS RIGHT PROX ICA EDV: -20.4 CM/SEC
BH CV XLRA MEAS RIGHT PROX ICA PSV: -106.6 CM/SEC
BH CV XLRA MEAS RIGHT PROX SCLA PSV: 209.8 CM/SEC
BH CV XLRA MEAS RIGHT VERTEBRAL A PSV: -31.9 CM/SEC
MAXIMAL PREDICTED HEART RATE: 155 BPM
STRESS TARGET HR: 132 BPM

## 2022-11-07 NOTE — TELEPHONE ENCOUNTER
----- Message from HAN Hernandez sent at 11/7/2022 12:04 PM EST -----  Please let him know that the carotid ultrasound demonstrated plaquing without significant narrowing or stenosis.

## 2022-11-07 NOTE — TELEPHONE ENCOUNTER
Left voicemail for Jimmie Jamil requesting callback.    Thank you,  Yaneli Bonner RN  Triage Nurse G

## 2022-11-08 NOTE — TELEPHONE ENCOUNTER
Notified patient of results. Patient verbalized understanding.    Katty Jordan RN  Triage [facility] [place]

## 2022-11-09 ENCOUNTER — TELEPHONE (OUTPATIENT)
Dept: CARDIOLOGY | Facility: CLINIC | Age: 67
End: 2022-11-09

## 2022-11-09 DIAGNOSIS — I10 ESSENTIAL HYPERTENSION: Primary | ICD-10-CM

## 2022-11-09 DIAGNOSIS — Z51.81 THERAPEUTIC DRUG MONITORING: ICD-10-CM

## 2022-11-10 RX ORDER — LISINOPRIL 10 MG/1
10 TABLET ORAL DAILY
Qty: 90 TABLET | Refills: 3 | Status: SHIPPED | OUTPATIENT
Start: 2022-11-10 | End: 2022-12-30

## 2022-11-10 NOTE — TELEPHONE ENCOUNTER
Please let patient know that the echocardiogram shows his heart is strong aortic valve has a mild amount of trouble opening as before.  Is overall unchanged.  The heart muscle slightly thickened may be slightly worse than last time.  Please have him track his blood pressure closely and call if remains greater than 130/80 mmHg.  Please let him know that hypertension will lead to worsening thickening of the heart muscle, heart failure, stroke, renal failure and decreased lifespan.

## 2022-11-10 NOTE — TELEPHONE ENCOUNTER
"Reviewed results and recommendations with Jimmie Jamil.  Patient verbalized understanding of results and recommendations.    After appointment on 10/24, patient realized he had been taking incorrect dosage of verapamil.  Patient stated [facility] had filled verapamil 180 mg.  Patient hasresumed correct dosage of verapamil 360 mg, every night.  Patient stated his blood pressure did improve slightly after resuming correct dosage.    Patient reports he was told to try taking enalapril 10 mg in the morning in addition to enalapril 40 mg that he takes every night.  Patient reports that since adding the extra 10 mg of enalapril in the morning, his blood pressure has been in the \"upper 120's/60's\".      Patient is requesting a prescription of enalapril 10 mg daily if you would like him to continue taking medication in this way.  Confirmed patient pharmacy.    Please let me know how you would like to proceed.    Thank you,  Yaneli Bonner, RN  Triage Nurse NICKO  "

## 2022-11-10 NOTE — TELEPHONE ENCOUNTER
Order for the additional 10 mg sent.  Please have him check a BMP in 1 week with increase in lisinopril dose.

## 2022-11-11 NOTE — TELEPHONE ENCOUNTER
Pt has received the new prescription and [facility].. Please place an order for BMP.

## 2022-12-29 ENCOUNTER — TELEPHONE (OUTPATIENT)
Dept: CARDIOLOGY | Facility: CLINIC | Age: 67
End: 2022-12-29
Payer: MEDICARE

## 2022-12-29 ENCOUNTER — PATIENT MESSAGE (OUTPATIENT)
Dept: CARDIOLOGY | Facility: CLINIC | Age: 67
End: 2022-12-29

## 2022-12-29 DIAGNOSIS — I10 ESSENTIAL HYPERTENSION: Primary | ICD-10-CM

## 2022-12-29 DIAGNOSIS — Z51.81 THERAPEUTIC DRUG MONITORING: ICD-10-CM

## 2022-12-29 NOTE — TELEPHONE ENCOUNTER
Current Meds:   Lisinopril 10mg QD  Enalapril 40mg QPM    Pt has been check 1-2 hours after meds.    No SOA  No edema  No palpitations  No fatigue

## 2022-12-29 NOTE — TELEPHONE ENCOUNTER
Discontinue lisinopril, increase verapamil to 480 mg a day.  Continue enalapril 20 mg twice daily.  Call with additional readings by next week if blood pressure still elevated may need to add hydralazine potentially.  Verify he is exercising or trying to stay active and on a low-salt diet.  Check a BMP in 1 week and this will help me to determine what medications will need to be his next of the increase in verapamil does not help.

## 2022-12-29 NOTE — TELEPHONE ENCOUNTER
----- Message from Jimmie Jamil sent at 12/29/2022  1:00 PM EST -----  Regarding: Enalapril / B/P med  Contact: [phone]  RE: last visit. I have increased my Enalapril to 50 mg / day, divided doses. My B/P systolic is staying 130-140. Diastolic around 70.  Have started unstoppable hiccups last couple of days.  My question is -what next?  I cannot take beta blockers or hctz.  Thank you so much for your help  Jimmie

## 2022-12-30 RX ORDER — VERAPAMIL HYDROCHLORIDE 240 MG/1
480 CAPSULE, EXTENDED RELEASE ORAL NIGHTLY
Qty: 60 CAPSULE | Refills: 1 | Status: SHIPPED | OUTPATIENT
Start: 2022-12-30 | End: 2023-02-22 | Stop reason: SDUPTHER

## 2022-12-30 RX ORDER — ENALAPRIL MALEATE 20 MG/1
20 TABLET ORAL 2 TIMES DAILY
Qty: 180 TABLET | Refills: 1 | Status: SHIPPED | OUTPATIENT
Start: 2022-12-30

## 2023-01-06 NOTE — TELEPHONE ENCOUNTER
Spoke to pt, he states he was in [place] when he talked to you and was not able to increase his verapamil to the entire 480mg /day.  He is going to send a list of his BP readings in [facility], but he does say his BP remained elevated through the weekend (140's/80's).  He states he increased his verapamil to the total 480mg/day starting Tues, and his BP was 130's/80's yesterday, and today they were 127/68 and 124/66.  He plans on getting his labs drawn next week.

## 2023-01-11 ENCOUNTER — TELEPHONE (OUTPATIENT)
Dept: CARDIOLOGY | Facility: CLINIC | Age: 68
End: 2023-01-11
Payer: MEDICARE

## 2023-01-11 ENCOUNTER — LAB (OUTPATIENT)
Dept: LAB | Facility: HOSPITAL | Age: 68
End: 2023-01-11
Payer: MEDICARE

## 2023-01-11 DIAGNOSIS — Z51.81 THERAPEUTIC DRUG MONITORING: ICD-10-CM

## 2023-01-11 DIAGNOSIS — I10 ESSENTIAL HYPERTENSION: ICD-10-CM

## 2023-01-11 LAB
ANION GAP SERPL CALCULATED.3IONS-SCNC: 11.4 MMOL/L (ref 5–15)
BUN SERPL-MCNC: 15 MG/DL (ref 8–23)
BUN/CREAT SERPL: 11.9 (ref 7–25)
CALCIUM SPEC-SCNC: 9.3 MG/DL (ref 8.6–10.5)
CHLORIDE SERPL-SCNC: 101 MMOL/L (ref 98–107)
CO2 SERPL-SCNC: 25.6 MMOL/L (ref 22–29)
CREAT SERPL-MCNC: 1.26 MG/DL (ref 0.76–1.27)
EGFRCR SERPLBLD CKD-EPI 2021: 63.3 ML/MIN/1.73
GLUCOSE SERPL-MCNC: 66 MG/DL (ref 65–99)
POTASSIUM SERPL-SCNC: 4.4 MMOL/L (ref 3.5–5.2)
SODIUM SERPL-SCNC: 138 MMOL/L (ref 136–145)

## 2023-01-11 PROCEDURE — 36415 COLL VENOUS BLD VENIPUNCTURE: CPT

## 2023-01-11 PROCEDURE — 80048 BASIC METABOLIC PNL TOTAL CA: CPT

## 2023-01-11 NOTE — TELEPHONE ENCOUNTER
----- Message from Jimmie Jamil sent at 1/11/2023 10:55 AM EST -----  Regarding: Enalapril / B/P med  Contact: [phone]  Attached are my blood pressures and heart rates. I’ve picked up my pace on exercising. I’m going to [facility] this afternoon to have my labs drawn. Thanks

## 2023-01-12 ENCOUNTER — TELEPHONE (OUTPATIENT)
Dept: CARDIOLOGY | Facility: CLINIC | Age: 68
End: 2023-01-12
Payer: MEDICARE

## 2023-01-12 NOTE — TELEPHONE ENCOUNTER
----- Message from Rene Stauffer RN sent at 1/12/2023  2:26 PM EST -----  VM X1    ----- Message -----  From: Mandy Chavez APRN  Sent: 1/12/2023   2:14 PM EST  To: Berkley Hernandez MA    Please let the patient know his labs are stable

## 2023-01-13 NOTE — TELEPHONE ENCOUNTER
Him know that blood work looks fine from yesterday.  Blood pressure seems to be slowly improving give this another week or 2 with the same regimen and see if it continues to fall below 130/80.  If not we will need to adjust meds further

## 2023-01-13 NOTE — TELEPHONE ENCOUNTER
I spoke with Jimmie Jamil and updated pt on results from provider.  Pt verbalized understanding and has no further questions at this time.    Thank you,    Randee Pham, RN  Triage [facility]  01/13/23 09:47 EST

## 2023-01-17 NOTE — TELEPHONE ENCOUNTER
See other message.  Labs are normal on 1/11/2023.  Please verify blood pressure is staying below 130/80 mmHg

## 2023-01-19 NOTE — TELEPHONE ENCOUNTER
I spoke with Jimmie Jamil and updated pt on results from provider.  Pt verbalized understanding and has no further questions at this time.    He reports that his BP has been staying below 130/80 and is feeling well.    Thank you,    Randee Pham, WINSOME  Triage [facility]  01/19/23 11:05 EST

## 2023-02-22 RX ORDER — VERAPAMIL HYDROCHLORIDE 240 MG/1
480 CAPSULE, EXTENDED RELEASE ORAL NIGHTLY
Qty: 180 CAPSULE | Refills: 1 | Status: SHIPPED | OUTPATIENT
Start: 2023-02-22

## 2023-03-23 ENCOUNTER — TELEPHONE (OUTPATIENT)
Dept: UROLOGY | Facility: CLINIC | Age: 68
End: 2023-03-23
Payer: MEDICARE

## 2023-03-24 ENCOUNTER — LAB (OUTPATIENT)
Dept: LAB | Facility: HOSPITAL | Age: 68
End: 2023-03-24
Payer: MEDICARE

## 2023-03-24 DIAGNOSIS — R97.20 ELEVATED PSA: ICD-10-CM

## 2023-03-24 DIAGNOSIS — E29.1 HYPOGONADISM IN MALE: ICD-10-CM

## 2023-03-24 DIAGNOSIS — N40.0 BENIGN PROSTATIC HYPERPLASIA, UNSPECIFIED WHETHER LOWER URINARY TRACT SYMPTOMS PRESENT: ICD-10-CM

## 2023-03-24 LAB
ALBUMIN SERPL-MCNC: 4.3 G/DL (ref 3.5–5.2)
ALBUMIN/GLOB SERPL: 1.4 G/DL
ALP SERPL-CCNC: 79 U/L (ref 39–117)
ALT SERPL W P-5'-P-CCNC: 38 U/L (ref 1–41)
ANION GAP SERPL CALCULATED.3IONS-SCNC: 15.2 MMOL/L (ref 5–15)
AST SERPL-CCNC: 32 U/L (ref 1–40)
BASOPHILS # BLD AUTO: 0.07 10*3/MM3 (ref 0–0.2)
BASOPHILS NFR BLD AUTO: 0.8 % (ref 0–1.5)
BILIRUB SERPL-MCNC: 0.5 MG/DL (ref 0–1.2)
BUN SERPL-MCNC: 14 MG/DL (ref 8–23)
BUN/CREAT SERPL: 11.6 (ref 7–25)
CALCIUM SPEC-SCNC: 9.8 MG/DL (ref 8.6–10.5)
CHLORIDE SERPL-SCNC: 101 MMOL/L (ref 98–107)
CO2 SERPL-SCNC: 20.8 MMOL/L (ref 22–29)
CREAT SERPL-MCNC: 1.21 MG/DL (ref 0.76–1.27)
DEPRECATED RDW RBC AUTO: 41.8 FL (ref 37–54)
EGFRCR SERPLBLD CKD-EPI 2021: 66.4 ML/MIN/1.73
EOSINOPHIL # BLD AUTO: 0.07 10*3/MM3 (ref 0–0.4)
EOSINOPHIL NFR BLD AUTO: 0.8 % (ref 0.3–6.2)
ERYTHROCYTE [DISTWIDTH] IN BLOOD BY AUTOMATED COUNT: 14.7 % (ref 12.3–15.4)
GLOBULIN UR ELPH-MCNC: 3 GM/DL
GLUCOSE SERPL-MCNC: 90 MG/DL (ref 65–99)
HCT VFR BLD AUTO: 52.4 % (ref 37.5–51)
HGB BLD-MCNC: 18.1 G/DL (ref 13–17.7)
IMM GRANULOCYTES # BLD AUTO: 0.09 10*3/MM3 (ref 0–0.05)
IMM GRANULOCYTES NFR BLD AUTO: 1 % (ref 0–0.5)
LYMPHOCYTES # BLD AUTO: 2.36 10*3/MM3 (ref 0.7–3.1)
LYMPHOCYTES NFR BLD AUTO: 25.4 % (ref 19.6–45.3)
MCH RBC QN AUTO: 28.6 PG (ref 26.6–33)
MCHC RBC AUTO-ENTMCNC: 34.5 G/DL (ref 31.5–35.7)
MCV RBC AUTO: 82.9 FL (ref 79–97)
MONOCYTES # BLD AUTO: 1.32 10*3/MM3 (ref 0.1–0.9)
MONOCYTES NFR BLD AUTO: 14.2 % (ref 5–12)
NEUTROPHILS NFR BLD AUTO: 5.38 10*3/MM3 (ref 1.7–7)
NEUTROPHILS NFR BLD AUTO: 57.8 % (ref 42.7–76)
NRBC BLD AUTO-RTO: 0 /100 WBC (ref 0–0.2)
PLATELET # BLD AUTO: 271 10*3/MM3 (ref 140–450)
PMV BLD AUTO: 10.4 FL (ref 6–12)
POTASSIUM SERPL-SCNC: 4.1 MMOL/L (ref 3.5–5.2)
PROT SERPL-MCNC: 7.3 G/DL (ref 6–8.5)
PSA SERPL-MCNC: 5.65 NG/ML (ref 0–4)
RBC # BLD AUTO: 6.32 10*6/MM3 (ref 4.14–5.8)
SODIUM SERPL-SCNC: 137 MMOL/L (ref 136–145)
TESTOST SERPL-MCNC: 496 NG/DL (ref 193–740)
WBC NRBC COR # BLD: 9.29 10*3/MM3 (ref 3.4–10.8)

## 2023-03-24 PROCEDURE — 84403 ASSAY OF TOTAL TESTOSTERONE: CPT

## 2023-03-24 PROCEDURE — 36415 COLL VENOUS BLD VENIPUNCTURE: CPT

## 2023-03-24 PROCEDURE — 84153 ASSAY OF PSA TOTAL: CPT

## 2023-03-24 PROCEDURE — 85025 COMPLETE CBC W/AUTO DIFF WBC: CPT

## 2023-03-24 PROCEDURE — 80053 COMPREHEN METABOLIC PANEL: CPT

## 2023-03-24 NOTE — PROGRESS NOTES
Chief Complaint    Urologic complaint    Subjective          Jimmie Jamil presents to [facility]  History of Present Illness        67-year-old  gentleman     Hypogonadism   BPH - 2/15/2022  Rezum   ED      Voiding okay.  Nocturia x2 doing well currently.  No prostate meds      Patient has noticed since we decreased his dosing from every 4 days to every 5 days little bit decreased libido and worse fatigue.      doing 0.25 mL Depo testosterone subcu every 5 days in 1/21.  Gets a 10 mL vial about every 5 months.            Previous    Has used Cialis 20 mg in the past, is having no trouble currently.    doing phlebotomy/donating blood about every 6 months.    History of increased H/H -has been doing well for a while    2/15/2022  Rezum - 4 cm prostate    Testosterone has run high in the past.  We have decreased his dose from 0.5 x 1 point.    Endocrinologist that he saw earlier that  had recommended therapeutic phlebotomy has retired.    Flomax  - could not tolerate, helped urination.  Nasal stuffiness, unusual feeling    Was on 0.5 mL IM Depo-Testosterone every 9 days before.     Has been on injections for several years    Sildenafil could not tolerate secondary to  side effects    No FH of prostate CA      No cardiopulmonary history.  Patient does not smoke.  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  Removed.  Normal bladder otherwise and normal retrogrades  Pathology negative    started initially for decreased libido and fatigue.  Did try AndroGel and patches in the past and did not like these b/c of side effects.    9/21 creatinine 1.4, GFR 51    Total testosterone    3/23    496       9/22    469  3/22   587  9/21    596  12/30/20  159  12/20  824  8/20    668  2/20    806  10/19  995  11/19 683  8/19   251  7/19   928  6/19   504 - every 10 days  3/19   826  12/18  713  7/18  1069  7/18   933       563   246   >1500 -  dose was cut in half      PSA      3/23      5.6     6.4     4.7   MRI prostate -49 g -negative, chronic prostatitis    4.92   5.46    4.28  3/19  4.0   prostate biopsy 43 g - negative    6.75    2.54          Past History:  Medical History: has a past medical history of Arthritis of back (), Benign essential hypertension, Bladder problem, Cervical disc disorder (), Cervical radiculopathy (2017), Cervical spinal stenosis (2017), Cervicalgia (2017), Coronary artery disease, ED (erectile dysfunction), Greater trochanteric bursitis of right hip (2018), High blood pressure, Hyperlipidemia, Hypertension, Leg pain, Leg swelling, Low back pain, Low back strain (), Lumbago (2017), Lumbosacral disc disease (), Neck pain, Paresthesia (2017), Periarthritis of shoulder (), Polycythemia, Right hip pain, Rotator cuff syndrome (2021), and Urinary bladder incontinence (2017).   Surgical History: has a past surgical history that includes Cardiac catheterization; Cervical disc surgery; Lumbar epidural injection; Vasectomy; Circumcision, non-; Shoulder arthroscopy w/ labral repair (Left); Colonoscopy; [place] tooth extraction; Anterior Cervical Fusion (2017); Cystoscopy (01/10/2014); Prostate biopsy (); Cardiac catheterization (N/A, 10/13/2021); Cardiac catheterization (N/A, 10/13/2021); Cardiac catheterization (N/A, 10/13/2021); Neck surgery (?); Shoulder surgery (); and Trigger point injection ().   Family History: family history includes Alzheimer's disease (age of onset: 80) in his mother; Arthritis in his father and mother; Cancer in his father; Colon cancer (age of onset: 85) in his father; Heart disease in his father; Hypertension (age of onset: 80) in his father; Stroke in his father.   Social History: reports that he has never smoked. He has never used  smokeless tobacco. He reports current alcohol use of about 4.0 standard drinks per week. He reports that he does not use drugs.  Allergies: Beta adrenergic blockers, Crestor [rosuvastatin calcium], Keflex [cephalexin], Livalo [pitavastatin], Losartan, and Naproxen       Current Outpatient Medications:   •  ascorbic acid (VITAMIN C) 1000 MG tablet, Take 1,000 mg by mouth Daily., Disp: , Rfl:   •  aspirin 81 MG EC tablet, Take 81 mg by mouth Daily., Disp: , Rfl:   •  Coenzyme Q10 (CoQ10) 100 MG capsule, , Disp: , Rfl:   •  diclofenac (VOLTAREN) 75 MG EC tablet, Take 75 mg by mouth 2 (Two) Times a Day As Needed., Disp: , Rfl:   •  enalapril (VASOTEC) 20 MG tablet, Take 1 tablet by mouth 2 (Two) Times a Day., Disp: 180 tablet, Rfl: 1  •  metaxalone (SKELAXIN) 800 MG tablet, Take 800 mg by mouth Daily As Needed., Disp: , Rfl:   •  Testosterone Cypionate (DEPOTESTOTERONE CYPIONATE) 200 MG/ML injection, INJECT 0.25ML UNDER THE SKIN Q 5 DAYS AS DIRECTED, Disp: 10 mL, Rfl: 0  •  VASCEPA 1 g capsule capsule, Take 2 g by mouth 2 (Two) Times a Day With Meals., Disp: 360 capsule, Rfl: 3  •  verapamil ER (VERELAN) 240 MG 24 hr capsule, Take 2 capsules by mouth Every Night., Disp: 180 capsule, Rfl: 1         Objective     Vital Signs:   There were no vitals taken for this visit.             Assessment and Plan    Diagnoses and all orders for this visit:    1. Hypogonadism in male (Primary)    2. Elevated PSA        BPH    Doing ok.      Hypogonadism      Patient is feeling worse on this new dose, after discussion we are going to increase him to 0.26 mL Depo testosterone subcu every 5 days.     likes to use 1 - 10 mL vial and get refill after.  Refilled today    Because we are increasing his dose a small amount I will have him get total testosterone x2 over the next few months       follow-up in 6 months with Tot testosterone, CBC, CMP        Elevated PSA      PSA stable, will continue to follow conservatively

## 2023-03-27 ENCOUNTER — OFFICE VISIT (OUTPATIENT)
Dept: UROLOGY | Facility: CLINIC | Age: 68
End: 2023-03-27
Payer: MEDICARE

## 2023-03-27 VITALS — RESPIRATION RATE: 16 BRPM | WEIGHT: 254.8 LBS | HEIGHT: 70 IN | BODY MASS INDEX: 36.48 KG/M2

## 2023-03-27 DIAGNOSIS — E29.1 HYPOGONADISM IN MALE: Primary | ICD-10-CM

## 2023-03-27 DIAGNOSIS — R97.20 ELEVATED PSA: ICD-10-CM

## 2023-03-27 PROCEDURE — 1160F RVW MEDS BY RX/DR IN RCRD: CPT | Performed by: UROLOGY

## 2023-03-27 PROCEDURE — 99214 OFFICE O/P EST MOD 30 MIN: CPT | Performed by: UROLOGY

## 2023-03-27 PROCEDURE — 1159F MED LIST DOCD IN RCRD: CPT | Performed by: UROLOGY

## 2023-03-27 RX ORDER — TESTOSTERONE CYPIONATE 200 MG/ML
INJECTION, SOLUTION INTRAMUSCULAR
Qty: 10 ML | Refills: 0 | Status: SHIPPED | OUTPATIENT
Start: 2023-03-27

## 2023-04-19 ENCOUNTER — TELEPHONE (OUTPATIENT)
Dept: GASTROENTEROLOGY | Facility: CLINIC | Age: 68
End: 2023-04-19
Payer: MEDICARE

## 2023-04-19 NOTE — TELEPHONE ENCOUNTER
I left patient a voicemail to return call. Patient is scheduled for MA visit in October, but isn't due for colon until 02/22/2024. I want to confirm that pt is not symptomatic, and does not require an office visit. Recall placed. Awaiting a call back from patient.

## 2023-04-21 NOTE — TELEPHONE ENCOUNTER
Patient kristen Nichole returning call from [place]. Verified that patient is not symptomatic. Verified 10/9/23 appt for screening

## 2023-04-26 RX ORDER — ICOSAPENT ETHYL 1000 MG/1
2 CAPSULE ORAL 2 TIMES DAILY WITH MEALS
Qty: 360 CAPSULE | Refills: 3 | OUTPATIENT
Start: 2023-04-26

## 2023-05-02 ENCOUNTER — TELEPHONE (OUTPATIENT)
Dept: OTOLARYNGOLOGY | Facility: CLINIC | Age: 68
End: 2023-05-02

## 2023-05-02 NOTE — TELEPHONE ENCOUNTER
Caller: marie abreu    Relationship to patient: SPOUSE    Best call back number: [phone]    Patient is needing: PT WAS TOLD BY DR FONTANEZ THAT IF HIS HEARING GOT WORSE TO CALL AND SCHEDULE AN AUDIO APPT. PLEASE GIVE PT'S WIFE A CALL BACK TO SCHEDULE.    [facility] WHO ROUTED THEM BACK TO THE OFFICE

## 2023-05-31 ENCOUNTER — TELEPHONE (OUTPATIENT)
Dept: OTOLARYNGOLOGY | Facility: CLINIC | Age: 68
End: 2023-05-31

## 2023-05-31 NOTE — TELEPHONE ENCOUNTER
Provider: DR FONTANEZ  Caller: NIMA GARCIA  Relationship to Patient: SPOUSE    Phone Number: [phone]  Reason for Call: PT HAS A FOLLOW UP APPT WITH AUDIO SCHEDULED FOR 6-26-23. PT'S LEFT TUBE FELL OUT LAST NIGHT AND PT'S WIFE ASKING IF PT CAN BE SEEN SOONER SO A NEW TUBE CAN BE PLACED, OTHERWISE HIS AUDIO SCREENING MIGHT NOT BE AS ACCURATE. LAST TIME DR FONTANEZ PLACED A TUBE IN HIS RT EAR AND STATED THAT THEY NORMALLY HAVE TO BE ORDERED AHEAD OF TIME.    PLEASE CONTACT PT'S WIFE TO ADVISE.  When was the patient last seen: 9-23-22

## 2023-06-06 ENCOUNTER — LAB (OUTPATIENT)
Dept: LAB | Facility: HOSPITAL | Age: 68
End: 2023-06-06
Payer: MEDICARE

## 2023-06-06 DIAGNOSIS — E29.1 HYPOGONADISM IN MALE: ICD-10-CM

## 2023-06-06 DIAGNOSIS — R97.20 ELEVATED PSA: ICD-10-CM

## 2023-06-06 PROCEDURE — 84403 ASSAY OF TOTAL TESTOSTERONE: CPT

## 2023-06-06 PROCEDURE — 36415 COLL VENOUS BLD VENIPUNCTURE: CPT

## 2023-06-07 LAB — TESTOST SERPL-MCNC: 733 NG/DL (ref 193–740)

## 2023-08-10 ENCOUNTER — LAB (OUTPATIENT)
Dept: LAB | Facility: HOSPITAL | Age: 68
End: 2023-08-10
Payer: MEDICARE

## 2023-08-10 DIAGNOSIS — R97.20 ELEVATED PSA: ICD-10-CM

## 2023-08-10 DIAGNOSIS — I10 ESSENTIAL HYPERTENSION: ICD-10-CM

## 2023-08-10 DIAGNOSIS — Z51.81 THERAPEUTIC DRUG MONITORING: ICD-10-CM

## 2023-08-10 DIAGNOSIS — E29.1 HYPOGONADISM IN MALE: ICD-10-CM

## 2023-08-10 LAB
ALBUMIN SERPL-MCNC: 3.9 G/DL (ref 3.5–5.2)
ALBUMIN/GLOB SERPL: 1.6 G/DL
ALP SERPL-CCNC: 66 U/L (ref 39–117)
ALT SERPL W P-5'-P-CCNC: 31 U/L (ref 1–41)
ANION GAP SERPL CALCULATED.3IONS-SCNC: 10.2 MMOL/L (ref 5–15)
AST SERPL-CCNC: 30 U/L (ref 1–40)
BASOPHILS # BLD AUTO: 0.06 10*3/MM3 (ref 0–0.2)
BASOPHILS NFR BLD AUTO: 0.7 % (ref 0–1.5)
BILIRUB SERPL-MCNC: 0.4 MG/DL (ref 0–1.2)
BUN SERPL-MCNC: 17 MG/DL (ref 8–23)
BUN/CREAT SERPL: 12.3 (ref 7–25)
CALCIUM SPEC-SCNC: 9.5 MG/DL (ref 8.6–10.5)
CHLORIDE SERPL-SCNC: 102 MMOL/L (ref 98–107)
CO2 SERPL-SCNC: 23.8 MMOL/L (ref 22–29)
CREAT SERPL-MCNC: 1.38 MG/DL (ref 0.76–1.27)
DEPRECATED RDW RBC AUTO: 40.8 FL (ref 37–54)
EGFRCR SERPLBLD CKD-EPI 2021: 56.4 ML/MIN/1.73
EOSINOPHIL # BLD AUTO: 0.11 10*3/MM3 (ref 0–0.4)
EOSINOPHIL NFR BLD AUTO: 1.2 % (ref 0.3–6.2)
ERYTHROCYTE [DISTWIDTH] IN BLOOD BY AUTOMATED COUNT: 13.5 % (ref 12.3–15.4)
GLOBULIN UR ELPH-MCNC: 2.5 GM/DL
GLUCOSE SERPL-MCNC: 88 MG/DL (ref 65–99)
HCT VFR BLD AUTO: 49.8 % (ref 37.5–51)
HGB BLD-MCNC: 17.1 G/DL (ref 13–17.7)
IMM GRANULOCYTES # BLD AUTO: 0.11 10*3/MM3 (ref 0–0.05)
IMM GRANULOCYTES NFR BLD AUTO: 1.2 % (ref 0–0.5)
LYMPHOCYTES # BLD AUTO: 2.48 10*3/MM3 (ref 0.7–3.1)
LYMPHOCYTES NFR BLD AUTO: 27.1 % (ref 19.6–45.3)
MCH RBC QN AUTO: 28.7 PG (ref 26.6–33)
MCHC RBC AUTO-ENTMCNC: 34.3 G/DL (ref 31.5–35.7)
MCV RBC AUTO: 83.7 FL (ref 79–97)
MONOCYTES # BLD AUTO: 1.71 10*3/MM3 (ref 0.1–0.9)
MONOCYTES NFR BLD AUTO: 18.7 % (ref 5–12)
NEUTROPHILS NFR BLD AUTO: 4.68 10*3/MM3 (ref 1.7–7)
NEUTROPHILS NFR BLD AUTO: 51.1 % (ref 42.7–76)
NRBC BLD AUTO-RTO: 0 /100 WBC (ref 0–0.2)
PLATELET # BLD AUTO: 225 10*3/MM3 (ref 140–450)
PMV BLD AUTO: 10.3 FL (ref 6–12)
POTASSIUM SERPL-SCNC: 4.1 MMOL/L (ref 3.5–5.2)
PROT SERPL-MCNC: 6.4 G/DL (ref 6–8.5)
RBC # BLD AUTO: 5.95 10*6/MM3 (ref 4.14–5.8)
SODIUM SERPL-SCNC: 136 MMOL/L (ref 136–145)
TESTOST SERPL-MCNC: 634 NG/DL (ref 193–740)
WBC NRBC COR # BLD: 9.15 10*3/MM3 (ref 3.4–10.8)

## 2023-08-10 PROCEDURE — 84403 ASSAY OF TOTAL TESTOSTERONE: CPT

## 2023-08-10 PROCEDURE — 80053 COMPREHEN METABOLIC PANEL: CPT

## 2023-08-10 PROCEDURE — 85025 COMPLETE CBC W/AUTO DIFF WBC: CPT

## 2023-08-10 PROCEDURE — 36415 COLL VENOUS BLD VENIPUNCTURE: CPT

## 2023-08-16 ENCOUNTER — TELEPHONE (OUTPATIENT)
Dept: CARDIOLOGY | Facility: CLINIC | Age: 68
End: 2023-08-16
Payer: MEDICARE

## 2023-08-16 NOTE — TELEPHONE ENCOUNTER
I spoke with Jimmie Jamil and updated pt on results/recommendations from provider.  Pt verbalized understanding and has no further questions at this time.    He shares that he hasn't taken this medication in several months, possibly even 6 months.    Thank you,    Randee Pham, RN  Triage [facility]  08/16/23 13:12 EDT

## 2023-08-16 NOTE — TELEPHONE ENCOUNTER
Please let him know that recent labs showed creatinine was slightly elevated from prior (prior was 1.2 he was 1.3 this check).  There is diclofenac on his medication list.  Would recommend that he limit the use of this medication as it can worsen kidney issues.  Would also recommend that he follow-up with PCP for repeat labs in the next 2 to 3 weeks.  If renal function continues to be abnormal may need to stop enalapril in favor of a different medication.

## 2023-09-23 NOTE — PROGRESS NOTES
Chief Complaint    Urologic complaint    Subjective          Jimmie Jamil presents to [facility]  History of Present Illness        68-year-old  gentleman     Hypogonadism   BPH - 2/15/2022  Rezum   ED      8/23   1.3, GFR 56 -a little worse than this has been in the past.  H/H 49/83    Voiding okay.  Nocturia x2 doing well currently.  No prostate meds.  No change.      libido and worse fatigue.      doing 0.25 mL Depo testosterone subcu every 5 days in 1/21.  Gets a 10 mL vial about every 5 months.            Previous    Has used Cialis 20 mg in the past, is having no trouble currently.    doing phlebotomy/donating blood about every 6 months.    History of increased H/H -has been doing well for a while    2/15/2022  Rezum - 4 cm prostate    Testosterone has run high in the past.  We have decreased his dose from 0.5 x 1 point.    Endocrinologist that he saw earlier that  had recommended therapeutic phlebotomy has retired.    Flomax  - could not tolerate, helped urination.  Nasal stuffiness, unusual feeling    Was on 0.5 mL IM Depo-Testosterone every 9 days before.     Has been on injections for several years    Sildenafil could not tolerate secondary to  side effects    No FH of prostate CA      No cardiopulmonary history.  Patient does not smoke.  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  Removed.  Normal bladder otherwise and normal retrogrades  Pathology negative    started initially for decreased libido and fatigue.  Did try AndroGel and patches in the past and did not like these b/c of side effects.    9/21 creatinine 1.4, GFR 51    Total testosterone    8/23    634  3/23    496       9/22    469  3/22   587  9/21    596  12/30/20  159  12/20  824  8/20    668  2/20    806  10/19  995  11/19 683  8/19   251  7/19   928  6/19   504 - every 10 days  3/19   826  12/18  713  7/18  1069  7/18   933       563   246   >1500 -  dose was cut in half      PSA      3/23      5.6     6.4     4.7   MRI prostate -49 g -negative, chronic prostatitis    4.92   5.46    4.28  3/19  4.0   prostate biopsy 43 g - negative    6.75    2.54          Past History:  Medical History: has a past medical history of Arthritis of back (), Benign essential hypertension, Bladder problem, Cervical disc disorder (), Cervical radiculopathy (2017), Cervical spinal stenosis (2017), Cervicalgia (2017), Coronary artery disease, ED (erectile dysfunction), Greater trochanteric bursitis of right hip (2018), High blood pressure, HL (hearing loss) (01 Oct 2021), Hyperlipidemia, Hypertension, Leg pain, Leg swelling, Low back pain, Low back strain (), Lumbago (2017), Lumbosacral disc disease (), Neck pain, Paresthesia (2017), Periarthritis of shoulder (), Polycythemia, Right hip pain, Rotator cuff syndrome (2021), and Urinary bladder incontinence (2017).   Surgical History: has a past surgical history that includes Cardiac catheterization; Cervical disc surgery; Lumbar epidural injection; Vasectomy; Circumcision, non-; Shoulder arthroscopy w/ labral repair (Left); Colonoscopy; [place] tooth extraction; Anterior Cervical Fusion (2017); Cystoscopy (01/10/2014); Prostate biopsy (); Cardiac catheterization (N/A, 10/13/2021); Cardiac catheterization (N/A, 10/13/2021); Cardiac catheterization (N/A, 10/13/2021); Neck surgery (?); Shoulder surgery (); and Trigger point injection ().   Family History: family history includes Alzheimer's disease (age of onset: 80) in his mother; Arthritis in his father and mother; Cancer in his father; Colon cancer (age of onset: 85) in his father; Heart disease in his father; Hypertension (age of onset: 80) in his father; Stroke in his father.   Social History: reports that he  has never smoked. He has never used smokeless tobacco. He reports current alcohol use of about 4.0 standard drinks per week. He reports that he does not use drugs.  Allergies: Beta adrenergic blockers, Crestor [rosuvastatin calcium], Keflex [cephalexin], Livalo [pitavastatin], Losartan, and Naproxen       Current Outpatient Medications:     ascorbic acid (VITAMIN C) 1000 MG tablet, Take 1 tablet by mouth Daily., Disp: , Rfl:     aspirin 81 MG EC tablet, Take 1 tablet by mouth Daily., Disp: , Rfl:     Coenzyme Q10 (CoQ10) 100 MG capsule, , Disp: , Rfl:     diclofenac (VOLTAREN) 75 MG EC tablet, Take 1 tablet by mouth 2 (Two) Times a Day As Needed., Disp: , Rfl:     enalapril (VASOTEC) 20 MG tablet, Take 1 tablet by mouth 2 (Two) Times a Day., Disp: 180 tablet, Rfl: 1    metaxalone (SKELAXIN) 800 MG tablet, Take 1 tablet by mouth Daily As Needed., Disp: , Rfl:     Testosterone Cypionate (DEPOTESTOTERONE CYPIONATE) 200 MG/ML injection, INJECT 0.26ML UNDER THE SKIN Q 5 DAYS AS DIRECTED, Disp: 10 mL, Rfl: 0    VASCEPA 1 g capsule capsule, Take 2 g by mouth 2 (Two) Times a Day With Meals., Disp: 360 capsule, Rfl: 3    verapamil ER (VERELAN) 240 MG 24 hr capsule, Take 2 capsules by mouth Every Night., Disp: 180 capsule, Rfl: 1         Objective     Vital Signs:   There were no vitals taken for this visit.             Assessment and Plan    Diagnoses and all orders for this visit:    1. Benign prostatic hyperplasia with lower urinary tract symptoms, symptom details unspecified (Primary)    2. Hypogonadism in male            Hypogonadism      Cont 0.26 mL Depo testosterone subcu every 5 days.     likes to use 1 - 10 mL vial and get refill after.  Refilled today     follow-up in 6 months with Tot testosterone, CBC, CMP, PSA        Elevated PSA      PSA stable, will continue to follow conservatively

## 2023-09-26 ENCOUNTER — LAB (OUTPATIENT)
Dept: LAB | Facility: HOSPITAL | Age: 68
End: 2023-09-26
Payer: MEDICARE

## 2023-09-26 ENCOUNTER — OFFICE VISIT (OUTPATIENT)
Dept: UROLOGY | Facility: CLINIC | Age: 68
End: 2023-09-26
Payer: MEDICARE

## 2023-09-26 VITALS
WEIGHT: 254 LBS | SYSTOLIC BLOOD PRESSURE: 152 MMHG | BODY MASS INDEX: 36.36 KG/M2 | HEIGHT: 70 IN | DIASTOLIC BLOOD PRESSURE: 72 MMHG | HEART RATE: 82 BPM

## 2023-09-26 DIAGNOSIS — N40.1 BENIGN PROSTATIC HYPERPLASIA WITH LOWER URINARY TRACT SYMPTOMS, SYMPTOM DETAILS UNSPECIFIED: Primary | ICD-10-CM

## 2023-09-26 DIAGNOSIS — E29.1 HYPOGONADISM IN MALE: ICD-10-CM

## 2023-09-26 DIAGNOSIS — N40.1 BENIGN PROSTATIC HYPERPLASIA WITH LOWER URINARY TRACT SYMPTOMS, SYMPTOM DETAILS UNSPECIFIED: ICD-10-CM

## 2023-09-26 DIAGNOSIS — R97.20 ELEVATED PROSTATE SPECIFIC ANTIGEN (PSA): ICD-10-CM

## 2023-09-26 LAB
ANION GAP SERPL CALCULATED.3IONS-SCNC: 11.6 MMOL/L (ref 5–15)
BUN SERPL-MCNC: 13 MG/DL (ref 8–23)
BUN/CREAT SERPL: 11.1 (ref 7–25)
CALCIUM SPEC-SCNC: 9.6 MG/DL (ref 8.6–10.5)
CHLORIDE SERPL-SCNC: 103 MMOL/L (ref 98–107)
CO2 SERPL-SCNC: 22.4 MMOL/L (ref 22–29)
CREAT SERPL-MCNC: 1.17 MG/DL (ref 0.76–1.27)
EGFRCR SERPLBLD CKD-EPI 2021: 68.8 ML/MIN/1.73
GLUCOSE SERPL-MCNC: 113 MG/DL (ref 65–99)
POTASSIUM SERPL-SCNC: 4.3 MMOL/L (ref 3.5–5.2)
SODIUM SERPL-SCNC: 137 MMOL/L (ref 136–145)

## 2023-09-26 PROCEDURE — 80048 BASIC METABOLIC PNL TOTAL CA: CPT

## 2023-09-26 PROCEDURE — 36415 COLL VENOUS BLD VENIPUNCTURE: CPT

## 2023-09-26 RX ORDER — TESTOSTERONE CYPIONATE 200 MG/ML
INJECTION, SOLUTION INTRAMUSCULAR
Qty: 10 ML | Refills: 0 | Status: SHIPPED | OUTPATIENT
Start: 2023-09-26

## 2023-09-27 ENCOUNTER — TELEPHONE (OUTPATIENT)
Dept: UROLOGY | Facility: CLINIC | Age: 68
End: 2023-09-27
Payer: MEDICARE

## 2023-09-27 NOTE — TELEPHONE ENCOUNTER
----- Message from Zechariah Clay MD sent at 9/27/2023  7:06 AM EDT -----  Let patient know renal function better on this recent BMP  ----- Message -----  From: Lab, Background User  Sent: 9/26/2023   6:18 PM EDT  To: Zechariah Clay MD

## 2023-10-09 ENCOUNTER — PREP FOR SURGERY (OUTPATIENT)
Dept: OTHER | Facility: HOSPITAL | Age: 68
End: 2023-10-09
Payer: MEDICARE

## 2023-10-09 ENCOUNTER — CLINICAL SUPPORT (OUTPATIENT)
Dept: GASTROENTEROLOGY | Facility: CLINIC | Age: 68
End: 2023-10-09
Payer: MEDICARE

## 2023-10-09 DIAGNOSIS — Z80.0 FAMILY HISTORY OF COLON CANCER: Primary | ICD-10-CM

## 2023-10-09 NOTE — PROGRESS NOTES
Jimmie Jamil  1955  68 y.o.    Reason for call: Recall  Prep prescribed: Clenpiq  Prep instructions reviewed with patient and sent to patient via [employer]  Is the patient currently on any injectable medications for weight loss or diabetes? No  Clearance needed? No  If yes, what clearance is needed? N/A  Clearance has been requested from n/aprovider name  The patient has been scheduled for: Colonoscopy  Family history of colon cancer? Yes  If yes, indicate relative: Father  Family History   Problem Relation Age of Onset    Arthritis Mother     Alzheimer's disease Mother 80    Arthritis Father     Heart disease Father     Colon cancer Father 85    Hypertension Father 80    Cancer Father         Colon cx in his 90s    Stroke Father         Mild in his 80s     Past Medical History:   Diagnosis Date    Arthritis of back 2015    Benign essential hypertension     Bladder problem     Cervical disc disorder 2018    Cervical radiculopathy 03/16/2017    Cervical spinal stenosis 03/16/2017    Cervicalgia 03/16/2017    Coronary artery disease     diastolic dysfunction    ED (erectile dysfunction)     Greater trochanteric bursitis of right hip 07/09/2018    High blood pressure     HL (hearing loss) 01 Oct 2021    Hyperlipidemia     Hypertension     Leg pain     Leg swelling     Low back pain     Low back strain 2015    Lumbago 03/16/2017    Lumbosacral disc disease 2018    Neck pain     Paresthesia 03/16/2017    left hand/leg    Periarthritis of shoulder 2021    Polycythemia     Right hip pain     Rotator cuff syndrome 09/01/2021    Urinary bladder incontinence 03/16/2017     Allergies   Allergen Reactions    Beta Adrenergic Blockers Unknown - High Severity    Crestor [Rosuvastatin Calcium] Myalgia    Keflex [Cephalexin] Other (See Comments)     hiccups    Livalo [Pitavastatin] Myalgia    Losartan Other (See Comments)     Intolerance due to fatigue, depression    Naproxen Other (See Comments)     Chest pain     Past  Surgical History:   Procedure Laterality Date    ANTERIOR CERVICAL FUSION  04/12/2017    C3-4    CARDIAC CATHETERIZATION      everything okay clearance for neck surgery    CARDIAC CATHETERIZATION N/A 10/13/2021    Procedure: Left Heart Cath;  Surgeon: Anahi Perales MD;  Location:  [facility];  Service: Cardiovascular;  Laterality: N/A;    CARDIAC CATHETERIZATION N/A 10/13/2021    Procedure: Coronary angiography;  Surgeon: Anahi Perales MD;  Location:  [facility];  Service: Cardiovascular;  Laterality: N/A;    CARDIAC CATHETERIZATION N/A 10/13/2021    Procedure: Left ventriculography;  Surgeon: Anahi Perales MD;  Location:  [facility];  Service: Cardiovascular;  Laterality: N/A;    CERVICAL DISC SURGERY      C2-3 fusion    CIRCUMCISION      COLONOSCOPY      CYSTOSCOPY  01/10/2014    Cysto ivan retrograde pyelogram urthral bx.    LUMBAR EPIDURAL INJECTION      L2-S2  going to try ablasion in future    NECK SURGERY  2018?    PROSTATE BIOPSY  2018    SHOULDER ARTHROSCOPY W/ LABRAL REPAIR Left     SHOULDER SURGERY  2008    TRIGGER POINT INJECTION  2021    L5-S1    VASECTOMY      WISDOM TOOTH EXTRACTION       Social History     Socioeconomic History    Marital status:    Tobacco Use    Smoking status: Never     Passive exposure: Never    Smokeless tobacco: Never   Vaping Use    Vaping Use: Never used   Substance and Sexual Activity    Alcohol use: Yes     Alcohol/week: 4.0 standard drinks of alcohol     Types: 4 Cans of beer per week     Comment: 4 beers a week    Drug use: Never    Sexual activity: Yes     Partners: Female     Birth control/protection: Surgical       Current Outpatient Medications:     ascorbic acid (VITAMIN C) 1000 MG tablet, Take 1 tablet by mouth Daily., Disp: , Rfl:     aspirin 81 MG EC tablet, Take 1 tablet by mouth Daily., Disp: , Rfl:     Coenzyme Q10 (CoQ10) 100 MG capsule, , Disp: , Rfl:     enalapril (VASOTEC) 20 MG tablet,  Take 1 tablet by mouth 2 (Two) Times a Day., Disp: 180 tablet, Rfl: 1    metaxalone (SKELAXIN) 800 MG tablet, Take 1 tablet by mouth Daily As Needed., Disp: , Rfl:     Testosterone Cypionate (DEPOTESTOTERONE CYPIONATE) 200 MG/ML injection, INJECT 0.26ML UNDER THE SKIN Q 5 DAYS AS DIRECTED, Disp: 10 mL, Rfl: 0    VASCEPA 1 g capsule capsule, Take 2 g by mouth 2 (Two) Times a Day With Meals., Disp: 360 capsule, Rfl: 3    verapamil ER (VERELAN) 240 MG 24 hr capsule, Take 2 capsules by mouth Every Night., Disp: 180 capsule, Rfl: 1    diclofenac (VOLTAREN) 75 MG EC tablet, Take 1 tablet by mouth 2 (Two) Times a Day As Needed. (Patient not taking: Reported on 10/9/2023), Disp: , Rfl:     Diclofenac Sodium (VOLTAREN) 1 % gel gel, APPLY 2 GRAMS TO THE AFFECTED AREA(S) BY TOPICAL ROUTE 4 TIMES PER DAY (Patient not taking: Reported on 10/9/2023), Disp: , Rfl:

## 2023-10-26 ENCOUNTER — OFFICE VISIT (OUTPATIENT)
Dept: CARDIOLOGY | Facility: CLINIC | Age: 68
End: 2023-10-26
Payer: MEDICARE

## 2023-10-26 VITALS
BODY MASS INDEX: 35.22 KG/M2 | HEIGHT: 70 IN | HEART RATE: 60 BPM | SYSTOLIC BLOOD PRESSURE: 118 MMHG | WEIGHT: 246 LBS | DIASTOLIC BLOOD PRESSURE: 68 MMHG

## 2023-10-26 DIAGNOSIS — E78.2 MIXED DYSLIPIDEMIA: ICD-10-CM

## 2023-10-26 DIAGNOSIS — I35.0 NONRHEUMATIC AORTIC VALVE STENOSIS: ICD-10-CM

## 2023-10-26 DIAGNOSIS — I35.0 AORTIC VALVE STENOSIS, ETIOLOGY OF CARDIAC VALVE DISEASE UNSPECIFIED: Primary | ICD-10-CM

## 2023-10-26 DIAGNOSIS — I10 ESSENTIAL HYPERTENSION: ICD-10-CM

## 2023-10-26 NOTE — PROGRESS NOTES
Date of Office Visit: 10/26/2023  Encounter Provider: Nikki Duncan MD  Place of Service: [facility] CARD[facility]  Patient Name: Jimmie Jamil  :1955    Chief complaint  Hypertension, mild aortic valve stenosis    History of Present Illness  67-year-old gentleman history of rheumatic fever, hypertension, hyperlipidemia, chronic renal insufficiency and obesity.  He has a history of rheumatic fever at age 14 treated with penicillin at age 18.  He has had a murmur noted intermittently since then.  Has a distant history of an abnormal stress test in hospital 10/2021 after an episode of syncope evaluation including CT of the chest abdomen pelvis that showed no pulmonary emboli borderline dilated ascending aorta without dissection.  Mild coronary artery calcification was present.  Echocardiogram showed an ejection fraction of 58% with aortic valve calcification with mild stenosis.  Cardiac catheterization showed no significant artery disease.  Subsequent 14-day outpatient monitor was unremarkable.    Since last visit patient has had no chest pain, shortness of breath, palpitations, syncope near syncope.  He is scheduled for colonoscopy on 2023.  Patient tried Repatha but had myalgias with this as well.  She is walking 2 to 3 miles in 30 minutes 4-5 times a week without difficulty and feels this is adequate.    Past Medical History:   Diagnosis Date    Arthritis of back     Benign essential hypertension     Bladder problem     Cervical disc disorder 2018    Cervical radiculopathy 2017    Cervical spinal stenosis 2017    Cervicalgia 2017    Coronary artery disease     diastolic dysfunction    ED (erectile dysfunction)     Greater trochanteric bursitis of right hip 2018    High blood pressure     HL (hearing loss) 01 Oct 2021    Hyperlipidemia     Hypertension     Leg pain     Leg swelling     Low back pain     Low back strain 2015    Lumbago 2017     Lumbosacral disc disease 2018    Neck pain     Paresthesia 03/16/2017    left hand/leg    Periarthritis of shoulder 2021    Polycythemia     Right hip pain     Rotator cuff syndrome 09/01/2021    Urinary bladder incontinence 03/16/2017     Past Surgical History:   Procedure Laterality Date    ANTERIOR CERVICAL FUSION  04/12/2017    C3-4    CARDIAC CATHETERIZATION      everything okay clearance for neck surgery    CARDIAC CATHETERIZATION N/A 10/13/2021    Procedure: Left Heart Cath;  Surgeon: Anahi Perales MD;  Location:  [facility];  Service: Cardiovascular;  Laterality: N/A;    CARDIAC CATHETERIZATION N/A 10/13/2021    Procedure: Coronary angiography;  Surgeon: Anahi Perales MD;  Location:  [facility];  Service: Cardiovascular;  Laterality: N/A;    CARDIAC CATHETERIZATION N/A 10/13/2021    Procedure: Left ventriculography;  Surgeon: Anahi Perales MD;  Location:  [facility];  Service: Cardiovascular;  Laterality: N/A;    CERVICAL DISC SURGERY      C2-3 fusion    CIRCUMCISION      COLONOSCOPY      CYSTOSCOPY  01/10/2014    Cysto ivan retrograde pyelogram urthral bx.    LUMBAR EPIDURAL INJECTION      L2-S2  going to try ablasion in future    NECK SURGERY  2018?    PROSTATE BIOPSY  2018    SHOULDER ARTHROSCOPY W/ LABRAL REPAIR Left     SHOULDER SURGERY  2008    TRIGGER POINT INJECTION  2021    L5-S1    VASECTOMY      WISDOM TOOTH EXTRACTION       Outpatient Medications Prior to Visit   Medication Sig Dispense Refill    ascorbic acid (VITAMIN C) 1000 MG tablet Take 1 tablet by mouth Daily.      aspirin 81 MG EC tablet Take 1 tablet by mouth Daily.      Coenzyme Q10 (CoQ10) 100 MG capsule       enalapril (VASOTEC) 20 MG tablet Take 1 tablet by mouth 2 (Two) Times a Day. 180 tablet 1    Testosterone Cypionate (DEPOTESTOTERONE CYPIONATE) 200 MG/ML injection INJECT 0.26ML UNDER THE SKIN Q 5 DAYS AS DIRECTED 10 mL 0    VASCEPA 1 g capsule capsule Take 2 g by mouth  2 (Two) Times a Day With Meals. 360 capsule 3    verapamil ER (VERELAN) 240 MG 24 hr capsule Take 2 capsules by mouth Every Night. 180 capsule 1    diclofenac (VOLTAREN) 75 MG EC tablet Take 1 tablet by mouth 2 (Two) Times a Day As Needed.      Diclofenac Sodium (VOLTAREN) 1 % gel gel APPLY 2 GRAMS TO THE AFFECTED AREA(S) BY TOPICAL ROUTE 4 TIMES PER DAY      metaxalone (SKELAXIN) 800 MG tablet Take 1 tablet by mouth Daily As Needed.      Sod Picosulfate-Mag Ox-Cit Acd 10-3.5-12 MG-GM -GM/175ML solution Take 175 mL by mouth Take As Directed. 350 mL 0     No facility-administered medications prior to visit.       Allergies as of 10/26/2023 - Reviewed 10/26/2023   Allergen Reaction Noted    Beta adrenergic blockers Unknown - High Severity 01/31/2020    Crestor [rosuvastatin calcium] Myalgia 01/31/2020    Keflex [cephalexin] Other (See Comments) 01/31/2020    Livalo [pitavastatin] Myalgia 01/31/2020    Losartan Other (See Comments) 03/16/2020    Naproxen Other (See Comments) 01/31/2020     Social History     Socioeconomic History    Marital status:    Tobacco Use    Smoking status: Never     Passive exposure: Never    Smokeless tobacco: Never   Vaping Use    Vaping Use: Never used   Substance and Sexual Activity    Alcohol use: Yes     Alcohol/week: 4.0 standard drinks of alcohol     Types: 4 Cans of beer per week     Comment: 4 beers a week    Drug use: Never    Sexual activity: Yes     Partners: Female     Birth control/protection: Surgical     Family History   Problem Relation Age of Onset    Arthritis Mother     Alzheimer's disease Mother 80    Arthritis Father     Heart disease Father     Colon cancer Father 85    Hypertension Father 80    Cancer Father         Colon cx in his 90s    Stroke Father         Mild in his 80s     Review of Systems   Constitutional: Negative for chills, fever, weight gain and weight loss.   Cardiovascular:  Negative for leg swelling.   Respiratory:  Negative for cough, snoring  "and wheezing.    Hematologic/Lymphatic: Negative for bleeding problem. Does not bruise/bleed easily.   Skin:  Negative for color change.   Musculoskeletal:  Positive for joint pain. Negative for falls and myalgias.   Gastrointestinal:  Negative for melena.   Genitourinary:  Negative for hematuria.   Neurological:  Negative for excessive daytime sleepiness.   Psychiatric/Behavioral:  Negative for depression. The patient is not nervous/anxious.         Objective:     Vitals:    10/26/23 1113   BP: 118/68   Pulse: 60   Weight: 112 kg (246 lb)   Height: 177.8 cm (70\")     Body mass index is 35.3 kg/m².    Vitals reviewed.   Constitutional:       Appearance: Well-developed. Obese.   Eyes:      General: No scleral icterus.        Right eye: No discharge.      Conjunctiva/sclera: Conjunctivae normal.      Pupils: Pupils are equal, round, and reactive to light.   HENT:      Head: Normocephalic.      Nose: Nose normal.   Neck:      Thyroid: No thyromegaly.      Vascular: No JVD.   Pulmonary:      Effort: Pulmonary effort is normal. No respiratory distress.      Breath sounds: Normal breath sounds. No wheezing. No rales.   Cardiovascular:      Normal rate. Regular rhythm. Normal S1. Normal S2.       Murmurs: There is a grade 2/6 harsh midsystolic murmur at the URSB, radiating to the neck.      No gallop.    Pulses:     Intact distal pulses.      Carotid: 2+ bilaterally.     Radial: 2+ bilaterally.     Femoral: 2+ bilaterally.     Popliteal: 2+ bilaterally.     Dorsalis pedis: 2+ bilaterally.     Posterior tibial: 2+ bilaterally.  Edema:     Peripheral edema absent.   Abdominal:      General: Bowel sounds are normal. There is no distension.      Palpations: Abdomen is soft.      Tenderness: There is no abdominal tenderness. There is no rebound.   Musculoskeletal: Normal range of motion.         General: No tenderness.      Cervical back: Normal range of motion and neck supple. Skin:     General: Skin is warm and dry.      " Findings: No erythema or rash.   Neurological:      Mental Status: Alert and oriented to person, place, and time.   Psychiatric:         Behavior: Behavior normal.         Thought Content: Thought content normal.         Judgment: Judgment normal.       Lab Review:   Lab Results - Last 18 Months   Lab Units 08/10/23  1151 03/24/23  1515   WBC 10*3/mm3 9.15 9.29   RBC 10*6/mm3 5.95* 6.32*   HEMOGLOBIN g/dL 17.1 18.1*   HEMATOCRIT % 49.8 52.4*   MCV fL 83.7 82.9   MCH pg 28.7 28.6   MCHC g/dL 34.3 34.5   RDW % 13.5 14.7   PLATELETS 10*3/mm3 225 271   NEUTROPHIL % % 51.1 57.8   LYMPHOCYTE % % 27.1 25.4   MONOCYTES % % 18.7* 14.2*   EOSINOPHIL % % 1.2 0.8   BASOPHIL % % 0.7 0.8   NEUTROS ABS 10*3/mm3 4.68 5.38   LYMPHS ABS 10*3/mm3 2.48 2.36   MONOS ABS 10*3/mm3 1.71* 1.32*   EOS ABS 10*3/mm3 0.11 0.07   BASOS ABS 10*3/mm3 0.06 0.07   RDW-SD fl 40.8 41.8   MPV fL 10.3 10.4       Lab Results - Last 18 Months   Lab Units 09/26/23  1145 08/10/23  1151 03/24/23  1515   GLUCOSE mg/dL 113* 88 90   BUN mg/dL 13 17 14   CREATININE mg/dL 1.17 1.38* 1.21   SODIUM mmol/L 137 136 137   POTASSIUM mmol/L 4.3 4.1 4.1   CHLORIDE mmol/L 103 102 101   CO2 mmol/L 22.4 23.8 20.8*   CALCIUM mg/dL 9.6 9.5 9.8   TOTAL PROTEIN g/dL  --  6.4 7.3   ALBUMIN g/dL  --  3.9 4.3   ALT (SGPT) U/L  --  31 38   AST (SGOT) U/L  --  30 32   ALK PHOS U/L  --  66 79   BILIRUBIN mg/dL  --  0.4 0.5   GLOBULIN gm/dL  --  2.5 3.0   A/G RATIO g/dL  --  1.6 1.4   BUN / CREAT RATIO  11.1 12.3 11.6   ANION GAP mmol/L 11.6 10.2 15.2*   EGFR mL/min/1.73 68.8 56.4* 66.4       ECG 12 Lead    Date/Time: 10/26/2023 11:33 AM  Performed by: Nikki Duncan MD    Authorized by: Nikki Duncan MD  Comparison: compared with previous ECG   Similar to previous ECG  Rhythm: sinus rhythm    Clinical impression: normal ECG            Diagnosis Plan   1. Aortic valve stenosis, etiology of cardiac valve disease unspecified  ECG 12 Lead    Adult Transthoracic Echo Complete W/ Cont if  Necessary Per Protocol      2. Essential hypertension        3. Mixed dyslipidemia        4. Nonrheumatic aortic valve stenosis          Plan:         1.  Coronary artery disease with abnormal coronary calcification and no obstructive coronary disease by cath 10/2021.  No anginal symptoms.  2.  Mild aortic valve stenosis.  We will check an echo.  3.  Left carotid bruit.  We will check carotid Doppler.  4.  Hypertension.  Controlled  5.  Hyperlipidemia.  Unfortunately intolerant of multiple statins and Repatha.  May need to consider bempedoic acid or inclisiran  6.  History of syncope.  No recurrence.    Time Spent: I spent 35 minutes caring for Jimmie on this date of service. This time includes time spent by me in the following activities: preparing for the visit, reviewing tests, obtaining and/or reviewing a separately obtained history, performing a medically appropriate examination and/or evaluation, counseling and educating the patient/family/caregiver, ordering medications, tests, or procedures, documenting information in the medical record, and independently interpreting results and communicating that information with the patient/family/caregiver.   I spent 1 minutes on the separately reported service of ECG. This time is not included in the time used to support the E/M service also reported today.        Your medication list            Accurate as of October 26, 2023 11:59 PM. If you have any questions, ask your nurse or doctor.                CONTINUE taking these medications        Instructions Last Dose Given Next Dose Due   ascorbic acid 1000 MG tablet  Commonly known as: VITAMIN C      Take 1 tablet by mouth Daily.       aspirin 81 MG EC tablet      Take 1 tablet by mouth Daily.       CoQ10 100 MG capsule           enalapril 20 MG tablet  Commonly known as: VASOTEC      Take 1 tablet by mouth 2 (Two) Times a Day.       Testosterone Cypionate 200 MG/ML injection  Commonly known as: DEPOTESTOTERONE  CYPIONATE      INJECT 0.26ML UNDER THE SKIN Q 5 DAYS AS DIRECTED       Vascepa 1 g capsule capsule  Generic drug: icosapent ethyl      Take 2 g by mouth 2 (Two) Times a Day With Meals.       verapamil  MG 24 hr capsule  Commonly known as: VERELAN      Take 2 capsules by mouth Every Night.                Patient is no longer taking -.  I corrected the med list to reflect this.  I did not stop these medications.      Dictated utilizing Dragon dictation

## 2023-10-28 PROBLEM — I35.0 NONRHEUMATIC AORTIC VALVE STENOSIS: Status: ACTIVE | Noted: 2023-10-28

## 2023-11-15 ENCOUNTER — TELEPHONE (OUTPATIENT)
Dept: GASTROENTEROLOGY | Facility: CLINIC | Age: 68
End: 2023-11-15
Payer: MEDICARE

## 2023-11-15 NOTE — TELEPHONE ENCOUNTER
Received email from [facility]. They were not able to reach pt.      I left vm (no ABIGAIL on file) that I would send a detailed [facility] message.  Gilbert      Pt scheduled for a colonoscopy on 11.20.23, arrival time of 10am.  Remind of liquid diet the day prior. Remind of bowel prep and instructions.

## 2023-11-17 ENCOUNTER — ANESTHESIA EVENT (OUTPATIENT)
Dept: GASTROENTEROLOGY | Facility: HOSPITAL | Age: 68
End: 2023-11-17
Payer: MEDICARE

## 2023-11-17 NOTE — ANESTHESIA PREPROCEDURE EVALUATION
Anesthesia Evaluation     Patient summary reviewed and Nursing notes reviewed   NPO Solid Status: > 8 hours  NPO Liquid Status: > 4 hours           Airway   Mallampati: II  TM distance: >3 FB  Neck ROM: full  No difficulty expected  Dental - normal exam     Comment: Patient states several crowns     Pulmonary - normal exam    breath sounds clear to auscultation  Cardiovascular - normal exam  Exercise tolerance: good (4-7 METS)    Rhythm: regular  Rate: normal    (+) hypertension well controlled, valvular problems/murmurs AS, CAD, hyperlipidemia      Neuro/Psych  (+) syncope, numbness  GI/Hepatic/Renal/Endo    (+) obesity    Musculoskeletal     (+) neck pain  Abdominal    Substance History      OB/GYN          Other   arthritis,     ROS/Med Hx Other: Last echo 11/22  LV EF = 63%   There is moderate calcification of the aortic valve. There is severe restriction to opening of the leaflets. The gradient is increased with probable mild aortic stenosis Mild aortic valve regurgitation is present.   Aortic valve area is 1.57 cm2  Trace mitral valve regurgitation  Trace tricuspid valve regurgitation                Anesthesia Plan    ASA 3     general   total IV anesthesia  intravenous induction     Anesthetic plan, risks, benefits, and alternatives have been provided, discussed and informed consent has been obtained with: patient and spouse/significant other.  Pre-procedure education provided  Plan discussed with CRNA.    CODE STATUS:

## 2023-11-20 ENCOUNTER — HOSPITAL ENCOUNTER (OUTPATIENT)
Facility: HOSPITAL | Age: 68
Setting detail: HOSPITAL OUTPATIENT SURGERY
Discharge: HOME | End: 2023-11-20
Attending: INTERNAL MEDICINE | Admitting: INTERNAL MEDICINE
Payer: MEDICARE

## 2023-11-20 ENCOUNTER — ANESTHESIA (OUTPATIENT)
Dept: GASTROENTEROLOGY | Facility: HOSPITAL | Age: 68
End: 2023-11-20
Payer: MEDICARE

## 2023-11-20 VITALS
OXYGEN SATURATION: 97 % | WEIGHT: 237.22 LBS | TEMPERATURE: 98 F | HEART RATE: 60 BPM | RESPIRATION RATE: 13 BRPM | SYSTOLIC BLOOD PRESSURE: 125 MMHG | BODY MASS INDEX: 34.04 KG/M2 | DIASTOLIC BLOOD PRESSURE: 69 MMHG

## 2023-11-20 DIAGNOSIS — Z80.0 FAMILY HISTORY OF COLON CANCER: ICD-10-CM

## 2023-11-20 PROCEDURE — 25010000002 PROPOFOL 10 MG/ML EMULSION: Performed by: NURSE ANESTHETIST, CERTIFIED REGISTERED

## 2023-11-20 PROCEDURE — 88305 TISSUE EXAM BY PATHOLOGIST: CPT | Performed by: INTERNAL MEDICINE

## 2023-11-20 PROCEDURE — 25810000003 LACTATED RINGERS PER 1000 ML: Performed by: ANESTHESIOLOGY

## 2023-11-20 RX ORDER — LIDOCAINE HYDROCHLORIDE 20 MG/ML
INJECTION, SOLUTION EPIDURAL; INFILTRATION; INTRACAUDAL; PERINEURAL AS NEEDED
Status: DISCONTINUED | OUTPATIENT
Start: 2023-11-20 | End: 2023-11-20 | Stop reason: SURG

## 2023-11-20 RX ORDER — PROPOFOL 10 MG/ML
VIAL (ML) INTRAVENOUS AS NEEDED
Status: DISCONTINUED | OUTPATIENT
Start: 2023-11-20 | End: 2023-11-20 | Stop reason: SURG

## 2023-11-20 RX ORDER — SODIUM CHLORIDE, SODIUM LACTATE, POTASSIUM CHLORIDE, CALCIUM CHLORIDE 600; 310; 30; 20 MG/100ML; MG/100ML; MG/100ML; MG/100ML
30 INJECTION, SOLUTION INTRAVENOUS CONTINUOUS
Status: DISCONTINUED | OUTPATIENT
Start: 2023-11-20 | End: 2023-11-20 | Stop reason: HOSPADM

## 2023-11-20 RX ADMIN — PROPOFOL 200 MCG/KG/MIN: 10 INJECTION, EMULSION INTRAVENOUS at 13:00

## 2023-11-20 RX ADMIN — PROPOFOL 50 MG: 10 INJECTION, EMULSION INTRAVENOUS at 13:00

## 2023-11-20 RX ADMIN — SODIUM CHLORIDE, POTASSIUM CHLORIDE, SODIUM LACTATE AND CALCIUM CHLORIDE 30 ML/HR: 600; 310; 30; 20 INJECTION, SOLUTION INTRAVENOUS at 11:37

## 2023-11-20 RX ADMIN — LIDOCAINE HYDROCHLORIDE 100 MG: 20 INJECTION, SOLUTION EPIDURAL; INFILTRATION; INTRACAUDAL; PERINEURAL at 13:00

## 2023-11-20 NOTE — H&P
ScreeningPre Procedure History & Physical    Chief Complaint:   Screening     Subjective     HPI:   Screening     Past Medical History:   Past Medical History:   Diagnosis Date    Arthritis of back 2015    Benign essential hypertension     Bladder problem     Cervical disc disorder 2018    Cervical radiculopathy 03/16/2017    Cervical spinal stenosis 03/16/2017    Cervicalgia 03/16/2017    Coronary artery disease     diastolic dysfunction    ED (erectile dysfunction)     Greater trochanteric bursitis of right hip 07/09/2018    High blood pressure     HL (hearing loss) 01 Oct 2021    Hyperlipidemia     Hypertension     Leg pain     Leg swelling     Low back pain     Low back strain 2015    Lumbago 03/16/2017    Lumbosacral disc disease 2018    Neck pain     Paresthesia 03/16/2017    left hand/leg    Periarthritis of shoulder 2021    Polycythemia     Right hip pain     Rotator cuff syndrome 09/01/2021    Urinary bladder incontinence 03/16/2017       Past Surgical History:  Past Surgical History:   Procedure Laterality Date    ANTERIOR CERVICAL FUSION  04/12/2017    C3-4    CARDIAC CATHETERIZATION      everything okay clearance for neck surgery    CARDIAC CATHETERIZATION N/A 10/13/2021    Procedure: Left Heart Cath;  Surgeon: Anahi Perales MD;  Location:  [facility];  Service: Cardiovascular;  Laterality: N/A;    CARDIAC CATHETERIZATION N/A 10/13/2021    Procedure: Coronary angiography;  Surgeon: Anahi Perales MD;  Location:  [facility];  Service: Cardiovascular;  Laterality: N/A;    CARDIAC CATHETERIZATION N/A 10/13/2021    Procedure: Left ventriculography;  Surgeon: Anahi Perales MD;  Location:  [facility];  Service: Cardiovascular;  Laterality: N/A;    CERVICAL DISC SURGERY      C2-3 fusion    CIRCUMCISION      COLONOSCOPY      CYSTOSCOPY  01/10/2014    Cysto ivan retrograde pyelogram urthral bx.    LUMBAR EPIDURAL INJECTION      L2-S2  going to try  ablasion in future    NECK SURGERY  2018?    PROSTATE BIOPSY  2018    SHOULDER ARTHROSCOPY W/ LABRAL REPAIR Left     SHOULDER SURGERY  2008    TRIGGER POINT INJECTION  2021    L5-S1    VASECTOMY      WISDOM TOOTH EXTRACTION         Family History:  Family History   Problem Relation Age of Onset    Arthritis Mother     Alzheimer's disease Mother 80    Arthritis Father     Heart disease Father     Colon cancer Father 85    Hypertension Father 80    Cancer Father         Colon cx in his 90s    Stroke Father         Mild in his 80s       Social History:   reports that he has never smoked. He has never been exposed to tobacco smoke. He has never used smokeless tobacco. He reports current alcohol use of about 4.0 standard drinks of alcohol per week. He reports that he does not use drugs.    Medications:   Medications Prior to Admission   Medication Sig Dispense Refill Last Dose    ascorbic acid (VITAMIN C) 1000 MG tablet Take 1 tablet by mouth Daily.   11/19/2023    aspirin 81 MG EC tablet Take 1 tablet by mouth Daily.   11/19/2023    Coenzyme Q10 (CoQ10) 100 MG capsule    11/19/2023    enalapril (VASOTEC) 20 MG tablet Take 1 tablet by mouth 2 (Two) Times a Day. 180 tablet 1 11/19/2023    VASCEPA 1 g capsule capsule Take 2 g by mouth 2 (Two) Times a Day With Meals. 360 capsule 3 11/19/2023    verapamil ER (VERELAN) 240 MG 24 hr capsule Take 2 capsules by mouth Every Night. 180 capsule 1 11/19/2023    Testosterone Cypionate (DEPOTESTOTERONE CYPIONATE) 200 MG/ML injection INJECT 0.26ML UNDER THE SKIN Q 5 DAYS AS DIRECTED 10 mL 0 11/17/2023       Allergies:  Beta adrenergic blockers, Crestor [rosuvastatin calcium], Keflex [cephalexin], Livalo [pitavastatin], Losartan, and Naproxen        Objective     Blood pressure 143/69, pulse 60, temperature 98.1 °F (36.7 °C), temperature source Temporal, resp. rate 18, weight 108 kg (237 lb 3.4 oz), SpO2 96%.    Physical Exam   Constitutional: Pt is oriented to person, place, and time  and well-developed, well-nourished, and in no distress.   Mouth/Throat: Oropharynx is clear and moist.   Neck: Normal range of motion.   Cardiovascular: Normal rate, regular rhythm and normal heart sounds.    Pulmonary/Chest: Effort normal and breath sounds normal.   Abdominal: Soft. Nontender  Skin: Skin is warm and dry.   Psychiatric: Mood, memory, affect and judgment normal.     Assessment & Plan     Diagnosis:  Screening colonoscopy  H/o colon polyps  Family h/o colon cancer     Anticipated Surgical Procedure:  colonoscopy    The risks, benefits, and alternatives of this procedure have been discussed with the patient or the responsible party- the patient understands and agrees to proceed.

## 2023-11-20 NOTE — ANESTHESIA POSTPROCEDURE EVALUATION
Patient: Jimmie Jamil    Procedure Summary       Date: 11/20/23 Room / Location: [facility] ENDOSCOPY 2 / [facility] ENDOSCOPY    Anesthesia Start: 1259 Anesthesia Stop: 1323    Procedure: COLONOSCOPY WITH COLD SNARE POLYECTOMY Diagnosis:       Family history of colon cancer      (Family history of colon cancer [Z80.0])    Surgeons: Kenny Urena MD Provider: Starr Matos CRNA    Anesthesia Type: general ASA Status: 3            Anesthesia Type: general    Vitals  Vitals Value Taken Time   /69 11/20/23 1340   Temp 36.7 °C (98 °F) 11/20/23 1340   Pulse 60 11/20/23 1340   Resp 13 11/20/23 1340   SpO2 97 % 11/20/23 1340           Post Anesthesia Care and Evaluation    Patient location during evaluation: bedside  Patient participation: complete - patient participated  Level of consciousness: awake  Pain management: adequate    Airway patency: patent  Anesthetic complications: No anesthetic complications  PONV Status: controlled  Cardiovascular status: acceptable and stable  Respiratory status: acceptable

## 2023-11-21 ENCOUNTER — HOSPITAL ENCOUNTER (OUTPATIENT)
Dept: CARDIOLOGY | Facility: HOSPITAL | Age: 68
Discharge: HOME | End: 2023-11-21
Admitting: INTERNAL MEDICINE
Payer: MEDICARE

## 2023-11-21 ENCOUNTER — TELEPHONE (OUTPATIENT)
Age: 68
End: 2023-11-21
Payer: MEDICARE

## 2023-11-21 DIAGNOSIS — I35.0 AORTIC VALVE STENOSIS, ETIOLOGY OF CARDIAC VALVE DISEASE UNSPECIFIED: ICD-10-CM

## 2023-11-21 LAB
AORTIC ARCH: 2.7 CM
AORTIC DIMENSIONLESS INDEX: 0.5 (DI)
ASCENDING AORTA: 3.5 CM
BH CV ECHO MEAS - ACS: 1.35 CM
BH CV ECHO MEAS - AI P1/2T: 567.9 MSEC
BH CV ECHO MEAS - AO MAX PG: 44.4 MMHG
BH CV ECHO MEAS - AO MEAN PG: 20.8 MMHG
BH CV ECHO MEAS - AO ROOT DIAM: 3.5 CM
BH CV ECHO MEAS - AO V2 MAX: 333.3 CM/SEC
BH CV ECHO MEAS - AO V2 VTI: 63.3 CM
BH CV ECHO MEAS - AVA(I,D): 2.01 CM2
BH CV ECHO MEAS - EDV(CUBED): 99.2 ML
BH CV ECHO MEAS - EDV(MOD-SP2): 128 ML
BH CV ECHO MEAS - EDV(MOD-SP4): 135 ML
BH CV ECHO MEAS - EF(MOD-BP): 60.7 %
BH CV ECHO MEAS - EF(MOD-SP2): 62.5 %
BH CV ECHO MEAS - EF(MOD-SP4): 63.7 %
BH CV ECHO MEAS - ESV(CUBED): 24 ML
BH CV ECHO MEAS - ESV(MOD-SP2): 48 ML
BH CV ECHO MEAS - ESV(MOD-SP4): 49 ML
BH CV ECHO MEAS - FS: 37.6 %
BH CV ECHO MEAS - IVS/LVPW: 1.06 CM
BH CV ECHO MEAS - IVSD: 0.98 CM
BH CV ECHO MEAS - LAT PEAK E' VEL: 10 CM/SEC
BH CV ECHO MEAS - LV DIASTOLIC VOL/BSA (35-75): 60.2 CM2
BH CV ECHO MEAS - LV MASS(C)D: 149.3 GRAMS
BH CV ECHO MEAS - LV MAX PG: 9.1 MMHG
BH CV ECHO MEAS - LV MEAN PG: 4.3 MMHG
BH CV ECHO MEAS - LV SYSTOLIC VOL/BSA (12-30): 21.8 CM2
BH CV ECHO MEAS - LV V1 MAX: 151 CM/SEC
BH CV ECHO MEAS - LV V1 VTI: 31.2 CM
BH CV ECHO MEAS - LVIDD: 4.6 CM
BH CV ECHO MEAS - LVIDS: 2.9 CM
BH CV ECHO MEAS - LVOT AREA: 4.1 CM2
BH CV ECHO MEAS - LVOT DIAM: 2.28 CM
BH CV ECHO MEAS - LVPWD: 0.92 CM
BH CV ECHO MEAS - MED PEAK E' VEL: 7.6 CM/SEC
BH CV ECHO MEAS - MV A DUR: 0.1 SEC
BH CV ECHO MEAS - MV A MAX VEL: 113 CM/SEC
BH CV ECHO MEAS - MV DEC SLOPE: 277.9 CM/SEC2
BH CV ECHO MEAS - MV DEC TIME: 0.24 SEC
BH CV ECHO MEAS - MV E MAX VEL: 86.6 CM/SEC
BH CV ECHO MEAS - MV E/A: 0.77
BH CV ECHO MEAS - MV MAX PG: 5 MMHG
BH CV ECHO MEAS - MV MEAN PG: 1.54 MMHG
BH CV ECHO MEAS - MV P1/2T: 90.1 MSEC
BH CV ECHO MEAS - MV V2 VTI: 35.8 CM
BH CV ECHO MEAS - MVA(P1/2T): 2.44 CM2
BH CV ECHO MEAS - MVA(VTI): 3.6 CM2
BH CV ECHO MEAS - PA ACC TIME: 0.14 SEC
BH CV ECHO MEAS - PA V2 MAX: 124.9 CM/SEC
BH CV ECHO MEAS - PI END-D VEL: 111.3 CM/SEC
BH CV ECHO MEAS - PULM A REVS DUR: 0.13 SEC
BH CV ECHO MEAS - PULM A REVS VEL: 39 CM/SEC
BH CV ECHO MEAS - PULM DIAS VEL: 34.9 CM/SEC
BH CV ECHO MEAS - PULM S/D: 1.86
BH CV ECHO MEAS - PULM SYS VEL: 64.7 CM/SEC
BH CV ECHO MEAS - QP/QS: 0.69
BH CV ECHO MEAS - RAP SYSTOLE: 3 MMHG
BH CV ECHO MEAS - RV MAX PG: 3.2 MMHG
BH CV ECHO MEAS - RV V1 MAX: 89.1 CM/SEC
BH CV ECHO MEAS - RV V1 VTI: 20.5 CM
BH CV ECHO MEAS - RVOT DIAM: 2.34 CM
BH CV ECHO MEAS - RVSP: 27 MMHG
BH CV ECHO MEAS - SI(MOD-SP2): 35.7 ML/M2
BH CV ECHO MEAS - SI(MOD-SP4): 38.3 ML/M2
BH CV ECHO MEAS - SUP REN AO DIAM: 2.2 CM
BH CV ECHO MEAS - SV(LVOT): 127.3 ML
BH CV ECHO MEAS - SV(MOD-SP2): 80 ML
BH CV ECHO MEAS - SV(MOD-SP4): 86 ML
BH CV ECHO MEAS - SV(RVOT): 87.9 ML
BH CV ECHO MEAS - TAPSE (>1.6): 2.19 CM
BH CV ECHO MEAS - TR MAX PG: 23.6 MMHG
BH CV ECHO MEAS - TR MAX VEL: 242.8 CM/SEC
BH CV ECHO MEASUREMENTS AVERAGE E/E' RATIO: 9.84
BH CV XLRA - RV BASE: 2.8 CM
BH CV XLRA - RV LENGTH: 8 CM
BH CV XLRA - RV MID: 1.76 CM
BH CV XLRA - TDI S': 21.2 CM/SEC
CYTO UR: NORMAL
LAB AP CASE REPORT: NORMAL
LAB AP CLINICAL INFORMATION: NORMAL
LEFT ATRIUM VOLUME INDEX: 16.8 ML/M2
PATH REPORT.FINAL DX SPEC: NORMAL
PATH REPORT.GROSS SPEC: NORMAL
SINUS: 3.4 CM
STJ: 2.9 CM

## 2023-11-21 PROCEDURE — 93306 TTE W/DOPPLER COMPLETE: CPT

## 2023-11-21 NOTE — TELEPHONE ENCOUNTER
Please let him know his echo looks good.  His heart is strong and overall functioning well.  He does still have some issues with narrowing and leakage of his aortic and mitral valves but these are very similar to his echocardiogram last year.  Continue current medications and call with questions or concerns

## 2023-11-29 ENCOUNTER — TELEPHONE (OUTPATIENT)
Dept: OTOLARYNGOLOGY | Facility: CLINIC | Age: 68
End: 2023-11-29

## 2023-11-29 NOTE — TELEPHONE ENCOUNTER
Provider: DR FONTANEZ    Caller:  marie abreu     Relationship to Patient: [facility]    Phone Number: [phone]     Reason for Call: PT'S RIGHT TUBE HAS FALLEN OUT AND PT IS HAVING TROUBLE HEARING OUT OF THAT EAR. DR FONTANEZ'S NEXT APPT ISN'T UNTIL FEBRUARY. PLEASE GIVE PT'S WIFE A CALL BACK TO SCHEDULE APPT.

## 2024-01-15 ENCOUNTER — TELEPHONE (OUTPATIENT)
Dept: OTOLARYNGOLOGY | Facility: CLINIC | Age: 69
End: 2024-01-15

## 2024-01-18 RX ORDER — ENALAPRIL MALEATE 20 MG/1
20 TABLET ORAL 2 TIMES DAILY
Qty: 180 TABLET | Refills: 0 | Status: SHIPPED | OUTPATIENT
Start: 2024-01-18

## 2024-02-28 ENCOUNTER — OFFICE VISIT (OUTPATIENT)
Dept: OTOLARYNGOLOGY | Facility: CLINIC | Age: 69
End: 2024-02-28
Payer: MEDICARE

## 2024-02-28 VITALS — HEIGHT: 70 IN | WEIGHT: 243.4 LBS | BODY MASS INDEX: 34.84 KG/M2 | TEMPERATURE: 97.5 F

## 2024-02-28 DIAGNOSIS — H92.12 OTORRHEA OF LEFT EAR: Primary | ICD-10-CM

## 2024-02-28 DIAGNOSIS — H90.A32 MIXED CONDUCTIVE AND SENSORINEURAL HEARING LOSS OF LEFT EAR WITH RESTRICTED HEARING OF RIGHT EAR: ICD-10-CM

## 2024-02-28 DIAGNOSIS — H65.21 CHRONIC SEROUS OTITIS MEDIA OF RIGHT EAR: ICD-10-CM

## 2024-02-28 RX ORDER — SODIUM PICOSULFATE, MAGNESIUM OXIDE, AND ANHYDROUS CITRIC ACID 12; 3.5; 1 G/175ML; G/175ML; MG/175ML
LIQUID ORAL
COMMUNITY

## 2024-02-28 RX ORDER — INCLISIRAN 284 MG/1.5ML
INJECTION, SOLUTION SUBCUTANEOUS
COMMUNITY
Start: 2024-01-02

## 2024-02-28 RX ORDER — CIPROFLOXACIN AND DEXAMETHASONE 3; 1 MG/ML; MG/ML
4 SUSPENSION/ DROPS AURICULAR (OTIC) 2 TIMES DAILY
Qty: 7.5 ML | Refills: 1 | Status: SHIPPED | OUTPATIENT
Start: 2024-02-28 | End: 2024-03-06

## 2024-02-28 RX ORDER — CIPROFLOXACIN AND DEXAMETHASONE 3; 1 MG/ML; MG/ML
SUSPENSION/ DROPS AURICULAR (OTIC)
COMMUNITY

## 2024-02-28 RX ORDER — VERAPAMIL HYDROCHLORIDE 240 MG/1
TABLET, FILM COATED, EXTENDED RELEASE ORAL
COMMUNITY
Start: 2024-02-16

## 2024-02-28 NOTE — PROGRESS NOTES
Patient Name: Jimmie Jamil   Visit Date: 02/28/2024   Patient ID: [id number]  Provider: Frank Solomon MD    Sex: male  Location: [facility]   YOB: 1955  Location Address: [address] [place] [address], [address],?[place]?[address]    Primary Care Provider Nicko Fontaine APRN  Location Phone: [phone]    Referring Provider: No ref. provider found        Chief Complaint  DIFFICULTY HEARING    History of Present Illness  Jimmie Jamil is a 68 y.o. male who returns today for follow-up of chronic serous otitis media and eustachian tube dysfunction.  He was originally seen on 11/19/2021 at which time he reported predominantly left-sided serous effusions over the last 10 to 15 years.  They typically occur after upper respiratory tract infection.  He has seen 2 separate ENTs in the past and has undergone multiple ear tube placements. CT scan of the head without contrast on 10/12/2021 revealed a partial right mastoid middle ear effusion.  The sinuses were unremarkable.  Examination that day revealed a slightly retracted right tympanic membrane with serous effusion and a left T-tube in place and patent.  After a thorough discussion he elected to pursue right myringotomy and tube placement which was performed in clinic. Audiogram on 6/26/2023 revealed right normal downsloping to mild to moderate sensorineural hearing loss and left mild downsloping to moderate mixed loss with a conductive component from 250 to 1000 Hz.  SRT is 25 on the right and 35 on the left.  Speech discrimination is 84% on the right and 93% on the left at 65 dB.  A seal could not be obtained on the right and was consistent with a type B tympanogram and expanded volume on the left.    He returns today for follow-up having last been seen on 6/26/2023.  Examination that day revealed the left tube to be obstructed with desiccated purulence and this was debrided in clinic.The right T-tube was in place and patent.   He tells me that sometime in October he noticed an acute change in hearing in his right ear during an upper respiratory tract infection.  He has also been experiencing left-sided otorrhea.  He denies any otalgia or vertigo.  He has been on multiple rounds of oral antibiotics and ototopicals.     Oct R HL, L mariely since, multiple rounds of abx and drops  R T tube inserted, L t tube removed and new placed as it was obstructed     Past Medical History:   Diagnosis Date    Arthritis of back 2015    Benign essential hypertension     Bladder problem     Cervical disc disorder 2018    Cervical radiculopathy 03/16/2017    Cervical spinal stenosis 03/16/2017    Cervicalgia 03/16/2017    Coronary artery disease     diastolic dysfunction    ED (erectile dysfunction)     Greater trochanteric bursitis of right hip 07/09/2018    High blood pressure     HL (hearing loss) 01 Oct 2021    Hyperlipidemia     Hypertension     Leg pain     Leg swelling     Low back pain     Low back strain 2015    Lumbago 03/16/2017    Lumbosacral disc disease 2018    Neck pain     Paresthesia 03/16/2017    left hand/leg    Periarthritis of shoulder 2021    Polycythemia     Right hip pain     Rotator cuff syndrome 09/01/2021    Urinary bladder incontinence 03/16/2017       Past Surgical History:   Procedure Laterality Date    ANTERIOR CERVICAL FUSION  04/12/2017    C3-4    CARDIAC CATHETERIZATION      everything okay clearance for neck surgery    CARDIAC CATHETERIZATION N/A 10/13/2021    Procedure: Left Heart Cath;  Surgeon: Anahi Perales MD;  Location: [facility] CATH INVASIVE LOCATION;  Service: Cardiovascular;  Laterality: N/A;    CARDIAC CATHETERIZATION N/A 10/13/2021    Procedure: Coronary angiography;  Surgeon: Anahi Perales MD;  Location:  [facility]ATH INVASIVE [facility];  Service: Cardiovascular;  Laterality: N/A;    CARDIAC CATHETERIZATION N/A 10/13/2021    Procedure: Left ventriculography;  Surgeon: Anahi Perales MD;  [facility] C[facility]  INVASIVE [facility];  Service: Cardiovascular;  Laterality: N/A;    CERVICAL DISC SURGERY      C2-3 fusion    CIRCUMCISION      COLONOSCOPY      COLONOSCOPY N/A 11/20/2023    Procedure: COLONOSCOPY WITH COLD SNARE POLYECTOMY;  Surgeon: Kenny Urena MD;  Location: [facility] ENDOSCOPY;  Service: Gastroenterology;  Laterality: N/A;  COLON POLYP    CYSTOSCOPY  01/10/2014    Cysto ivan retrograde pyelogram urthral bx.    LUMBAR EPIDURAL INJECTION      L2-S2  going to try ablasion in future    NECK SURGERY  2018?    PROSTATE BIOPSY  2018    SHOULDER ARTHROSCOPY W/ LABRAL REPAIR Left     SHOULDER SURGERY  2008    TRIGGER POINT INJECTION  2021    L5-S1    VASECTOMY      WISDOM TOOTH EXTRACTION           Current Outpatient Medications:     ascorbic acid (VITAMIN C) 1000 MG tablet, Take 1 tablet by mouth Daily., Disp: , Rfl:     aspirin 81 MG EC tablet, Take 1 tablet by mouth Daily., Disp: , Rfl:     Coenzyme Q10 (CoQ10) 100 MG capsule, , Disp: , Rfl:     enalapril (VASOTEC) 20 MG tablet, Take 1 tablet by mouth twice daily (Patient taking differently: Take 1 tablet by mouth 2 (Two) Times a Day. TAKES 2 TABS), Disp: 180 tablet, Rfl: 0    Leqvio 284 MG/1.5ML solution prefilled syringe, , Disp: , Rfl:     Testosterone Cypionate (DEPOTESTOTERONE CYPIONATE) 200 MG/ML injection, INJECT 0.26ML UNDER THE SKIN Q 5 DAYS AS DIRECTED, Disp: 10 mL, Rfl: 0    VASCEPA 1 g capsule capsule, Take 2 g by mouth 2 (Two) Times a Day With Meals., Disp: 360 capsule, Rfl: 3    verapamil ER (VERELAN) 240 MG 24 hr capsule, Take 2 capsules by mouth Every Night., Disp: 180 capsule, Rfl: 1    verapamil SR (CALAN-SR) 240 MG CR tablet, , Disp: , Rfl:     ciprofloxacin-dexAMETHasone (CIPRODEX) 0.3-0.1 % otic suspension, Administer 4 drops into ear(s) as directed by provider 2 (Two) Times a Day for 7 days., Disp: 7.5 mL, Rfl: 1    ciprofloxacin-dexAMETHasone (CIPRODEX) 0.3-0.1 % otic suspension, INSTILL 4 DROPS INTO AFFECTED EAR(S) TWICE DAILY FOR 7 DAYS,  "Disp: , Rfl:     Diclofenac Sodium (VOLTAREN) 1 % gel gel, APPLY 2 GRAMS TO THE AFFECTED AREA(S) 4 TIMES PER DAY, Disp: , Rfl:     Sod Picosulfate-Mag Ox-Cit Acd (Clenpiq) 10-3.5-12 MG-GM -GM/175ML solution, , Disp: , Rfl:      Allergies   Allergen Reactions    Beta Adrenergic Blockers Unknown - High Severity    Crestor [Rosuvastatin Calcium] Myalgia    Keflex [Cephalexin] Other (See Comments)     hiccups    Livalo [Pitavastatin] Myalgia    Losartan Other (See Comments)     Intolerance due to fatigue, depression    Naproxen Other (See Comments)     Chest pain       Social History     Tobacco Use    Smoking status: Never     Passive exposure: Never    Smokeless tobacco: Never   Vaping Use    Vaping Use: Never used   Substance Use Topics    Alcohol use: Yes     Alcohol/week: 4.0 standard drinks of alcohol     Types: 4 Cans of beer per week     Comment: 4 beers a week    Drug use: Never        Objective     Vital Signs:   Temp 97.5 °F (36.4 °C)   Ht 177.8 cm (70\")   Wt 110 kg (243 lb 6.4 oz)   BMI 34.92 kg/m²       Physical Exam    General: Well developed, well nourished patient of stated age in no acute distress. Voice is strong and clear.   Head: Normocephalic and atraumatic.  Face: No lesions.  Bilateral parotid and submandibular glands are unremarkable.  Stensen's and Warthin's ducts are productive of clear saliva bilaterally.  House-Brackmann I/VI     bilaterally.   muscles and temporomandibular joint nontender to palpation.  No TMJ crepitus.  Eyes: PERRLA, sclerae anicteric, no conjunctival injection. Extra ocular movements are intact and full. No nystagmus.   Ears: Auricles are normal in appearance. Bilateral external auditory canals are unremarkable.  Right tympanic membrane is intact with a mucoid appearing effusion.  Left tympanic membrane with T-tube in place but plugged with desiccated purulence.  This was examined under the operating microscope and debrided using Ciprodex drops and a #3 " suction.  However, during the debridement the left tube became dislodged.  A T-tube was placed back through that opening after completely suctioning the middle ear and irrigating it with Ciprodex.  Hearing normal to conversational voice.   Nose: External nose is normal in appearance. Bilateral nares are patent with normal appearing mucosa. Septum midline. Turbinates are unremarkable. No lesions.   Oral Cavity: Lips are normal in appearance. Oral mucosa is unremarkable. Gingiva is unremarkable. Normal dentition for age. Tongue is unremarkable with good movement. Hard palate is unremarkable.   Oropharynx: Soft palate is unremarkable with full movement. Uvula is unremarkable. Bilateral tonsils are unremarkable. Posterior oropharynx is unremarkable.    Larynx and hypopharynx: Deferred secondary to gag reflex.  Neck: Supple.  No mass.  Nontender to palpation.  Trachea midline. Thyroid normal size and without nodules to palpation.   Lymphatic: No lymphadenopathy upon palpation.   Psychiatric: Appropriate affect, cooperative   Neurologic: Oriented x 3, strength symmetric in all extremities, Cranial Nerves II-XII are grossly intact to confrontation   Skin: Warm and dry. No rashes.    Procedures   Right myringotomy and tube placement:    Indications: Chronic right serous otitis media.    The patient was brought back to the microscope room and placed reclined in the chair. The microscope was moved into position to view the patient's right ear revealing a normal-appearing external auditory canal and tympanic membrane with possible mucoid effusion. Phenol was used to anesthetize the tympanic membrane and a radial myringotomy performed in the posterioinferior quadrant. A mucoid effusion was suctioned and a T- tube was placed without difficulty. Ciprodex drops were instilled into the canal. The patient tolerated the procedure well.    Result Review :               Assessment and Plan    Diagnoses and all orders for this  visit:    1. Otorrhea of left ear (Primary)  -     ciprofloxacin-dexAMETHasone (CIPRODEX) 0.3-0.1 % otic suspension; Administer 4 drops into ear(s) as directed by provider 2 (Two) Times a Day for 7 days.  Dispense: 7.5 mL; Refill: 1    2. Chronic serous otitis media of right ear    3. Mixed conductive and sensorineural hearing loss of left ear with restricted hearing of right ear      Impressions and findings were discussed.  Currently, he is seen after developing acute hearing loss in his right ear in October and experiencing persistent left-sided otorrhea since.  Examination today reveals the right tube to be absent and the tympanic membrane healed with evidence of a mucoid effusion.  His left T-tube is plugged with desiccated mucus and this was debrided but unfortunately dislodged the tube.  A new T-tube was placed through the opening in the left tympanic membrane.  We discussed options for management of his right ear and he elected to proceed with myringotomy and T-tube placement which was performed successfully today in clinic.  He will be placed on a course of Ciprodex and asked to use them until he is seen for follow-up in 2 weeks.  He was given ample time to ask questions, all of which were answered to his satisfaction.    Follow Up   Return in about 2 weeks (around 3/13/2024).  Patient was given instructions and counseling regarding his condition or for health maintenance advice. Please see specific information pulled into the AVS if appropriate.

## 2024-03-13 ENCOUNTER — LAB (OUTPATIENT)
Dept: LAB | Facility: HOSPITAL | Age: 69
End: 2024-03-13
Payer: MEDICARE

## 2024-03-13 ENCOUNTER — OFFICE VISIT (OUTPATIENT)
Dept: OTOLARYNGOLOGY | Facility: CLINIC | Age: 69
End: 2024-03-13
Payer: MEDICARE

## 2024-03-13 VITALS
HEART RATE: 65 BPM | SYSTOLIC BLOOD PRESSURE: 127 MMHG | TEMPERATURE: 97.9 F | DIASTOLIC BLOOD PRESSURE: 75 MMHG | OXYGEN SATURATION: 96 %

## 2024-03-13 DIAGNOSIS — E29.1 HYPOGONADISM IN MALE: ICD-10-CM

## 2024-03-13 DIAGNOSIS — H65.21 CHRONIC SEROUS OTITIS MEDIA OF RIGHT EAR: ICD-10-CM

## 2024-03-13 DIAGNOSIS — H92.12 OTORRHEA OF LEFT EAR: Primary | ICD-10-CM

## 2024-03-13 DIAGNOSIS — R97.20 ELEVATED PROSTATE SPECIFIC ANTIGEN (PSA): ICD-10-CM

## 2024-03-13 DIAGNOSIS — N40.1 BENIGN PROSTATIC HYPERPLASIA WITH LOWER URINARY TRACT SYMPTOMS, SYMPTOM DETAILS UNSPECIFIED: ICD-10-CM

## 2024-03-13 DIAGNOSIS — H69.91 ETD (EUSTACHIAN TUBE DYSFUNCTION), RIGHT: ICD-10-CM

## 2024-03-13 LAB
ALBUMIN SERPL-MCNC: 4 G/DL (ref 3.5–5.2)
ALBUMIN/GLOB SERPL: 1.4 G/DL
ALP SERPL-CCNC: 66 U/L (ref 39–117)
ALT SERPL W P-5'-P-CCNC: 41 U/L (ref 1–41)
ANION GAP SERPL CALCULATED.3IONS-SCNC: 12.3 MMOL/L (ref 5–15)
AST SERPL-CCNC: 33 U/L (ref 1–40)
BASOPHILS # BLD AUTO: 0.07 10*3/MM3 (ref 0–0.2)
BASOPHILS NFR BLD AUTO: 0.7 % (ref 0–1.5)
BILIRUB SERPL-MCNC: 0.5 MG/DL (ref 0–1.2)
BUN SERPL-MCNC: 13 MG/DL (ref 8–23)
BUN/CREAT SERPL: 10.2 (ref 7–25)
CALCIUM SPEC-SCNC: 9.1 MG/DL (ref 8.6–10.5)
CHLORIDE SERPL-SCNC: 103 MMOL/L (ref 98–107)
CO2 SERPL-SCNC: 22.7 MMOL/L (ref 22–29)
CREAT SERPL-MCNC: 1.27 MG/DL (ref 0.76–1.27)
DEPRECATED RDW RBC AUTO: 39.7 FL (ref 37–54)
EGFRCR SERPLBLD CKD-EPI 2021: 62.3 ML/MIN/1.73
EOSINOPHIL # BLD AUTO: 0.05 10*3/MM3 (ref 0–0.4)
EOSINOPHIL NFR BLD AUTO: 0.5 % (ref 0.3–6.2)
ERYTHROCYTE [DISTWIDTH] IN BLOOD BY AUTOMATED COUNT: 13.5 % (ref 12.3–15.4)
GLOBULIN UR ELPH-MCNC: 2.8 GM/DL
GLUCOSE SERPL-MCNC: 123 MG/DL (ref 65–99)
HCT VFR BLD AUTO: 53.5 % (ref 37.5–51)
HGB BLD-MCNC: 18.4 G/DL (ref 13–17.7)
IMM GRANULOCYTES # BLD AUTO: 0.34 10*3/MM3 (ref 0–0.05)
IMM GRANULOCYTES NFR BLD AUTO: 3.6 % (ref 0–0.5)
LYMPHOCYTES # BLD AUTO: 3.1 10*3/MM3 (ref 0.7–3.1)
LYMPHOCYTES NFR BLD AUTO: 33.2 % (ref 19.6–45.3)
MCH RBC QN AUTO: 28.8 PG (ref 26.6–33)
MCHC RBC AUTO-ENTMCNC: 34.4 G/DL (ref 31.5–35.7)
MCV RBC AUTO: 83.9 FL (ref 79–97)
MONOCYTES # BLD AUTO: 1.02 10*3/MM3 (ref 0.1–0.9)
MONOCYTES NFR BLD AUTO: 10.9 % (ref 5–12)
NEUTROPHILS NFR BLD AUTO: 4.77 10*3/MM3 (ref 1.7–7)
NEUTROPHILS NFR BLD AUTO: 51.1 % (ref 42.7–76)
NRBC BLD AUTO-RTO: 0 /100 WBC (ref 0–0.2)
PLATELET # BLD AUTO: 301 10*3/MM3 (ref 140–450)
PMV BLD AUTO: 10.1 FL (ref 6–12)
POTASSIUM SERPL-SCNC: 4.1 MMOL/L (ref 3.5–5.2)
PROT SERPL-MCNC: 6.8 G/DL (ref 6–8.5)
PSA SERPL-MCNC: 9.01 NG/ML (ref 0–4)
RBC # BLD AUTO: 6.38 10*6/MM3 (ref 4.14–5.8)
SODIUM SERPL-SCNC: 138 MMOL/L (ref 136–145)
TESTOST SERPL-MCNC: 552 NG/DL (ref 193–740)
WBC NRBC COR # BLD AUTO: 9.35 10*3/MM3 (ref 3.4–10.8)

## 2024-03-13 PROCEDURE — 84403 ASSAY OF TOTAL TESTOSTERONE: CPT

## 2024-03-13 PROCEDURE — 3078F DIAST BP <80 MM HG: CPT | Performed by: OTOLARYNGOLOGY

## 2024-03-13 PROCEDURE — 84153 ASSAY OF PSA TOTAL: CPT

## 2024-03-13 PROCEDURE — 80053 COMPREHEN METABOLIC PANEL: CPT

## 2024-03-13 PROCEDURE — 3074F SYST BP LT 130 MM HG: CPT | Performed by: OTOLARYNGOLOGY

## 2024-03-13 PROCEDURE — 85025 COMPLETE CBC W/AUTO DIFF WBC: CPT

## 2024-03-13 PROCEDURE — 36415 COLL VENOUS BLD VENIPUNCTURE: CPT

## 2024-03-13 PROCEDURE — 99212 OFFICE O/P EST SF 10 MIN: CPT | Performed by: OTOLARYNGOLOGY

## 2024-03-13 RX ORDER — OFLOXACIN 3 MG/ML
SOLUTION AURICULAR (OTIC)
COMMUNITY
Start: 2024-03-12

## 2024-03-13 RX ORDER — METAXALONE 800 MG/1
800 TABLET ORAL
COMMUNITY

## 2024-03-13 RX ORDER — INDOMETHACIN 75 MG/1
75 CAPSULE, EXTENDED RELEASE ORAL
COMMUNITY

## 2024-03-13 NOTE — PROGRESS NOTES
Patient Name: Jimmie Jamil   Visit Date: 03/13/2024   Patient ID: [id number]  Provider: Frank Solomon MD    Sex: male  Location: [facility]   YOB: 1955  Location Address: [address] [place] [address], [address],?[place]?[id number]    Primary Care Provider Nicko Fontaine APRN  Location Phone: [phone]    Referring Provider: No ref. provider found        Chief Complaint  2 week follow up    History of Present Illness  Jimmie Jamil is a 68 y.o. male who returns today for follow-up of chronic serous otitis media and eustachian tube dysfunction.  He was originally seen on 11/19/2021 at which time he reported predominantly left-sided serous effusions over the last 10 to 15 years.  They typically occur after upper respiratory tract infection.  He has seen 2 separate ENTs in the past and has undergone multiple ear tube placements. CT scan of the head without contrast on 10/12/2021 revealed a partial right mastoid middle ear effusion.  The sinuses were unremarkable.  Examination that day revealed a slightly retracted right tympanic membrane with serous effusion and a left T-tube in place and patent.  After a thorough discussion he elected to pursue right myringotomy and tube placement which was performed in clinic. Audiogram on 6/26/2023 revealed right normal downsloping to mild to moderate sensorineural hearing loss and left mild downsloping to moderate mixed loss with a conductive component from 250 to 1000 Hz.  SRT is 25 on the right and 35 on the left.  Speech discrimination is 84% on the right and 93% on the left at 65 dB.  A seal could not be obtained on the right and was consistent with a type B tympanogram and expanded volume on the left.    He returns today for follow-up having last been seen on 2/28/2024 at which time the right tube was absent and there was evidence of mucoid effusion.  The right T-tube was replaced and the left T-tube was plugged with desiccated  mucus and ended up being replaced.  He was placed on Ciprodex.  He tells me that his hearing has improved significantly in both ears but still feels a little bit off in the left.  He has not experienced any left-sided otorrhea over the last few days.  He denies any otalgia.  Past Medical History:   Diagnosis Date    Arthritis of back 2015    Benign essential hypertension     Bladder problem     Cervical disc disorder 2018    Cervical radiculopathy 03/16/2017    Cervical spinal stenosis 03/16/2017    Cervicalgia 03/16/2017    Coronary artery disease     diastolic dysfunction    ED (erectile dysfunction)     Greater trochanteric bursitis of right hip 07/09/2018    High blood pressure     HL (hearing loss) 01 Oct 2021    Hyperlipidemia     Hypertension     Leg pain     Leg swelling     Low back pain     Low back strain 2015    Lumbago 03/16/2017    Lumbosacral disc disease 2018    Neck pain     Paresthesia 03/16/2017    left hand/leg    Periarthritis of shoulder 2021    Polycythemia     Right hip pain     Rotator cuff syndrome 09/01/2021    Urinary bladder incontinence 03/16/2017       Past Surgical History:   Procedure Laterality Date    ANTERIOR CERVICAL FUSION  04/12/2017    C3-4    CARDIAC CATHETERIZATION      everything okay clearance for neck surgery    CARDIAC CATHETERIZATION N/A 10/13/2021    Procedure: Left Heart Cath;  Surgeon: Anahi Perales MD;  Location:  [facility];  Service: Cardiovascular;  Laterality: N/A;    CARDIAC CATHETERIZATION N/A 10/13/2021    Procedure: Coronary angiography;  Surgeon: Anahi Perales MD;  Location:  [facility];  Service: Cardiovascular;  Laterality: N/A;    CARDIAC CATHETERIZATION N/A 10/13/2021    Procedure: Left ventriculography;  Surgeon: Anahi Perales MD;  Location:  [facility];  Service: Cardiovascular;  Laterality: N/A;    CERVICAL DISC SURGERY      C2-3 fusion    CIRCUMCISION      COLONOSCOPY      COLONOSCOPY  N/A 11/20/2023    Procedure: COLONOSCOPY WITH COLD SNARE POLYECTOMY;  Surgeon: Kenny Urena MD;  Location: [facility];  Service: Gastroenterology;  Laterality: N/A;  COLON POLYP    CYSTOSCOPY  01/10/2014    Cysto ivan retrograde pyelogram urthral bx.    LUMBAR EPIDURAL INJECTION      L2-S2  going to try ablasion in future    NECK SURGERY  2018?    PROSTATE BIOPSY  2018    SHOULDER ARTHROSCOPY W/ LABRAL REPAIR Left     SHOULDER SURGERY  2008    TRIGGER POINT INJECTION  2021    L5-S1    VASECTOMY      WISDOM TOOTH EXTRACTION           Current Outpatient Medications:     ascorbic acid (VITAMIN C) 1000 MG tablet, Take 1 tablet by mouth Daily., Disp: , Rfl:     aspirin 81 MG EC tablet, Take 1 tablet by mouth Daily., Disp: , Rfl:     ciprofloxacin-dexAMETHasone (CIPRODEX) 0.3-0.1 % otic suspension, INSTILL 4 DROPS INTO AFFECTED EAR(S) TWICE DAILY FOR 7 DAYS, Disp: , Rfl:     Coenzyme Q10 (CoQ10) 100 MG capsule, , Disp: , Rfl:     Diclofenac Sodium (VOLTAREN) 1 % gel gel, APPLY 2 GRAMS TO THE AFFECTED AREA(S) 4 TIMES PER DAY, Disp: , Rfl:     enalapril (VASOTEC) 20 MG tablet, Take 1 tablet by mouth twice daily (Patient taking differently: Take 1 tablet by mouth 2 (Two) Times a Day. TAKES 2 TABS), Disp: 180 tablet, Rfl: 0    indomethacin SR (INDOCIN SR) 75 MG CR capsule, Take 1 capsule by mouth., Disp: , Rfl:     Leqvio 284 MG/1.5ML solution prefilled syringe, , Disp: , Rfl:     metaxalone (SKELAXIN) 800 MG tablet, Take 1 tablet by mouth., Disp: , Rfl:     ofloxacin (FLOXIN) 0.3 % otic solution, , Disp: , Rfl:     Sod Picosulfate-Mag Ox-Cit Acd (Clenpiq) 10-3.5-12 MG-GM -GM/175ML solution, , Disp: , Rfl:     Testosterone Cypionate (DEPOTESTOTERONE CYPIONATE) 200 MG/ML injection, INJECT 0.26ML UNDER THE SKIN Q 5 DAYS AS DIRECTED, Disp: 10 mL, Rfl: 0    VASCEPA 1 g capsule capsule, Take 2 g by mouth 2 (Two) Times a Day With Meals., Disp: 360 capsule, Rfl: 3    verapamil ER (VERELAN) 240 MG 24 hr capsule, Take 2  capsules by mouth Every Night., Disp: 180 capsule, Rfl: 1    verapamil SR (CALAN-SR) 240 MG CR tablet, , Disp: , Rfl:      Allergies   Allergen Reactions    Beta Adrenergic Blockers Unknown - High Severity    Crestor [Rosuvastatin Calcium] Myalgia    Keflex [Cephalexin] Other (See Comments)     hiccups    Livalo [Pitavastatin] Myalgia    Losartan Other (See Comments)     Intolerance due to fatigue, depression    Naproxen Other (See Comments)     Chest pain       Social History     Tobacco Use    Smoking status: Never     Passive exposure: Never    Smokeless tobacco: Never   Vaping Use    Vaping status: Never Used   Substance Use Topics    Alcohol use: Yes     Alcohol/week: 4.0 standard drinks of alcohol     Types: 4 Cans of beer per week     Comment: 4 beers a week    Drug use: Never        Objective     Vital Signs:   /75   Pulse 65   Temp 97.9 °F (36.6 °C)   SpO2 96%       Physical Exam    General: Well developed, well nourished patient of stated age in no acute distress. Voice is strong and clear.   Head: Normocephalic and atraumatic.  Face: No lesions.  Bilateral parotid and submandibular glands are unremarkable.  Stensen's and Warthin's ducts are productive of clear saliva bilaterally.  House-Brackmann I/VI     bilaterally.   muscles and temporomandibular joint nontender to palpation.  No TMJ crepitus.  Eyes: PERRLA, sclerae anicteric, no conjunctival injection. Extra ocular movements are intact and full. No nystagmus.   Ears: Auricles are normal in appearance. Bilateral external auditory canals are unremarkable.  Right tympanic membrane with T-tube in place and patent but angled anteriorly.  Left tympanic membrane with T-tube in place and patent but with a small amount of mucus present within the tube.  This was debrided using the operating microscope along with a combination of Ciprodex and a #3 suction.  Hearing normal to conversational voice.   Nose: External nose is normal in appearance.  Bilateral nares are patent with normal appearing mucosa. Septum midline. Turbinates are unremarkable. No lesions.   Oral Cavity: Lips are normal in appearance. Oral mucosa is unremarkable. Gingiva is unremarkable. Normal dentition for age. Tongue is unremarkable with good movement. Hard palate is unremarkable.   Oropharynx: Soft palate is unremarkable with full movement. Uvula is unremarkable. Bilateral tonsils are unremarkable. Posterior oropharynx is unremarkable.    Larynx and hypopharynx: Deferred secondary to gag reflex.  Neck: Supple.  No mass.  Nontender to palpation.  Trachea midline. Thyroid normal size and without nodules to palpation.   Lymphatic: No lymphadenopathy upon palpation.   Psychiatric: Appropriate affect, cooperative   Neurologic: Oriented x 3, strength symmetric in all extremities, Cranial Nerves II-XII are grossly intact to confrontation   Skin: Warm and dry. No rashes.    Procedures   Result Review :               Assessment and Plan    Diagnoses and all orders for this visit:    1. Otorrhea of left ear (Primary)    2. Chronic serous otitis media of right ear    3. ETD (Eustachian tube dysfunction), right        Impressions and findings were discussed.  Currently, he is doing well status post right myringotomy and T-tube placement along with left T-tube exchange at his last appointment.  Both T tubes are in place and patent but there is a small amount of mucus present in the left T-tube which was debrided today in clinic.  We discussed continuing to use Ciprodex over the next 4 days.  He was given ample time to ask questions, all of which were answered to his satisfaction.    Follow Up   No follow-ups on file.  Patient was given instructions and counseling regarding his condition or for health maintenance advice. Please see specific information pulled into the AVS if appropriate.

## 2024-03-23 NOTE — PROGRESS NOTES
Chief Complaint    Urologic complaint    Subjective          Jimmie Jamil presents to [facility]  History of Present Illness        68-year-old  gentleman     Hypogonadism   [facility]   [facility]        3/24   H/H  18/53,   1.2, GFR 62    Patient was having some dysuria/gross hematuria last month.  All this has resolved.   Did not end up having to have antibiotics.      Voiding okay.  Nocturia x2 doing well currently.  No prostate meds.  No change.      Libido ok      doing 0.25 mL Depo testosterone subcu every 5 days in 1/21.  Gets a 10 mL vial about every 5 months.            Previous    Has used Cialis 20 mg in the past, is having no trouble currently.    doing phlebotomy/donating blood about every 6 months.    History of increased H/H -has been doing well for a while    2/15/2022  Rezu - 4 cm prostate    Testosterone has run high in the past.  We have decreased his dose from 0.5 x 1 point.    Endocrinologist that he saw earlier that  had recommended therapeutic phlebotomy has retired.    Flomax  - could not tolerate, helped urination.  Nasal stuffiness, unusual feeling    Was on 0.5 mL IM Depo-Testosterone every 9 days before.     Has been on injections for several years    Sildenafil could not tolerate secondary to  side effects    No FH of prostate CA      No cardiopulmonary history.  Patient does not smoke.  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  Removed.  Normal bladder otherwise and normal retrogrades  Pathology negative    started initially for decreased libido and fatigue.  Did try AndroGel and patches in the past and did not like these b/c of side effects.    9/21 creatinine 1.4, GFR 51    Total testosterone      3/24    552   8/23    634  3/23    496       9/22    469  3/22   587  9/21    596  12/30/20  159  12/20  824  8/20    668  2/20    806  10/19  995  11/19 683  8/19    251     928     504 - every 10 days  3/19   826    713    1069    933       563   246   >1500 -  dose was cut in half      PSA    3/24      9.0      3/23      5.6       6.4       4.7   MRI prostate -49 g -negative, chronic prostatitis    4.92   5.46    4.28  3/19  4.0   prostate biopsy 43 g - negative    6.75    2.54          Past History:  Medical History: has a past medical history of Arthritis of back (), Benign essential hypertension, Bladder problem, Cervical disc disorder (), Cervical radiculopathy (2017), Cervical spinal stenosis (2017), Cervicalgia (2017), Coronary artery disease, ED (erectile dysfunction), Greater trochanteric bursitis of right hip (2018), High blood pressure, HL (hearing loss) (01 Oct 2021), Hyperlipidemia, Hypertension, Leg pain, Leg swelling, Low back pain, Low back strain (), Lumbago (2017), Lumbosacral disc disease (), Neck pain, Paresthesia (2017), Periarthritis of shoulder (), Polycythemia, Right hip pain, Rotator cuff syndrome (2021), and Urinary bladder incontinence (2017).   Surgical History: has a past surgical history that includes Cardiac catheterization; Cervical disc surgery; Lumbar epidural injection; Vasectomy; Circumcision, non-; Shoulder arthroscopy w/ labral repair (Left); Colonoscopy; [place] tooth extraction; Anterior Cervical Fusion (2017); Cystoscopy (01/10/2014); Prostate biopsy (); Cardiac catheterization (N/A, 10/13/2021); Cardiac catheterization (N/A, 10/13/2021); Cardiac catheterization (N/A, 10/13/2021); Neck surgery (2018?); Shoulder surgery (); Trigger point injection (); and Colonoscopy (N/A, 2023).   Family History: family history includes Alzheimer's disease (age of onset: 80) in his mother; Arthritis in his father and mother; Cancer in his father; Colon cancer (age of onset: 85) in his  father; Heart disease in his father; Hypertension (age of onset: 80) in his father; Stroke in his father.   Social History: reports that he has never smoked. He has never been exposed to tobacco smoke. He has never used smokeless tobacco. He reports current alcohol use of about 4.0 standard drinks of alcohol per week. He reports that he does not use drugs.  Allergies: Beta adrenergic blockers, Crestor [rosuvastatin calcium], Keflex [cephalexin], Livalo [pitavastatin], Losartan, and Naproxen       Current Outpatient Medications:     ascorbic acid (VITAMIN C) 1000 MG tablet, Take 1 tablet by mouth Daily., Disp: , Rfl:     aspirin 81 MG EC tablet, Take 1 tablet by mouth Daily., Disp: , Rfl:     ciprofloxacin-dexAMETHasone (CIPRODEX) 0.3-0.1 % otic suspension, INSTILL 4 DROPS INTO AFFECTED EAR(S) TWICE DAILY FOR 7 DAYS, Disp: , Rfl:     Coenzyme Q10 (CoQ10) 100 MG capsule, , Disp: , Rfl:     Diclofenac Sodium (VOLTAREN) 1 % gel gel, APPLY 2 GRAMS TO THE AFFECTED AREA(S) 4 TIMES PER DAY, Disp: , Rfl:     enalapril (VASOTEC) 20 MG tablet, Take 1 tablet by mouth twice daily (Patient taking differently: Take 1 tablet by mouth 2 (Two) Times a Day. TAKES 2 TABS), Disp: 180 tablet, Rfl: 0    indomethacin SR (INDOCIN SR) 75 MG CR capsule, Take 1 capsule by mouth., Disp: , Rfl:     Leqvio 284 MG/1.5ML solution prefilled syringe, , Disp: , Rfl:     metaxalone (SKELAXIN) 800 MG tablet, Take 1 tablet by mouth., Disp: , Rfl:     ofloxacin (FLOXIN) 0.3 % otic solution, , Disp: , Rfl:     Sod Picosulfate-Mag Ox-Cit Acd (Clenpiq) 10-3.5-12 MG-GM -GM/175ML solution, , Disp: , Rfl:     Testosterone Cypionate (DEPOTESTOTERONE CYPIONATE) 200 MG/ML injection, INJECT 0.26ML UNDER THE SKIN Q 5 DAYS AS DIRECTED, Disp: 10 mL, Rfl: 0    VASCEPA 1 g capsule capsule, Take 2 g by mouth 2 (Two) Times a Day With Meals., Disp: 360 capsule, Rfl: 3    verapamil ER (VERELAN) 240 MG 24 hr capsule, Take 2 capsules by mouth Every Night., Disp: 180 capsule,  Rfl: 1    verapamil SR (CALAN-SR) 240 MG CR tablet, , Disp: , Rfl:          Objective     Vital Signs:   There were no vitals taken for this visit.             Assessment and Plan    Diagnoses and all orders for this visit:    1. Hypogonadism in male (Primary)    2. Benign prostatic hyperplasia with lower urinary tract symptoms, symptom details unspecified    3. Elevated PSA        Gross Hematuria      Patient was having pain with ejaculation and dysuria when he was having gross hematuria after discussion at this time we will hold off on any workup on this and he will let me know if he has any more.          Hypogonadism      Cont 0.26 mL Depo testosterone subcu every 5 days.     likes to use 1 - 10 mL vial and get refill after.  Refilled today    Will need total testosterone, CBC and CMP in 9/24          Elevated PSA      PSA has come up to 9.  Patient has had some pain with ejaculation a while back which resolved.  I am worried about some prostatitis.  When placed him on doxycycline 100 mg p.o. twice daily x 3 weeks.  Risk and benefits discussed.    PSA in 3 months

## 2024-03-26 ENCOUNTER — OFFICE VISIT (OUTPATIENT)
Dept: UROLOGY | Facility: CLINIC | Age: 69
End: 2024-03-26
Payer: MEDICARE

## 2024-03-26 VITALS — BODY MASS INDEX: 34.79 KG/M2 | HEIGHT: 70 IN | RESPIRATION RATE: 17 BRPM | WEIGHT: 243 LBS

## 2024-03-26 DIAGNOSIS — E29.1 HYPOGONADISM IN MALE: Primary | ICD-10-CM

## 2024-03-26 DIAGNOSIS — R97.20 ELEVATED PSA: ICD-10-CM

## 2024-03-26 DIAGNOSIS — R31.0 GROSS HEMATURIA: ICD-10-CM

## 2024-03-26 DIAGNOSIS — N40.1 BENIGN PROSTATIC HYPERPLASIA WITH LOWER URINARY TRACT SYMPTOMS, SYMPTOM DETAILS UNSPECIFIED: ICD-10-CM

## 2024-03-26 RX ORDER — TESTOSTERONE CYPIONATE 200 MG/ML
INJECTION, SOLUTION INTRAMUSCULAR
Qty: 10 ML | Refills: 0 | Status: SHIPPED | OUTPATIENT
Start: 2024-03-26

## 2024-03-26 RX ORDER — DOXYCYCLINE HYCLATE 100 MG/1
100 CAPSULE ORAL 2 TIMES DAILY
Qty: 42 CAPSULE | Refills: 0 | Status: SHIPPED | OUTPATIENT
Start: 2024-03-26 | End: 2024-04-16

## 2024-05-01 RX ORDER — VERAPAMIL HYDROCHLORIDE 240 MG/1
CAPSULE, EXTENDED RELEASE ORAL
Qty: 180 CAPSULE | Refills: 1 | Status: SHIPPED | OUTPATIENT
Start: 2024-05-01

## 2024-05-14 RX ORDER — ENALAPRIL MALEATE 20 MG/1
20 TABLET ORAL 2 TIMES DAILY
Qty: 180 TABLET | Refills: 0 | Status: SHIPPED | OUTPATIENT
Start: 2024-05-14

## 2024-06-18 ENCOUNTER — LAB (OUTPATIENT)
Dept: LAB | Facility: HOSPITAL | Age: 69
End: 2024-06-18
Payer: MEDICARE

## 2024-06-18 DIAGNOSIS — R97.20 ELEVATED PSA: ICD-10-CM

## 2024-06-18 LAB — PSA SERPL-MCNC: 8.77 NG/ML (ref 0–4)

## 2024-06-18 PROCEDURE — 36415 COLL VENOUS BLD VENIPUNCTURE: CPT

## 2024-06-18 PROCEDURE — 84153 ASSAY OF PSA TOTAL: CPT

## 2024-06-22 NOTE — PROGRESS NOTES
Chief Complaint    Urologic complaint    Subjective          Jimmie Jamil presents to [facility]  History of Present Illness        68-year-old  gentleman     Hypogonadism   BPH - 2/15/2022  Rezu   ED      Status post doxycycline x 2 weeks, no side effects    Follows up with PSA no bloody urine      3/24   H/H  18/53,   1.2, GFR 62      No GH      Voiding okay.  Nocturia x2 doing well currently.  No prostate meds.       Libido ok /no fatigue      doing 0.26 mL Depo testosterone subcu every 5 days in 1/21.  Gets a 10 mL vial about every 5 months.        No family history of prostate cancer.    Previous    Has used Cialis 20 mg in the past, is having no trouble currently.    doing phlebotomy/donating blood about every 6 months.    History of increased H/H -has been doing well for a while    2/15/2022  Rezum - 4 cm prostate    Testosterone has run high in the past.  We have decreased his dose from 0.5 x 1 point.    Endocrinologist that he saw earlier that  had recommended therapeutic phlebotomy has retired.    Flomax  - could not tolerate, helped urination.  Nasal stuffiness, unusual feeling    Was on 0.5 mL IM Depo-Testosterone every 9 days before.     Has been on injections for several years    Sildenafil could not tolerate secondary to  side effects      No cardiopulmonary history.  Patient does not smoke.  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  Removed.  Normal bladder otherwise and normal retrogrades  Pathology negative    started initially for decreased libido and fatigue.  Did try AndroGel and patches in the past and did not like these b/c of side effects.    9/21 creatinine 1.4, GFR 51    Total testosterone      3/24    552   8/23    634  3/23    496       9/22    469  3/22   587  9/21    596  12/30/20  159  12/20  824  8/20    668  2/20    806  10/19  995  11/19 683  8/19   251  7/19    928     504 - every 10 days  3/19   826    713    1069    933       563   246   >1500 -  dose was cut in half      PSA          8.7      PSAd  0.17  3/24      9.0      3/23      5.6       6.4       4.7   MRI prostate -49 g -negative, chronic prostatitis    4.92   5.46    4.28  3/19  4.0   prostate biopsy 43 g - negative    6.75    2.54          Past History:  Medical History: has a past medical history of Arthritis of back (), Benign essential hypertension, Bladder problem, Cervical disc disorder (), Cervical radiculopathy (2017), Cervical spinal stenosis (2017), Cervicalgia (2017), Coronary artery disease, ED (erectile dysfunction), Greater trochanteric bursitis of right hip (2018), High blood pressure, HL (hearing loss) (01 Oct 2021), Hyperlipidemia, Hypertension, Leg pain, Leg swelling, Low back pain, Low back strain (), Lumbago (2017), Lumbosacral disc disease (), Neck pain, Paresthesia (2017), Periarthritis of shoulder (), Polycythemia, Right hip pain, Rotator cuff syndrome (2021), and Urinary bladder incontinence (2017).   Surgical History: has a past surgical history that includes Cardiac catheterization; Cervical disc surgery; Lumbar epidural injection; Vasectomy; Circumcision, non-; Shoulder arthroscopy w/ labral repair (Left); Colonoscopy; [place] tooth extraction; Anterior Cervical Fusion (2017); Cystoscopy (01/10/2014); Prostate biopsy (); Cardiac catheterization (N/A, 10/13/2021); Cardiac catheterization (N/A, 10/13/2021); Cardiac catheterization (N/A, 10/13/2021); Neck surgery (2018?); Shoulder surgery (); Trigger point injection (); and Colonoscopy (N/A, 2023).   Family History:          Objective     Vital Signs:   There were no vitals taken for this visit.             Assessment and Plan    Diagnoses and all orders for this  visit:    1. Elevated PSA (Primary)    2. Hypogonadism in male    3. Gross hematuria        Gross Hematuria      Patient was having pain with ejaculation and dysuria when he was having gross hematuria after discussion at this time we will hold off on any workup on this and he will let me know if he has any more.          Hypogonadism      Patient is wanting to increase dose to 0.27.  We discussed risk and benefits he is going to try this.    Cont 0.27 mL Depo testosterone subcu every 5 days.     likes to use 1 - 10 mL vial and get refill after.     Will need total testosterone, CBC and CMP in 9/24    Testosterone before follow-up      Elevated PSA    Patient's PSA has not really come down much.  After discussion we will go get MRI prostate rule out underlying malignancy    Follow-up after MRI and  testosterone

## 2024-06-25 ENCOUNTER — OFFICE VISIT (OUTPATIENT)
Dept: UROLOGY | Facility: CLINIC | Age: 69
End: 2024-06-25
Payer: MEDICARE

## 2024-06-25 VITALS — WEIGHT: 243 LBS | HEIGHT: 70 IN | BODY MASS INDEX: 34.79 KG/M2

## 2024-06-25 DIAGNOSIS — R97.20 ELEVATED PSA: Primary | ICD-10-CM

## 2024-06-25 DIAGNOSIS — E29.1 HYPOGONADISM IN MALE: ICD-10-CM

## 2024-06-25 DIAGNOSIS — R31.0 GROSS HEMATURIA: ICD-10-CM

## 2024-07-01 ENCOUNTER — TELEPHONE (OUTPATIENT)
Dept: OTOLARYNGOLOGY | Facility: CLINIC | Age: 69
End: 2024-07-01

## 2024-07-01 NOTE — TELEPHONE ENCOUNTER
Provider: DR FONTANEZ    Caller: NIMA GARCIA    Relationship to Patient: SPOUSE      Reason for Call: PT CALLED TO SCHEDULE FU APPT WITH DR FONTANEZ - PT IS NOW HAVING FLUID LEAKING FROM THE LEFT EAR AND EXPERIENCING HEARING LOSS. PT CAN ONLY HEAR A LITTLE OUT OF THE RIGHT EAR.    PT IS SCHEDULED 7/12/24 - PT REQUESTED TO BE SEEN SOONER IF POSSIBLE.      When was the patient last seen: 3/13/24    When did it start: 11/11    Where is it located: LEFT EAR    Characteristics of symptom/severity: 8    Timing- Is it constant or intermittent: CONSTANT    What makes it worse: NA    What makes it better: NA    What therapies/medications have you tried: ALLEGRA, EAR DROPS    PLEASE REVIEW AND CALL PT WIFE TO CONFIRM IF HE CAN BE SEEN SOONER. PT CANNOT HEAR ON PH PLEASE CALL WIFE

## 2024-07-12 ENCOUNTER — OFFICE VISIT (OUTPATIENT)
Dept: OTOLARYNGOLOGY | Facility: CLINIC | Age: 69
End: 2024-07-12
Payer: MEDICARE

## 2024-07-12 VITALS
SYSTOLIC BLOOD PRESSURE: 176 MMHG | TEMPERATURE: 97.7 F | DIASTOLIC BLOOD PRESSURE: 93 MMHG | HEART RATE: 98 BPM | OXYGEN SATURATION: 97 %

## 2024-07-12 DIAGNOSIS — H92.12 OTORRHEA OF LEFT EAR: Primary | ICD-10-CM

## 2024-07-12 DIAGNOSIS — L92.9 GRANULATION TISSUE OF EAR CANAL: ICD-10-CM

## 2024-07-12 PROCEDURE — 87186 SC STD MICRODIL/AGAR DIL: CPT | Performed by: OTOLARYNGOLOGY

## 2024-07-12 PROCEDURE — 87070 CULTURE OTHR SPECIMN AEROBIC: CPT | Performed by: OTOLARYNGOLOGY

## 2024-07-12 PROCEDURE — 87205 SMEAR GRAM STAIN: CPT | Performed by: OTOLARYNGOLOGY

## 2024-07-12 PROCEDURE — 87077 CULTURE AEROBIC IDENTIFY: CPT | Performed by: OTOLARYNGOLOGY

## 2024-07-12 RX ORDER — VERAPAMIL HYDROCHLORIDE 180 MG/1
180 CAPSULE, EXTENDED RELEASE ORAL
COMMUNITY
Start: 2024-07-03 | End: 2025-07-04

## 2024-07-12 RX ORDER — OFLOXACIN 3 MG/ML
SOLUTION/ DROPS OPHTHALMIC
COMMUNITY
Start: 2024-07-08

## 2024-07-12 RX ORDER — TOBRAMYCIN / DEXAMETHASONE 3; .5 MG/ML; MG/ML
SUSPENSION/ DROPS OPHTHALMIC
Qty: 5 ML | Refills: 1 | Status: SHIPPED | OUTPATIENT
Start: 2024-07-12

## 2024-07-12 RX ORDER — VERAPAMIL HYDROCHLORIDE 240 MG/1
480 TABLET, FILM COATED, EXTENDED RELEASE ORAL NIGHTLY
COMMUNITY
Start: 2024-07-02

## 2024-07-12 NOTE — PROGRESS NOTES
Patient Name: Jimmie Jamil   Visit Date: 07/12/2024   Patient ID: [id number]  Provider: Frank Solomon MD    Sex: male  Location: [facility]   YOB: 1955  Location Address: [address] [place] [address], [address] [place] [address],?[place]?[id number]    Primary Care Provider Nicko Fontaine APRN  Location Phone: [phone]    Referring Provider: No ref. provider found        Chief Complaint  Ear Drainage    History of Present Illness  Jimmie Jamli is a 68 y.o. male who returns today for follow-up of chronic serous otitis media and eustachian tube dysfunction.  He was originally seen on 11/19/2021 at which time he reported predominantly left-sided serous effusions over the last 10 to 15 years.  They typically occur after upper respiratory tract infection.  He has seen 2 separate ENTs in the past and has undergone multiple ear tube placements. CT scan of the head without contrast on 10/12/2021 revealed a partial right mastoid middle ear effusion.  The sinuses were unremarkable.  Examination that day revealed a slightly retracted right tympanic membrane with serous effusion and a left T-tube in place and patent.  After a thorough discussion he elected to pursue right myringotomy and tube placement which was performed in clinic. Audiogram on 6/26/2023 revealed right normal downsloping to mild to moderate sensorineural hearing loss and left mild downsloping to moderate mixed loss with a conductive component from 250 to 1000 Hz.  SRT is 25 on the right and 35 on the left.  Speech discrimination is 84% on the right and 93% on the left at 65 dB.  A seal could not be obtained on the right and was consistent with a type B tympanogram and expanded volume on the left.    He returns today for follow-up having last been seen on 3/13/2024 at which time his left T-tube was plugged with mucus and debrided in clinic. He tells me that his ears stayed clear for a few weeks before draining mucous again. It has  occurred intermittently ever since. It is mostly clear watery on the left. He reports a history of left sided head trauma in November of 2023.  He denies any obvious change in the otorrhea with bending over or straining.   Past Medical History:   Diagnosis Date    Arthritis of back 2015    Benign essential hypertension     Bladder problem     Cervical disc disorder 2018    Cervical radiculopathy 03/16/2017    Cervical spinal stenosis 03/16/2017    Cervicalgia 03/16/2017    Coronary artery disease     diastolic dysfunction    ED (erectile dysfunction)     Greater trochanteric bursitis of right hip 07/09/2018    High blood pressure     HL (hearing loss) 01 Oct 2021    Hyperlipidemia     Hypertension     Leg pain     Leg swelling     Low back pain     Low back strain 2015    Lumbago 03/16/2017    Lumbosacral disc disease 2018    Neck pain     Paresthesia 03/16/2017    left hand/leg    Periarthritis of shoulder 2021    Polycythemia     Right hip pain     Rotator cuff syndrome 09/01/2021    Urinary bladder incontinence 03/16/2017       Past Surgical History:   Procedure Laterality Date    ANTERIOR CERVICAL FUSION  04/12/2017    C3-4    CARDIAC CATHETERIZATION      everything okay clearance for neck surgery    CARDIAC CATHETERIZATION N/A 10/13/2021    Procedure: Left Heart Cath;  Surgeon: Anahi Perales MD;  Location:  [facility];  Service: Cardiovascular;  Laterality: N/A;    CARDIAC CATHETERIZATION N/A 10/13/2021    Procedure: Coronary angiography;  Surgeon: Anahi Perales MD;  Location:  [facility];  Service: Cardiovascular;  Laterality: N/A;    CARDIAC CATHETERIZATION N/A 10/13/2021    Procedure: Left ventriculography;  Surgeon: Anahi Perales MD;  Location:  [facility];  Service: Cardiovascular;  Laterality: N/A;    CERVICAL DISC SURGERY      C2-3 fusion    CIRCUMCISION      COLONOSCOPY      COLONOSCOPY N/A 11/20/2023    Procedure: COLONOSCOPY WITH COLD  SNARE POLYECTOMY;  Surgeon: Kenny Urena MD;  Location: [facility];  Service: Gastroenterology;  Laterality: N/A;  COLON POLYP    CYSTOSCOPY  01/10/2014    Cysto ivan retrograde pyelogram urthral bx.    LUMBAR EPIDURAL INJECTION      L2-S2  going to try ablasion in future    NECK SURGERY  2018?    PROSTATE BIOPSY  2018    SHOULDER ARTHROSCOPY W/ LABRAL REPAIR Left     SHOULDER SURGERY  2008    TRIGGER POINT INJECTION  2021    L5-S1    VASECTOMY      WISDOM TOOTH EXTRACTION           Current Outpatient Medications:     ascorbic acid (VITAMIN C) 1000 MG tablet, Take 1 tablet by mouth Daily., Disp: , Rfl:     aspirin 81 MG EC tablet, Take 1 tablet by mouth Daily., Disp: , Rfl:     Coenzyme Q10 (CoQ10) 100 MG capsule, , Disp: , Rfl:     enalapril (VASOTEC) 20 MG tablet, Take 1 tablet by mouth twice daily, Disp: 180 tablet, Rfl: 0    Leqvio 284 MG/1.5ML solution prefilled syringe, , Disp: , Rfl:     ofloxacin (OCUFLOX) 0.3 % ophthalmic solution, , Disp: , Rfl:     Sod Picosulfate-Mag Ox-Cit Acd (Clenpiq) 10-3.5-12 MG-GM -GM/175ML solution, , Disp: , Rfl:     Testosterone Cypionate (DEPOTESTOTERONE CYPIONATE) 200 MG/ML injection, INJECT 0.26ML UNDER THE SKIN Q 5 DAYS AS DIRECTED, Disp: 10 mL, Rfl: 0    VASCEPA 1 g capsule capsule, Take 2 g by mouth 2 (Two) Times a Day With Meals., Disp: 360 capsule, Rfl: 3    verapamil ER (VERELAN) 180 MG 24 hr capsule, Take 1 capsule by mouth., Disp: , Rfl:     verapamil SR (CALAN-SR) 240 MG CR tablet, Take 2 tablets by mouth Every Night., Disp: , Rfl:     Tobramycin-dexAMETHasone (TobraDex ST) 0.3-0.05 % suspension, 3-4 drops in affected ear twice a day, Disp: 5 mL, Rfl: 1     Allergies   Allergen Reactions    Beta Adrenergic Blockers Unknown - High Severity    Crestor [Rosuvastatin Calcium] Myalgia    Keflex [Cephalexin] Other (See Comments)     hiccups    Livalo [Pitavastatin] Myalgia    Losartan Other (See Comments)     Intolerance due to fatigue, depression     Naproxen Other (See Comments)     Chest pain       Social History     Tobacco Use    Smoking status: Never     Passive exposure: Never    Smokeless tobacco: Never   Vaping Use    Vaping status: Never Used   Substance Use Topics    Alcohol use: Yes     Alcohol/week: 2.0 standard drinks of alcohol     Types: 2 Cans of beer per week     Comment: 4 beers a week    Drug use: Never        Objective     Vital Signs:   /93   Pulse 98   Temp 97.7 °F (36.5 °C)   SpO2 97%       Physical Exam    General: Well developed, well nourished patient of stated age in no acute distress. Voice is strong and clear.   Head: Normocephalic and atraumatic.  Face: No lesions.  Bilateral parotid and submandibular glands are unremarkable.  Stensen's and Warthin's ducts are productive of clear saliva bilaterally.  House-Brackmann I/VI     bilaterally.   muscles and temporomandibular joint nontender to palpation.  No TMJ crepitus.  Eyes: PERRLA, sclerae anicteric, no conjunctival injection. Extra ocular movements are intact and full. No nystagmus.   Ears: Auricles are normal in appearance. Bilateral external auditory canals are unremarkable.  Right tympanic membrane with T-tube in place.  Left tympanic membrane with T-tube in place and draining a scant amount of purulence.  There is a small area superiorly to the tube with granulation tissue.  This was examined under the operating microscope and debrided using Ciprodex drops and a #3 suction.  Hearing normal to conversational voice.   Nose: External nose is normal in appearance. Bilateral nares are patent with normal appearing mucosa. Septum midline. Turbinates are unremarkable. No lesions.   Oral Cavity: Lips are normal in appearance. Oral mucosa is unremarkable. Gingiva is unremarkable. Normal dentition for age. Tongue is unremarkable with good movement. Hard palate is unremarkable.   Oropharynx: Soft palate is unremarkable with full movement. Uvula is unremarkable. Bilateral  tonsils are unremarkable. Posterior oropharynx is unremarkable.    Larynx and hypopharynx: Deferred secondary to gag reflex.  Neck: Supple.  No mass.  Nontender to palpation.  Trachea midline. Thyroid normal size and without nodules to palpation.   Lymphatic: No lymphadenopathy upon palpation.   Psychiatric: Appropriate affect, cooperative   Neurologic: Oriented x 3, strength symmetric in all extremities, Cranial Nerves II-XII are grossly intact to confrontation   Skin: Warm and dry. No rashes.    Procedures   Result Review :               Assessment and Plan    Diagnoses and all orders for this visit:    1. Otorrhea of left ear (Primary)  -     Beta 2 Transferrin - Body Fluid,; Future  -     Tobramycin-dexAMETHasone (TobraDex ST) 0.3-0.05 % suspension; 3-4 drops in affected ear twice a day  Dispense: 5 mL; Refill: 1  -     Wound Culture - Wound, Ear, Left    2. Granulation tissue of ear canal  -     Tobramycin-dexAMETHasone (TobraDex ST) 0.3-0.05 % suspension; 3-4 drops in affected ear twice a day  Dispense: 5 mL; Refill: 1          Impressions and findings were discussed.  Currently, both tubes are in place and patent but the left is draining a scant amount of purulence and there is area of granulation tissue present superior to the tube.  He does report clear watery otorrhea and we discussed that this may be secondary to the granulation tissue but also discussed the possibility of CSF otorrhea although this is much less likely given the findings on examination today.  However, I have put in for a beta-2 transferrin test and we will have him try to obtain a sample.  A sample was obtained from the left ear for culture and sensitivities although with a scant drainage we discussed that it may not return any results.  In the meantime, he will be started on TobraDex.  He was given ample time to ask questions, all of which were answered to his satisfaction.    Follow Up   Return in about 4 weeks (around  8/9/2024).  Patient was given instructions and counseling regarding his condition or for health maintenance advice. Please see specific information pulled into the AVS if appropriate.

## 2024-07-22 LAB
BACTERIA SPEC AEROBE CULT: ABNORMAL
BACTERIA SPEC AEROBE CULT: ABNORMAL
GRAM STN SPEC: ABNORMAL

## 2024-07-23 ENCOUNTER — HOSPITAL ENCOUNTER (OUTPATIENT)
Dept: OTHER | Facility: HOSPITAL | Age: 69
Discharge: HOME | End: 2024-07-23

## 2024-07-25 ENCOUNTER — DOCUMENTATION (OUTPATIENT)
Dept: OTOLARYNGOLOGY | Facility: CLINIC | Age: 69
End: 2024-07-25
Payer: MEDICARE

## 2024-07-25 DIAGNOSIS — L92.9 GRANULATION TISSUE OF EAR CANAL: ICD-10-CM

## 2024-07-25 DIAGNOSIS — H92.12 OTORRHEA OF LEFT EAR: Primary | ICD-10-CM

## 2024-07-25 RX ORDER — SULFAMETHOXAZOLE AND TRIMETHOPRIM 800; 160 MG/1; MG/1
1 TABLET ORAL 2 TIMES DAILY
Qty: 20 TABLET | Refills: 0 | Status: SHIPPED | OUTPATIENT
Start: 2024-07-25 | End: 2024-08-04

## 2024-07-27 ENCOUNTER — LAB (OUTPATIENT)
Dept: LAB | Facility: HOSPITAL | Age: 69
End: 2024-07-27
Payer: MEDICARE

## 2024-07-27 DIAGNOSIS — E29.1 HYPOGONADISM IN MALE: ICD-10-CM

## 2024-07-27 DIAGNOSIS — R97.20 ELEVATED PSA: ICD-10-CM

## 2024-07-27 LAB — TESTOST SERPL-MCNC: 567 NG/DL (ref 193–740)

## 2024-07-27 PROCEDURE — 84403 ASSAY OF TOTAL TESTOSTERONE: CPT

## 2024-07-27 PROCEDURE — 36415 COLL VENOUS BLD VENIPUNCTURE: CPT

## 2024-07-29 ENCOUNTER — PREP FOR SURGERY (OUTPATIENT)
Dept: OTHER | Facility: HOSPITAL | Age: 69
End: 2024-07-29
Payer: MEDICARE

## 2024-07-29 ENCOUNTER — OFFICE VISIT (OUTPATIENT)
Dept: UROLOGY | Facility: CLINIC | Age: 69
End: 2024-07-29
Payer: MEDICARE

## 2024-07-29 VITALS — RESPIRATION RATE: 16 BRPM | HEIGHT: 70 IN | WEIGHT: 237 LBS | BODY MASS INDEX: 33.93 KG/M2

## 2024-07-29 DIAGNOSIS — R97.20 ELEVATED PSA: ICD-10-CM

## 2024-07-29 DIAGNOSIS — E29.1 HYPOGONADISM IN MALE: Primary | ICD-10-CM

## 2024-07-29 DIAGNOSIS — R97.20 ELEVATED PROSTATE SPECIFIC ANTIGEN (PSA): Primary | ICD-10-CM

## 2024-07-29 PROCEDURE — 1159F MED LIST DOCD IN RCRD: CPT | Performed by: UROLOGY

## 2024-07-29 PROCEDURE — 1160F RVW MEDS BY RX/DR IN RCRD: CPT | Performed by: UROLOGY

## 2024-07-29 PROCEDURE — 99214 OFFICE O/P EST MOD 30 MIN: CPT | Performed by: UROLOGY

## 2024-07-29 RX ORDER — SODIUM CHLORIDE 0.9 % (FLUSH) 0.9 %
3 SYRINGE (ML) INJECTION EVERY 12 HOURS SCHEDULED
OUTPATIENT
Start: 2024-07-29

## 2024-07-29 RX ORDER — SODIUM CHLORIDE 0.9 % (FLUSH) 0.9 %
10 SYRINGE (ML) INJECTION AS NEEDED
OUTPATIENT
Start: 2024-07-29

## 2024-07-29 RX ORDER — SODIUM CHLORIDE 9 MG/ML
100 INJECTION, SOLUTION INTRAVENOUS CONTINUOUS
OUTPATIENT
Start: 2024-07-29

## 2024-07-29 RX ORDER — TESTOSTERONE CYPIONATE 200 MG/ML
INJECTION, SOLUTION INTRAMUSCULAR
Qty: 10 ML | Refills: 0 | Status: SHIPPED | OUTPATIENT
Start: 2024-07-29

## 2024-07-29 RX ORDER — CIPROFLOXACIN 500 MG/1
500 TABLET, FILM COATED ORAL 2 TIMES DAILY
Qty: 6 TABLET | Refills: 0 | Status: SHIPPED | OUTPATIENT
Start: 2024-07-29 | End: 2024-08-01

## 2024-07-29 RX ORDER — SODIUM CHLORIDE 9 MG/ML
40 INJECTION, SOLUTION INTRAVENOUS AS NEEDED
OUTPATIENT
Start: 2024-07-29

## 2024-08-06 ENCOUNTER — TELEPHONE (OUTPATIENT)
Dept: UROLOGY | Facility: CLINIC | Age: 69
End: 2024-08-06
Payer: MEDICARE

## 2024-08-06 NOTE — PRE-PROCEDURE INSTRUCTIONS
IMPORTANT INSTRUCTIONS - PRE-ADMISSION TESTING  DO NOT EAT OR CHEW anything after midnight the night before your procedure.    You may have CLEAR liquids up to _2__ hours prior to ARRIVAL time.   Take the following medications the morning of your procedure with JUST A SIP OF WATER:  _NO MEDICATIONS ______________________________________________________________________________________________________________________________________________________________________________________    DO NOT BRING your medications to the hospital with you, UNLESS something has changed since your PRE-Admission Testing appointment.  Hold all vitamins, supplements, and NSAIDS (Non- steroidal anti-inflammatory meds) for one week prior to surgery (you MAY take Tylenol or Acetaminophen).  If you are diabetic, check your blood sugar the morning of your procedure. If it is less than 70 or if you are feeling symptomatic, call the following number for further instructions: [phone]_.  Use your inhalers/nebulizers as usual, the morning of your procedure. BRING YOUR INHALERS with you.   Bring your CPAP or BIPAP to hospital, ONLY IF YOU WILL BE SPENDING THE NIGHT.   Make sure you have a ride home and have someone who will stay with you the day of your procedure after you go home.  If you have any questions, please call your Pre-Admission Testing Nurse, _HEAVENLY____ at [phone]_____.   Per anesthesia request, do not smoke for 24 hours before your procedure or as instructed by your surgeon.

## 2024-08-06 NOTE — TELEPHONE ENCOUNTER
PER SHAKA IN PAT THE PATIENT IS BEING TREATED FOR AN EAR INFECTION BY DR FONTANEZ. HE IS SCHEDULED FOR MRI PROSTATE BX ON 8/8.

## 2024-08-08 ENCOUNTER — HOSPITAL ENCOUNTER (OUTPATIENT)
Facility: HOSPITAL | Age: 69
Discharge: HOME | End: 2024-08-08
Attending: UROLOGY | Admitting: UROLOGY
Payer: MEDICARE

## 2024-08-08 ENCOUNTER — ANESTHESIA (OUTPATIENT)
Dept: PERIOP | Facility: HOSPITAL | Age: 69
End: 2024-08-08
Payer: MEDICARE

## 2024-08-08 ENCOUNTER — ANESTHESIA EVENT (OUTPATIENT)
Dept: PERIOP | Facility: HOSPITAL | Age: 69
End: 2024-08-08
Payer: MEDICARE

## 2024-08-08 VITALS
SYSTOLIC BLOOD PRESSURE: 127 MMHG | HEIGHT: 71 IN | TEMPERATURE: 97.5 F | RESPIRATION RATE: 18 BRPM | DIASTOLIC BLOOD PRESSURE: 76 MMHG | OXYGEN SATURATION: 100 % | HEART RATE: 60 BPM | BODY MASS INDEX: 32.87 KG/M2 | WEIGHT: 234.79 LBS

## 2024-08-08 DIAGNOSIS — R97.20 ELEVATED PROSTATE SPECIFIC ANTIGEN (PSA): ICD-10-CM

## 2024-08-08 PROCEDURE — 25810000003 LACTATED RINGERS PER 1000 ML: Performed by: ANESTHESIOLOGY

## 2024-08-08 PROCEDURE — 76942 ECHO GUIDE FOR BIOPSY: CPT | Performed by: UROLOGY

## 2024-08-08 PROCEDURE — 55700 PR PROSTATE NEEDLE BIOPSY ANY APPROACH: CPT | Performed by: UROLOGY

## 2024-08-08 PROCEDURE — 88305 TISSUE EXAM BY PATHOLOGIST: CPT | Performed by: UROLOGY

## 2024-08-08 PROCEDURE — 25010000002 CEFTRIAXONE PER 250 MG: Performed by: UROLOGY

## 2024-08-08 PROCEDURE — 25010000002 PROPOFOL 10 MG/ML EMULSION

## 2024-08-08 RX ORDER — OXYCODONE HYDROCHLORIDE 5 MG/1
5 TABLET ORAL
Status: DISCONTINUED | OUTPATIENT
Start: 2024-08-08 | End: 2024-08-08 | Stop reason: HOSPADM

## 2024-08-08 RX ORDER — MIDAZOLAM HYDROCHLORIDE 2 MG/2ML
1 INJECTION, SOLUTION INTRAMUSCULAR; INTRAVENOUS ONCE
Status: DISCONTINUED | OUTPATIENT
Start: 2024-08-08 | End: 2024-08-08

## 2024-08-08 RX ORDER — CIPROFLOXACIN 500 MG/1
500 TABLET, FILM COATED ORAL 2 TIMES DAILY
Qty: 4 TABLET | Refills: 0 | Status: SHIPPED | OUTPATIENT
Start: 2024-08-08

## 2024-08-08 RX ORDER — PROPOFOL 10 MG/ML
VIAL (ML) INTRAVENOUS AS NEEDED
Status: DISCONTINUED | OUTPATIENT
Start: 2024-08-08 | End: 2024-08-08 | Stop reason: SURG

## 2024-08-08 RX ORDER — CEFTRIAXONE 1 G/1
1 INJECTION, POWDER, FOR SOLUTION INTRAMUSCULAR; INTRAVENOUS ONCE
Status: DISCONTINUED | OUTPATIENT
Start: 2024-08-08 | End: 2024-08-08

## 2024-08-08 RX ORDER — SODIUM CHLORIDE 0.9 % (FLUSH) 0.9 %
3 SYRINGE (ML) INJECTION EVERY 12 HOURS SCHEDULED
Status: DISCONTINUED | OUTPATIENT
Start: 2024-08-08 | End: 2024-08-08 | Stop reason: HOSPADM

## 2024-08-08 RX ORDER — SODIUM CHLORIDE 9 MG/ML
40 INJECTION, SOLUTION INTRAVENOUS AS NEEDED
Status: DISCONTINUED | OUTPATIENT
Start: 2024-08-08 | End: 2024-08-08 | Stop reason: HOSPADM

## 2024-08-08 RX ORDER — SODIUM CHLORIDE, SODIUM LACTATE, POTASSIUM CHLORIDE, CALCIUM CHLORIDE 600; 310; 30; 20 MG/100ML; MG/100ML; MG/100ML; MG/100ML
9 INJECTION, SOLUTION INTRAVENOUS CONTINUOUS PRN
Status: DISCONTINUED | OUTPATIENT
Start: 2024-08-08 | End: 2024-08-08 | Stop reason: HOSPADM

## 2024-08-08 RX ORDER — ONDANSETRON 2 MG/ML
4 INJECTION INTRAMUSCULAR; INTRAVENOUS ONCE AS NEEDED
Status: DISCONTINUED | OUTPATIENT
Start: 2024-08-08 | End: 2024-08-08 | Stop reason: HOSPADM

## 2024-08-08 RX ORDER — MEPERIDINE HYDROCHLORIDE 25 MG/ML
12.5 INJECTION INTRAMUSCULAR; INTRAVENOUS; SUBCUTANEOUS
Status: DISCONTINUED | OUTPATIENT
Start: 2024-08-08 | End: 2024-08-08 | Stop reason: HOSPADM

## 2024-08-08 RX ORDER — PROMETHAZINE HYDROCHLORIDE 12.5 MG/1
25 TABLET ORAL ONCE AS NEEDED
Status: DISCONTINUED | OUTPATIENT
Start: 2024-08-08 | End: 2024-08-08 | Stop reason: HOSPADM

## 2024-08-08 RX ORDER — LIDOCAINE HYDROCHLORIDE 20 MG/ML
INJECTION, SOLUTION EPIDURAL; INFILTRATION; INTRACAUDAL; PERINEURAL AS NEEDED
Status: DISCONTINUED | OUTPATIENT
Start: 2024-08-08 | End: 2024-08-08 | Stop reason: SURG

## 2024-08-08 RX ORDER — PROMETHAZINE HYDROCHLORIDE 25 MG/1
25 SUPPOSITORY RECTAL ONCE AS NEEDED
Status: DISCONTINUED | OUTPATIENT
Start: 2024-08-08 | End: 2024-08-08 | Stop reason: HOSPADM

## 2024-08-08 RX ORDER — SODIUM CHLORIDE 9 MG/ML
100 INJECTION, SOLUTION INTRAVENOUS CONTINUOUS
Status: DISCONTINUED | OUTPATIENT
Start: 2024-08-08 | End: 2024-08-08 | Stop reason: HOSPADM

## 2024-08-08 RX ORDER — ACETAMINOPHEN 500 MG
500 TABLET ORAL ONCE
Status: COMPLETED | OUTPATIENT
Start: 2024-08-08 | End: 2024-08-08

## 2024-08-08 RX ORDER — SODIUM CHLORIDE 0.9 % (FLUSH) 0.9 %
10 SYRINGE (ML) INJECTION AS NEEDED
Status: DISCONTINUED | OUTPATIENT
Start: 2024-08-08 | End: 2024-08-08 | Stop reason: HOSPADM

## 2024-08-08 RX ORDER — PROMETHAZINE HYDROCHLORIDE 12.5 MG/1
12.5 TABLET ORAL ONCE AS NEEDED
Status: DISCONTINUED | OUTPATIENT
Start: 2024-08-08 | End: 2024-08-08 | Stop reason: HOSPADM

## 2024-08-08 RX ORDER — ACETAMINOPHEN 325 MG/1
650 TABLET ORAL ONCE
Status: DISCONTINUED | OUTPATIENT
Start: 2024-08-08 | End: 2024-08-08 | Stop reason: HOSPADM

## 2024-08-08 RX ORDER — ONDANSETRON 4 MG/1
4 TABLET, ORALLY DISINTEGRATING ORAL ONCE AS NEEDED
Status: DISCONTINUED | OUTPATIENT
Start: 2024-08-08 | End: 2024-08-08 | Stop reason: HOSPADM

## 2024-08-08 RX ADMIN — SODIUM CHLORIDE, POTASSIUM CHLORIDE, SODIUM LACTATE AND CALCIUM CHLORIDE 9 ML/HR: 600; 310; 30; 20 INJECTION, SOLUTION INTRAVENOUS at 08:32

## 2024-08-08 RX ADMIN — PROPOFOL 250 MCG/KG/MIN: 10 INJECTION, EMULSION INTRAVENOUS at 09:48

## 2024-08-08 RX ADMIN — PROPOFOL 100 MG: 10 INJECTION, EMULSION INTRAVENOUS at 09:48

## 2024-08-08 RX ADMIN — CEFTRIAXONE SODIUM 1000 MG: 1 INJECTION, POWDER, FOR SOLUTION INTRAMUSCULAR; INTRAVENOUS at 09:48

## 2024-08-08 RX ADMIN — LIDOCAINE HYDROCHLORIDE 80 MG: 20 INJECTION, SOLUTION INTRAVENOUS at 09:48

## 2024-08-08 RX ADMIN — ACETAMINOPHEN 500 MG: 500 TABLET ORAL at 08:32

## 2024-08-08 NOTE — ANESTHESIA POSTPROCEDURE EVALUATION
Patient: Jimmie Jamil    Procedure Summary       Date: 08/08/24 Room / Location: [facility] 02 / [facility] MAIN OR    Anesthesia Start: 0943 Anesthesia Stop: 1003    Procedure: MRI fusion transrectal ultrasound-guided prostate biopsy Diagnosis:       Elevated prostate specific antigen (PSA)      (Elevated prostate specific antigen (PSA) [R97.20])    Surgeons: Zechariah Clay MD Provider: Bk Mathis MD    Anesthesia Type: general ASA Status: 3            Anesthesia Type: general    Vitals  Vitals Value Taken Time   /76 08/08/24 1038   Temp 36.5 °C (97.7 °F) 08/08/24 1025   Pulse 61 08/08/24 1040   Resp 16 08/08/24 1025   SpO2 96 % 08/08/24 1040   Vitals shown include unfiled device data.        Post Anesthesia Care and Evaluation    Patient location during evaluation: bedside  Patient participation: complete - patient participated  Level of consciousness: awake and alert  Pain management: adequate    Airway patency: patent  Anesthetic complications: No anesthetic complications  PONV Status: none  Cardiovascular status: acceptable  Respiratory status: acceptable  Hydration status: acceptable    Comments: An Anesthesiologist personally participated in the most demanding procedures (including induction and emergence if applicable) in the anesthesia plan, monitored the course of anesthesia administration at frequent intervals and remained physically present and available for immediate diagnosis and treatment of emergencies.

## 2024-08-08 NOTE — DISCHARGE INSTRUCTIONS
Discharge Instructions Prostate Biopsy             For your surgery you had:    Monitored anesthesia care    You may experience dizziness, drowsiness, or lightheadedness for several hours following surgery.  Do not stay alone today or tonight.  Limit your activity for 24 hours.  You should not drive, operate machinery, drink alcohol, or sign legally binding documents for 24 hours or while you are taking pain medication.  Resume your diet slowly.  Follow any special dietary instructions you may have been given by your doctor.    NOTIFY YOUR DOCTOR IF YOU EXPERIENCE ANY OF THE FOLLOWING:  Temperature greater than 101 degrees Fahrenheit  Shaking Chills  Redness or excessive drainage from incision  Nausea, vomiting and/or pain that is not controlled by prescribed medications  Increase in bleeding or bleeding that is excessive  Unable to urinate in 6 hours after surgery  If unable to reach your doctor, please go to the closest Emergency Room.   Drink 4-6 glasses of water a day for 3-4 days  You may see blood in your urine for up to a week, blood in your stool for 3-4 days, and blood in semen for up to a month  If you are passing large clots, call your doctor  Avoid lifting or straining for 48 hours  It is normal to experience burning during urination for 48 hours after biopsy  Call your doctor if pain, burning, urgency, or frequency of urination persist beyond 48 hours  Medications per physician as indicated on discharge medication information sheet    SPECIAL INSTRUCTIONS:                      Last dose of pain medication given at:     .

## 2024-08-08 NOTE — H&P
[facility]   UROLOGY HISTORY AND PHYSICAL    Patient Name: Jimmie Jamil  : 1955  MRN: [id number]  Primary Care Physician:  Nicko Fontaine APRN  Date of admission: 2024    Subjective   Subjective     Chief Complaint:     Elevated PSA    HPI:    Jimmie Jamil is a 69 y.o. male     Elevated PSA    No change in H&P    Review of Systems     10 systems reviewed and are negative other than what is listed in HPI    Personal History     Past Medical History:   Diagnosis Date    Aortic stenosis     Arthritis of back     Benign essential hypertension     Bladder problem     Cervical disc disorder 2018    Cervical radiculopathy 2017    Cervical spinal stenosis 2017    Cervicalgia 2017    Coronary artery disease     diastolic dysfunction    ED (erectile dysfunction)     Greater trochanteric bursitis of right hip 2018    High blood pressure     HL (hearing loss) 01 Oct 2021    Hyperlipidemia     Hypertension     Leg pain     Leg swelling     Low back pain     Low back strain 2015    Lumbago 2017    Lumbosacral disc disease 2018    Neck pain     Paresthesia 2017    left hand/leg    Periarthritis of shoulder     Polycythemia     Right hip pain     Rotator cuff syndrome 2021    Urinary bladder incontinence 2017       Past Surgical History:   Procedure Laterality Date    ANTERIOR CERVICAL FUSION  2017    C3-4    CARDIAC CATHETERIZATION      everything okay clearance for neck surgery    CARDIAC CATHETERIZATION N/A 10/13/2021    Procedure: Left Heart Cath;  Surgeon: Anahi Perales MD;  Location:  [facility];  Service: Cardiovascular;  Laterality: N/A;    CARDIAC CATHETERIZATION N/A 10/13/2021    Procedure: Coronary angiography;  Surgeon: Anahi Perales MD;  Location:  [facility];  Service: Cardiovascular;  Laterality: N/A;    CARDIAC CATHETERIZATION N/A 10/13/2021    Procedure: Left ventriculography;   Surgeon: Anahi Perales MD;  Location:  [facility];  Service: Cardiovascular;  Laterality: N/A;    CERVICAL DISC SURGERY      C2-3 fusion    CIRCUMCISION      COLONOSCOPY      COLONOSCOPY N/A 11/20/2023    Procedure: COLONOSCOPY WITH COLD SNARE POLYECTOMY;  Surgeon: Kenny Urena MD;  Location: [facility] END[facility]COPY;  Service: Gastroenterology;  Laterality: N/A;  COLON POLYP    CYSTOSCOPY  01/10/2014    Cysto ivan retrograde pyelogram urthral bx.    LUMBAR EPIDURAL INJECTION      L2-S2  going to try ablasion in future    NECK SURGERY  2018?    PROSTATE BIOPSY  2018    SHOULDER ARTHROSCOPY W/ LABRAL REPAIR Left     SHOULDER SURGERY  2008    TRIGGER POINT INJECTION  2021    L5-S1    VASECTOMY      WISDOM TOOTH EXTRACTION         Family History: family history includes Alzheimer's disease (age of onset: 80) in his mother; Arthritis in his father and mother; Cancer in his father; Colon cancer (age of onset: 85) in his father; Heart disease in his father; Hypertension (age of onset: 80) in his father; Stroke in his father. Otherwise pertinent FHx was reviewed and not pertinent to current issue.    Social History:  reports that he has never smoked. He has never been exposed to tobacco smoke. He has never used smokeless tobacco. He reports current alcohol use of about 2.0 standard drinks of alcohol per week. He reports that he does not use drugs.    Home Medications:  CoQ10, Inclisiran Sodium, Sod Picosulfate-Mag Ox-Cit Acd, Testosterone Cypionate, Tobramycin-dexAMETHasone, ascorbic acid, aspirin, enalapril, icosapent ethyl, ofloxacin, and verapamil ER      Allergies:  Allergies   Allergen Reactions    Beta Adrenergic Blockers Unknown - High Severity    Crestor [Rosuvastatin Calcium] Myalgia    Keflex [Cephalexin] Other (See Comments)     hiccups    Livalo [Pitavastatin] Myalgia    Losartan Other (See Comments)     Intolerance due to fatigue, depression    Naproxen Other (See Comments)     Chest pain        Objective   Objective     Vitals:   Temp:  [97.6 °F (36.4 °C)] 97.6 °F (36.4 °C)  Heart Rate:  [62] 62  Resp:  [20] 20  BP: (123)/(66) 123/66  Physical Exam    Constitutional: Awake, alert    Respiratory: Clear to auscultation bilaterally, nonlabored respirations    Cardiovascular: RRR, no murmurs, rubs, or gallops, palpable pedal pulses bilaterally   Gastrointestinal: Positive bowel sounds, soft, nontender, nondistended   Musculoskeletal: No bilateral ankle edema, no clubbing or cyanosis to extremities     Result Review    Result Review:  I have personally reviewed the results from the time of this admission to 8/8/2024 08:01 EDT and agree with these findings:  []  Laboratory  []  Microbiology  []  Radiology  []  EKG/Telemetry   []  Cardiology/Vascular   []  Pathology  []  Old records  []  Other:    Assessment & Plan   Assessment / Plan     Brief Patient Summary:  Jimmie Jamil is a 69 y.o. male     Active Hospital Problems:  Active Hospital Problems    Diagnosis     **Elevated prostate specific antigen (PSA)     Elevated PSA      MRI fusion prostate biopsy.  Risks and benefits were discussed including bleeding, infection and damage to the urinary system.  We also discussed the risk of anesthesia up to and including death.  Patient voiced understanding and would like to proceed.    Electronically signed by Zechariah Clay MD, 08/08/24, 8:01 AM EDT.

## 2024-08-08 NOTE — ANESTHESIA PREPROCEDURE EVALUATION
Anesthesia Evaluation     Patient summary reviewed and Nursing notes reviewed   no history of anesthetic complications:   NPO Solid Status: > 8 hours  NPO Liquid Status: > 2 hours           Airway   Mallampati: II  TM distance: >3 FB  Neck ROM: full  No difficulty expected  Dental      Pulmonary - negative pulmonary ROS and normal exam    breath sounds clear to auscultation  Cardiovascular - negative cardio ROS and normal exam  Exercise tolerance: poor (<4 METS)    Rhythm: regular  Rate: normal    (+) hypertension, valvular problems/murmurs AS, CAD, hyperlipidemia    ROS comment: 2023:  y Per Protocol  Clinical Indication    Aortic stenosis  Dx: Aortic valve stenosis, etiology of cardiac valve disease unspecified [I35.0 (ICD-10-CM)]    Interpretation Summary       ·  Left ventricular systolic function is normal. Calculated left ventricular EF = 60.7%  ·  Left ventricular diastolic function is consistent with (grade I) impaired relaxation.  ·  Estimated right ventricular systolic pressure from tricuspid regurgitation is normal (<35 mmHg). Calculated right ventricular systolic pressure from tricuspid regurgitation is 27 mmHg.  ·  The aortic valve is not well visualized. The aortic valve is abnormal in structure. There is calcification of the aortic valve. Mild aortic valve regurgitation is present. Mild to moderate aortic valve stenosis is present. The aortic valve may be bicuspid, restricted leaflet movement     Cardiac History    Diagnosis Date Comment Source  Benign essential hypertension     Coronary artery disease  diastolic dysfunction   High blood pressure     Hyperlipidemia     Hypertension       Social History    Tobacco Use    Never smoked or used smokeless tobacco.  Passive Exposure: Never    Vaping Use    Never used    Family Cardiac History as of 11/21/2023  Pedigree Problem Relation Age of Onset  Heart disease Father   Hypertension Father 80    Additional Study Details    Study Quality The study is  technically adequate for diagnosis.    Echocardiogram Findings    Left Ventricle Left ventricular systolic function is normal. Calculated left ventricular EF = 60.7%     Normal left ventricular cavity size and wall thickness noted. All left ventricular wall segments contract normally. Left ventricular diastolic function is consistent with (grade I) impaired relaxation.  Right Ventricle Normal right ventricular cavity size and systolic function noted.  Left Atrium Normal left atrial cavity size noted.  Right Atrium Normal right atrial cavity size noted.  Aortic Valve The aortic valve is not well visualized. The aortic valve is abnormal in structure. There is calcification of the aortic valve. Mild aortic valve regurgitation is present. Mild to moderate aortic valve stenosis is present. The aortic valve may be bicuspid, restricted leaflet movement  Mitral Valve The mitral valve is structurally normal with no significant stenosis present. Trace mitral valve regurgitation is present.  Tricuspid Valve The tricuspid valve is structurally normal with no significant stenosis present. Trace tricuspid valve regurgitation is present. Estimated right ventricular systolic pressure from tricuspid regurgitation is normal (<35 mmHg). Calculated right ventricular systolic pressure from tricuspid regurgitation is 27 mmHg. No evidence of pulmonary hypertension is present.  Pulmonic Valve The pulmonic valve is structurally normal with no significant stenosis present. There is trace pulmonic valve regurgitation present.  Greater Vessels No dilation of the aortic root is present. No dilation of the ascending aorta is present. The inferior vena cava is normally sized. Normal IVC inspiratory collapse of greater than 50% noted.  Pericardium There is no evidence of pericardial effusion. .        Wall Scoring    Score Index: 1.00            The left ventricular wall motion is normal.              LV Measurements    Dimensions  LVIDd   4.6  cm      IVSd   0.98 cm      FS   37.6 %      ESV(cubed)   24 ml      EDV(cubed)   99.2 ml      LVOT area   4.1 cm2      LVOT diam   2.28 cm      EDV(MOD-sp2)   128 ml      ESV(MOD-sp2)   48 ml      Diastolic Filling  MV E max victor hugo   86.6 cm/sec      MV A max victor hugo   113 cm/sec      MV dec time   0.24 sec      MV E/A   0.77      Pulm A Revs Dur   0.13 sec      LA ESV Index (BP)   16.8 ml/m2      Med Peak E' Victor Hugo   7.6 cm/sec      Lat Peak E' Victor Hugo   10 cm/sec      Avg E/e' ratio   9.84      Shunt Ratio  SV(LVOT)   127.3 ml      SV(RVOT)   87.9 ml      Qp/Qs   0.69         Dimensions  LVIDs   2.9 cm      LVPWd   0.92 cm      IVS/LVPW   1.06 cm      LV Sys Vol (BSA corrected)   21.8 cm2      LV Joy Vol (BSA corrected)   60.2 cm2      LV mass(C)d   149.3 grams      EDV(MOD-sp4)   135 ml      ESV(MOD-sp4)   49 ml      Systolic Function  SV(MOD-sp2)   80 ml      SV(MOD-sp4)   86 ml      SVi(MOD-SP2)   35.7 ml/m2      SVi(MOD-SP4)   38.3 ml/m2      EF(MOD-sp2)   62.5 %      EF(MOD-sp4)   63.7 %      EF(MOD-bp)   60.7 %            Right Ventricle Measurements    RV Base   2.8 cm      RV Mid   1.76 cm      RV Length   8 cm         TAPSE (>1.6)   2.19 cm      RV S'   21.2 cm/sec            LA Measurements    LA Dimensions  LA ESV Index (BP)   16.8 ml/m2         Pulmonary Ve      Pulm A Revs Victor Hugo   39 cm/sec      Pulm A Revs Dur   0.13 sec            Aortic Valve Measurements    Stenosis  LVOT diam   2.28 cm      LV V1 max   151 cm/sec      LV V1 max PG   9.1 mmHg      LV V1 mean PG   4.3 mmHg      LV V1 VTI   31.2 cm      Ao pk victor hugo   333.3 cm/sec         Stenosis  Ao max PG   44.4 mmHg      Ao mean PG   20.8 mmHg      Ao V2 VTI   63.3 cm      MALIK(I,D)   2.01 cm2                  Neuro/Psych- negative ROS  GI/Hepatic/Renal/Endo - negative ROS   (+) obesity    Musculoskeletal (-) negative ROS    Abdominal    Substance History - negative use     OB/GYN negative ob/gyn ROS         Other - negative ROS       ROS/Med Hx Other:  <4METS,SOAE,DEC. MOBILITY.WALKS 4-5XW.HX RHEUMATIC FEVER AGE 14,HTN,HLD.ECHO 11/21/23 EF 60%,MILD AR, MILD/MOD AS,?BICUSPID AV. CARDS OV 10/26/23 CATH 10/21 NO CAD. F/U YEARLY (APPT 10/31/24). KT                     Anesthesia Plan    ASA 3     general   total IV anesthesia  (Patient understands anesthesia not responsible for dental damage.  Pt with mild AS, careful induction, keep  MAP > 70)  intravenous induction     Anesthetic plan, risks, benefits, and alternatives have been provided, discussed and informed consent has been obtained with: patient.  Pre-procedure education provided  Plan discussed with CRNA.        CODE STATUS:

## 2024-08-08 NOTE — OP NOTE
PROSTATE ULTRASOUND BIOPSY MRI FUSION WITH URONAV  Procedure Report    Patient Name:  Jimmie Jamil  YOB: 1955    Date of Surgery:  8/8/2024      Pre-op Diagnosis:   Elevated prostate specific antigen (PSA) [R97.20]       Postop diagnosis:    Same    Procedure/CPT® Codes:      Procedure(s):    MRI fusion transrectal ultrasound-guided prostate biopsy    Staff:  Surgeon(s):  Zechariah Clay MD         Anesthesia: Monitored Anesthesia Care    Estimated Blood Loss: minimal    Implants:    Nothing was implanted during the procedure    Specimen:          Specimens       ID Source Type Tests Collected By Collected At Frozen?    A Prostate Tissue TISSUE PATHOLOGY EXAM   Zechariah Clay MD 8/8/24 0950     Description: RIGHT APEX X2    This specimen was not marked as sent.    B Prostate Tissue TISSUE PATHOLOGY EXAM   Zechariah Clay MD 8/8/24 0950     Description: LEFT APEX X2    This specimen was not marked as sent.    C Prostate Tissue TISSUE PATHOLOGY EXAM   Zechariah Clay MD 8/8/24 0950     Description: RIGHT MID X2    This specimen was not marked as sent.    D Prostate Tissue TISSUE PATHOLOGY EXAM   Zechariah Clay MD 8/8/24 0950     Description: LEFT MID X2    This specimen was not marked as sent.    E Prostate Tissue TISSUE PATHOLOGY EXAM   Zechariah Clay MD 8/8/24 0950     Description: RIGHT BASE X2    This specimen was not marked as sent.    F Prostate Tissue TISSUE PATHOLOGY EXAM   Zechariah Clay MD 8/8/24 0950     Description: LEFT BASE X2    This specimen was not marked as sent.    G Prostate Tissue TISSUE PATHOLOGY EXAM   Zechariah Clay MD 8/8/24 0950     Description: REGION OF INTEREST 1 X2    This specimen was not marked as sent.    H Prostate Tissue TISSUE PATHOLOGY EXAM   Zechariah Clay MD 8/8/24 0950     Description: REGION OF INTEREST 2 X3    This specimen was not marked as sent.                Findings: none    Complications: none    Description of  Procedure:     After informed consent patient was taken to the operating room.  Patient was laid supine and placed under monitored anesthesia care by the anesthesia team.  At this point a multidisciplinary timeout was undertaken documenting the correct patient site and procedure.  Patient was laid on his left side down in fetal position.  Ultrasound probe was placed into the rectum and the prostate was visualized without issue.  A sweep was done from base to apex to link the real-time ultrasound to the MRI data.  The region of interest was identified and biopsies were taken from the region of interest.  I then did 12 systematic biopsies starting on the left side.  2 from the base, 2 from the mid and 2 from the apex.  These were done medially and laterally.  I then did the right side in the same fashion.  Patient tolerated the procedure well.  There were no intraoperative complications.  Minimal bleeding from the rectum.  Patient was awakened and taken to the postanesthesia care unit without problem.          Zechariah Clay MD     Date: 8/8/2024  Time: 10:00 EDT

## 2024-08-12 ENCOUNTER — TELEPHONE (OUTPATIENT)
Dept: UROLOGY | Facility: CLINIC | Age: 69
End: 2024-08-12
Payer: MEDICARE

## 2024-08-12 ENCOUNTER — OFFICE VISIT (OUTPATIENT)
Dept: OTOLARYNGOLOGY | Facility: CLINIC | Age: 69
End: 2024-08-12
Payer: MEDICARE

## 2024-08-12 VITALS — OXYGEN SATURATION: 94 % | HEART RATE: 106 BPM | TEMPERATURE: 98.4 F

## 2024-08-12 DIAGNOSIS — H92.12 OTORRHEA OF LEFT EAR: Primary | ICD-10-CM

## 2024-08-12 DIAGNOSIS — L92.9 GRANULATION TISSUE OF EAR CANAL: ICD-10-CM

## 2024-08-12 LAB
CYTO UR: NORMAL
LAB AP CASE REPORT: NORMAL
LAB AP CLINICAL INFORMATION: NORMAL
PATH REPORT.FINAL DX SPEC: NORMAL
PATH REPORT.GROSS SPEC: NORMAL

## 2024-08-12 PROCEDURE — 99212 OFFICE O/P EST SF 10 MIN: CPT | Performed by: OTOLARYNGOLOGY

## 2024-08-12 NOTE — PROGRESS NOTES
Patient Name: Jimmie Jamil   Visit Date: 08/12/2024   Patient ID: [id number]  Provider: Frank Solomon MD    Sex: male  Location: [facility]   YOB: 1955  Location Address: [address] [place] [address], [address],?[place]?[id number]    Primary Care Provider Nicko Fontaine APRN  Location Phone: [phone]    Referring Provider: No ref. provider found        Chief Complaint  4 week follow up    History of Present Illness  Jimmie Jamil is a 69 y.o. male who returns today for follow-up of chronic serous otitis media and eustachian tube dysfunction.  He was originally seen on 11/19/2021 at which time he reported predominantly left-sided serous effusions over the last 10 to 15 years.  They typically occur after upper respiratory tract infection.  He has seen 2 separate ENTs in the past and has undergone multiple ear tube placements. CT scan of the head without contrast on 10/12/2021 revealed a partial right mastoid middle ear effusion.  The sinuses were unremarkable.  Examination that day revealed a slightly retracted right tympanic membrane with serous effusion and a left T-tube in place and patent.  After a thorough discussion he elected to pursue right myringotomy and tube placement which was performed in clinic. Audiogram on 6/26/2023 revealed right normal downsloping to mild to moderate sensorineural hearing loss and left mild downsloping to moderate mixed loss with a conductive component from 250 to 1000 Hz.  SRT is 25 on the right and 35 on the left.  Speech discrimination is 84% on the right and 93% on the left at 65 dB.  A seal could not be obtained on the right and was consistent with a type B tympanogram and expanded volume on the left.    He returns today for follow-up having last been seen on 7/12/2024 at which time his left tube was draining a scant amount of purulence and there is an area of granulation tissue present superior to the tube.  A culture was  obtained and he was placed on TobraDex.  The culture ended up growing out E. coli and he was double covered with Bactrim.  He tells me that he has not experienced any further drainage from his ears.  Past Medical History:   Diagnosis Date    Aortic stenosis     Arthritis of back 2015    Benign essential hypertension     Bladder problem     Cervical disc disorder 2018    Cervical radiculopathy 03/16/2017    Cervical spinal stenosis 03/16/2017    Cervicalgia 03/16/2017    Coronary artery disease     diastolic dysfunction    ED (erectile dysfunction)     Greater trochanteric bursitis of right hip 07/09/2018    High blood pressure     HL (hearing loss) 01 Oct 2021    Hyperlipidemia     Hypertension     Leg pain     Leg swelling     Low back pain     Low back strain 2015    Lumbago 03/16/2017    Lumbosacral disc disease 2018    Neck pain     Paresthesia 03/16/2017    left hand/leg    Periarthritis of shoulder 2021    Polycythemia     Right hip pain     Rotator cuff syndrome 09/01/2021    Urinary bladder incontinence 03/16/2017       Past Surgical History:   Procedure Laterality Date    ANTERIOR CERVICAL FUSION  04/12/2017    C3-4    CARDIAC CATHETERIZATION      everything okay clearance for neck surgery    CARDIAC CATHETERIZATION N/A 10/13/2021    Procedure: Left Heart Cath;  Surgeon: Anahi Perales MD;  Location:  [facility];  Service: Cardiovascular;  Laterality: N/A;    CARDIAC CATHETERIZATION N/A 10/13/2021    Procedure: Coronary angiography;  Surgeon: Anahi Perales MD;  Location:  [facility];  Service: Cardiovascular;  Laterality: N/A;    CARDIAC CATHETERIZATION N/A 10/13/2021    Procedure: Left ventriculography;  Surgeon: Anahi Perales MD;  Location:  [facility];  Service: Cardiovascular;  Laterality: N/A;    CERVICAL DISC SURGERY      C2-3 fusion    CIRCUMCISION      COLONOSCOPY      COLONOSCOPY N/A 11/20/2023    Procedure: COLONOSCOPY WITH COLD SNARE  POLYECTOMY;  Surgeon: Kenny Urena MD;  Location: [facility] ENDOSCOPY;  Service: Gastroenterology;  Laterality: N/A;  COLON POLYP    CYSTOSCOPY  01/10/2014    Cysto ivan retrograde pyelogram urthral bx.    LUMBAR EPIDURAL INJECTION      L2-S2  going to try ablasion in future    NECK SURGERY  2018?    PROSTATE BIOPSY  2018    PROSTATE BIOPSY N/A 8/8/2024    Procedure: MRI fusion transrectal ultrasound-guided prostate biopsy;  Surgeon: Zechariah Clay MD;  Location: [facility] MAIN OR;  Service: Urology;  Laterality: N/A;    SHOULDER ARTHROSCOPY W/ LABRAL REPAIR Left     SHOULDER SURGERY  2008    TRIGGER POINT INJECTION  2021    L5-S1    VASECTOMY      WISDOM TOOTH EXTRACTION           Current Outpatient Medications:     ascorbic acid (VITAMIN C) 1000 MG tablet, Take 1 tablet by mouth Daily., Disp: , Rfl:     aspirin 81 MG EC tablet, Take 1 tablet by mouth Daily., Disp: , Rfl:     Coenzyme Q10 (CoQ10) 100 MG capsule, , Disp: , Rfl:     enalapril (VASOTEC) 20 MG tablet, Take 1 tablet by mouth twice daily (Patient taking differently: Take 1 tablet by mouth Every Night.), Disp: 180 tablet, Rfl: 0    Leqvio 284 MG/1.5ML solution prefilled syringe, EVERY 6 MTHS, Disp: , Rfl:     ofloxacin (OCUFLOX) 0.3 % ophthalmic solution, USES FOR EARS INSTEAD OF EYES, Disp: , Rfl:     Testosterone Cypionate (DEPOTESTOTERONE CYPIONATE) 200 MG/ML injection, INJECT 0.3 ML UNDER THE SKIN Q 5 DAYS AS DIRECTED, Disp: 10 mL, Rfl: 0    Tobramycin-dexAMETHasone (TobraDex ST) 0.3-0.05 % suspension, 3-4 drops in affected ear twice a day, Disp: 5 mL, Rfl: 1    VASCEPA 1 g capsule capsule, Take 2 g by mouth 2 (Two) Times a Day With Meals., Disp: 360 capsule, Rfl: 3    verapamil ER (VERELAN) 360 MG 24 hr capsule, Take 1 capsule by mouth Every Night., Disp: , Rfl:     ciprofloxacin (Cipro) 500 MG tablet, Take 1 tablet by mouth 2 (Two) Times a Day., Disp: 4 tablet, Rfl: 0     Allergies   Allergen Reactions    Beta Adrenergic Blockers Unknown -  High Severity    Crestor [Rosuvastatin Calcium] Myalgia    Keflex [Cephalexin] Other (See Comments)     hiccups    Livalo [Pitavastatin] Myalgia    Losartan Other (See Comments)     Intolerance due to fatigue, depression    Naproxen Other (See Comments)     Chest pain       Social History     Tobacco Use    Smoking status: Never     Passive exposure: Never    Smokeless tobacco: Never   Vaping Use    Vaping status: Never Used   Substance Use Topics    Alcohol use: Yes     Alcohol/week: 2.0 standard drinks of alcohol     Types: 2 Cans of beer per week     Comment: 4 beers a week    Drug use: Never        Objective     Vital Signs:   Pulse 106   Temp 98.4 °F (36.9 °C)   SpO2 94%       Physical Exam    General: Well developed, well nourished patient of stated age in no acute distress. Voice is strong and clear.   Head: Normocephalic and atraumatic.  Face: No lesions.  Bilateral parotid and submandibular glands are unremarkable.  Stensen's and Warthin's ducts are productive of clear saliva bilaterally.  House-Brackmann I/VI     bilaterally.   muscles and temporomandibular joint nontender to palpation.  No TMJ crepitus.  Eyes: PERRLA, sclerae anicteric, no conjunctival injection. Extra ocular movements are intact and full. No nystagmus.   Ears: Auricles are normal in appearance. Bilateral external auditory canals are unremarkable.  Bilateral tympanic membranes with T tubes in place and patent.  No evidence of otorrhea.  Hearing normal to conversational voice.   Nose: External nose is normal in appearance. Bilateral nares are patent with normal appearing mucosa. Septum midline. Turbinates are unremarkable. No lesions.   Oral Cavity: Lips are normal in appearance. Oral mucosa is unremarkable. Gingiva is unremarkable. Normal dentition for age. Tongue is unremarkable with good movement. Hard palate is unremarkable.   Oropharynx: Soft palate is unremarkable with full movement. Uvula is unremarkable. Bilateral  tonsils are unremarkable. Posterior oropharynx is unremarkable.    Larynx and hypopharynx: Deferred secondary to gag reflex.  Neck: Supple.  No mass.  Nontender to palpation.  Trachea midline. Thyroid normal size and without nodules to palpation.   Lymphatic: No lymphadenopathy upon palpation.   Psychiatric: Appropriate affect, cooperative   Neurologic: Oriented x 3, strength symmetric in all extremities, Cranial Nerves II-XII are grossly intact to confrontation   Skin: Warm and dry. No rashes.    Procedures   Result Review :               Assessment and Plan    Diagnoses and all orders for this visit:    1. Otorrhea of left ear (Primary)    2. Granulation tissue of ear canal            Impressions and findings were discussed.  Fortunately, his otorrhea resolved with TobraDex and Bactrim.  Examination today reveals both tubes to be in place and patent.  We again discussed the pathophysiology and natural history of this condition.  Options for management were discussed and we will continue with observation.  He was given ample time to ask questions, all of which were answered to his satisfaction.    Follow Up   No follow-ups on file.  Patient was given instructions and counseling regarding his condition or for health maintenance advice. Please see specific information pulled into the AVS if appropriate.

## 2024-08-12 NOTE — TELEPHONE ENCOUNTER
JESSIKA CALLED FROM PATHOLOGY.  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

## 2024-08-20 PROBLEM — C61 PROSTATE CANCER: Status: ACTIVE | Noted: 2024-08-20

## 2024-08-20 NOTE — PROGRESS NOTES
Chief Complaint    Urologic complaint    Subjective          Jimmie Jamil presents to [facility] UROLOGY  History of Present Illness        69-year-old  gentleman     Hypogonadism   BPH - 2/15/2022  Rezum   ED  cT1c prostatic adenocarcinoma      Doing well after biopsy.  No gross hematuria no pain      follows up after increased his testosterone to 0.3 mL  Depo testosterone subcu every 5 days in 1/21.  Gets a 10 mL vial about every 5 months.      Have not really noticed any change in his symptoms      3/24   H/H  18/53,   1.2, GFR 62      No GH      Voiding okay.    Nocturia x2 doing well currently.  No prostate meds.       Libido ok /no fatigue    Patient does not do well when he is off his testosterone replacement.      No family history of prostate cancer.    Previous    Has used Cialis 20 mg in the past, is having no trouble currently.    doing phlebotomy/donating blood about every 6 months.    History of increased H/H -has been doing well for a while    2/15/2022  Rezum - 4 cm prostate    Testosterone has run high in the past.  We have decreased his dose from 0.5 x 1 point.    Endocrinologist that he saw earlier that  had recommended therapeutic phlebotomy has retired.    Flomax  - could not tolerate, helped urination.  Nasal stuffiness, unusual feeling    Was on 0.5 mL IM Depo-Testosterone every 9 days before.     Has been on injections for several years    Sildenafil could not tolerate secondary to  side effects      No cardiopulmonary history.  Patient does not smoke.  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  Removed.  Normal bladder otherwise and normal retrogrades  Pathology negative    started initially for decreased libido and fatigue.  Did try AndroGel and patches in the past and did not like these b/c of side effects.    9/21 creatinine 1.4, GFR 51    Total testosterone    7/24     567  3/24    552       634  3/23    496           469  3/22   587      596  20  159    824      668      806  10/19  995   683     251     928     504 - every 10 days  3/19   826    713    1069    933       563   246   >1500 -  dose was cut in half        cT1c prostatic adenocarcinoma      2024 MRI fusion  Right apex - 3+3, 1/2, 10%  Right mid - 3+3, 1/2, 7%    2024 MRI prostate - 49 g  PI- RADS 4 - 1.3 x 1.4 cm-- right posterior lateral peripheral zone near apex  PI-RADS 3 - left mid gland-1.4 x 1.5 cm  Some chronic prostatitis          8.7      PSAd  0.17  3/24      9.0      3/23      5.6       6.4       4.7   MRI prostate -49 g -negative, chronic prostatitis    4.92   5.46    4.28  3/19  4.0   prostate biopsy 43 g - negative    6.75    2.54          Past History:  Medical History: has a past medical history of Aortic stenosis, Arthritis of back (), Benign essential hypertension, Bladder problem, Cervical disc disorder (), Cervical radiculopathy (2017), Cervical spinal stenosis (2017), Cervicalgia (2017), Coronary artery disease, ED (erectile dysfunction), Greater trochanteric bursitis of right hip (2018), High blood pressure, HL (hearing loss) (01 Oct 2021), Hyperlipidemia, Hypertension, Leg pain, Leg swelling, Low back pain, Low back strain (), Lumbago (2017), Lumbosacral disc disease (), Neck pain, Paresthesia (2017), Periarthritis of shoulder (), Polycythemia, Right hip pain, Rotator cuff syndrome (2021), and Urinary bladder incontinence (2017).   Surgical History: has a past surgical history that includes Cardiac catheterization; Cervical disc surgery; Lumbar epidural injection; Vasectomy; Circumcision, non-; Shoulder arthroscopy w/ labral repair (Left); Colonoscopy; [place] tooth extraction; Anterior Cervical  Fusion (04/12/2017); Cystoscopy (01/10/2014); Prostate biopsy (2018); Cardiac catheterization (N/A, 10/13/2021); Cardiac catheterization (N/A, 10/13/2021); Cardiac catheterization (N/A, 10/13/2021); Neck surgery (2018?); Shoulder surgery (2008); Trigger point injection (2021); Colonoscopy (N/A, 11/20/2023); and Prostate biopsy (N/A, 8/8/2024).   Family History:          Objective     Vital Signs:   There were no vitals taken for this visit.             Assessment and Plan    Diagnoses and all orders for this visit:    1. Prostate cancer (Primary)    2. Hypogonadism in male    3. Benign prostatic hyperplasia with lower urinary tract symptoms, symptom details unspecified                Hypogonadism    Discussed with patient the risk of being on testosterone replacement with known prostate cancer.  Patient states that when he is not on testosterone replacement he has severe mental and physical issues.  States he cannot do this his quality of life would be so low that he would not be able to lift.  Coming off testosterone replacement per patient is not an option.  We discussed this in detail and he understands the risk      Cont  0.3 mL daily.  Depo testosterone subcu every 5 days.     likes to use 1 - 10 mL vial and get refill after.     Did really see a lot of change in his symptoms.     Will need total testosterone, CBC and CMP in 9/24    Total testosterone after he changes his dose          Patient did recently increase his dose is going to follow-up in a few weeks after his second opinion with testosterone level        Low risk clinical T1c prostatic adenocarcinoma    Today in clinic the patient was counseled on the risk and benefits of laparoscopic robotic prostatectomy.  All risk and benefits were discussed including but not limited to the risk of bleeding, infection, damage to adjacent structures, anesthetic complications and all other complications up to and including death.         We also discussed the  alternatives of laparoscopic robotic prostatectomy including the risk and benefits of each.  This included but was not limited to brachy therapy, external beam radiation therapy, open prostatectomy, active surveillance and also watchful waiting.         We also discussed today in clinic the side effects of surgery including erectile dysfunction and urinary incontinence.  The patient understands that his erections will not be as good as they are now after surgery.  We also discussed he may get no erections after surgery.  We also discussed that the patient will leak urine after surgery and this gets better over time usually taking a year to see a new baseline continence. The treatments of these were also discussed.  Patient understands these risks and acknowledges understanding.         We also discussed radiation therapy and encouraged the patient to seek an opinion a radiation oncologist.  We discussed the different radiation modalities including IMRTand brachytherapy.  We discussed a 10 year outcomes from radiation and surgery appears similar.  We discussed the risk of radiation including erectile dysfunction, radiation cystitis, proctitis and secondary malignancies.         We also discussed alternative therapies including HIFU and cryotherapy.  We discussed these or not currently NCCN guidelines and are not considered standard of care currently.         We discussed that active surveillance is an option for a patient with low risk prostate cancer.  The risks of surveillance include progression of disease, failure of clinical staging to detect advanced disease, need for strict followup, need for repeat prostate biopsies, and patient anxiety related to PSA.  We did discuss that the evidence suggests that patient's who progress to treatment on surveillance have survival similar to those treated at diagnosis.  Greater than 45 minutes was used in counseling and coordination of care, with greater than 51% of this in  face-to-face counseling        Discussed with patient we could do active surveillance with him staying on testosterone replacement but this does make me worry and this is not optimal.        I discussed some of the focal treatments that are being used including HIFU and FRANCESCA.      I will get patient in with tertiary center specifically  to discuss possibility of focal therapy.  I did discuss with patient if he is going to stay on testosterone placement I would want him to consider treatment

## 2024-08-23 ENCOUNTER — OFFICE VISIT (OUTPATIENT)
Dept: UROLOGY | Facility: CLINIC | Age: 69
End: 2024-08-23
Payer: MEDICARE

## 2024-08-23 VITALS — HEIGHT: 71 IN | WEIGHT: 236 LBS | RESPIRATION RATE: 16 BRPM | BODY MASS INDEX: 33.04 KG/M2

## 2024-08-23 DIAGNOSIS — C61 PROSTATE CANCER: Primary | ICD-10-CM

## 2024-08-23 DIAGNOSIS — N40.1 BENIGN PROSTATIC HYPERPLASIA WITH LOWER URINARY TRACT SYMPTOMS, SYMPTOM DETAILS UNSPECIFIED: ICD-10-CM

## 2024-08-23 DIAGNOSIS — E29.1 HYPOGONADISM IN MALE: ICD-10-CM

## 2024-09-16 ENCOUNTER — TELEPHONE (OUTPATIENT)
Dept: UROLOGY | Facility: CLINIC | Age: 69
End: 2024-09-16
Payer: MEDICARE

## 2024-09-16 DIAGNOSIS — E29.1 HYPOGONADISM IN MALE: Primary | ICD-10-CM

## 2024-09-20 ENCOUNTER — LAB (OUTPATIENT)
Dept: LAB | Facility: HOSPITAL | Age: 69
End: 2024-09-20
Payer: MEDICARE

## 2024-09-20 ENCOUNTER — TELEPHONE (OUTPATIENT)
Dept: UROLOGY | Facility: CLINIC | Age: 69
End: 2024-09-20
Payer: MEDICARE

## 2024-09-20 DIAGNOSIS — E29.1 HYPOGONADISM IN MALE: ICD-10-CM

## 2024-09-20 LAB
CYTO UR: NORMAL
LAB AP CASE REPORT: NORMAL
LAB AP CLINICAL INFORMATION: NORMAL
PATH REPORT.ADDENDUM SPEC: NORMAL
PATH REPORT.FINAL DX SPEC: NORMAL
PATH REPORT.GROSS SPEC: NORMAL
TESTOST SERPL-MCNC: 946 NG/DL (ref 193–740)

## 2024-09-20 PROCEDURE — 84403 ASSAY OF TOTAL TESTOSTERONE: CPT

## 2024-09-20 PROCEDURE — 36415 COLL VENOUS BLD VENIPUNCTURE: CPT

## 2024-09-23 DIAGNOSIS — E29.1 HYPOGONADISM IN MALE: Primary | ICD-10-CM

## 2024-10-15 ENCOUNTER — LAB (OUTPATIENT)
Dept: LAB | Facility: HOSPITAL | Age: 69
End: 2024-10-15
Payer: MEDICARE

## 2024-10-15 DIAGNOSIS — E29.1 HYPOGONADISM IN MALE: ICD-10-CM

## 2024-10-15 LAB — TESTOST SERPL-MCNC: 654 NG/DL (ref 193–740)

## 2024-10-15 PROCEDURE — 36415 COLL VENOUS BLD VENIPUNCTURE: CPT

## 2024-10-15 PROCEDURE — 84403 ASSAY OF TOTAL TESTOSTERONE: CPT

## 2024-10-31 ENCOUNTER — HOSPITAL ENCOUNTER (OUTPATIENT)
Dept: CARDIOLOGY | Facility: HOSPITAL | Age: 69
Discharge: HOME | End: 2024-10-31
Payer: MEDICARE

## 2024-10-31 ENCOUNTER — HOSPITAL ENCOUNTER (OUTPATIENT)
Dept: CT IMAGING | Facility: HOSPITAL | Age: 69
Discharge: HOME | End: 2024-10-31
Payer: MEDICARE

## 2024-10-31 ENCOUNTER — OFFICE VISIT (OUTPATIENT)
Dept: CARDIOLOGY | Facility: CLINIC | Age: 69
End: 2024-10-31
Payer: MEDICARE

## 2024-10-31 VITALS
SYSTOLIC BLOOD PRESSURE: 112 MMHG | WEIGHT: 235 LBS | HEIGHT: 71 IN | BODY MASS INDEX: 32.9 KG/M2 | HEART RATE: 55 BPM | DIASTOLIC BLOOD PRESSURE: 68 MMHG

## 2024-10-31 DIAGNOSIS — E78.00 HIGH CHOLESTEROL: ICD-10-CM

## 2024-10-31 DIAGNOSIS — I10 ESSENTIAL HYPERTENSION: Primary | ICD-10-CM

## 2024-10-31 DIAGNOSIS — I35.0 NONRHEUMATIC AORTIC VALVE STENOSIS: ICD-10-CM

## 2024-10-31 DIAGNOSIS — R06.02 SHORTNESS OF BREATH: ICD-10-CM

## 2024-10-31 DIAGNOSIS — C61 PROSTATE CANCER: ICD-10-CM

## 2024-10-31 DIAGNOSIS — R07.2 PRECORDIAL PAIN: ICD-10-CM

## 2024-10-31 DIAGNOSIS — I10 ESSENTIAL HYPERTENSION: ICD-10-CM

## 2024-10-31 DIAGNOSIS — D75.1 POLYCYTHEMIA: ICD-10-CM

## 2024-10-31 LAB
AORTIC ARCH: 2.9 CM
AORTIC DIMENSIONLESS INDEX: 0.32 (DI)
ASCENDING AORTA: 3 CM
BH CV ECHO LEFT VENTRICLE GLOBAL LONGITUDINAL STRAIN: -20.5 %
BH CV ECHO MEAS - ACS: 1.13 CM
BH CV ECHO MEAS - AI P1/2T: 700 MSEC
BH CV ECHO MEAS - AO MAX PG: 33 MMHG
BH CV ECHO MEAS - AO MEAN PG: 18.2 MMHG
BH CV ECHO MEAS - AO ROOT AREA (BSA CORRECTED): 1.5 CM2
BH CV ECHO MEAS - AO ROOT DIAM: 3.3 CM
BH CV ECHO MEAS - AO V2 MAX: 287.3 CM/SEC
BH CV ECHO MEAS - AO V2 VTI: 68.4 CM
BH CV ECHO MEAS - AVA(I,D): 1.01 CM2
BH CV ECHO MEAS - EDV(CUBED): 97.3 ML
BH CV ECHO MEAS - EDV(MOD-SP2): 99 ML
BH CV ECHO MEAS - EDV(MOD-SP4): 111 ML
BH CV ECHO MEAS - EF(MOD-BP): 55.4 %
BH CV ECHO MEAS - EF(MOD-SP2): 57.6 %
BH CV ECHO MEAS - EF(MOD-SP4): 53.2 %
BH CV ECHO MEAS - EF_3D-VOL: 55 %
BH CV ECHO MEAS - ESV(CUBED): 34.5 ML
BH CV ECHO MEAS - ESV(MOD-SP2): 42 ML
BH CV ECHO MEAS - ESV(MOD-SP4): 52 ML
BH CV ECHO MEAS - FS: 29.3 %
BH CV ECHO MEAS - IVS/LVPW: 1.09 CM
BH CV ECHO MEAS - IVSD: 1.2 CM
BH CV ECHO MEAS - LA 3D VOL INDEX: 26
BH CV ECHO MEAS - LAT PEAK E' VEL: 8.7 CM/SEC
BH CV ECHO MEAS - LV DIASTOLIC VOL/BSA (35-75): 49.1 CM2
BH CV ECHO MEAS - LV MASS(C)D: 192.9 GRAMS
BH CV ECHO MEAS - LV MAX PG: 4.2 MMHG
BH CV ECHO MEAS - LV MEAN PG: 2 MMHG
BH CV ECHO MEAS - LV SYSTOLIC VOL/BSA (12-30): 23 CM2
BH CV ECHO MEAS - LV V1 MAX: 102 CM/SEC
BH CV ECHO MEAS - LV V1 VTI: 21.9 CM
BH CV ECHO MEAS - LVIDD: 4.6 CM
BH CV ECHO MEAS - LVIDS: 3.3 CM
BH CV ECHO MEAS - LVOT AREA: 3.2 CM2
BH CV ECHO MEAS - LVOT DIAM: 2 CM
BH CV ECHO MEAS - LVPWD: 1.1 CM
BH CV ECHO MEAS - MED PEAK E' VEL: 9.4 CM/SEC
BH CV ECHO MEAS - MV A DUR: 0.17 SEC
BH CV ECHO MEAS - MV A MAX VEL: 93.8 CM/SEC
BH CV ECHO MEAS - MV DEC SLOPE: 439.7 CM/SEC2
BH CV ECHO MEAS - MV DEC TIME: 0.19 SEC
BH CV ECHO MEAS - MV E MAX VEL: 94.7 CM/SEC
BH CV ECHO MEAS - MV E/A: 1.01
BH CV ECHO MEAS - MV MAX PG: 6 MMHG
BH CV ECHO MEAS - MV MEAN PG: 1.72 MMHG
BH CV ECHO MEAS - MV P1/2T: 79.6 MSEC
BH CV ECHO MEAS - MV V2 VTI: 35.7 CM
BH CV ECHO MEAS - MVA(P1/2T): 2.8 CM2
BH CV ECHO MEAS - MVA(VTI): 1.94 CM2
BH CV ECHO MEAS - PA ACC TIME: 0.11 SEC
BH CV ECHO MEAS - PA V2 MAX: 78.7 CM/SEC
BH CV ECHO MEAS - PULM A REVS DUR: 0.2 SEC
BH CV ECHO MEAS - PULM A REVS VEL: 30.7 CM/SEC
BH CV ECHO MEAS - PULM DIAS VEL: 49.6 CM/SEC
BH CV ECHO MEAS - PULM S/D: 0.91
BH CV ECHO MEAS - PULM SYS VEL: 45.1 CM/SEC
BH CV ECHO MEAS - QP/QS: 0.76
BH CV ECHO MEAS - RAP SYSTOLE: 3 MMHG
BH CV ECHO MEAS - RV MAX PG: 1.2 MMHG
BH CV ECHO MEAS - RV V1 MAX: 54.8 CM/SEC
BH CV ECHO MEAS - RV V1 VTI: 12.7 CM
BH CV ECHO MEAS - RVOT DIAM: 2.29 CM
BH CV ECHO MEAS - RVSP: 23 MMHG
BH CV ECHO MEAS - SV(LVOT): 69.1 ML
BH CV ECHO MEAS - SV(MOD-SP2): 57 ML
BH CV ECHO MEAS - SV(MOD-SP4): 59 ML
BH CV ECHO MEAS - SV(RVOT): 52.6 ML
BH CV ECHO MEAS - SVI(LVOT): 30.6 ML/M2
BH CV ECHO MEAS - SVI(MOD-SP2): 25.2 ML/M2
BH CV ECHO MEAS - SVI(MOD-SP4): 26.1 ML/M2
BH CV ECHO MEAS - TAPSE (>1.6): 2.48 CM
BH CV ECHO MEAS - TR MAX PG: 20.1 MMHG
BH CV ECHO MEAS - TR MAX VEL: 224 CM/SEC
BH CV ECHO MEASUREMENTS AVERAGE E/E' RATIO: 10.46
BH CV XLRA - RV BASE: 3.8 CM
BH CV XLRA - RV LENGTH: 8.5 CM
BH CV XLRA - RV MID: 3.1 CM
BH CV XLRA - TDI S': 14.8 CM/SEC
D DIMER PPP FEU-MCNC: 0.48 MCGFEU/ML (ref 0–0.67)
ERYTHROCYTE [SEDIMENTATION RATE] IN BLOOD: 11 MM/HR (ref 0–20)
LEFT ATRIUM VOLUME INDEX: 28.3 ML/M2
NT-PROBNP SERPL-MCNC: 466 PG/ML (ref 0–900)
SINUS: 3.3 CM
STJ: 2.6 CM
TROPONIN T SERPL HS-MCNC: 18 NG/L

## 2024-10-31 PROCEDURE — 71275 CT ANGIOGRAPHY CHEST: CPT

## 2024-10-31 PROCEDURE — 84484 ASSAY OF TROPONIN QUANT: CPT | Performed by: INTERNAL MEDICINE

## 2024-10-31 PROCEDURE — 83880 ASSAY OF NATRIURETIC PEPTIDE: CPT | Performed by: INTERNAL MEDICINE

## 2024-10-31 PROCEDURE — 85379 FIBRIN DEGRADATION QUANT: CPT | Performed by: INTERNAL MEDICINE

## 2024-10-31 PROCEDURE — 25510000001 IOPAMIDOL PER 1 ML: Performed by: INTERNAL MEDICINE

## 2024-10-31 PROCEDURE — 93306 TTE W/DOPPLER COMPLETE: CPT

## 2024-10-31 PROCEDURE — 85652 RBC SED RATE AUTOMATED: CPT | Performed by: INTERNAL MEDICINE

## 2024-10-31 PROCEDURE — 36415 COLL VENOUS BLD VENIPUNCTURE: CPT

## 2024-10-31 PROCEDURE — 93356 MYOCRD STRAIN IMG SPCKL TRCK: CPT

## 2024-10-31 RX ORDER — IOPAMIDOL 755 MG/ML
100 INJECTION, SOLUTION INTRAVASCULAR
Status: COMPLETED | OUTPATIENT
Start: 2024-10-31 | End: 2024-10-31

## 2024-10-31 RX ADMIN — IOPAMIDOL 95 ML: 755 INJECTION, SOLUTION INTRAVENOUS at 17:02

## 2024-10-31 NOTE — PROGRESS NOTES
Date of Office Visit: 10/31/2024  Encounter Provider: Nikki Duncan MD  Place of Service: [facility] CARDIOLOGY  Patient Name: Jimmie Jamil  :1955    Chief complaint  Aortic valve stenosis, chest pain    History of Present Illness  67-year-old gentleman history of rheumatic fever, hypertension, hyperlipidemia, chronic renal insufficiency and obesity.  He has a history of rheumatic fever at age 14 treated with penicillin at age 18.  He has had a murmur noted intermittently since then.  Has a distant history of an abnormal stress test in hospital 10/2021 after an episode of syncope evaluation including CT of the chest abdomen pelvis that showed no pulmonary emboli borderline dilated ascending aorta without dissection.  Mild coronary artery calcification was present.  Echocardiogram showed an ejection fraction of 58% with aortic valve calcification with mild stenosis.  Cardiac catheterization showed no significant artery disease.  Subsequent 14-day outpatient monitor was unremarkable.  Subsequent echo on 2023 showed an ejection fraction of 61%, grade 1 diastolic dysfunction, aortic valve calcification with mild aortic valve stenosis, mild to moderate aortic valve stenosis.  The aortic valve is felt to be possibly bicuspid.  Early  he was also found elevated PSA with an abnormal MRI and biopsy consistent with prostate cancer which is being managed by careful observation    Patient states that over the past several months he has had significant stresses with building a cabin and family stressors including his.  He states blood pressure has typically 120s over 60s as well.  He gets lightheaded and dizzy and this occurs rarely.  However over the past month he has had left-sided chest discomfort most of the time rated as 2 out of 10 though this morning it was 7 out of 10.  It is not radiating to his back or arm he does feel unwell and short of breath with the chest pain.  It  is not clearly exertional but can occur with activity.  This morning he could not sleep and was laying in bed in 5 AM had severe discomfort.  He also had similar discomfort when he walked into our office.  These episodes may last for 10 minutes at a time.    Past Medical History:   Diagnosis Date    Aortic stenosis     Arthritis of back 2015    Benign essential hypertension     Bladder problem     Cervical disc disorder 2018    Cervical radiculopathy 03/16/2017    Cervical spinal stenosis 03/16/2017    Cervicalgia 03/16/2017    Coronary artery disease     diastolic dysfunction    ED (erectile dysfunction)     Greater trochanteric bursitis of right hip 07/09/2018    High blood pressure     HL (hearing loss) 01 Oct 2021    Hyperlipidemia     Hypertension     Leg pain     Leg swelling     Low back pain     Low back strain 2015    Lumbago 03/16/2017    Lumbosacral disc disease 2018    Neck pain     Paresthesia 03/16/2017    left hand/leg    Periarthritis of shoulder 2021    Polycythemia     Right hip pain     Rotator cuff syndrome 09/01/2021    Urinary bladder incontinence 03/16/2017     Past Surgical History:   Procedure Laterality Date    ANTERIOR CERVICAL FUSION  04/12/2017    C3-4    CARDIAC CATHETERIZATION      everything okay clearance for neck surgery    CARDIAC CATHETERIZATION N/A 10/13/2021    Procedure: Left Heart Cath;  Surgeon: Anahi Perales MD;  Location:  [facility];  Service: Cardiovascular;  Laterality: N/A;    CARDIAC CATHETERIZATION N/A 10/13/2021    Procedure: Coronary angiography;  Surgeon: Anahi Perales MD;  Location:  [facility];  Service: Cardiovascular;  Laterality: N/A;    CARDIAC CATHETERIZATION N/A 10/13/2021    Procedure: Left ventriculography;  Surgeon: Anahi Perales MD;  Location:  [facility];  Service: Cardiovascular;  Laterality: N/A;    CERVICAL DISC SURGERY      C2-3 fusion    CIRCUMCISION      COLONOSCOPY      COLONOSCOPY  N/A 11/20/2023    Procedure: COLONOSCOPY WITH COLD SNARE POLYECTOMY;  Surgeon: Kenny Urena MD;  Location: [facility];  Service: Gastroenterology;  Laterality: N/A;  COLON POLYP    CYSTOSCOPY  01/10/2014    Cysto ivan retrograde pyelogram urthral bx.    LUMBAR EPIDURAL INJECTION      L2-S2  going to try ablasion in future    NECK SURGERY  2018?    PROSTATE BIOPSY  2018    PROSTATE BIOPSY N/A 8/8/2024    Procedure: MRI fusion transrectal ultrasound-guided prostate biopsy;  Surgeon: Zechariah Clay MD;  Location: [facility] MAIN OR;  Service: Urology;  Laterality: N/A;    SHOULDER ARTHROSCOPY W/ LABRAL REPAIR Left     SHOULDER SURGERY  2008    TRIGGER POINT INJECTION  2021    L5-S1    VASECTOMY      WISDOM TOOTH EXTRACTION       Outpatient Medications Prior to Visit   Medication Sig Dispense Refill    ascorbic acid (VITAMIN C) 1000 MG tablet Take 1 tablet by mouth Daily.      aspirin 81 MG EC tablet Take 1 tablet by mouth Daily.      Coenzyme Q10 (CoQ10) 100 MG capsule       enalapril (VASOTEC) 20 MG tablet Take 1 tablet by mouth twice daily (Patient taking differently: Take 1 tablet by mouth Every Night.) 180 tablet 0    Leqvio 284 MG/1.5ML solution prefilled syringe EVERY 6 MTHS      ofloxacin (OCUFLOX) 0.3 % ophthalmic solution USES FOR EARS INSTEAD OF EYES      Testosterone Cypionate (DEPOTESTOTERONE CYPIONATE) 200 MG/ML injection INJECT 0.3 ML UNDER THE SKIN Q 5 DAYS AS DIRECTED 10 mL 0    Tobramycin-dexAMETHasone (TobraDex ST) 0.3-0.05 % suspension 3-4 drops in affected ear twice a day 5 mL 1    VASCEPA 1 g capsule capsule Take 2 g by mouth 2 (Two) Times a Day With Meals. 360 capsule 3    verapamil ER (VERELAN) 360 MG 24 hr capsule Take 1 capsule by mouth Every Night.      ciprofloxacin (Cipro) 500 MG tablet Take 1 tablet by mouth 2 (Two) Times a Day. (Patient not taking: Reported on 10/31/2024) 4 tablet 0     No facility-administered medications prior to visit.       Allergies as of 10/31/2024 -  "Reviewed 10/31/2024   Allergen Reaction Noted    Beta adrenergic blockers Unknown - High Severity 01/31/2020    Crestor [rosuvastatin calcium] Myalgia 01/31/2020    Keflex [cephalexin] Other (See Comments) 01/31/2020    Livalo [pitavastatin] Myalgia 01/31/2020    Losartan Other (See Comments) 03/16/2020    Naproxen Other (See Comments) 01/31/2020     Social History     Socioeconomic History    Marital status:    Tobacco Use    Smoking status: Never     Passive exposure: Never    Smokeless tobacco: Never   Vaping Use    Vaping status: Never Used   Substance and Sexual Activity    Alcohol use: Yes     Alcohol/week: 2.0 standard drinks of alcohol     Types: 2 Cans of beer per week     Comment: 4 beers a week    Drug use: Never    Sexual activity: Yes     Partners: Female     Birth control/protection: Surgical     Family History   Problem Relation Age of Onset    Arthritis Mother     Alzheimer's disease Mother 80    Arthritis Father     Heart disease Father     Colon cancer Father 85    Hypertension Father 80    Cancer Father         Colon cx in his 90s    Stroke Father         Mild in his 80s     Review of Systems   Constitutional: Negative for chills, fever, weight gain and weight loss.   Cardiovascular:  Positive for chest pain and dyspnea on exertion. Negative for leg swelling.   Respiratory:  Negative for cough, snoring and wheezing.    Hematologic/Lymphatic: Negative for bleeding problem. Does not bruise/bleed easily.   Skin:  Negative for color change.   Musculoskeletal:  Negative for falls, joint pain and myalgias.   Gastrointestinal:  Negative for melena.   Genitourinary:  Negative for hematuria.   Neurological:  Negative for excessive daytime sleepiness.   Psychiatric/Behavioral:  Negative for depression. The patient is not nervous/anxious.         Objective:     Vitals:    10/31/24 1236   BP: 112/68   Pulse: 55   Weight: 107 kg (235 lb)   Height: 180.3 cm (71\")     Body mass index is 32.78 " kg/m².    Vitals reviewed.   Constitutional:       Appearance: Well-developed. Obese.   Eyes:      General: No scleral icterus.        Right eye: No discharge.      Conjunctiva/sclera: Conjunctivae normal.      Pupils: Pupils are equal, round, and reactive to light.   HENT:      Head: Normocephalic.      Nose: Nose normal.   Neck:      Thyroid: No thyromegaly.      Vascular: No JVD.   Pulmonary:      Effort: Pulmonary effort is normal. No respiratory distress.      Breath sounds: Normal breath sounds. No wheezing. No rales.   Cardiovascular:      Normal rate. Regular rhythm. Normal S1. Normal S2.       Murmurs: There is a harsh midsystolic murmur at the URSB, radiating to the neck.      No gallop.    Pulses:     Intact distal pulses.      Carotid: 2+ bilaterally.     Radial: 2+ bilaterally.     Femoral: 2+ bilaterally.     Popliteal: 2+ bilaterally.     Dorsalis pedis: 2+ bilaterally.     Posterior tibial: 2+ bilaterally.  Edema:     Peripheral edema absent.   Abdominal:      General: Bowel sounds are normal. There is no distension.      Palpations: Abdomen is soft.      Tenderness: There is no abdominal tenderness. There is no rebound.   Musculoskeletal: Normal range of motion.         General: No tenderness.      Cervical back: Normal range of motion and neck supple. Skin:     General: Skin is warm and dry.      Findings: No erythema or rash.   Neurological:      Mental Status: Alert and oriented to person, place, and time.   Psychiatric:         Behavior: Behavior normal.         Thought Content: Thought content normal.         Judgment: Judgment normal.       Lab Review:   Lab Results - Last 18 Months   Lab Units 03/13/24  1435 08/10/23  1151   WBC 10*3/mm3 9.35 9.15   RBC 10*6/mm3 6.38* 5.95*   HEMOGLOBIN g/dL 18.4* 17.1   HEMATOCRIT % 53.5* 49.8   MCV fL 83.9 83.7   MCH pg 28.8 28.7   MCHC g/dL 34.4 34.3   RDW % 13.5 13.5   PLATELETS 10*3/mm3 301 225   NEUTROPHIL % % 51.1 51.1   LYMPHOCYTE % % 33.2 27.1    MONOCYTES % % 10.9 18.7*   EOSINOPHIL % % 0.5 1.2   BASOPHIL % % 0.7 0.7   NEUTROS ABS 10*3/mm3 4.77 4.68   LYMPHS ABS 10*3/mm3 3.10 2.48   MONOS ABS 10*3/mm3 1.02* 1.71*   EOS ABS 10*3/mm3 0.05 0.11   BASOS ABS 10*3/mm3 0.07 0.06   RDW-SD fl 39.7 40.8   MPV fL 10.1 10.3       Lab Results - Last 18 Months   Lab Units 03/13/24  1435 09/26/23  1145 08/10/23  1151   GLUCOSE mg/dL 123* 113* 88   BUN mg/dL 13 13 17   CREATININE mg/dL 1.27 1.17 1.38*   SODIUM mmol/L 138 137 136   POTASSIUM mmol/L 4.1 4.3 4.1   CHLORIDE mmol/L 103 103 102   CO2 mmol/L 22.7 22.4 23.8   CALCIUM mg/dL 9.1 9.6 9.5   TOTAL PROTEIN g/dL 6.8  --  6.4   ALBUMIN g/dL 4.0  --  3.9   ALT (SGPT) U/L 41  --  31   AST (SGOT) U/L 33  --  30   ALK PHOS U/L 66  --  66   BILIRUBIN mg/dL 0.5  --  0.4   GLOBULIN gm/dL 2.8  --  2.5   A/G RATIO g/dL 1.4  --  1.6   BUN / CREAT RATIO  10.2 11.1 12.3   ANION GAP mmol/L 12.3 11.6 10.2   EGFR mL/min/1.73 62.3 68.8 56.4*         ECG 12 Lead    Date/Time: 10/31/2024 12:50 PM  Performed by: Nikki Duncan MD    Authorized by: Nikki Duncan MD  Comparison: compared with previous ECG   Similar to previous ECG  Rhythm: sinus rhythm  Other findings: non-specific ST-T wave changes    Clinical impression: abnormal EKG            Diagnosis Plan   1. Essential hypertension  ECG 12 Lead    High Sensitivity Troponin T    proBNP    D-dimer, Quantitative    Adult Transthoracic Echo Complete W/ Cont if Necessary Per Protocol      2. Precordial pain  ECG 12 Lead    High Sensitivity Troponin T    proBNP    D-dimer, Quantitative    Adult Transthoracic Echo Complete W/ Cont if Necessary Per Protocol    Sedimentation Rate    CT Angiogram Chest      3. Shortness of breath  ECG 12 Lead    proBNP      4. High cholesterol        5. Nonrheumatic aortic valve stenosis        6. Prostate cancer        7. Polycythemia          Plan:       1.  Chest pain.  Anginal and atypical features.  Will check troponin D-dimer proBNP.  Will check an  echocardiogram given the bicuspid aortic valve also check CT angiogram of the chest for aortic pathology including aneurysm or dissection if all negative, Can consider further coronary evaluation and/or GI evaluation  2.  Shortness of breath, as above  3.  Dizziness, as above  4.  Coronary artery disease with abnormal coronary calcification and no obstructive coronary disease by cath 10/2021.  Recommendations as above  5.  Mild aortic valve stenosis.  We will check an echo.  6.  Left carotid plaque without stenosis by Doppler 11/2022  7.  Hypertension.  Controlled  8.  Hyperlipidemia.  Unfortunately intolerant of multiple statins and Repatha.  Has been started on seems to tolerate and inclisiran  9.  Polycythemia.  Patient states he has had this for a long time even before starting testosterone shots.  He has not had prior hematology evaluation.  This has been monitored and managed by urology with phlebotomy every 2 to 3 months.  He has a brother with polycythemia who gives frequently with an elevated count.  May need to consider further hematology evaluation.    Time Spent: I spent 45 minutes caring for Jimmie on this date of service. This time includes time spent by me in the following activities: preparing for the visit, reviewing tests, obtaining and/or reviewing a separately obtained history, performing a medically appropriate examination and/or evaluation, counseling and educating the patient/family/caregiver, ordering medications, tests, or procedures, documenting information in the medical record, and independently interpreting results and communicating that information with the patient/family/caregiver.   I spent 1 minutes on the separately reported service of ECG. This time is not included in the time used to support the E/M service also reported today.        Your medication list            Accurate as of October 31, 2024 11:59 PM. If you have any questions, ask your nurse or doctor.                CHANGE  how you take these medications        Instructions Last Dose Given Next Dose Due   enalapril 20 MG tablet  Commonly known as: VASOTEC  What changed: when to take this      Take 1 tablet by mouth twice daily              CONTINUE taking these medications        Instructions Last Dose Given Next Dose Due   ascorbic acid 1000 MG tablet  Commonly known as: VITAMIN C      Take 1 tablet by mouth Daily.       aspirin 81 MG EC tablet      Take 1 tablet by mouth Daily.       CoQ10 100 MG capsule           Leqvio 284 MG/1.5ML solution prefilled syringe  Generic drug: Inclisiran Sodium      EVERY 6 MTHS       ofloxacin 0.3 % ophthalmic solution  Commonly known as: OCUFLOX      USES FOR EARS INSTEAD OF EYES       Testosterone Cypionate 200 MG/ML injection  Commonly known as: DEPOTESTOTERONE CYPIONATE      INJECT 0.3 ML UNDER THE SKIN Q 5 DAYS AS DIRECTED       TobraDex ST 0.3-0.05 % suspension  Generic drug: Tobramycin-dexAMETHasone      3-4 drops in affected ear twice a day       Vascepa 1 g capsule capsule  Generic drug: icosapent ethyl      Take 2 g by mouth 2 (Two) Times a Day With Meals.       verapamil  MG 24 hr capsule  Commonly known as: VERELAN      Take 1 capsule by mouth Every Night.                Patient is no longer taking -.  I corrected the med list to reflect this.  I did not stop these medications.      Dictated utilizing Dragon dictation

## 2024-10-31 NOTE — NURSING NOTE
Stat hold & call CTA Chest (dissection) results called to Yi SHORT. Patient may d/c home and someone from the office will contact patient in the a.m. IV d/c'd intact. No problems or concerns noted at this time. Patient ambulated to main entrance for discharge.

## 2024-11-01 ENCOUNTER — TELEPHONE (OUTPATIENT)
Dept: GASTROENTEROLOGY | Facility: CLINIC | Age: 69
End: 2024-11-01
Payer: MEDICARE

## 2024-11-01 ENCOUNTER — TELEPHONE (OUTPATIENT)
Dept: CARDIOLOGY | Facility: CLINIC | Age: 69
End: 2024-11-01
Payer: MEDICARE

## 2024-11-01 NOTE — TELEPHONE ENCOUNTER
Notified patient's wife, Dayanara, of results/recommendations, allowed per ABIGAIL. She verbalized understanding.    Katty Iglesias RN  Triage MG

## 2024-11-01 NOTE — TELEPHONE ENCOUNTER
Permission for the hub to transfer this call directly to the nurse triage line at [facility].    Attempted to call Jimmie Jamil, no answer.  Left a voicemail for patient to call back and ask to speak with the triage nurses.  Will continue to try to reach patient.    Alannah Rosales RN  [facility] Triage  11/01/24 08:36 EDT

## 2024-11-01 NOTE — TELEPHONE ENCOUNTER
Patient left a voicemail requesting Dr. Urena to review patient's recent CTA. I attempted to contact patient, but no answer. I left a voicemail with my direct line as a call back number. Patient would need an appointment with the office for CT to be reviewed, per HAN Ly, or patient can have PCP review and send referral if needed

## 2024-11-01 NOTE — TELEPHONE ENCOUNTER
Please let him know that CTA of the chest showed aortic size is overall stable with maybe slight enlargement from prior CT scan in October 2021.  There were gallstones as well as inflammatory changes in the gallbladder that could indicate gallbladder disease.  This could be causing his symptoms.  Would recommend following up with PCP as soon as possible to get this assessed.

## 2024-11-04 ENCOUNTER — TELEPHONE (OUTPATIENT)
Dept: GASTROENTEROLOGY | Facility: CLINIC | Age: 69
End: 2024-11-04
Payer: MEDICARE

## 2024-11-04 NOTE — TELEPHONE ENCOUNTER
Hub staff attempted to follow warm transfer process and was unsuccessful     Caller: marie abreu    Relationship to patient: Emergency Contact    Best call back number: [phone]    Patient is needing: CALLBACK ABOUT CT REVIEW

## 2024-11-05 NOTE — TELEPHONE ENCOUNTER
I spoke with patient's wife. I explained to her that we have not received a referral from PCP regarding gall bladder. I have discussed with her that patient will need to see general surgery. I have provided patient with the number to surgical specialists. She states she will contact their office.

## 2024-11-08 ENCOUNTER — OFFICE VISIT (OUTPATIENT)
Dept: SURGERY | Facility: CLINIC | Age: 69
End: 2024-11-08
Payer: MEDICARE

## 2024-11-08 ENCOUNTER — TELEPHONE (OUTPATIENT)
Dept: CARDIOLOGY | Facility: CLINIC | Age: 69
End: 2024-11-08

## 2024-11-08 ENCOUNTER — HOSPITAL ENCOUNTER (INPATIENT)
Facility: HOSPITAL | Age: 69
LOS: 5 days | Discharge: HOME | End: 2024-11-13
Attending: INTERNAL MEDICINE | Admitting: INTERNAL MEDICINE
Payer: MEDICARE

## 2024-11-08 VITALS — WEIGHT: 207 LBS | RESPIRATION RATE: 16 BRPM | BODY MASS INDEX: 28.98 KG/M2 | HEIGHT: 71 IN

## 2024-11-08 DIAGNOSIS — K80.46 CALCULUS OF BILE DUCT WITH ACUTE ON CHRONIC CHOLECYSTITIS WITHOUT OBSTRUCTION: Primary | ICD-10-CM

## 2024-11-08 DIAGNOSIS — K81.9 CHOLECYSTITIS: ICD-10-CM

## 2024-11-08 DIAGNOSIS — K80.20 GALLSTONES: Primary | ICD-10-CM

## 2024-11-08 DIAGNOSIS — K80.00 CALCULUS OF GALLBLADDER WITH ACUTE CHOLECYSTITIS WITHOUT OBSTRUCTION: ICD-10-CM

## 2024-11-08 PROBLEM — R07.9 CHEST PAIN: Status: ACTIVE | Noted: 2024-11-08

## 2024-11-08 PROBLEM — K80.10 CHOLECYSTITIS WITH CHOLELITHIASIS: Status: ACTIVE | Noted: 2024-11-08

## 2024-11-08 PROBLEM — R06.09 DYSPNEA ON EXERTION: Status: ACTIVE | Noted: 2024-11-08

## 2024-11-08 LAB
ALBUMIN SERPL-MCNC: 3.7 G/DL (ref 3.5–5.2)
ALBUMIN/GLOB SERPL: 1.5 G/DL
ALP SERPL-CCNC: 71 U/L (ref 39–117)
ALT SERPL W P-5'-P-CCNC: 43 U/L (ref 1–41)
ANION GAP SERPL CALCULATED.3IONS-SCNC: 11.4 MMOL/L (ref 5–15)
AST SERPL-CCNC: 25 U/L (ref 1–40)
BASOPHILS # BLD AUTO: 0.11 10*3/MM3 (ref 0–0.2)
BASOPHILS NFR BLD AUTO: 0.9 % (ref 0–1.5)
BILIRUB SERPL-MCNC: 0.5 MG/DL (ref 0–1.2)
BUN SERPL-MCNC: 16 MG/DL (ref 8–23)
BUN/CREAT SERPL: 15.4 (ref 7–25)
CALCIUM SPEC-SCNC: 9.4 MG/DL (ref 8.6–10.5)
CHLORIDE SERPL-SCNC: 103 MMOL/L (ref 98–107)
CO2 SERPL-SCNC: 23.6 MMOL/L (ref 22–29)
CREAT SERPL-MCNC: 1.04 MG/DL (ref 0.76–1.27)
D-LACTATE SERPL-SCNC: 1.3 MMOL/L (ref 0.5–2)
DEPRECATED RDW RBC AUTO: 40.2 FL (ref 37–54)
EGFRCR SERPLBLD CKD-EPI 2021: 78.7 ML/MIN/1.73
EOSINOPHIL # BLD AUTO: 0.13 10*3/MM3 (ref 0–0.4)
EOSINOPHIL NFR BLD AUTO: 1.1 % (ref 0.3–6.2)
ERYTHROCYTE [DISTWIDTH] IN BLOOD BY AUTOMATED COUNT: 13.7 % (ref 12.3–15.4)
GEN 5 2HR TROPONIN T REFLEX: 11 NG/L
GLOBULIN UR ELPH-MCNC: 2.5 GM/DL
GLUCOSE SERPL-MCNC: 82 MG/DL (ref 65–99)
HBA1C MFR BLD: 5.7 % (ref 4.8–5.6)
HCT VFR BLD AUTO: 54.4 % (ref 37.5–51)
HGB BLD-MCNC: 18.9 G/DL (ref 13–17.7)
IMM GRANULOCYTES # BLD AUTO: 0.37 10*3/MM3 (ref 0–0.05)
IMM GRANULOCYTES NFR BLD AUTO: 3.1 % (ref 0–0.5)
INR PPP: 0.99 (ref 0.9–1.1)
LYMPHOCYTES # BLD AUTO: 3.16 10*3/MM3 (ref 0.7–3.1)
LYMPHOCYTES NFR BLD AUTO: 26.8 % (ref 19.6–45.3)
MCH RBC QN AUTO: 29.5 PG (ref 26.6–33)
MCHC RBC AUTO-ENTMCNC: 34.7 G/DL (ref 31.5–35.7)
MCV RBC AUTO: 85 FL (ref 79–97)
MONOCYTES # BLD AUTO: 1.59 10*3/MM3 (ref 0.1–0.9)
MONOCYTES NFR BLD AUTO: 13.5 % (ref 5–12)
NEUTROPHILS NFR BLD AUTO: 54.6 % (ref 42.7–76)
NEUTROPHILS NFR BLD AUTO: 6.43 10*3/MM3 (ref 1.7–7)
NRBC BLD AUTO-RTO: 0 /100 WBC (ref 0–0.2)
NT-PROBNP SERPL-MCNC: 95.2 PG/ML (ref 0–900)
PLATELET # BLD AUTO: 226 10*3/MM3 (ref 140–450)
PMV BLD AUTO: 9.3 FL (ref 6–12)
POTASSIUM SERPL-SCNC: 4.2 MMOL/L (ref 3.5–5.2)
PROCALCITONIN SERPL-MCNC: 0.06 NG/ML (ref 0–0.25)
PROT SERPL-MCNC: 6.2 G/DL (ref 6–8.5)
PROTHROMBIN TIME: 13.3 SECONDS (ref 11.7–14.2)
RBC # BLD AUTO: 6.4 10*6/MM3 (ref 4.14–5.8)
SODIUM SERPL-SCNC: 138 MMOL/L (ref 136–145)
TROPONIN T DELTA: 0 NG/L
TROPONIN T SERPL HS-MCNC: 11 NG/L
WBC NRBC COR # BLD AUTO: 11.79 10*3/MM3 (ref 3.4–10.8)

## 2024-11-08 PROCEDURE — 80053 COMPREHEN METABOLIC PANEL: CPT | Performed by: INTERNAL MEDICINE

## 2024-11-08 PROCEDURE — 83880 ASSAY OF NATRIURETIC PEPTIDE: CPT | Performed by: INTERNAL MEDICINE

## 2024-11-08 PROCEDURE — 85025 COMPLETE CBC W/AUTO DIFF WBC: CPT | Performed by: INTERNAL MEDICINE

## 2024-11-08 PROCEDURE — 25010000002 PIPERACILLIN SOD-TAZOBACTAM PER 1 G: Performed by: INTERNAL MEDICINE

## 2024-11-08 PROCEDURE — 83036 HEMOGLOBIN GLYCOSYLATED A1C: CPT | Performed by: INTERNAL MEDICINE

## 2024-11-08 PROCEDURE — 84484 ASSAY OF TROPONIN QUANT: CPT | Performed by: INTERNAL MEDICINE

## 2024-11-08 PROCEDURE — 84145 PROCALCITONIN (PCT): CPT | Performed by: INTERNAL MEDICINE

## 2024-11-08 PROCEDURE — 83605 ASSAY OF LACTIC ACID: CPT | Performed by: INTERNAL MEDICINE

## 2024-11-08 PROCEDURE — 85610 PROTHROMBIN TIME: CPT | Performed by: INTERNAL MEDICINE

## 2024-11-08 PROCEDURE — 84153 ASSAY OF PSA TOTAL: CPT | Performed by: INTERNAL MEDICINE

## 2024-11-08 RX ORDER — ONDANSETRON 2 MG/ML
4 INJECTION INTRAMUSCULAR; INTRAVENOUS EVERY 6 HOURS PRN
OUTPATIENT
Start: 2024-11-08

## 2024-11-08 RX ORDER — ENALAPRIL MALEATE 20 MG/1
20 TABLET ORAL NIGHTLY
Status: DISCONTINUED | OUTPATIENT
Start: 2024-11-08 | End: 2024-11-12

## 2024-11-08 RX ORDER — ACETAMINOPHEN 650 MG/1
650 SUPPOSITORY RECTAL EVERY 4 HOURS PRN
Status: DISCONTINUED | OUTPATIENT
Start: 2024-11-08 | End: 2024-11-10

## 2024-11-08 RX ORDER — ICOSAPENT ETHYL 1 G/1
2 CAPSULE ORAL 2 TIMES DAILY WITH MEALS
Status: DISCONTINUED | OUTPATIENT
Start: 2024-11-08 | End: 2024-11-08

## 2024-11-08 RX ORDER — ACETAMINOPHEN 325 MG/1
650 TABLET ORAL EVERY 4 HOURS PRN
Status: DISCONTINUED | OUTPATIENT
Start: 2024-11-08 | End: 2024-11-10

## 2024-11-08 RX ORDER — FUROSEMIDE 40 MG/1
40 TABLET ORAL DAILY
Status: DISCONTINUED | OUTPATIENT
Start: 2024-11-09 | End: 2024-11-10

## 2024-11-08 RX ORDER — FUROSEMIDE 40 MG/1
40 TABLET ORAL
Status: ON HOLD | COMMUNITY
Start: 2024-11-05 | End: 2025-11-06

## 2024-11-08 RX ORDER — SODIUM CHLORIDE 0.9 % (FLUSH) 0.9 %
10 SYRINGE (ML) INJECTION EVERY 12 HOURS SCHEDULED
OUTPATIENT
Start: 2024-11-08

## 2024-11-08 RX ORDER — NITROGLYCERIN 0.4 MG/1
0.4 TABLET SUBLINGUAL
Status: DISCONTINUED | OUTPATIENT
Start: 2024-11-08 | End: 2024-11-13 | Stop reason: HOSPADM

## 2024-11-08 RX ORDER — SODIUM CHLORIDE 9 MG/ML
40 INJECTION, SOLUTION INTRAVENOUS AS NEEDED
OUTPATIENT
Start: 2024-11-08

## 2024-11-08 RX ORDER — MORPHINE SULFATE 2 MG/ML
2 INJECTION, SOLUTION INTRAMUSCULAR; INTRAVENOUS EVERY 4 HOURS PRN
Status: DISCONTINUED | OUTPATIENT
Start: 2024-11-08 | End: 2024-11-13 | Stop reason: HOSPADM

## 2024-11-08 RX ORDER — SODIUM CHLORIDE 0.9 % (FLUSH) 0.9 %
10 SYRINGE (ML) INJECTION AS NEEDED
OUTPATIENT
Start: 2024-11-08

## 2024-11-08 RX ORDER — INDOCYANINE GREEN AND WATER 25 MG
2.5 KIT INJECTION ONCE
OUTPATIENT
Start: 2024-11-08 | End: 2024-11-08

## 2024-11-08 RX ORDER — SODIUM CHLORIDE 0.9 % (FLUSH) 0.9 %
10 SYRINGE (ML) INJECTION EVERY 12 HOURS SCHEDULED
Status: DISCONTINUED | OUTPATIENT
Start: 2024-11-08 | End: 2024-11-13 | Stop reason: HOSPADM

## 2024-11-08 RX ORDER — POTASSIUM CHLORIDE 1500 MG/1
20 TABLET, EXTENDED RELEASE ORAL DAILY
Status: ON HOLD | COMMUNITY
Start: 2024-11-05 | End: 2025-11-06

## 2024-11-08 RX ORDER — ACETAMINOPHEN 160 MG/5ML
650 SOLUTION ORAL EVERY 4 HOURS PRN
Status: DISCONTINUED | OUTPATIENT
Start: 2024-11-08 | End: 2024-11-10

## 2024-11-08 RX ORDER — VERAPAMIL HYDROCHLORIDE 120 MG/1
360 TABLET, FILM COATED, EXTENDED RELEASE ORAL NIGHTLY
Status: DISCONTINUED | OUTPATIENT
Start: 2024-11-08 | End: 2024-11-13 | Stop reason: HOSPADM

## 2024-11-08 RX ORDER — SODIUM CHLORIDE 0.9 % (FLUSH) 0.9 %
10 SYRINGE (ML) INJECTION AS NEEDED
Status: DISCONTINUED | OUTPATIENT
Start: 2024-11-08 | End: 2024-11-13 | Stop reason: HOSPADM

## 2024-11-08 RX ORDER — ONDANSETRON 2 MG/ML
4 INJECTION INTRAMUSCULAR; INTRAVENOUS EVERY 6 HOURS PRN
Status: DISCONTINUED | OUTPATIENT
Start: 2024-11-08 | End: 2024-11-13 | Stop reason: HOSPADM

## 2024-11-08 RX ORDER — SODIUM CHLORIDE 9 MG/ML
40 INJECTION, SOLUTION INTRAVENOUS AS NEEDED
Status: DISCONTINUED | OUTPATIENT
Start: 2024-11-08 | End: 2024-11-13 | Stop reason: HOSPADM

## 2024-11-08 RX ADMIN — ENALAPRIL MALEATE 20 MG: 20 TABLET ORAL at 21:09

## 2024-11-08 RX ADMIN — Medication 10 ML: at 21:09

## 2024-11-08 RX ADMIN — VERAPAMIL HYDROCHLORIDE 360 MG: 120 TABLET, FILM COATED, EXTENDED RELEASE ORAL at 21:09

## 2024-11-08 RX ADMIN — PIPERACILLIN AND TAZOBACTAM 3.38 G: 3; .375 INJECTION, POWDER, LYOPHILIZED, FOR SOLUTION INTRAVENOUS at 21:08

## 2024-11-08 NOTE — PROGRESS NOTES
Inpatient History and Physical Surgical Orders    Preadmission Location:   Preadmission Time:  Facility:  Surgery Date:  Surgery Time:  Preadmission Test date:     Chief Complaint  Outpatient History and Physical / Surgical Orders    Primary Care Provider: Nicko Fontaine APRN    Referring Provider: Referring, Self    Subjective      Patient Name: Jimmie Jamil : 1955    HPI  The patient is a 69-year-old gentleman who presents with symptomatic gallstones.  He been having some pain in his lower chest and upper abdomen and recently had a CTA that showed some gallstones and a little bit of inflammation around the gallbladder.  He also was noted to have some calcifications on his pulmonic valve and is currently being worked up for that.  He did have a heart cath 2 years ago that showed clean coronary anatomy.    Past History:  Medical History: has a past medical history of Aortic stenosis, Arthritis of back (), Benign essential hypertension, Bladder problem, Cervical disc disorder (), Cervical radiculopathy (2017), Cervical spinal stenosis (2017), Cervicalgia (2017), Coronary artery disease, ED (erectile dysfunction), Greater trochanteric bursitis of right hip (2018), High blood pressure, HL (hearing loss) (01 Oct 2021), Hyperlipidemia, Hypertension, Leg pain, Leg swelling, Low back pain, Low back strain (), Lumbago (2017), Lumbosacral disc disease (), Neck pain, Paresthesia (2017), Periarthritis of shoulder (), Polycythemia, Right hip pain, Rotator cuff syndrome (2021), and Urinary bladder incontinence (2017).   Surgical History: has a past surgical history that includes Cardiac catheterization; Cervical disc surgery; Lumbar epidural injection; Vasectomy; Circumcision, non-; Shoulder arthroscopy w/ labral repair (Left); Colonoscopy; [place] tooth extraction; Anterior Cervical Fusion (2017); Cystoscopy (01/10/2014); Prostate  biopsy (2018); Cardiac catheterization (N/A, 10/13/2021); Cardiac catheterization (N/A, 10/13/2021); Cardiac catheterization (N/A, 10/13/2021); Neck surgery (2018?); Shoulder surgery (2008); Trigger point injection (2021); Colonoscopy (N/A, 11/20/2023); and Prostate biopsy (N/A, 8/8/2024).   Family History: family history includes Alzheimer's disease (age of onset: 80) in his mother; Arthritis in his father and mother; Cancer in his father; Colon cancer (age of onset: 85) in his father; Heart disease in his father; Hypertension (age of onset: 80) in his father; Stroke in his father.   Social History: reports that he has never smoked. He has never been exposed to tobacco smoke. He has never used smokeless tobacco. He reports current alcohol use of about 2.0 standard drinks of alcohol per week. He reports that he does not use drugs.  Allergies: Beta adrenergic blockers, Crestor [rosuvastatin calcium], Keflex [cephalexin], Livalo [pitavastatin], Losartan, and Naproxen       Current Outpatient Medications:     ascorbic acid (VITAMIN C) 1000 MG tablet, Take 1 tablet by mouth Daily., Disp: , Rfl:     aspirin 81 MG EC tablet, Take 1 tablet by mouth Daily., Disp: , Rfl:     Coenzyme Q10 (CoQ10) 100 MG capsule, , Disp: , Rfl:     enalapril (VASOTEC) 20 MG tablet, Take 1 tablet by mouth twice daily (Patient taking differently: Take 1 tablet by mouth Every Night.), Disp: 180 tablet, Rfl: 0    furosemide (LASIX) 40 MG tablet, Take 1 tablet by mouth., Disp: , Rfl:     Leqvio 284 MG/1.5ML solution prefilled syringe, EVERY 6 MTHS, Disp: , Rfl:     ofloxacin (OCUFLOX) 0.3 % ophthalmic solution, USES FOR EARS INSTEAD OF EYES, Disp: , Rfl:     potassium chloride (KLOR-CON M20) 20 MEQ CR tablet, Take 1 tablet by mouth Daily., Disp: , Rfl:     Testosterone Cypionate (DEPOTESTOTERONE CYPIONATE) 200 MG/ML injection, INJECT 0.3 ML UNDER THE SKIN Q 5 DAYS AS DIRECTED, Disp: 10 mL, Rfl: 0    Tobramycin-dexAMETHasone (TobraDex ST) 0.3-0.05 %  "suspension, 3-4 drops in affected ear twice a day, Disp: 5 mL, Rfl: 1    VASCEPA 1 g capsule capsule, Take 2 g by mouth 2 (Two) Times a Day With Meals., Disp: 360 capsule, Rfl: 3    verapamil ER (VERELAN) 360 MG 24 hr capsule, Take 1 capsule by mouth Every Night., Disp: , Rfl:        Objective   Vital Signs:   Resp 16   Ht 180.3 cm (70.98\")   Wt 93.9 kg (207 lb)   BMI 28.88 kg/m²       Physical Exam  Vitals and nursing note reviewed.   Constitutional:       Appearance: Normal appearance. The patient is well-developed.   Cardiovascular:      Rate and Rhythm: Normal rate and regular rhythm.   Pulmonary:      Effort: Pulmonary effort is normal.      Breath sounds: Normal air entry.   Abdominal:      General: Bowel sounds are normal.      Palpations: Abdomen is soft.      Skin:     General: Skin is warm and dry.   Neurological:      Mental Status: The patient is alert and oriented to person, place, and time.      Motor: Motor function is intact.   Psychiatric:         Mood and Affect: Mood normal.       Result Review :               Assessment and Plan   Diagnoses and all orders for this visit:    1. Gallstones (Primary)  -     Case Request; Standing  -     Follow Anesthesia Guidelines / Protocol; Standing  -     Verify NPO Status; Standing  -     Verify / Perform Chlorhexidine Skin Prep; Standing  -     Insert Peripheral IV; Standing  -     Saline Lock & Maintain IV Access; Standing  -     sodium chloride 0.9 % flush 10 mL  -     sodium chloride 0.9 % flush 10 mL  -     sodium chloride 0.9 % infusion 40 mL  -     Place Sequential Compression Device; Standing  -     Maintain Sequential Compression Device; Standing  -     ondansetron (ZOFRAN) injection 4 mg  -     indocyanine green (IC-GREEN) injection 2.5 mg  -     Case Request    I have told Mr. Jamil that I think it would be reasonable to consider going ahead and performing a robotic cholecystectomy for the symptomatic stones.  He is going to follow-up with his " cardiologist here in the next several days and make sure that nothing needs to be done for his valvular disease.  Is on his he is clear from that standpoint we will go ahead and schedule him for a robotic cholecystectomy.  I described the procedure to him as well as the risk and benefits and he is agreeable to proceeding.    I  Jeremiah Guido MD  11/08/2024

## 2024-11-08 NOTE — TELEPHONE ENCOUNTER
Caller: marie abreu    Relationship: Emergency Contact    Best call back number: [phone]    What is the best time to reach you: ANYTIME    Who are you requesting to speak with (clinical staff, provider,  specific staff member): CLINICAL        What was the call regarding: PT HAS FINISHED ALL OF HIS TESTING.  PT NEEDS GALLBLADDER SURGERY. PLEASE LET PT KNOW CARDIAC CLEARANCE HAS BEEN FAXED

## 2024-11-08 NOTE — TELEPHONE ENCOUNTER
I talked to patient and wife.  Patient's having more chest discomfort that is clearly much worse with exertion associate with shortness of breath near syncope.  He states it is different from what he attributes to gallbladder disease which has been getting worse with eating.  He has had both though he is quite concerned about the discomfort with exertion which is quite severe.  He also notes that his white count was elevated at labs done by PCP several days ago at [facility].  In review his hemoglobin was also 19.6.  He has a history of elevated hemoglobin for which urology has had 2 phlebotomies every 2 to 3 months.  There is some thought this is due to testosterone use though this preceded testosterone use.    With this in mind I told them it is best if he be admitted to hospital for further evaluation by GI surgery, internal medicine possible hematology and cardiology services.  Given extreme dyspnea may need pulmonary to see as well.  I contacted Dr. Herring who was kindly agreed to admit patient to hospital.  Will contact patient to arrange for room and admission.

## 2024-11-09 ENCOUNTER — APPOINTMENT (OUTPATIENT)
Dept: NUCLEAR MEDICINE | Facility: HOSPITAL | Age: 69
End: 2024-11-09
Payer: MEDICARE

## 2024-11-09 LAB
BH CV REST NUCLEAR ISOTOPE DOSE: 10.8 MCI
BH CV STRESS COMMENTS STAGE 1: NORMAL
BH CV STRESS DOSE REGADENOSON STAGE 1: 0.4
BH CV STRESS DURATION MIN STAGE 1: 0
BH CV STRESS DURATION SEC STAGE 1: 10
BH CV STRESS NUCLEAR ISOTOPE DOSE: 33 MCI
BH CV STRESS PROTOCOL 1: NORMAL
BH CV STRESS RECOVERY BP: NORMAL MMHG
BH CV STRESS RECOVERY HR: 70 BPM
BH CV STRESS STAGE 1: 1
LDH SERPL-CCNC: 287 U/L (ref 135–225)
LV EF NUC BP: 61 %
MAXIMAL PREDICTED HEART RATE: 153 BPM
PERCENT MAX PREDICTED HR: 50.33 %
PSA SERPL-MCNC: 6.41 NG/ML (ref 0–4)
QT INTERVAL: 426 MS
QTC INTERVAL: 422 MS
STRESS BASELINE BP: NORMAL MMHG
STRESS BASELINE HR: 63 BPM
STRESS PERCENT HR: 59 %
STRESS POST PEAK BP: NORMAL MMHG
STRESS POST PEAK HR: 77 BPM
STRESS TARGET HR: 130 BPM

## 2024-11-09 PROCEDURE — 93017 CV STRESS TEST TRACING ONLY: CPT

## 2024-11-09 PROCEDURE — A9500 TC99M SESTAMIBI: HCPCS | Performed by: INTERNAL MEDICINE

## 2024-11-09 PROCEDURE — 93010 ELECTROCARDIOGRAM REPORT: CPT | Performed by: INTERNAL MEDICINE

## 2024-11-09 PROCEDURE — 88184 FLOWCYTOMETRY/ TC 1 MARKER: CPT | Performed by: INTERNAL MEDICINE

## 2024-11-09 PROCEDURE — 88182 CELL MARKER STUDY: CPT | Performed by: INTERNAL MEDICINE

## 2024-11-09 PROCEDURE — 93005 ELECTROCARDIOGRAM TRACING: CPT | Performed by: INTERNAL MEDICINE

## 2024-11-09 PROCEDURE — 99222 1ST HOSP IP/OBS MODERATE 55: CPT | Performed by: INTERNAL MEDICINE

## 2024-11-09 PROCEDURE — 81338 MPL GENE COMMON VARIANTS: CPT | Performed by: INTERNAL MEDICINE

## 2024-11-09 PROCEDURE — 93016 CV STRESS TEST SUPVJ ONLY: CPT | Performed by: INTERNAL MEDICINE

## 2024-11-09 PROCEDURE — 88185 FLOWCYTOMETRY/TC ADD-ON: CPT | Performed by: INTERNAL MEDICINE

## 2024-11-09 PROCEDURE — 86790 VIRUS ANTIBODY NOS: CPT | Performed by: INTERNAL MEDICINE

## 2024-11-09 PROCEDURE — 93018 CV STRESS TEST I&R ONLY: CPT | Performed by: INTERNAL MEDICINE

## 2024-11-09 PROCEDURE — 81270 JAK2 GENE: CPT | Performed by: INTERNAL MEDICINE

## 2024-11-09 PROCEDURE — 83615 LACTATE (LD) (LDH) ENZYME: CPT | Performed by: INTERNAL MEDICINE

## 2024-11-09 PROCEDURE — 0 TECHNETIUM SESTAMIBI: Performed by: INTERNAL MEDICINE

## 2024-11-09 PROCEDURE — 78452 HT MUSCLE IMAGE SPECT MULT: CPT

## 2024-11-09 PROCEDURE — 25010000002 REGADENOSON 0.4 MG/5ML SOLUTION: Performed by: INTERNAL MEDICINE

## 2024-11-09 PROCEDURE — 99222 1ST HOSP IP/OBS MODERATE 55: CPT | Performed by: SURGERY

## 2024-11-09 PROCEDURE — 25010000002 PIPERACILLIN SOD-TAZOBACTAM PER 1 G: Performed by: INTERNAL MEDICINE

## 2024-11-09 PROCEDURE — 78452 HT MUSCLE IMAGE SPECT MULT: CPT | Performed by: INTERNAL MEDICINE

## 2024-11-09 PROCEDURE — 82668 ASSAY OF ERYTHROPOIETIN: CPT | Performed by: INTERNAL MEDICINE

## 2024-11-09 RX ORDER — REGADENOSON 0.08 MG/ML
0.4 INJECTION, SOLUTION INTRAVENOUS
Status: COMPLETED | OUTPATIENT
Start: 2024-11-09 | End: 2024-11-09

## 2024-11-09 RX ADMIN — VERAPAMIL HYDROCHLORIDE 360 MG: 120 TABLET, FILM COATED, EXTENDED RELEASE ORAL at 20:04

## 2024-11-09 RX ADMIN — REGADENOSON 0.4 MG: 0.08 INJECTION, SOLUTION INTRAVENOUS at 10:30

## 2024-11-09 RX ADMIN — TECHNETIUM TC 99M SESTAMIBI 1 DOSE: 1 INJECTION INTRAVENOUS at 10:30

## 2024-11-09 RX ADMIN — ENALAPRIL MALEATE 20 MG: 20 TABLET ORAL at 20:04

## 2024-11-09 RX ADMIN — PIPERACILLIN AND TAZOBACTAM 3.38 G: 3; .375 INJECTION, POWDER, LYOPHILIZED, FOR SOLUTION INTRAVENOUS at 12:06

## 2024-11-09 RX ADMIN — FUROSEMIDE 40 MG: 40 TABLET ORAL at 08:18

## 2024-11-09 RX ADMIN — PIPERACILLIN AND TAZOBACTAM 3.38 G: 3; .375 INJECTION, POWDER, LYOPHILIZED, FOR SOLUTION INTRAVENOUS at 04:35

## 2024-11-09 RX ADMIN — TECHNETIUM TC 99M SESTAMIBI 1 DOSE: 1 INJECTION INTRAVENOUS at 08:50

## 2024-11-09 RX ADMIN — PIPERACILLIN AND TAZOBACTAM 3.38 G: 3; .375 INJECTION, POWDER, LYOPHILIZED, FOR SOLUTION INTRAVENOUS at 20:04

## 2024-11-09 RX ADMIN — Medication 10 ML: at 20:36

## 2024-11-09 NOTE — CONSULTS
[facility] Consult    Patient Name: Jimmie Jamil  Age/Sex: 69 y.o. male  : 1955  MRN: [id number]    Date of Admission: 2024  Date of Encounter Visit: 24  Encounter Provider: Anahi Perales MD  Referring Provider: Marjorie Vivas MD  Place of Service: [facility] CARDIOLOGY  Patient Care Team:  Nicko Fontaine APRN as PCP - General (Nurse Practitioner)  Zechariah Clay MD as Consulting Physician (Urology)    Subjective:     Consulted for: Chest pain    Chief Complaint: Chest pain/dyspnea on exertion    History of Present Illness:  Jimmie Jamil is a 69 y.o. male with coronary artery calcification, hypertension, hyperlipidemia, chronic kidney disease, cholelithiasis, bicuspid aortic valve with moderate aortic valve stenosis, who was admitted with complaints of left-sided chest pain and shortness of breath.    The patient and his wife indicate that he has been having symptoms of intermittent chest pain that is left-sided and initially would occur with exertion.  He also reported increasing shortness of breath with activity.  He was evaluated by Dr. Duncan for the symptoms on 10/31/2024.  She recommended proceeding with lab work, echocardiogram and a CT angiogram of the chest.    CT angiogram of the chest did not show any evidence of dissection or pulmonary embolism.  It did show evidence of gallstones and possible inflammation around the gallbladder.  Echocardiogram showed normal left ventricular function and wall motion with an EF of 55%, bicuspid aortic valve with moderate aortic valve stenosis.  Lab work was significant for polycythemia.      The patient was evaluated by general surgery yesterday who did recommend proceeding with cholecystectomy once cleared from a cardiac standpoint.    The patient called yesterday reporting that his chest pain was now more persistent.  Outside of increased dyspnea with activity did not really report any  worsening with exertion.  Due to the increase in the chest discomfort Dr. Duncan recommended admission for further workup.    He denies any significant changes in his symptoms.  EKG performed this morning was unremarkable.  Troponins have been normal.  Hemoglobin remains elevated around 19.  CMP is unremarkable except for minimally elevated ALT.      Past Medical History:  Past Medical History:   Diagnosis Date    Aortic stenosis     Arthritis of back 2015    Benign essential hypertension     Bladder problem     Cervical disc disorder 2018    Cervical radiculopathy 03/16/2017    Cervical spinal stenosis 03/16/2017    Cervicalgia 03/16/2017    Coronary artery disease     diastolic dysfunction    ED (erectile dysfunction)     Greater trochanteric bursitis of right hip 07/09/2018    High blood pressure     HL (hearing loss) 01 Oct 2021    Hyperlipidemia     Hypertension     Leg pain     Leg swelling     Low back pain     Low back strain 2015    Lumbago 03/16/2017    Lumbosacral disc disease 2018    Neck pain     Paresthesia 03/16/2017    left hand/leg    Periarthritis of shoulder 2021    Polycythemia     Right hip pain     Rotator cuff syndrome 09/01/2021    Urinary bladder incontinence 03/16/2017       Past Surgical History:   Procedure Laterality Date    ANTERIOR CERVICAL FUSION  04/12/2017    C3-4    CARDIAC CATHETERIZATION      everything okay clearance for neck surgery    CARDIAC CATHETERIZATION N/A 10/13/2021    Procedure: Left Heart Cath;  Surgeon: Anahi Perales MD;  Location:  [facility];  Service: Cardiovascular;  Laterality: N/A;    CARDIAC CATHETERIZATION N/A 10/13/2021    Procedure: Coronary angiography;  Surgeon: Anahi Perales MD;  Location:  [facility];  Service: Cardiovascular;  Laterality: N/A;    CARDIAC CATHETERIZATION N/A 10/13/2021    Procedure: Left ventriculography;  Surgeon: Anahi Perales MD;  Location:  [facility];  Service:  Cardiovascular;  Laterality: N/A;    CERVICAL DISC SURGERY      C2-3 fusion    CIRCUMCISION      COLONOSCOPY      COLONOSCOPY N/A 11/20/2023    Procedure: COLONOSCOPY WITH COLD SNARE POLYECTOMY;  Surgeon: Kenny Urena MD;  Location: [facility] ENDOSCOPY;  Service: Gastroenterology;  Laterality: N/A;  COLON POLYP    CYSTOSCOPY  01/10/2014    Cysto ivan retrograde pyelogram urthral bx.    LUMBAR EPIDURAL INJECTION      L2-S2  going to try ablasion in future    NECK SURGERY  2018?    PROSTATE BIOPSY  2018    PROSTATE BIOPSY N/A 8/8/2024    Procedure: MRI fusion transrectal ultrasound-guided prostate biopsy;  Surgeon: Zehcariah Clay MD;  Location: [facility] MAIN OR;  Service: Urology;  Laterality: N/A;    SHOULDER ARTHROSCOPY W/ LABRAL REPAIR Left     SHOULDER SURGERY  2008    TRIGGER POINT INJECTION  2021    L5-S1    VASECTOMY      WISDOM TOOTH EXTRACTION         Home Medications:   Medications Prior to Admission   Medication Sig Dispense Refill Last Dose/Taking    ascorbic acid (VITAMIN C) 1000 MG tablet Take 1 tablet by mouth Daily.   11/7/2024 Evening    aspirin 81 MG EC tablet Take 1 tablet by mouth Daily.   11/7/2024 Evening    Coenzyme Q10 (CoQ10) 100 MG capsule    11/7/2024 Evening    enalapril (VASOTEC) 20 MG tablet Take 1 tablet by mouth twice daily (Patient taking differently: Take 1 tablet by mouth Every Night.) 180 tablet 0 11/7/2024 Evening    furosemide (LASIX) 40 MG tablet Take 1 tablet by mouth.   11/8/2024 Morning    Leqvio 284 MG/1.5ML solution prefilled syringe EVERY 6 MTHS   Past Month    ofloxacin (OCUFLOX) 0.3 % ophthalmic solution USES FOR EARS INSTEAD OF EYES   Taking    Testosterone Cypionate (DEPOTESTOTERONE CYPIONATE) 200 MG/ML injection INJECT 0.3 ML UNDER THE SKIN Q 5 DAYS AS DIRECTED 10 mL 0 11/8/2024 Morning    VASCEPA 1 g capsule capsule Take 2 g by mouth 2 (Two) Times a Day With Meals. 360 capsule 3 11/7/2024 Evening    verapamil ER (VERELAN) 360 MG 24 hr capsule Take 1 capsule by  mouth Every Night.   11/7/2024 Evening    potassium chloride (KLOR-CON M20) 20 MEQ CR tablet Take 1 tablet by mouth Daily.       Tobramycin-dexAMETHasone (TobraDex ST) 0.3-0.05 % suspension 3-4 drops in affected ear twice a day 5 mL 1 Unknown       Allergies:  Allergies   Allergen Reactions    Beta Adrenergic Blockers Unknown - High Severity    Crestor [Rosuvastatin Calcium] Myalgia    Keflex [Cephalexin] Other (See Comments)     hiccups    Livalo [Pitavastatin] Myalgia    Losartan Other (See Comments)     Intolerance due to fatigue, depression    Naproxen Other (See Comments)     Chest pain       Past Social History:  Social History     Socioeconomic History    Marital status:    Tobacco Use    Smoking status: Never     Passive exposure: Never    Smokeless tobacco: Never   Vaping Use    Vaping status: Never Used   Substance and Sexual Activity    Alcohol use: Yes     Alcohol/week: 2.0 standard drinks of alcohol     Types: 2 Cans of beer per week     Comment: 4 beers a week    Drug use: Never    Sexual activity: Yes     Partners: Female     Birth control/protection: Surgical       Past Family History:  Family History   Problem Relation Age of Onset    Arthritis Mother     Alzheimer's disease Mother 80    Arthritis Father     Heart disease Father     Colon cancer Father 85    Hypertension Father 80    Cancer Father         Colon cx in his 90s    Stroke Father         Mild in his 80s       Review of Systems:   All systems reviewed. Pertinent positives identified in HPI. All other systems are negative.    Objective:   Temp:  [97.7 °F (36.5 °C)-98.4 °F (36.9 °C)] 98.2 °F (36.8 °C)  Heart Rate:  [70-77] 73  Resp:  [18-20] 20  BP: (117-166)/(68-89) 143/88     Intake/Output Summary (Last 24 hours) at 11/9/2024 1315  Last data filed at 11/9/2024 1304  Gross per 24 hour   Intake 240 ml   Output --   Net 240 ml     Body mass index is 32.59 kg/m².      11/08/24  1658   Weight: 106 kg (233 lb 11 oz)     Weight change:  "    Physical Exam:   General Appearance:    Alert, cooperative, in no acute distress   Head:    Normocephalic, without obvious abnormality, atraumatic   Eyes:            Lids and lashes normal, conjunctivae and sclerae normal, no   icterus, no pallor, corneas clear, PERRLA   Ears:    Ears appear intact with no abnormalities noted   Neck:   No adenopathy, supple, trachea midline, no thyromegaly, no   carotid bruit, no JVD   Lungs:     Clear to auscultation,respirations regular, even and unlabored    Heart:    Regular rhythm and normal rate, normal S1 and S2, no murmur, no gallop, no rub, no click   Chest Wall:    No abnormalities observed   Abdomen:     Normal bowel sounds, no masses, no organomegaly, soft        non-tender, non-distended, no guarding, no rebound  tenderness   Extremities:   Moves all extremities well, no edema, no cyanosis, no redness   Pulses:   Pulses palpable and equal bilaterally. Normal radial, carotid, femoral, dorsalis pedis and posterior tibial pulses bilaterally. Normal abdominal aorta   Skin:  Psychiatric:   No bleeding, bruising or rash    Alert and oriented x 3, normal mood and affect       Lab Review:   Results from last 7 days   Lab Units 11/08/24 1936   SODIUM mmol/L 138   POTASSIUM mmol/L 4.2   CHLORIDE mmol/L 103   CO2 mmol/L 23.6   BUN mg/dL 16   CREATININE mg/dL 1.04   GLUCOSE mg/dL 82   CALCIUM mg/dL 9.4   AST (SGOT) U/L 25   ALT (SGPT) U/L 43*     Results from last 7 days   Lab Units 11/08/24 2120 11/08/24 1936   HSTROP T ng/L 11 11     Results from last 7 days   Lab Units 11/08/24 1936   WBC 10*3/mm3 11.79*   HEMOGLOBIN g/dL 18.9*   HEMATOCRIT % 54.4*   PLATELETS 10*3/mm3 226     Results from last 7 days   Lab Units 11/08/24 1936   INR  0.99               Invalid input(s): \"LDLCALC\"  Results from last 7 days   Lab Units 11/08/24 1936   PROBNP pg/mL 95.2           I personally viewed and interpreted the patient's EKG    Assessment/Plan:     1.  Chest pain.  Somewhat " atypical sounding symptoms.  Normal EKG and troponins are reassuring in the setting of persistent chest pain.  Does have known coronary artery calcifications.  2.  Dyspnea on exertion.  Cause of the symptoms is unclear.  Recent echocardiogram with normal left ventricular systolic function and just moderate aortic valve stenosis.  3.  Aortic valve stenosis.  Moderate on his most recent echocardiogram.  I think is unlikely that this is responsible for his shortness of breath.  4.  Cholelithiasis.  General surgery recommended cholecystectomy.  5.  Polycythemia.  Plan for hematology consultation noted.    -After I saw the patient this morning I recommended proceeding with a stress test for further evaluation of his chest pain and dyspnea on exertion.  This shows no evidence of ischemia.  -No objections to proceeding with cholecystectomy from a cardiac standpoint at this time.  -Undergoing hematology evaluation for polycythemia.    Thank you for allowing me to participate in the care of Jimmie Jamil. Feel free to contact me directly with any further questions or concerns.    Anahi Perales MD  [facility]  11/09/24  13:15 EST

## 2024-11-09 NOTE — H&P
Internal medicine history and physical  INTERNAL MEDICINE   [facility]       Patient Identification:  Name: Jimmie Jamil  Age: 69 y.o.  Sex: male  :  1955  MRN: [id number]                   Primary Care Physician: Nicko Fontaine APRN                               Date of admission:2024    Chief Complaint: Requested for direct admission by cardiology service for ongoing issues with chest pain, dyspnea on exertion for the last month or so with episodes of occasional near syncopal episodes with concern for cholecystitis and cholelithiasis.    History of Present Illness:   Patient is a 69-year-old male who has complicated past medical history including history of coronary artery disease, chronic kidney disease, history of rheumatic fever, history of cardiac murmur, and episode of hospitalization in 2021 for syncopal episode with extensive workup including cardiac catheterization, echocardiogram and 14-day event monitor testing and all of them were unremarkable, diastolic dysfunction seen on echocardiogram performed on 2023 showing left ventricular ejection fraction of 61%, history of prostate cancer based on biopsy earlier this year because of elevated PSA and abnormal MRI of her prostate currently being managed expectantly, aortic stenosis, hypertension, history of cervical spinal stenosis and interventions who has been having issues with off-and-on chest discomfort for quite some time with associated dyspnea on exertion and near syncopal episodes.  Patient also has underlying history of polycythemia for which he gets 3-4 times a year phlebotomy and is currently taking intramuscular testosterone replacement by his urologist for quite some time for testosterone deficiency and symptoms associated with low testosterone.  Patient does not recall having specific or formal diagnosis of polycythemia according to him by hematologist or has had formal hematological evaluation.   Because of his ongoing symptoms of chest pain and dyspnea on exertion patient was seen by cardiology service on 10/31/2024 and various studies were ordered including echocardiogram, D-dimer and CT scan of the chest PE protocol.  CT scan of the chest did not show any aortic dissection or pulmonary embolism but did show cholelithiasis with mild pericholecystic inflammatory changes raising suspicion for acute on chronic cholecystitis.  For these findings patient was seen by primary care provider and was referred to general surgery service who evaluated the patient today and recommended surgical intervention after cardiology clearance.  Cardiology service was contacted and because of his ongoing symptoms of chest pain and dizziness and dyspnea on exertion and need for cholecystectomy cardiology service recommend admission to the hospital for cardiac catheterization and further cardiac studies based on their assessment in order to clear him for needed cholecystectomy.  Patient is currently hungry and wants to eat.  Patient has been using testosterone injection and replacement therapy for quite some time and says that he was told by his urologist that his elevated hemoglobin could be due to testosterone replacement.  Patient claims that his brother who does not use testosterone has similar issues with elevated hemoglobin and he donates blood 3 4 times a year.  He is not sure whether his brother has seen a hematologist.  According to patient when he is at rest he does not have the symptoms of chest pain or shortness of breath orthopnea or PND.  Patient however admits that he was having abdominal discomfort off-and-on few hours after eating food for the last few months.  He denies any fever and chills.    Past Medical History:  Past Medical History:   Diagnosis Date    Aortic stenosis     Arthritis of back 2015    Benign essential hypertension     Bladder problem     Cervical disc disorder 2018    Cervical radiculopathy  03/16/2017    Cervical spinal stenosis 03/16/2017    Cervicalgia 03/16/2017    Coronary artery disease     diastolic dysfunction    ED (erectile dysfunction)     Greater trochanteric bursitis of right hip 07/09/2018    High blood pressure     HL (hearing loss) 01 Oct 2021    Hyperlipidemia     Hypertension     Leg pain     Leg swelling     Low back pain     Low back strain 2015    Lumbago 03/16/2017    Lumbosacral disc disease 2018    Neck pain     Paresthesia 03/16/2017    left hand/leg    Periarthritis of shoulder 2021    Polycythemia     Right hip pain     Rotator cuff syndrome 09/01/2021    Urinary bladder incontinence 03/16/2017     Past Surgical History:  Past Surgical History:   Procedure Laterality Date    ANTERIOR CERVICAL FUSION  04/12/2017    C3-4    CARDIAC CATHETERIZATION      everything okay clearance for neck surgery    CARDIAC CATHETERIZATION N/A 10/13/2021    Procedure: Left Heart Cath;  Surgeon: Anahi Perales MD;  Location:  [facility] INVASIVE [facility];  Service: Cardiovascular;  Laterality: N/A;    CARDIAC CATHETERIZATION N/A 10/13/2021    Procedure: Coronary angiography;  Surgeon: Anahi Perales MD;  Location:  [facility] INVASIVE [facility];  Service: Cardiovascular;  Laterality: N/A;    CARDIAC CATHETERIZATION N/A 10/13/2021    Procedure: Left ventriculography;  Surgeon: Anahi Perales MD;  Location:  [facility] INVASIVE [facility];  Service: Cardiovascular;  Laterality: N/A;    CERVICAL DISC SURGERY      C2-3 fusion    CIRCUMCISION      COLONOSCOPY      COLONOSCOPY N/A 11/20/2023    Procedure: COLONOSCOPY WITH COLD SNARE POLYECTOMY;  Surgeon: Kenny Urena MD;  Location: [facility] ENDOSCOPY;  Service: Gastroenterology;  Laterality: N/A;  COLON POLYP    CYSTOSCOPY  01/10/2014    Cysto ivan retrograde pyelogram urthral bx.    LUMBAR EPIDURAL INJECTION      L2-S2  going to try ablasion in future    NECK SURGERY  2018?    PROSTATE BIOPSY  2018    PROSTATE BIOPSY N/A 8/8/2024    Procedure:  MRI fusion transrectal ultrasound-guided prostate biopsy;  Surgeon: Zechariah Clay MD;  Location: [facility] MAIN OR;  Service: Urology;  Laterality: N/A;    SHOULDER ARTHROSCOPY W/ LABRAL REPAIR Left     SHOULDER SURGERY  2008    TRIGGER POINT INJECTION  2021    L5-S1    VASECTOMY      WISDOM TOOTH EXTRACTION        Home Meds:  Medications Prior to Admission   Medication Sig Dispense Refill Last Dose/Taking    ascorbic acid (VITAMIN C) 1000 MG tablet Take 1 tablet by mouth Daily.   11/7/2024 Evening    aspirin 81 MG EC tablet Take 1 tablet by mouth Daily.   11/7/2024 Evening    Coenzyme Q10 (CoQ10) 100 MG capsule    11/7/2024 Evening    enalapril (VASOTEC) 20 MG tablet Take 1 tablet by mouth twice daily (Patient taking differently: Take 1 tablet by mouth Every Night.) 180 tablet 0 11/7/2024 Evening    furosemide (LASIX) 40 MG tablet Take 1 tablet by mouth.   11/8/2024 Morning    Leqvio 284 MG/1.5ML solution prefilled syringe EVERY 6 MTHS   Past Month    ofloxacin (OCUFLOX) 0.3 % ophthalmic solution USES FOR EARS INSTEAD OF EYES   Taking    Testosterone Cypionate (DEPOTESTOTERONE CYPIONATE) 200 MG/ML injection INJECT 0.3 ML UNDER THE SKIN Q 5 DAYS AS DIRECTED 10 mL 0 11/8/2024 Morning    VASCEPA 1 g capsule capsule Take 2 g by mouth 2 (Two) Times a Day With Meals. 360 capsule 3 11/7/2024 Evening    verapamil ER (VERELAN) 360 MG 24 hr capsule Take 1 capsule by mouth Every Night.   11/7/2024 Evening    potassium chloride (KLOR-CON M20) 20 MEQ CR tablet Take 1 tablet by mouth Daily.       Tobramycin-dexAMETHasone (TobraDex ST) 0.3-0.05 % suspension 3-4 drops in affected ear twice a day 5 mL 1 Unknown     Current Meds:   No current facility-administered medications for this encounter.  Allergies:  Allergies   Allergen Reactions    Beta Adrenergic Blockers Unknown - High Severity    Crestor [Rosuvastatin Calcium] Myalgia    Keflex [Cephalexin] Other (See Comments)     hiccups    Livalo [Pitavastatin] Myalgia    Losartan  "Other (See Comments)     Intolerance due to fatigue, depression    Naproxen Other (See Comments)     Chest pain     Social History:   Social History     Tobacco Use    Smoking status: Never     Passive exposure: Never    Smokeless tobacco: Never   Substance Use Topics    Alcohol use: Yes     Alcohol/week: 2.0 standard drinks of alcohol     Types: 2 Cans of beer per week     Comment: 4 beers a week      Family History:  Family History   Problem Relation Age of Onset    Arthritis Mother     Alzheimer's disease Mother 80    Arthritis Father     Heart disease Father     Colon cancer Father 85    Hypertension Father 80    Cancer Father         Colon cx in his 90s    Stroke Father         Mild in his 80s          Review of Systems  See history of present illness and past medical history.  As described in history presenting illness.      Vitals:   /89 (BP Location: Right arm, Patient Position: Lying)   Pulse 70   Temp 98.4 °F (36.9 °C) (Oral)   Resp 20   Ht 180.3 cm (71\")   Wt 106 kg (233 lb 11 oz)   SpO2 97%   BMI 32.59 kg/m²   I/O: No intake or output data in the 24 hours ending 11/08/24 1922  Exam:  Patient is examined using the personal protective equipment as per guidelines from infection control for this particular patient as enacted.  Hand washing was performed before and after patient interaction.  General Appearance:    Alert, cooperative, no distress, appears stated age   Head:    Normocephalic, without obvious abnormality, atraumatic   Eyes:    PERRL, conjunctiva/corneas clear, EOM's intact, both eyes   Ears:    Normal external ear canals, both ears   Nose:   Nares normal, septum midline, mucosa normal, no drainage    or sinus tenderness   Throat:   Lips, tongue, gums normal; oral mucosa pink and moist   Neck: Supple no adenopathy   Back:     Symmetric, no curvature, ROM normal, no CVA tenderness   Lungs:     Clear to auscultation bilaterally, respirations unlabored   Chest Wall:    No tenderness or " deformity    Heart:    Regular rate and rhythm, S1 and S2 normal, no murmur, rub   or gallop   Abdomen:   Obese soft mild epigastric and right upper quadrant tenderness   Extremities:   Extremities normal, atraumatic, no cyanosis or edema   Pulses:   Pulses palpable in all extremities; symmetric all extremities   Skin:   Skin color normal, Skin is warm and dry,  no rashes or palpable lesions   Neurologic: Alert and oriented x 3       Data Review:      I reviewed the patient's new clinical results.  Labs pending  Assessment:  Active Hospital Problems    Diagnosis  POA    Cholelithiasis [K80.20]  Unknown    Cholecystitis [K81.9]  Unknown    Chest pain [R07.9]  Unknown    Dyspnea on exertion [R06.09]  Unknown    Prostate cancer [C61]  Yes    Polycythemia [D75.1]  Yes    Essential hypertension [I10]  Yes       Medical decision making/care plan: See admitting orders  Cholelithiasis with possible cholecystitis with postprandial symptoms suggestive of symptomatic cholelithiasis-plan is to admit the patient general surgery consultation keep him n.p.o. after midnight HIDA scan and empiric antibiotic therapy.  Chest pain with dyspnea on exertion and history of syncopal episode in 2021 requiring extensive workup with negative results and ongoing use syncopal episodes nowadays-consult cardiology service as recommended by  for further workup and clearance for cholecystectomy.  Abnormal echocardiogram on 10/31/2024 with left ventricular ejection fraction of 55% with mild concentric hypertrophy and noted to have left ventricular diastolic function being normal with bicuspid aortic valve and moderate aortic valve stenosis-cardiology consultation.  History of prostate cancer currently being managed expectantly  Polycythemia-likely multifactorial including possible underlying myeloproliferative disorder exacerbated by ongoing testosterone replacement.  His brother has similar diagnosis and donates blood every so often per year.   Plan is to consult hematology oncology service while monitoring his hemoglobin.  Hypertension-continue his antihypertensive regimen and avoid hypotensive episode.    Marjorie Vivas MD   11/8/2024  19:22 EST    Parts of this note may be an electronic transcription/translation of spoken language to printed text using the Dragon dictation system.

## 2024-11-09 NOTE — CONSULTS
REASON FOR CONSULTATION: Polycythemia  Provide an opinion on any further workup or treatment                             HISTORY OF PRESENT ILLNESS:  The patient is a 69 y.o. year old male who is here for an opinion about the above issue.    The patient is a 69-year-old male with a somewhat complex medical history inclusive of coronary artery disease, cardiac murmur, echo 11/2023 with EF of 61%, aortic stenosis history of prostate cancer status postbiopsy being managed expectantly, cervical spinal stenosis.    He also has a history of polycythemia undergoing therapy with testosterone injections and symptoms associated with low testosterone.    He gives a history of being on testosterone for 10 years as a result of excessively low levels and being quite symptomatic.  In discussions with his urologist he has been followed carefully considering his history of prostate cancer with PSAs on a regular basis and prostate examinations including potential MRI if his PSA excessively increased.  Further history includes erythrocytosis/polycythemia for as long as 40 years followed by a number of physicians but never hematology.  He started phlebotomy  or blood donations, again, as long as 30+ years ago as did his brother who had similar findings of elevated hemoglobin hematocrit.  His wife indicates that he is often plethoric and particularly  demonstrates ruddiness in the face that will indicate to them both that he needs a phlebotomy.      Recently symptoms of chest pain dyspnea worsened and he was seen by cardiology 10/31/2024 with CT scans negative but demonstrating evidence of cholelithiasis with mild pericholecystic inflammatory changes-?  Chronic cholecystitis.  He was referred to general surgery with a subsequent request to cardiology for clearance.  His symptoms however prompted assessment for cardiac catheterization.      Past Medical History:   Diagnosis Date    Aortic stenosis     Arthritis of back 2015     Benign essential hypertension     Bladder problem     Cervical disc disorder 2018    Cervical radiculopathy 03/16/2017    Cervical spinal stenosis 03/16/2017    Cervicalgia 03/16/2017    Coronary artery disease     diastolic dysfunction    ED (erectile dysfunction)     Greater trochanteric bursitis of right hip 07/09/2018    High blood pressure     HL (hearing loss) 01 Oct 2021    Hyperlipidemia     Hypertension     Leg pain     Leg swelling     Low back pain     Low back strain 2015    Lumbago 03/16/2017    Lumbosacral disc disease 2018    Neck pain     Paresthesia 03/16/2017    left hand/leg    Periarthritis of shoulder 2021    Polycythemia     Right hip pain     Rotator cuff syndrome 09/01/2021    Urinary bladder incontinence 03/16/2017        Past Surgical History:   Procedure Laterality Date    ANTERIOR CERVICAL FUSION  04/12/2017    C3-4    CARDIAC CATHETERIZATION      everything okay clearance for neck surgery    CARDIAC CATHETERIZATION N/A 10/13/2021    Procedure: Left Heart Cath;  Surgeon: Anahi Perales MD;  Location: [facility] CATH INVASIVE LOC[facility];  Service: Cardiovascular;  Laterality: N/A;    CARDIAC CATHETERIZATION N/A 10/13/2021    Procedure: Coronary angiography;  Surgeon: Anahi Perales MD;  Location:  [facility] CATH INVASIVE LOCATION;  Service: Cardiovascular;  Laterality: N/A;    CARDIAC CATHETERIZATION N/A 10/13/2021    Procedure: Left ventriculography;  Surgeon: Anahi Perales MD;  Location:  [facility] CATH INVASIVE LOCATION;  Service: Cardiovascular;  Laterality: N/A;    CERVICAL DISC SURGERY      C2-3 fusion    CIRCUMCISION      COLONOSCOPY      COLONOSCOPY N/A 11/20/2023    Procedure: COLONOSCOPY WITH COLD SNARE POLYECTOMY;  Surgeon: Kenny Urena MD;  Location: [facility] ENDOSCOPY;  Service: Gastroenterology;  Laterality: N/A;  COLON POLYP    CYSTOSCOPY  01/10/2014    Cysto ivan retrograde pyelogram urthral bx.    LUMBAR EPIDURAL INJECTION      L2-S2  going to try ablasion in future     NECK SURGERY  2018?    PROSTATE BIOPSY  2018    PROSTATE BIOPSY N/A 8/8/2024    Procedure: MRI fusion transrectal ultrasound-guided prostate biopsy;  Surgeon: Zechariah Clay MD;  Location: [facility] MAIN OR;  Service: Urology;  Laterality: N/A;    SHOULDER ARTHROSCOPY W/ LABRAL REPAIR Left     SHOULDER SURGERY  2008    TRIGGER POINT INJECTION  2021    L5-S1    VASECTOMY      WISDOM TOOTH EXTRACTION          No current facility-administered medications on file prior to encounter.     Current Outpatient Medications on File Prior to Encounter   Medication Sig Dispense Refill    ascorbic acid (VITAMIN C) 1000 MG tablet Take 1 tablet by mouth Daily.      aspirin 81 MG EC tablet Take 1 tablet by mouth Daily.      Coenzyme Q10 (CoQ10) 100 MG capsule       enalapril (VASOTEC) 20 MG tablet Take 1 tablet by mouth twice daily (Patient taking differently: Take 1 tablet by mouth Every Night.) 180 tablet 0    furosemide (LASIX) 40 MG tablet Take 1 tablet by mouth.      Leqvio 284 MG/1.5ML solution prefilled syringe EVERY 6 MTHS      ofloxacin (OCUFLOX) 0.3 % ophthalmic solution USES FOR EARS INSTEAD OF EYES      Testosterone Cypionate (DEPOTESTOTERONE CYPIONATE) 200 MG/ML injection INJECT 0.3 ML UNDER THE SKIN Q 5 DAYS AS DIRECTED 10 mL 0    VASCEPA 1 g capsule capsule Take 2 g by mouth 2 (Two) Times a Day With Meals. 360 capsule 3    verapamil ER (VERELAN) 360 MG 24 hr capsule Take 1 capsule by mouth Every Night.      potassium chloride (KLOR-CON M20) 20 MEQ CR tablet Take 1 tablet by mouth Daily.      Tobramycin-dexAMETHasone (TobraDex ST) 0.3-0.05 % suspension 3-4 drops in affected ear twice a day 5 mL 1        ALLERGIES:    Allergies   Allergen Reactions    Beta Adrenergic Blockers Unknown - High Severity    Crestor [Rosuvastatin Calcium] Myalgia    Keflex [Cephalexin] Other (See Comments)     hiccups    Livalo [Pitavastatin] Myalgia    Losartan Other (See Comments)     Intolerance due to fatigue, depression    Naproxen  "Other (See Comments)     Chest pain        Social History     Socioeconomic History    Marital status:    Tobacco Use    Smoking status: Never     Passive exposure: Never    Smokeless tobacco: Never   Vaping Use    Vaping status: Never Used   Substance and Sexual Activity    Alcohol use: Yes     Alcohol/week: 2.0 standard drinks of alcohol     Types: 2 Cans of beer per week     Comment: 4 beers a week    Drug use: Never    Sexual activity: Yes     Partners: Female     Birth control/protection: Surgical        Family History   Problem Relation Age of Onset    Arthritis Mother     Alzheimer's disease Mother 80    Arthritis Father     Heart disease Father     Colon cancer Father 85    Hypertension Father 80    Cancer Father         Colon cx in his 90s    Stroke Father         Mild in his 80s        Review of Systems   Constitutional:  Positive for fatigue. Negative for activity change, appetite change and unexpected weight change.   HENT: Negative.     Cardiovascular:  Positive for chest pain.   Gastrointestinal:  Positive for abdominal pain.   Genitourinary: Negative.    Musculoskeletal: Negative.    Skin:  Positive for color change.   Neurological: Negative.    Hematological:         History of present illness   Psychiatric/Behavioral: Negative.          Objective     Vitals:    11/08/24 1658 11/08/24 2008 11/08/24 2308 11/09/24 0711   BP: 148/89 166/89 121/68 117/77   BP Location: Right arm Right arm Right arm Right arm   Patient Position: Lying Lying Lying Lying   Pulse: 70 77 73    Resp: 20 18 18 20   Temp: 98.4 °F (36.9 °C) 98.1 °F (36.7 °C) 98.4 °F (36.9 °C) 97.7 °F (36.5 °C)   TempSrc: Oral Oral Oral Oral   SpO2: 97% 97% 95%    Weight: 106 kg (233 lb 11 oz)      Height: 180.3 cm (71\")             No data to display                Physical Exam  Constitutional:       Appearance: He is obese.   HENT:      Head: Normocephalic and atraumatic.      Nose: Nose normal.      Mouth/Throat:      Mouth: Mucous " membranes are moist.      Pharynx: Oropharynx is clear.   Eyes:      Extraocular Movements: Extraocular movements intact.      Conjunctiva/sclera: Conjunctivae normal.      Pupils: Pupils are equal, round, and reactive to light.   Cardiovascular:      Rate and Rhythm: Normal rate and regular rhythm.      Pulses: Normal pulses.      Heart sounds: Normal heart sounds.   Pulmonary:      Effort: Pulmonary effort is normal.      Breath sounds: Normal breath sounds.   Abdominal:      General: Bowel sounds are normal.      Palpations: There is no mass (Negative splenomegaly).   Musculoskeletal:         General: Normal range of motion.      Cervical back: Normal range of motion and neck supple.   Skin:     Findings: Erythema (Generalized) present.   Neurological:      General: No focal deficit present.      Mental Status: He is oriented to person, place, and time.   Psychiatric:         Mood and Affect: Mood normal.         Behavior: Behavior normal.           RECENT LABS:  Hematology WBC   Date Value Ref Range Status   11/08/2024 11.79 (H) 3.40 - 10.80 10*3/mm3 Final     RBC   Date Value Ref Range Status   11/08/2024 6.40 (H) 4.14 - 5.80 10*6/mm3 Final     Hemoglobin   Date Value Ref Range Status   11/08/2024 18.9 (H) 13.0 - 17.7 g/dL Final     Hematocrit   Date Value Ref Range Status   11/08/2024 54.4 (H) 37.5 - 51.0 % Final     Platelets   Date Value Ref Range Status   11/08/2024 226 140 - 450 10*3/mm3 Final          Assessment & Plan     *History of polycythemia with known underlying etiology with ongoing testosterone placement placement therapy.  Again this is long-term in nature possibly 30+ years.  He has never had a hematology assessment but has had carbon monoxide exposures on the job as a  beginning in his late teens and 20s and, later, testosterone replacement therapy for at least 10+ years.  He periodically has phlebotomy as result  Studies will be drawn today-EPO level, LDH, JAK2 analysis with  reflex, MPL analysis  His current studies do not warrant immediate phlebotomy if he is undergoing abdominal surgery in the next several days    *History of prostate cancer  Steve grade 3+3 (grade group 1) with biopsy 9/3/2024.  Repeat PSA level    *Admission with findings consistent with possible cholecystitis  Undergoing HIDA scan currently  Empiric antibiotic therapy  Surgical candidate at the beginning of the week after clearance by cardiology    *Cardiac history  Coronary disease with abnormal calcification, no obstructive disease by cath 10/21/2021, known aortic valve stenosis, hypertension  Echo 10/31/2024 with LV SF of 55.4%, GLS of -20.5%, bicuspid aortic valve with fused right side and left-sided cusp, calcification noted, aortic valve area of 1.01 cm², trace tricuspid valve regurgitation, ascending aorta not well-seen  Now been assessed by cardiology prior to surgery    *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

## 2024-11-09 NOTE — PLAN OF CARE
Goal Outcome Evaluation:              Outcome Evaluation: Pt.is AOX4, vital sign, lab and medication reviewed.on room air,medication administered per ordered. NPO from midnight,family at bedside. Plan of care continue.

## 2024-11-09 NOTE — PROGRESS NOTES
[facility] Clinical [facility]acy Services: Piperacillin-Tazobactam Consult    Pt Name: Jimmie Jamil   : 1955    Indication: Intra-Abdominal Infection    Relevant clinical data and objective history reviewed:    Past Medical History:   Diagnosis Date    Aortic stenosis     Arthritis of back     Benign essential hypertension     Bladder problem     Cervical disc disorder 2018    Cervical radiculopathy 2017    Cervical spinal stenosis 2017    Cervicalgia 2017    Coronary artery disease     diastolic dysfunction    ED (erectile dysfunction)     Greater trochanteric bursitis of right hip 2018    High blood pressure     HL (hearing loss) 01 Oct 2021    Hyperlipidemia     Hypertension     Leg pain     Leg swelling     Low back pain     Low back strain 2015    Lumbago 2017    Lumbosacral disc disease 2018    Neck pain     Paresthesia 2017    left hand/leg    Periarthritis of shoulder     Polycythemia     Right hip pain     Rotator cuff syndrome 2021    Urinary bladder incontinence 2017     Creatinine   Date Value Ref Range Status   2024 1.04 0.76 - 1.27 mg/dL Final   2024 1.27 0.76 - 1.27 mg/dL Final   2023 1.17 0.76 - 1.27 mg/dL Final     BUN   Date Value Ref Range Status   2024 16 8 - 23 mg/dL Final     Estimated Creatinine Clearance: 85.4 mL/min (by C-G formula based on SCr of 1.04 mg/dL).    Lab Results   Component Value Date    WBC 11.79 (H) 2024     Temp Readings from Last 3 Encounters:   24 98.1 °F (36.7 °C) (Oral)   24 98.4 °F (36.9 °C)   24 97.5 °F (36.4 °C) (Temporal)      Assessment/Plan  Estimated CrCl >20 mL/min at this time; BMI 32.59 kg/m2  Will start piperacillin-tazobactam 3.375 g IV every 8 hours for 5 days    Pharmacy will continue to follow daily while on piperacillin-tazobactam and adjust as needed. Thank you for this consult.    Alba Woodward [facility]  Clinical Pharmacist

## 2024-11-09 NOTE — PROGRESS NOTES
Name: Jimmie Jamil ADMIT: 2024   : 1955  PCP: Nicko Fontaine APRN    MRN: [id number] LOS: 1 days   AGE/SEX: 69 y.o. male  ROOM: RUST     Subjective   Subjective   Continues with left-sided chest pain.  No shortness of breath while at rest.  No PND orthopnea.  No ankle edema.  No palpitation.  No cough.  No wheeze.  No hemoptysis.  No abdominal pain.  No nausea or vomiting.  Normal bowel movements without constipation/diarrhea/bleeding per rectum/melena.  No fever or chills.    Review of Systems  .  No dysuria or hematuria.     Objective   Objective   Vital Signs  Temp:  [97.7 °F (36.5 °C)-98.4 °F (36.9 °C)] 98.2 °F (36.8 °C)  Heart Rate:  [70-77] 73  Resp:  [18-20] 20  BP: (117-166)/(68-89) 143/88  SpO2:  [95 %-97 %] 95 %  on   ;   Device (Oxygen Therapy): room air    Intake/Output Summary (Last 24 hours) at 2024 1632  Last data filed at 2024 1304  Gross per 24 hour   Intake 240 ml   Output --   Net 240 ml     Body mass index is 32.59 kg/m².      24  1658   Weight: 106 kg (233 lb 11 oz)     Physical Exam  General.  Middle-aged gentleman.  Obese.  Alert and oriented x 4.  In no apparent pain/distress/diaphoresis.  Normal mood and affect.  Eyes.  Pupils equal round and reactive.  Intact extraocular musculature.  No pallor or jaundice.  Oral cavity.  Moist mucous membrane.  Neck.  Supple.  No JVD.  No lymphadenopathy or thyromegaly.  Cardiovascular.  Regular rate and rhythm with grade 2 systolic murmur.  Chest.  Clear to auscultation bilaterally with no added sounds.  Abdomen.  Soft lax.  No tenderness.  No organomegaly.  No guarding or rebound.  Extremities.  No clubbing/cyanosis/edema.  CNS.  No acute focal neurological deficits.    Results Review:      Results from last 7 days   Lab Units 24  1936   SODIUM mmol/L 138   POTASSIUM mmol/L 4.2   CHLORIDE mmol/L 103   CO2 mmol/L 23.6   BUN mg/dL 16   CREATININE mg/dL 1.04   GLUCOSE mg/dL 82   CALCIUM mg/dL 9.4   AST  "(SGOT) U/L 25   ALT (SGPT) U/L 43*     Estimated Creatinine Clearance: 85.4 mL/min (by C-G formula based on SCr of 1.04 mg/dL).  Results from last 7 days   Lab Units 11/08/24 1936   HEMOGLOBIN A1C % 5.70*         Results from last 7 days   Lab Units 11/08/24 2120 11/08/24 1936   HSTROP T ng/L 11 11     Results from last 7 days   Lab Units 11/08/24 1936   PROBNP pg/mL 95.2                   Invalid input(s): \"LDLCALC\"  Results from last 7 days   Lab Units 11/08/24 1936   WBC 10*3/mm3 11.79*   HEMOGLOBIN g/dL 18.9*   HEMATOCRIT % 54.4*   PLATELETS 10*3/mm3 226   MCV fL 85.0   MCH pg 29.5   MCHC g/dL 34.7   RDW % 13.7   RDW-SD fl 40.2   MPV fL 9.3   NEUTROPHIL % % 54.6   LYMPHOCYTE % % 26.8   MONOCYTES % % 13.5*   EOSINOPHIL % % 1.1   BASOPHIL % % 0.9   IMM GRAN % % 3.1*   NEUTROS ABS 10*3/mm3 6.43   LYMPHS ABS 10*3/mm3 3.16*   MONOS ABS 10*3/mm3 1.59*   EOS ABS 10*3/mm3 0.13   BASOS ABS 10*3/mm3 0.11   IMMATURE GRANS (ABS) 10*3/mm3 0.37*   NRBC /100 WBC 0.0     Results from last 7 days   Lab Units 11/08/24 1936   INR  0.99         Results from last 7 days   Lab Units 11/08/24 1936   PROCALCITONIN ng/mL 0.06   LACTATE mmol/L 1.3                               Imaging:  Imaging Results (Last 24 Hours)       ** No results found for the last 24 hours. **               I reviewed the patient's new clinical results / labs / tests / procedures      Assessment/Plan     Active Hospital Problems    Diagnosis  POA    **Cholecystitis with cholelithiasis [K80.10]  Yes    Cholelithiasis [K80.20]  Unknown    Cholecystitis [K81.9]  Unknown    Chest pain [R07.9]  Unknown    Dyspnea on exertion [R06.09]  Unknown    Prostate cancer [C61]  Yes    Polycythemia [D75.1]  Yes    Essential hypertension [I10]  Yes      Resolved Hospital Problems   No resolved problems to display.           Cholelithiasis and cholecystitis.  Surgery planning laparoscopic cholecystectomy.  Benign GI examination.  Mildly elevated liver function test.  Will " monitor CMP.  Continue Zosyn.  Atypical chest pain and exertional dyspnea/coronary calcification/history of hypertension.  Most recent echo on 10/2024 revealed a normal ejection fraction with moderate AAS and left ventricular hypertrophy.  Chest pain is atypical.  EKG without acute ischemic changes.  Stress test was negative for ischemia and normal ejection fraction.  CTA of the chest revealed no aortic dissection or PE.  Chest pain could be referred chest pain.  Exertional dyspnea could be secondary to AS.  Continue Lasix/verapamil/Vasotec.  History of prostate cancer.  PSA is elevated.  Hematology oncology is following.  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  Hematology oncology consulted and they are checking LDH/JAK2/ epo level.  History of HTLV-1.  Hematology oncology rechecking.  Glucose intolerance.  Diet at discharge.  A1c mildly elevated  VTE prophylaxis.  Sequential compression device.    Discussed my findings and plan of treatment with the patient/wife.  Disposition.  Hopefully home in 2 days after cholecystectomy tomorrow.        Froilan Kebede MD  [facility]  11/09/24  16:32 EST

## 2024-11-10 ENCOUNTER — ANESTHESIA EVENT (OUTPATIENT)
Dept: PERIOP | Facility: HOSPITAL | Age: 69
End: 2024-11-10
Payer: MEDICARE

## 2024-11-10 ENCOUNTER — APPOINTMENT (OUTPATIENT)
Dept: GENERAL RADIOLOGY | Facility: HOSPITAL | Age: 69
End: 2024-11-10
Payer: MEDICARE

## 2024-11-10 ENCOUNTER — ANESTHESIA (OUTPATIENT)
Dept: PERIOP | Facility: HOSPITAL | Age: 69
End: 2024-11-10
Payer: MEDICARE

## 2024-11-10 LAB
ALBUMIN SERPL-MCNC: 3.7 G/DL (ref 3.5–5.2)
ALBUMIN/GLOB SERPL: 1.4 G/DL
ALP SERPL-CCNC: 65 U/L (ref 39–117)
ALT SERPL W P-5'-P-CCNC: 40 U/L (ref 1–41)
ANION GAP SERPL CALCULATED.3IONS-SCNC: 11.3 MMOL/L (ref 5–15)
AST SERPL-CCNC: 22 U/L (ref 1–40)
BASOPHILS # BLD AUTO: 0.1 10*3/MM3 (ref 0–0.2)
BASOPHILS NFR BLD AUTO: 0.9 % (ref 0–1.5)
BILIRUB SERPL-MCNC: 1.1 MG/DL (ref 0–1.2)
BUN SERPL-MCNC: 14 MG/DL (ref 8–23)
BUN/CREAT SERPL: 10.3 (ref 7–25)
CALCIUM SPEC-SCNC: 9.2 MG/DL (ref 8.6–10.5)
CHLORIDE SERPL-SCNC: 99 MMOL/L (ref 98–107)
CO2 SERPL-SCNC: 24.7 MMOL/L (ref 22–29)
CREAT SERPL-MCNC: 1.36 MG/DL (ref 0.76–1.27)
DEPRECATED RDW RBC AUTO: 39.6 FL (ref 37–54)
EGFRCR SERPLBLD CKD-EPI 2021: 57 ML/MIN/1.73
EOSINOPHIL # BLD AUTO: 0.17 10*3/MM3 (ref 0–0.4)
EOSINOPHIL NFR BLD AUTO: 1.5 % (ref 0.3–6.2)
ERYTHROCYTE [DISTWIDTH] IN BLOOD BY AUTOMATED COUNT: 13.1 % (ref 12.3–15.4)
GLOBULIN UR ELPH-MCNC: 2.6 GM/DL
GLUCOSE SERPL-MCNC: 91 MG/DL (ref 65–99)
HCT VFR BLD AUTO: 55.3 % (ref 37.5–51)
HGB BLD-MCNC: 19.4 G/DL (ref 13–17.7)
IMM GRANULOCYTES # BLD AUTO: 0.31 10*3/MM3 (ref 0–0.05)
IMM GRANULOCYTES NFR BLD AUTO: 2.7 % (ref 0–0.5)
LDH SERPL-CCNC: 187 U/L (ref 135–225)
LYMPHOCYTES # BLD AUTO: 2.92 10*3/MM3 (ref 0.7–3.1)
LYMPHOCYTES NFR BLD AUTO: 25.1 % (ref 19.6–45.3)
MCH RBC QN AUTO: 29.3 PG (ref 26.6–33)
MCHC RBC AUTO-ENTMCNC: 35.1 G/DL (ref 31.5–35.7)
MCV RBC AUTO: 83.7 FL (ref 79–97)
MONOCYTES # BLD AUTO: 1.8 10*3/MM3 (ref 0.1–0.9)
MONOCYTES NFR BLD AUTO: 15.5 % (ref 5–12)
NEUTROPHILS NFR BLD AUTO: 54.3 % (ref 42.7–76)
NEUTROPHILS NFR BLD AUTO: 6.32 10*3/MM3 (ref 1.7–7)
NRBC BLD AUTO-RTO: 0 /100 WBC (ref 0–0.2)
PLATELET # BLD AUTO: 233 10*3/MM3 (ref 140–450)
PMV BLD AUTO: 9.7 FL (ref 6–12)
POTASSIUM SERPL-SCNC: 4.4 MMOL/L (ref 3.5–5.2)
PROT SERPL-MCNC: 6.3 G/DL (ref 6–8.5)
RBC # BLD AUTO: 6.61 10*6/MM3 (ref 4.14–5.8)
SODIUM SERPL-SCNC: 135 MMOL/L (ref 136–145)
WBC NRBC COR # BLD AUTO: 11.62 10*3/MM3 (ref 3.4–10.8)

## 2024-11-10 PROCEDURE — 47563 LAPARO CHOLECYSTECTOMY/GRAPH: CPT | Performed by: SURGERY

## 2024-11-10 PROCEDURE — S2900 ROBOTIC SURGICAL SYSTEM: HCPCS | Performed by: SURGERY

## 2024-11-10 PROCEDURE — 85025 COMPLETE CBC W/AUTO DIFF WBC: CPT | Performed by: INTERNAL MEDICINE

## 2024-11-10 PROCEDURE — 25510000001 IOPAMIDOL 61 % SOLUTION: Performed by: SURGERY

## 2024-11-10 PROCEDURE — 25010000002 PHENYLEPHRINE 10 MG/ML SOLUTION: Performed by: STUDENT IN AN ORGANIZED HEALTH CARE EDUCATION/TRAINING PROGRAM

## 2024-11-10 PROCEDURE — 74300 X-RAY BILE DUCTS/PANCREAS: CPT

## 2024-11-10 PROCEDURE — 25010000002 DEXAMETHASONE PER 1 MG: Performed by: STUDENT IN AN ORGANIZED HEALTH CARE EDUCATION/TRAINING PROGRAM

## 2024-11-10 PROCEDURE — BF101ZZ FLUOROSCOPY OF BILE DUCTS USING LOW OSMOLAR CONTRAST: ICD-10-PCS | Performed by: SURGERY

## 2024-11-10 PROCEDURE — 25810000003 LACTATED RINGERS PER 1000 ML: Performed by: STUDENT IN AN ORGANIZED HEALTH CARE EDUCATION/TRAINING PROGRAM

## 2024-11-10 PROCEDURE — 99232 SBSQ HOSP IP/OBS MODERATE 35: CPT | Performed by: PHYSICIAN ASSISTANT

## 2024-11-10 PROCEDURE — 80053 COMPREHEN METABOLIC PANEL: CPT | Performed by: INTERNAL MEDICINE

## 2024-11-10 PROCEDURE — 0FT44ZZ RESECTION OF GALLBLADDER, PERCUTANEOUS ENDOSCOPIC APPROACH: ICD-10-PCS | Performed by: SURGERY

## 2024-11-10 PROCEDURE — 8E0W4CZ ROBOTIC ASSISTED PROCEDURE OF TRUNK REGION, PERCUTANEOUS ENDOSCOPIC APPROACH: ICD-10-PCS | Performed by: SURGERY

## 2024-11-10 PROCEDURE — 25010000002 HYDROMORPHONE 1 MG/ML SOLUTION: Performed by: STUDENT IN AN ORGANIZED HEALTH CARE EDUCATION/TRAINING PROGRAM

## 2024-11-10 PROCEDURE — 25810000003 SODIUM CHLORIDE 0.9 % SOLUTION: Performed by: INTERNAL MEDICINE

## 2024-11-10 PROCEDURE — 25010000002 MORPHINE PER 10 MG: Performed by: SURGERY

## 2024-11-10 PROCEDURE — 25010000002 HYDRALAZINE PER 20 MG: Performed by: STUDENT IN AN ORGANIZED HEALTH CARE EDUCATION/TRAINING PROGRAM

## 2024-11-10 PROCEDURE — 25010000002 PROPOFOL 10 MG/ML EMULSION: Performed by: STUDENT IN AN ORGANIZED HEALTH CARE EDUCATION/TRAINING PROGRAM

## 2024-11-10 PROCEDURE — C1758 CATHETER, URETERAL: HCPCS | Performed by: SURGERY

## 2024-11-10 PROCEDURE — 25010000002 SUGAMMADEX 200 MG/2ML SOLUTION: Performed by: STUDENT IN AN ORGANIZED HEALTH CARE EDUCATION/TRAINING PROGRAM

## 2024-11-10 PROCEDURE — 99231 SBSQ HOSP IP/OBS SF/LOW 25: CPT | Performed by: INTERNAL MEDICINE

## 2024-11-10 PROCEDURE — 25010000002 SUCCINYLCHOLINE PER 20 MG: Performed by: STUDENT IN AN ORGANIZED HEALTH CARE EDUCATION/TRAINING PROGRAM

## 2024-11-10 PROCEDURE — 88304 TISSUE EXAM BY PATHOLOGIST: CPT | Performed by: SURGERY

## 2024-11-10 PROCEDURE — 83615 LACTATE (LD) (LDH) ENZYME: CPT | Performed by: INTERNAL MEDICINE

## 2024-11-10 PROCEDURE — 25010000002 INDOCYANINE GREEN 25 MG RECONSTITUTED SOLUTION: Performed by: SURGERY

## 2024-11-10 PROCEDURE — 25010000002 ONDANSETRON PER 1 MG: Performed by: STUDENT IN AN ORGANIZED HEALTH CARE EDUCATION/TRAINING PROGRAM

## 2024-11-10 PROCEDURE — 25010000002 FENTANYL CITRATE (PF) 50 MCG/ML SOLUTION: Performed by: STUDENT IN AN ORGANIZED HEALTH CARE EDUCATION/TRAINING PROGRAM

## 2024-11-10 PROCEDURE — 25010000002 PIPERACILLIN SOD-TAZOBACTAM PER 1 G: Performed by: INTERNAL MEDICINE

## 2024-11-10 PROCEDURE — 25010000002 LIDOCAINE 2% SOLUTION: Performed by: STUDENT IN AN ORGANIZED HEALTH CARE EDUCATION/TRAINING PROGRAM

## 2024-11-10 PROCEDURE — 25010000002 HYDROMORPHONE PER 4 MG: Performed by: STUDENT IN AN ORGANIZED HEALTH CARE EDUCATION/TRAINING PROGRAM

## 2024-11-10 DEVICE — HEMOLOK ML 6 CLIPS/CART
Type: IMPLANTABLE DEVICE | Site: ABDOMEN | Status: FUNCTIONAL
Brand: WECK

## 2024-11-10 RX ORDER — FENTANYL CITRATE 50 UG/ML
25 INJECTION, SOLUTION INTRAMUSCULAR; INTRAVENOUS
Status: DISCONTINUED | OUTPATIENT
Start: 2024-11-10 | End: 2024-11-10 | Stop reason: HOSPADM

## 2024-11-10 RX ORDER — SODIUM CHLORIDE 0.9 % (FLUSH) 0.9 %
3 SYRINGE (ML) INJECTION EVERY 12 HOURS SCHEDULED
Status: DISCONTINUED | OUTPATIENT
Start: 2024-11-10 | End: 2024-11-10 | Stop reason: HOSPADM

## 2024-11-10 RX ORDER — MAGNESIUM HYDROXIDE 1200 MG/15ML
LIQUID ORAL AS NEEDED
Status: DISCONTINUED | OUTPATIENT
Start: 2024-11-10 | End: 2024-11-10 | Stop reason: HOSPADM

## 2024-11-10 RX ORDER — BUPIVACAINE HYDROCHLORIDE AND EPINEPHRINE 5; 5 MG/ML; UG/ML
INJECTION, SOLUTION EPIDURAL; INTRACAUDAL; PERINEURAL AS NEEDED
Status: DISCONTINUED | OUTPATIENT
Start: 2024-11-10 | End: 2024-11-10 | Stop reason: HOSPADM

## 2024-11-10 RX ORDER — DEXAMETHASONE SODIUM PHOSPHATE 4 MG/ML
INJECTION, SOLUTION INTRA-ARTICULAR; INTRALESIONAL; INTRAMUSCULAR; INTRAVENOUS; SOFT TISSUE AS NEEDED
Status: DISCONTINUED | OUTPATIENT
Start: 2024-11-10 | End: 2024-11-10 | Stop reason: SURG

## 2024-11-10 RX ORDER — INDOCYANINE GREEN AND WATER 25 MG
2.5 KIT INJECTION ONCE
Status: COMPLETED | OUTPATIENT
Start: 2024-11-10 | End: 2024-11-10

## 2024-11-10 RX ORDER — IPRATROPIUM BROMIDE AND ALBUTEROL SULFATE 2.5; .5 MG/3ML; MG/3ML
3 SOLUTION RESPIRATORY (INHALATION) ONCE AS NEEDED
Status: DISCONTINUED | OUTPATIENT
Start: 2024-11-10 | End: 2024-11-10 | Stop reason: HOSPADM

## 2024-11-10 RX ORDER — LIDOCAINE HYDROCHLORIDE 20 MG/ML
INJECTION, SOLUTION INFILTRATION; PERINEURAL AS NEEDED
Status: DISCONTINUED | OUTPATIENT
Start: 2024-11-10 | End: 2024-11-10 | Stop reason: SURG

## 2024-11-10 RX ORDER — TRAMADOL HYDROCHLORIDE 50 MG/1
50 TABLET ORAL EVERY 6 HOURS PRN
Status: DISCONTINUED | OUTPATIENT
Start: 2024-11-10 | End: 2024-11-13 | Stop reason: HOSPADM

## 2024-11-10 RX ORDER — PROPOFOL 10 MG/ML
VIAL (ML) INTRAVENOUS AS NEEDED
Status: DISCONTINUED | OUTPATIENT
Start: 2024-11-10 | End: 2024-11-10 | Stop reason: SURG

## 2024-11-10 RX ORDER — SODIUM CHLORIDE, SODIUM LACTATE, POTASSIUM CHLORIDE, CALCIUM CHLORIDE 600; 310; 30; 20 MG/100ML; MG/100ML; MG/100ML; MG/100ML
INJECTION, SOLUTION INTRAVENOUS CONTINUOUS PRN
Status: DISCONTINUED | OUTPATIENT
Start: 2024-11-10 | End: 2024-11-10 | Stop reason: SURG

## 2024-11-10 RX ORDER — NALOXONE HCL 0.4 MG/ML
0.2 VIAL (ML) INJECTION AS NEEDED
Status: DISCONTINUED | OUTPATIENT
Start: 2024-11-10 | End: 2024-11-10 | Stop reason: HOSPADM

## 2024-11-10 RX ORDER — HYDROMORPHONE HYDROCHLORIDE 1 MG/ML
0.25 INJECTION, SOLUTION INTRAMUSCULAR; INTRAVENOUS; SUBCUTANEOUS
Status: DISCONTINUED | OUTPATIENT
Start: 2024-11-10 | End: 2024-11-10 | Stop reason: HOSPADM

## 2024-11-10 RX ORDER — FENTANYL CITRATE 50 UG/ML
50 INJECTION, SOLUTION INTRAMUSCULAR; INTRAVENOUS ONCE AS NEEDED
Status: DISCONTINUED | OUTPATIENT
Start: 2024-11-10 | End: 2024-11-10 | Stop reason: HOSPADM

## 2024-11-10 RX ORDER — DROPERIDOL 2.5 MG/ML
0.62 INJECTION, SOLUTION INTRAMUSCULAR; INTRAVENOUS
Status: DISCONTINUED | OUTPATIENT
Start: 2024-11-10 | End: 2024-11-10 | Stop reason: HOSPADM

## 2024-11-10 RX ORDER — HYDRALAZINE HYDROCHLORIDE 20 MG/ML
5 INJECTION INTRAMUSCULAR; INTRAVENOUS
Status: DISCONTINUED | OUTPATIENT
Start: 2024-11-10 | End: 2024-11-10 | Stop reason: HOSPADM

## 2024-11-10 RX ORDER — ACETAMINOPHEN 500 MG
1000 TABLET ORAL EVERY 6 HOURS
Status: DISCONTINUED | OUTPATIENT
Start: 2024-11-10 | End: 2024-11-13 | Stop reason: HOSPADM

## 2024-11-10 RX ORDER — ONDANSETRON 2 MG/ML
4 INJECTION INTRAMUSCULAR; INTRAVENOUS ONCE AS NEEDED
Status: COMPLETED | OUTPATIENT
Start: 2024-11-10 | End: 2024-11-10

## 2024-11-10 RX ORDER — FENTANYL CITRATE 50 UG/ML
INJECTION, SOLUTION INTRAMUSCULAR; INTRAVENOUS AS NEEDED
Status: DISCONTINUED | OUTPATIENT
Start: 2024-11-10 | End: 2024-11-10 | Stop reason: SURG

## 2024-11-10 RX ORDER — SODIUM CHLORIDE 0.9 % (FLUSH) 0.9 %
3-10 SYRINGE (ML) INJECTION AS NEEDED
Status: DISCONTINUED | OUTPATIENT
Start: 2024-11-10 | End: 2024-11-10 | Stop reason: HOSPADM

## 2024-11-10 RX ORDER — DIPHENHYDRAMINE HYDROCHLORIDE 50 MG/ML
12.5 INJECTION INTRAMUSCULAR; INTRAVENOUS
Status: DISCONTINUED | OUTPATIENT
Start: 2024-11-10 | End: 2024-11-10 | Stop reason: HOSPADM

## 2024-11-10 RX ORDER — GABAPENTIN 300 MG/1
300 CAPSULE ORAL ONCE
Status: COMPLETED | OUTPATIENT
Start: 2024-11-10 | End: 2024-11-10

## 2024-11-10 RX ORDER — PHENYLEPHRINE HYDROCHLORIDE 10 MG/ML
INJECTION INTRAVENOUS AS NEEDED
Status: DISCONTINUED | OUTPATIENT
Start: 2024-11-10 | End: 2024-11-10 | Stop reason: SURG

## 2024-11-10 RX ORDER — SODIUM CHLORIDE 9 MG/ML
100 INJECTION, SOLUTION INTRAVENOUS CONTINUOUS
Status: ACTIVE | OUTPATIENT
Start: 2024-11-10 | End: 2024-11-11

## 2024-11-10 RX ORDER — LIDOCAINE HYDROCHLORIDE 10 MG/ML
0.5 INJECTION, SOLUTION INFILTRATION; PERINEURAL ONCE AS NEEDED
Status: DISCONTINUED | OUTPATIENT
Start: 2024-11-10 | End: 2024-11-10 | Stop reason: HOSPADM

## 2024-11-10 RX ORDER — FAMOTIDINE 10 MG/ML
20 INJECTION, SOLUTION INTRAVENOUS ONCE
Status: COMPLETED | OUTPATIENT
Start: 2024-11-10 | End: 2024-11-10

## 2024-11-10 RX ORDER — ACETAMINOPHEN 500 MG
1000 TABLET ORAL ONCE
Status: COMPLETED | OUTPATIENT
Start: 2024-11-10 | End: 2024-11-10

## 2024-11-10 RX ORDER — HYDROCODONE BITARTRATE AND ACETAMINOPHEN 7.5; 325 MG/1; MG/1
1 TABLET ORAL EVERY 4 HOURS PRN
Status: DISCONTINUED | OUTPATIENT
Start: 2024-11-10 | End: 2024-11-10 | Stop reason: HOSPADM

## 2024-11-10 RX ORDER — PROMETHAZINE HYDROCHLORIDE 25 MG/1
25 SUPPOSITORY RECTAL ONCE AS NEEDED
Status: DISCONTINUED | OUTPATIENT
Start: 2024-11-10 | End: 2024-11-10 | Stop reason: HOSPADM

## 2024-11-10 RX ORDER — FLUMAZENIL 0.1 MG/ML
0.2 INJECTION INTRAVENOUS AS NEEDED
Status: DISCONTINUED | OUTPATIENT
Start: 2024-11-10 | End: 2024-11-10 | Stop reason: HOSPADM

## 2024-11-10 RX ORDER — ONDANSETRON 2 MG/ML
INJECTION INTRAMUSCULAR; INTRAVENOUS AS NEEDED
Status: DISCONTINUED | OUTPATIENT
Start: 2024-11-10 | End: 2024-11-10 | Stop reason: SURG

## 2024-11-10 RX ORDER — SODIUM CHLORIDE, SODIUM LACTATE, POTASSIUM CHLORIDE, CALCIUM CHLORIDE 600; 310; 30; 20 MG/100ML; MG/100ML; MG/100ML; MG/100ML
9 INJECTION, SOLUTION INTRAVENOUS CONTINUOUS
Status: DISCONTINUED | OUTPATIENT
Start: 2024-11-10 | End: 2024-11-10

## 2024-11-10 RX ORDER — EPHEDRINE SULFATE 50 MG/ML
5 INJECTION, SOLUTION INTRAVENOUS ONCE AS NEEDED
Status: DISCONTINUED | OUTPATIENT
Start: 2024-11-10 | End: 2024-11-10 | Stop reason: HOSPADM

## 2024-11-10 RX ORDER — IOPAMIDOL 612 MG/ML
INJECTION, SOLUTION INTRAVASCULAR AS NEEDED
Status: DISCONTINUED | OUTPATIENT
Start: 2024-11-10 | End: 2024-11-10 | Stop reason: HOSPADM

## 2024-11-10 RX ORDER — HYDROCODONE BITARTRATE AND ACETAMINOPHEN 5; 325 MG/1; MG/1
1 TABLET ORAL ONCE AS NEEDED
Status: DISCONTINUED | OUTPATIENT
Start: 2024-11-10 | End: 2024-11-10 | Stop reason: HOSPADM

## 2024-11-10 RX ORDER — ROCURONIUM BROMIDE 10 MG/ML
INJECTION, SOLUTION INTRAVENOUS AS NEEDED
Status: DISCONTINUED | OUTPATIENT
Start: 2024-11-10 | End: 2024-11-10 | Stop reason: SURG

## 2024-11-10 RX ORDER — SUCCINYLCHOLINE CHLORIDE 20 MG/ML
INJECTION INTRAMUSCULAR; INTRAVENOUS AS NEEDED
Status: DISCONTINUED | OUTPATIENT
Start: 2024-11-10 | End: 2024-11-10 | Stop reason: SURG

## 2024-11-10 RX ORDER — PROMETHAZINE HYDROCHLORIDE 25 MG/1
25 TABLET ORAL ONCE AS NEEDED
Status: DISCONTINUED | OUTPATIENT
Start: 2024-11-10 | End: 2024-11-10 | Stop reason: HOSPADM

## 2024-11-10 RX ORDER — LABETALOL HYDROCHLORIDE 5 MG/ML
5 INJECTION, SOLUTION INTRAVENOUS
Status: DISCONTINUED | OUTPATIENT
Start: 2024-11-10 | End: 2024-11-10 | Stop reason: HOSPADM

## 2024-11-10 RX ORDER — INDOCYANINE GREEN AND WATER 25 MG
2.5 KIT INJECTION ONCE
Status: DISCONTINUED | OUTPATIENT
Start: 2024-11-10 | End: 2024-11-10

## 2024-11-10 RX ADMIN — INDOCYANINE GREEN AND WATER 2.5 MG: KIT at 10:06

## 2024-11-10 RX ADMIN — HYDROMORPHONE HYDROCHLORIDE 0.25 MG: 1 INJECTION, SOLUTION INTRAMUSCULAR; INTRAVENOUS; SUBCUTANEOUS at 14:21

## 2024-11-10 RX ADMIN — HYDROMORPHONE HYDROCHLORIDE 0.25 MG: 1 INJECTION, SOLUTION INTRAMUSCULAR; INTRAVENOUS; SUBCUTANEOUS at 14:15

## 2024-11-10 RX ADMIN — SODIUM CHLORIDE 100 ML/HR: 9 INJECTION, SOLUTION INTRAVENOUS at 18:20

## 2024-11-10 RX ADMIN — FENTANYL CITRATE 50 MCG: 50 INJECTION, SOLUTION INTRAMUSCULAR; INTRAVENOUS at 12:04

## 2024-11-10 RX ADMIN — PROPOFOL 150 MG: 10 INJECTION, EMULSION INTRAVENOUS at 12:04

## 2024-11-10 RX ADMIN — FAMOTIDINE 20 MG: 10 INJECTION INTRAVENOUS at 10:59

## 2024-11-10 RX ADMIN — HYDROMORPHONE HYDROCHLORIDE 0.25 MG: 1 INJECTION, SOLUTION INTRAMUSCULAR; INTRAVENOUS; SUBCUTANEOUS at 13:53

## 2024-11-10 RX ADMIN — HYDROMORPHONE HYDROCHLORIDE 0.25 MG: 1 INJECTION, SOLUTION INTRAMUSCULAR; INTRAVENOUS; SUBCUTANEOUS at 13:56

## 2024-11-10 RX ADMIN — FENTANYL CITRATE 25 MCG: 50 INJECTION, SOLUTION INTRAMUSCULAR; INTRAVENOUS at 13:43

## 2024-11-10 RX ADMIN — HYDRALAZINE HYDROCHLORIDE 5 MG: 20 INJECTION INTRAMUSCULAR; INTRAVENOUS at 14:37

## 2024-11-10 RX ADMIN — ONDANSETRON 4 MG: 2 INJECTION INTRAMUSCULAR; INTRAVENOUS at 13:05

## 2024-11-10 RX ADMIN — ONDANSETRON 4 MG: 2 INJECTION, SOLUTION INTRAMUSCULAR; INTRAVENOUS at 13:56

## 2024-11-10 RX ADMIN — FENTANYL CITRATE 25 MCG: 50 INJECTION, SOLUTION INTRAMUSCULAR; INTRAVENOUS at 13:38

## 2024-11-10 RX ADMIN — MORPHINE SULFATE 2 MG: 2 INJECTION, SOLUTION INTRAMUSCULAR; INTRAVENOUS at 15:21

## 2024-11-10 RX ADMIN — SUCCINYLCHOLINE CHLORIDE 120 MG: 20 INJECTION, SOLUTION INTRAMUSCULAR; INTRAVENOUS; PARENTERAL at 12:04

## 2024-11-10 RX ADMIN — PHENYLEPHRINE HYDROCHLORIDE 100 MCG: 10 INJECTION INTRAVENOUS at 12:22

## 2024-11-10 RX ADMIN — FUROSEMIDE 40 MG: 40 TABLET ORAL at 08:08

## 2024-11-10 RX ADMIN — HYDROCODONE BITARTRATE AND ACETAMINOPHEN 1 TABLET: 7.5; 325 TABLET ORAL at 13:56

## 2024-11-10 RX ADMIN — FENTANYL CITRATE 25 MCG: 50 INJECTION, SOLUTION INTRAMUSCULAR; INTRAVENOUS at 14:09

## 2024-11-10 RX ADMIN — PHENYLEPHRINE HYDROCHLORIDE 200 MCG: 10 INJECTION INTRAVENOUS at 12:15

## 2024-11-10 RX ADMIN — HYDROMORPHONE HYDROCHLORIDE 0.25 MG: 1 INJECTION, SOLUTION INTRAMUSCULAR; INTRAVENOUS; SUBCUTANEOUS at 14:02

## 2024-11-10 RX ADMIN — ROCURONIUM BROMIDE 50 MG: 10 INJECTION, SOLUTION INTRAVENOUS at 12:16

## 2024-11-10 RX ADMIN — VERAPAMIL HYDROCHLORIDE 360 MG: 120 TABLET, FILM COATED, EXTENDED RELEASE ORAL at 20:09

## 2024-11-10 RX ADMIN — DEXAMETHASONE SODIUM PHOSPHATE 8 MG: 4 INJECTION, SOLUTION INTRA-ARTICULAR; INTRALESIONAL; INTRAMUSCULAR; INTRAVENOUS; SOFT TISSUE at 12:20

## 2024-11-10 RX ADMIN — PIPERACILLIN AND TAZOBACTAM 3.38 G: 3; .375 INJECTION, POWDER, LYOPHILIZED, FOR SOLUTION INTRAVENOUS at 11:52

## 2024-11-10 RX ADMIN — SUGAMMADEX 200 MG: 100 INJECTION, SOLUTION INTRAVENOUS at 13:18

## 2024-11-10 RX ADMIN — Medication 10 ML: at 20:09

## 2024-11-10 RX ADMIN — HYDROMORPHONE HYDROCHLORIDE 0.5 MG: 1 INJECTION, SOLUTION INTRAMUSCULAR; INTRAVENOUS; SUBCUTANEOUS at 12:37

## 2024-11-10 RX ADMIN — FENTANYL CITRATE 25 MCG: 50 INJECTION, SOLUTION INTRAMUSCULAR; INTRAVENOUS at 14:14

## 2024-11-10 RX ADMIN — PIPERACILLIN AND TAZOBACTAM 3.38 G: 3; .375 INJECTION, POWDER, LYOPHILIZED, FOR SOLUTION INTRAVENOUS at 04:01

## 2024-11-10 RX ADMIN — ACETAMINOPHEN 1000 MG: 500 TABLET ORAL at 18:20

## 2024-11-10 RX ADMIN — ENALAPRIL MALEATE 20 MG: 20 TABLET ORAL at 20:09

## 2024-11-10 RX ADMIN — ACETAMINOPHEN 1000 MG: 500 TABLET ORAL at 10:06

## 2024-11-10 RX ADMIN — HYDROMORPHONE HYDROCHLORIDE 0.25 MG: 1 INJECTION, SOLUTION INTRAMUSCULAR; INTRAVENOUS; SUBCUTANEOUS at 13:48

## 2024-11-10 RX ADMIN — PHENYLEPHRINE HYDROCHLORIDE 100 MCG: 10 INJECTION INTRAVENOUS at 12:55

## 2024-11-10 RX ADMIN — LIDOCAINE HYDROCHLORIDE 100 MG: 20 INJECTION, SOLUTION INFILTRATION; PERINEURAL at 12:04

## 2024-11-10 RX ADMIN — SODIUM CHLORIDE, POTASSIUM CHLORIDE, SODIUM LACTATE AND CALCIUM CHLORIDE: 600; 310; 30; 20 INJECTION, SOLUTION INTRAVENOUS at 12:04

## 2024-11-10 RX ADMIN — GABAPENTIN 300 MG: 300 CAPSULE ORAL at 10:06

## 2024-11-10 RX ADMIN — ROCURONIUM BROMIDE 20 MG: 10 INJECTION, SOLUTION INTRAVENOUS at 12:57

## 2024-11-10 RX ADMIN — SODIUM CHLORIDE, POTASSIUM CHLORIDE, SODIUM LACTATE AND CALCIUM CHLORIDE 9 ML/HR: 600; 310; 30; 20 INJECTION, SOLUTION INTRAVENOUS at 11:00

## 2024-11-10 NOTE — ANESTHESIA POSTPROCEDURE EVALUATION
Patient: Jimmie Jamil    Procedure Summary       Date: 11/10/24 Room / Location: [facility] [address] [place] [address] MAIN OR    Anesthesia Start: 1157 Anesthesia Stop: 1336    Procedure: Robotic assisted cholecystectomy with intraoperative cholangiogram (Abdomen) Diagnosis:       Calculus of gallbladder with acute cholecystitis without obstruction      (Calculus of gallbladder with acute cholecystitis without obstruction [K80.00])    Surgeons: Reagan Hernandez MD Provider: Jerson Villegas MD    Anesthesia Type: general ASA Status: 3            Anesthesia Type: general    Vitals  Vitals Value Taken Time   /66 11/10/24 1400   Temp 36.8 °C (98.2 °F) 11/10/24 1331   Pulse 85 11/10/24 1410   Resp 19 11/10/24 1331   SpO2 90 % 11/10/24 1410   Vitals shown include unfiled device data.        Post Anesthesia Care and Evaluation    Patient location during evaluation: bedside  Patient participation: complete - patient participated  Level of consciousness: awake and alert  Pain management: adequate    Airway patency: patent  Anesthetic complications: No anesthetic complications  PONV Status: controlled  Cardiovascular status: blood pressure returned to baseline and acceptable  Respiratory status: acceptable  Hydration status: acceptable

## 2024-11-10 NOTE — PLAN OF CARE
Goal Outcome Evaluation:  Plan of Care Reviewed With: patient           Outcome Evaluation: Pt.vital sign, lab and medication reviewed.AOX4, on room air,medication administered per ordered. NPO from midnight,consent is sign for surgery.No any complain of pain this shift. Plan of care continue.

## 2024-11-10 NOTE — ANESTHESIA PROCEDURE NOTES
Airway  Urgency: elective    Date/Time: 11/10/2024 12:05 PM  Airway not difficult    General Information and Staff    Patient location during procedure: OR  Anesthesiologist: Jerson Villegas MD    Indications and Patient Condition  Indications for airway management: airway protection    Preoxygenated: yes  MILS maintained throughout  Mask difficulty assessment: 0 - not attempted    Final Airway Details  Final airway type: endotracheal airway      Successful airway: ETT  Cuffed: yes   Successful intubation technique: direct laryngoscopy  Facilitating devices/methods: Bougie  Endotracheal tube insertion site: oral  Blade: Yoan  Blade size: 3  ETT size (mm): 7.5  Cormack-Lehane Classification: grade IIb - view of arytenoids or posterior of glottis only  Placement verified by: chest auscultation and capnometry   Cuff volume (mL): 8  Measured from: teeth  ETT/EBT  to teeth (cm): 22  Number of attempts at approach: 1  Assessment: lips, teeth, and gum same as pre-op and atraumatic intubation

## 2024-11-10 NOTE — ANESTHESIA PREPROCEDURE EVALUATION
Anesthesia Evaluation     Patient summary reviewed and Nursing notes reviewed   no history of anesthetic complications:   NPO Solid Status: > 8 hours  NPO Liquid Status: > 2 hours           Airway   Mallampati: II  TM distance: >3 FB  Neck ROM: full  Dental      Pulmonary    Cardiovascular     ECG reviewed    (+) hypertension, valvular problems/murmurs AS, hyperlipidemia    ROS comment: Stress test: Low risk study    Echo: Moderate aortic valve stenosis is present. Aortic valve area is 1.01 cm2. Peak velocity of the flow distal to the aortic valve is 287.3 cm/s. Aortic valve maximum pressure gradient is 33.0 mmHg. Aortic valve mean pressure gradient is 18.2 mmHg    Neuro/Psych  GI/Hepatic/Renal/Endo    (+) obesity    Musculoskeletal     (+) neck pain      ROS comment: Hx of cervical fusion  Abdominal    Substance History      OB/GYN          Other                          Anesthesia Plan    ASA 3     general     intravenous induction     Anesthetic plan, risks, benefits, and alternatives have been provided, discussed and informed consent has been obtained with: patient.        CODE STATUS:    Code Status (Patient has no pulse and is not breathing): CPR (Attempt to Resuscitate)  Medical Interventions (Patient has pulse or is breathing): Full Support

## 2024-11-10 NOTE — H&P (VIEW-ONLY)
General Surgery H&P/Consultation      Impression/Plan: 69-year-old gentleman with cholelithiasis and cholecystitis.  He has received cardiac clearance and had no evidence of ischemia on his stress test.  Plan to proceed with robotic cholecystectomy with intraoperative cholangiogram on 11/10/2024.  N.p.o. at midnight.  Continue Zosyn.  Risk and rationale for the procedure been discussed with him.  Discussion of risk included, but not limited to, damage to common bile duct, bile leak, pain, bleeding, infection.     CC: Chest pain, abdominal pain    HPI:   Mr. Jimmie Jamil is a 69 y.o. male that presented to the hospital for further workup related to chest pain.  He has evidence of cholelithiasis and cholecystitis on imaging.  He reports a 1 month history of left-sided chest pain and also some postprandial upper abdominal pain.  He was evaluated by Dr. Guido at [facility] who recommended cholecystectomy after clearance.  He was admitted to [facility] for further workup given persistent chest pain and increased dyspnea with activity.  He underwent a nuclear stress test which did not show any signs of ischemia.  He has been granted cardiac clearance.    Past Medical History:   Past Medical History:   Diagnosis Date    Aortic stenosis     Arthritis of back 2015    Benign essential hypertension     Bladder problem     Cervical disc disorder 2018    Cervical radiculopathy 03/16/2017    Cervical spinal stenosis 03/16/2017    Cervicalgia 03/16/2017    Coronary artery disease     diastolic dysfunction    ED (erectile dysfunction)     Greater trochanteric bursitis of right hip 07/09/2018    High blood pressure     HL (hearing loss) 01 Oct 2021    Hyperlipidemia     Hypertension     Leg pain     Leg swelling     Low back pain     Low back strain 2015    Lumbago 03/16/2017    Lumbosacral disc disease 2018    Neck pain     Paresthesia 03/16/2017    left hand/leg    Periarthritis of shoulder 2021     Polycythemia     Right hip pain     Rotator cuff syndrome 09/01/2021    Urinary bladder incontinence 03/16/2017       Past Surgical History:   Past Surgical History:   Procedure Laterality Date    ANTERIOR CERVICAL FUSION  04/12/2017    C3-4    CARDIAC CATHETERIZATION      everything okay clearance for neck surgery    CARDIAC CATHETERIZATION N/A 10/13/2021    Procedure: Left Heart Cath;  Surgeon: Anahi Perales MD;  Location: [facility] CATH INVASIVE LOC[facility];  Service: Cardiovascular;  Laterality: N/A;    CARDIAC CATHETERIZATION N/A 10/13/2021    Procedure: Coronary angiography;  Surgeon: Anahi Perales MD;  Location:  [facility] CATH INVASIVE LOCATION;  Service: Cardiovascular;  Laterality: N/A;    CARDIAC CATHETERIZATION N/A 10/13/2021    Procedure: Left ventriculography;  Surgeon: Anahi Perales MD;  Location:  [facility] CATH INVASIVE LOCATION;  Service: Cardiovascular;  Laterality: N/A;    CERVICAL DISC SURGERY      C2-3 fusion    CIRCUMCISION      COLONOSCOPY      COLONOSCOPY N/A 11/20/2023    Procedure: COLONOSCOPY WITH COLD SNARE POLYECTOMY;  Surgeon: Kenny Urena MD;  Location: [facility] ENDOSCOPY;  Service: Gastroenterology;  Laterality: N/A;  COLON POLYP    CYSTOSCOPY  01/10/2014    Cysto ivan retrograde pyelogram urthral bx.    LUMBAR EPIDURAL INJECTION      L2-S2  going to try ablasion in future    NECK SURGERY  2018?    PROSTATE BIOPSY  2018    PROSTATE BIOPSY N/A 8/8/2024    Procedure: MRI fusion transrectal ultrasound-guided prostate biopsy;  Surgeon: Zechariha Clay MD;  Location: [facility] MAIN OR;  Service: Urology;  Laterality: N/A;    SHOULDER ARTHROSCOPY W/ LABRAL REPAIR Left     SHOULDER SURGERY  2008    TRIGGER POINT INJECTION  2021    L5-S1    VASECTOMY      WISDOM TOOTH EXTRACTION         Medications:  Medications Prior to Admission   Medication Sig Dispense Refill Last Dose/Taking    ascorbic acid (VITAMIN C) 1000 MG tablet Take 1 tablet by mouth Daily.   11/7/2024 Evening    aspirin 81  MG EC tablet Take 1 tablet by mouth Daily.   11/7/2024 Evening    Coenzyme Q10 (CoQ10) 100 MG capsule    11/7/2024 Evening    enalapril (VASOTEC) 20 MG tablet Take 1 tablet by mouth twice daily (Patient taking differently: Take 1 tablet by mouth Every Night.) 180 tablet 0 11/7/2024 Evening    furosemide (LASIX) 40 MG tablet Take 1 tablet by mouth.   11/8/2024 Morning    Leqvio 284 MG/1.5ML solution prefilled syringe EVERY 6 MTHS   Past Month    ofloxacin (OCUFLOX) 0.3 % ophthalmic solution USES FOR EARS INSTEAD OF EYES   Taking    Testosterone Cypionate (DEPOTESTOTERONE CYPIONATE) 200 MG/ML injection INJECT 0.3 ML UNDER THE SKIN Q 5 DAYS AS DIRECTED 10 mL 0 11/8/2024 Morning    VASCEPA 1 g capsule capsule Take 2 g by mouth 2 (Two) Times a Day With Meals. 360 capsule 3 11/7/2024 Evening    verapamil ER (VERELAN) 360 MG 24 hr capsule Take 1 capsule by mouth Every Night.   11/7/2024 Evening    potassium chloride (KLOR-CON M20) 20 MEQ CR tablet Take 1 tablet by mouth Daily.       Tobramycin-dexAMETHasone (TobraDex ST) 0.3-0.05 % suspension 3-4 drops in affected ear twice a day 5 mL 1 Unknown         Current Facility-Administered Medications:     acetaminophen (TYLENOL) tablet 650 mg, 650 mg, Oral, Q4H PRN **OR** acetaminophen (TYLENOL) 160 MG/5ML oral solution 650 mg, 650 mg, Oral, Q4H PRN **OR** acetaminophen (TYLENOL) suppository 650 mg, 650 mg, Rectal, Q4H PRN, Marjorie Vivas MD    Calcium Replacement - Follow Nurse / BPA Driven Protocol, , Does not apply, PRNOrtega Jawed, MD    enalapril (VASOTEC) tablet 20 mg, 20 mg, Oral, Nightly, Marjorie Vivas MD, 20 mg at 11/08/24 2109    furosemide (LASIX) tablet 40 mg, 40 mg, Oral, Daily, Marjorie Vivas MD, 40 mg at 11/09/24 0818    Magnesium Standard Dose Replacement - Follow Nurse / BPA Driven Protocol, , Does not apply, PRN, Marjorie Vivas MD    morphine injection 2 mg, 2 mg, Intravenous, Q4H PRN, Marjorie Vivas MD    nitroglycerin (NITROSTAT) SL tablet 0.4 mg, 0.4 mg,  Sublingual, Q5 Min PRN, Marjorie Vivas MD    ondansetron (ZOFRAN) injection 4 mg, 4 mg, Intravenous, Q6H PRN, Marjorie Vivas MD    Phosphorus Replacement - Follow Nurse / BPA Driven Protocol, , Does not apply, Ortega WALSH Jawed, MD    piperacillin-tazobactam (ZOSYN) 3.375 g IVPB in 100 mL NS MBP (CD), 3.375 g, Intravenous, Q8H, Marjorie Vivas MD, 3.375 g at 11/09/24 1206    Potassium Replacement - Follow Nurse / BPA Driven Protocol, , Does not apply, PRNOrtega Jawed, MD    sodium chloride 0.9 % flush 10 mL, 10 mL, Intravenous, Q12H, Marjorie Vivas MD, 10 mL at 11/08/24 2109    sodium chloride 0.9 % flush 10 mL, 10 mL, Intravenous, PRN, Marjorie Vivas MD    sodium chloride 0.9 % infusion 40 mL, 40 mL, Intravenous, PRN, Marjorie Vivas MD    verapamil SR (CALAN-SR) CR tablet 360 mg, 360 mg, Oral, Nightly, Marjorie Vivas MD, 360 mg at 11/08/24 2109     Allergies:   Allergies   Allergen Reactions    Beta Adrenergic Blockers Unknown - High Severity    Crestor [Rosuvastatin Calcium] Myalgia    Keflex [Cephalexin] Other (See Comments)     hiccups    Livalo [Pitavastatin] Myalgia    Losartan Other (See Comments)     Intolerance due to fatigue, depression    Naproxen Other (See Comments)     Chest pain       Social History:   Social History     Socioeconomic History    Marital status:    Tobacco Use    Smoking status: Never     Passive exposure: Never    Smokeless tobacco: Never   Vaping Use    Vaping status: Never Used   Substance and Sexual Activity    Alcohol use: Yes     Alcohol/week: 2.0 standard drinks of alcohol     Types: 2 Cans of beer per week     Comment: 4 beers a week    Drug use: Never    Sexual activity: Yes     Partners: Female     Birth control/protection: Surgical       Family History:   Family History   Problem Relation Age of Onset    Arthritis Mother     Alzheimer's disease Mother 80    Arthritis Father     Heart disease Father     Colon cancer Father 85    Hypertension Father 80    Cancer Father          Colon cx in his 90s    Stroke Father         Mild in his 80s       Review of Systems:  Per HPI    Physical Exam:   Vitals:    11/09/24 1304   BP: 143/88   Pulse:    Resp: 20   Temp: 98.2 °F (36.8 °C)   SpO2:      BMI: Body mass index is 32.59 kg/m².   106 kg (233 lb 11 oz)      Intake/Output Summary (Last 24 hours) at 11/9/2024 1929  Last data filed at 11/9/2024 1804  Gross per 24 hour   Intake 480 ml   Output --   Net 480 ml       GENERAL: no acute distress, awake and alert  RESPIRATORY: symmetric excursion bilaterally, normal work of breathing  CARDIOVASCULAR: Regular rate, well perfused  GASTROINTESTINAL: Soft, mild epigastric/right upper quadrant tenderness but no peritoneal signs      Pertinent labs:   Results from last 7 days   Lab Units 11/08/24  1936   WBC 10*3/mm3 11.79*   HEMOGLOBIN g/dL 18.9*   HEMATOCRIT % 54.4*   PLATELETS 10*3/mm3 226     Results from last 7 days   Lab Units 11/08/24  1936   SODIUM mmol/L 138   POTASSIUM mmol/L 4.2   CHLORIDE mmol/L 103   CO2 mmol/L 23.6   BUN mg/dL 16   CREATININE mg/dL 1.04   CALCIUM mg/dL 9.4   BILIRUBIN mg/dL 0.5   ALK PHOS U/L 71   ALT (SGPT) U/L 43*   AST (SGOT) U/L 25   GLUCOSE mg/dL 82       IMAGING:  CT chest from 10/31/2024 reviewed showing inflammatory change around the gallbladder with cholelithiasis.          Reagan Hernandez MD  General and Endoscopic Surgery  [facility]    [address], [address]  [place] [address]  P: [phone]  F: [phone]

## 2024-11-10 NOTE — PROGRESS NOTES
Name: Jimmie Jamil ADMIT: 2024   : 1955  PCP: Nicko Fontaine APRN    MRN: [id number] LOS: 2 days   AGE/SEX: 69 y.o. male  ROOM: [facility]     Subjective   Subjective   Seen 1 hour after coming back from laparoscopic cholecystectomy.  Minimal right-sided upper abdominal pain.  No nausea or vomiting.  No flatus or bowel movement.  Tolerating clear liquids diet.  No fever or chills.  Did not pass urine after surgery yet.  Improved left-sided chest pain.  No shortness of breath       Objective   Objective   Vital Signs  Temp:  [97.8 °F (36.6 °C)-98.4 °F (36.9 °C)] 98.2 °F (36.8 °C)  Heart Rate:  [] 101  Resp:  [16-20] 19  BP: (106-197)/(62-86) 137/66  SpO2:  [88 %-97 %] 93 %  on  Flow (L/min) (Oxygen Therapy):  [3-4] 3;   Device (Oxygen Therapy): room air    Intake/Output Summary (Last 24 hours) at 11/10/2024 1715  Last data filed at 2024 1804  Gross per 24 hour   Intake 240 ml   Output --   Net 240 ml     Body mass index is 32.59 kg/m².      24  1658   Weight: 106 kg (233 lb 11 oz)     Physical Exam  General.  Middle-aged gentleman.  Obese.  Alert and oriented x 4.  In no apparent pain/distress/diaphoresis.  Normal mood and affect.  Eyes.  Pupils equal round and reactive.  Intact extraocular musculature.  No pallor or jaundice.  Oral cavity.  Moist mucous membrane.  Neck.  Supple.  No JVD.  No lymphadenopathy or thyromegaly.  Cardiovascular.  Regular rate and rhythm with grade 2 systolic murmur.  Chest.  Clear to auscultation bilaterally with no added sounds.  Poor bilateral air entry.  Abdomen.  Soft lax.  No tenderness.  No organomegaly.  No guarding or rebound.  Healthy laparoscopic cholecystectomy scar.  Positive bowel sounds.  Extremities.  No clubbing/cyanosis/edema.  CNS.  No acute focal neurological deficits.    Results Review:      Results from last 7 days   Lab Units 11/10/24  0533 24  1936   SODIUM mmol/L 135* 138   POTASSIUM mmol/L 4.4 4.2   CHLORIDE mmol/L 99  "103   CO2 mmol/L 24.7 23.6   BUN mg/dL 14 16   CREATININE mg/dL 1.36* 1.04   GLUCOSE mg/dL 91 82   CALCIUM mg/dL 9.2 9.4   AST (SGOT) U/L 22 25   ALT (SGPT) U/L 40 43*     Estimated Creatinine Clearance: 65.3 mL/min (A) (by C-G formula based on SCr of 1.36 mg/dL (H)).  Results from last 7 days   Lab Units 11/08/24 1936   HEMOGLOBIN A1C % 5.70*         Results from last 7 days   Lab Units 11/08/24 2120 11/08/24 1936   HSTROP T ng/L 11 11     Results from last 7 days   Lab Units 11/08/24 1936   PROBNP pg/mL 95.2                   Invalid input(s): \"LDLCALC\"  Results from last 7 days   Lab Units 11/10/24  0533 11/08/24 1936   WBC 10*3/mm3 11.62* 11.79*   HEMOGLOBIN g/dL 19.4* 18.9*   HEMATOCRIT % 55.3* 54.4*   PLATELETS 10*3/mm3 233 226   MCV fL 83.7 85.0   MCH pg 29.3 29.5   MCHC g/dL 35.1 34.7   RDW % 13.1 13.7   RDW-SD fl 39.6 40.2   MPV fL 9.7 9.3   NEUTROPHIL % % 54.3 54.6   LYMPHOCYTE % % 25.1 26.8   MONOCYTES % % 15.5* 13.5*   EOSINOPHIL % % 1.5 1.1   BASOPHIL % % 0.9 0.9   IMM GRAN % % 2.7* 3.1*   NEUTROS ABS 10*3/mm3 6.32 6.43   LYMPHS ABS 10*3/mm3 2.92 3.16*   MONOS ABS 10*3/mm3 1.80* 1.59*   EOS ABS 10*3/mm3 0.17 0.13   BASOS ABS 10*3/mm3 0.10 0.11   IMMATURE GRANS (ABS) 10*3/mm3 0.31* 0.37*   NRBC /100 WBC 0.0 0.0     Results from last 7 days   Lab Units 11/08/24 1936   INR  0.99         Results from last 7 days   Lab Units 11/08/24 1936   PROCALCITONIN ng/mL 0.06   LACTATE mmol/L 1.3                               Imaging:  Imaging Results (Last 24 Hours)       Procedure Component Value Units Date/Time    FL Cholangiogram Operative [[id number]] Collected: 11/10/24 1618     Updated: 11/10/24 1624    Narrative:      FL CHOLANGIOGRAM OPERATIVE-        INDICATION: Surgery     COMPARISON: CT abdomen pelvis October 12, 2021     TECHNIQUE: 1 static fluoroscopic image and 1 fluoroscopy cine loop  provided. Fluoroscopy time: 29.0 seconds.     FINDINGS:      Small catheter placed in the cystic duct. " Intraoperative cholangiogram.  No filling defects seen in the CBD. Contrast passes in the duodenum.  Suspect duodenal diverticulum.       Impression:      Fluoroscopic guidance for intraoperative cholangiogram.     This report was finalized on 11/10/2024 4:20 PM by Dr. Zane Donnelly M.D on Workstation: [id number]                  I reviewed the patient's new clinical results / labs / tests / procedures      Assessment/Plan     Active Hospital Problems    Diagnosis  POA    **Cholecystitis with cholelithiasis [K80.10]  Yes    Cholelithiasis [K80.20]  Unknown    Cholecystitis [K81.9]  Unknown    Chest pain [R07.9]  Unknown    Dyspnea on exertion [R06.09]  Unknown    Prostate cancer [C61]  Yes    Polycythemia [D75.1]  Yes    Essential hypertension [I10]  Yes      Resolved Hospital Problems   No resolved problems to display.           Cholelithiasis and acute on chronic cholecystitis.  Status post laparoscopic cholecystectomy today with negative intraoperative cholangiogram..  Benign GI examination.  Resolved liver function test elevation.  Continue to  monitor CMP.  Jairon DC'd by surgery.  Diet advanced.  Atypical chest pain and exertional dyspnea/coronary calcification/history of hypertension.  Most recent echo on 10/2024 revealed a normal ejection fraction with moderate AS and left ventricular hypertrophy.  Chest pain is atypical.  EKG without acute ischemic changes.  Stress test was negative for ischemia and with normal ejection fraction.  CTA of the chest revealed no aortic dissection or PE.  Chest pain could be referred chest pain.  Good blood pressure control.  Exertional dyspnea could be secondary to AS.  Continue verapamil/Vasotec.  Will hold Lasix secondary to acute kidney failure.  Status post cardiology consult..  History of prostate cancer.  PSA is elevated.  Hematology oncology is following.  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  Hematology oncology consulted and they are checking  LDH/JAK2/ epo level/flow cytometry.  Hematology oncology indicates no need for phlebotomy at this time..  History of HTLV-1.  Hematology oncology rechecking.  Glucose intolerance.  Diet at discharge.  A1c mildly elevated  Acute kidney failure.  Occurred postoperatively.  Mostly hypovolemia.  Will initiate IV fluid and hold Lasix.  Monitor.  If no better will stop Vasotec.  VTE prophylaxis.  Sequential compression device.    Discussed my findings and plan of treatment with the patient/wife.  Disposition.  Hopefully home tomorrow if no complications/diet is tolerated and renal function resolves.      Froilan Kebede MD  [facility]  11/10/24  17:15 EST

## 2024-11-10 NOTE — PROGRESS NOTES
"[facility]    Patient Name: Jimmie Jamil  :1955  69 y.o.  LOS: 2  Encounter Provider: Crow Daley PA-C      Patient Care Team:  Nicko Fontaine APRN as PCP - General (Nurse Practitioner)  Zechariah Clay MD as Consulting Physician (Urology)    Chief Complaint:  Follow up for chest pain and WHITE    Interval History: No events overnight.  Going for cholecystectomy this morning       Objective   Vital Signs  Temp:  [98.1 °F (36.7 °C)-98.4 °F (36.9 °C)] 98.1 °F (36.7 °C)  Heart Rate:  [63-70] 70  Resp:  [20] 20  BP: (106-143)/(64-88) 121/77    Intake/Output Summary (Last 24 hours) at 11/10/2024 0932  Last data filed at 2024 1804  Gross per 24 hour   Intake 480 ml   Output --   Net 480 ml     Flowsheet Rows      Flowsheet Row First Filed Value   Admission Height 180.3 cm (71\") Documented at 2024 1658   Admission Weight 106 kg (233 lb 11 oz) Documented at 2024 1658              Constitutional:       General: Awake. Not in acute distress.     Appearance: Not in distress.   Pulmonary:      Effort: Pulmonary effort is normal.      Breath sounds: Normal breath sounds.   Cardiovascular:      Normal rate. Regular rhythm.      Murmurs: There is no murmur.   Skin:     General: Skin is warm.   Neurological:      Mental Status: Alert.   Psychiatric:         Behavior: Behavior is cooperative.           Pertinent Test Results:  Results from last 7 days   Lab Units 11/10/24  0533 11/08/24  1936   SODIUM mmol/L 135* 138   POTASSIUM mmol/L 4.4 4.2   CHLORIDE mmol/L 99 103   CO2 mmol/L 24.7 23.6   BUN mg/dL 14 16   CREATININE mg/dL 1.36* 1.04   GLUCOSE mg/dL 91 82   CALCIUM mg/dL 9.2 9.4   AST (SGOT) U/L 22 25   ALT (SGPT) U/L 40 43*     Results from last 7 days   Lab Units 24   HSTROP T ng/L 11 11     Results from last 7 days   Lab Units 11/10/24  0533 11/08/24  1936   WBC 10*3/mm3 11.62* 11.79*   HEMOGLOBIN g/dL 19.4* 18.9*   HEMATOCRIT % 55.3* " "54.4*   PLATELETS 10*3/mm3 233 226     Results from last 7 days   Lab Units 11/08/24  1936   INR  0.99               Invalid input(s): \"LDLCALC\"  Results from last 7 days   Lab Units 11/08/24  1936   PROBNP pg/mL 95.2               Medication Review:   enalapril, 20 mg, Oral, Nightly  furosemide, 40 mg, Oral, Daily  indocyanine green, 2.5 mg, Intravenous, Once  piperacillin-tazobactam, 3.375 g, Intravenous, Q8H  sodium chloride, 10 mL, Intravenous, Q12H  verapamil SR, 360 mg, Oral, Nightly              Assessment & Plan     Active Hospital Problems    Diagnosis  POA    **Cholecystitis with cholelithiasis [K80.10]  Yes    Cholelithiasis [K80.20]  Unknown    Cholecystitis [K81.9]  Unknown    Chest pain [R07.9]  Unknown    Dyspnea on exertion [R06.09]  Unknown    Prostate cancer [C61]  Yes    Polycythemia [D75.1]  Yes    Essential hypertension [I10]  Yes      Resolved Hospital Problems   No resolved problems to display.        1.  Chest pain.  Somewhat atypical sounding symptoms.  Normal EKG and troponins are reassuring in the setting of persistent chest pain.  Stress test yesterday was a normal myocardial perfusion study.   2.  Dyspnea on exertion.  Cause of the symptoms is unclear.  Recent echocardiogram with normal left ventricular systolic function and just moderate aortic valve stenosis.  3.  Aortic valve stenosis.  Moderate on his most recent echocardiogram.  I think is unlikely that this is responsible for his shortness of breath.  4.  Cholelithiasis.  General surgery recommended cholecystectomy. Pt going for surgery this am.   5.  Polycythemia.  Plan for hematology consultation noted.              Crow Daley PA-C  [facility]   [facility]  [address] [place] [address] [address]  [place] [address]  Office: [phone]    11/10/24  09:32 EST          "

## 2024-11-10 NOTE — CONSULTS
General Surgery H&P/Consultation      Impression/Plan: 69-year-old gentleman with cholelithiasis and cholecystitis.  He has received cardiac clearance and had no evidence of ischemia on his stress test.  Plan to proceed with robotic cholecystectomy with intraoperative cholangiogram on 11/10/2024.  N.p.o. at midnight.  Continue Zosyn.  Risk and rationale for the procedure been discussed with him.  Discussion of risk included, but not limited to, damage to common bile duct, bile leak, pain, bleeding, infection.     CC: Chest pain, abdominal pain    HPI:   Mr. Jimmie Jamil is a 69 y.o. male that presented to the hospital for further workup related to chest pain.  He has evidence of cholelithiasis and cholecystitis on imaging.  He reports a 1 month history of left-sided chest pain and also some postprandial upper abdominal pain.  He was evaluated by Dr. Guido at [facility] who recommended cholecystectomy after clearance.  He was admitted to [facility] for further workup given persistent chest pain and increased dyspnea with activity.  He underwent a nuclear stress test which did not show any signs of ischemia.  He has been granted cardiac clearance.    Past Medical History:   Past Medical History:   Diagnosis Date    Aortic stenosis     Arthritis of back 2015    Benign essential hypertension     Bladder problem     Cervical disc disorder 2018    Cervical radiculopathy 03/16/2017    Cervical spinal stenosis 03/16/2017    Cervicalgia 03/16/2017    Coronary artery disease     diastolic dysfunction    ED (erectile dysfunction)     Greater trochanteric bursitis of right hip 07/09/2018    High blood pressure     HL (hearing loss) 01 Oct 2021    Hyperlipidemia     Hypertension     Leg pain     Leg swelling     Low back pain     Low back strain 2015    Lumbago 03/16/2017    Lumbosacral disc disease 2018    Neck pain     Paresthesia 03/16/2017    left hand/leg    Periarthritis of shoulder 2021     Polycythemia     Right hip pain     Rotator cuff syndrome 09/01/2021    Urinary bladder incontinence 03/16/2017       Past Surgical History:   Past Surgical History:   Procedure Laterality Date    ANTERIOR CERVICAL FUSION  04/12/2017    C3-4    CARDIAC CATHETERIZATION      everything okay clearance for neck surgery    CARDIAC CATHETERIZATION N/A 10/13/2021    Procedure: Left Heart Cath;  Surgeon: Anahi Perales MD;  Location: [facility] CATH INVASIVE LOCATION;  Service: Cardiovascular;  Laterality: N/A;    CARDIAC CATHETERIZATION N/A 10/13/2021    Procedure: Coronary angiography;  Surgeon: Anahi Perales MD;  Location:  [facility] CATH INVASIVE LOCATION;  Service: Cardiovascular;  Laterality: N/A;    CARDIAC CATHETERIZATION N/A 10/13/2021    Procedure: Left ventriculography;  Surgeon: Anahi Perales MD;  Location:  [facility] CATH INVASIVE LOCATION;  Service: Cardiovascular;  Laterality: N/A;    CERVICAL DISC SURGERY      C2-3 fusion    CIRCUMCISION      COLONOSCOPY      COLONOSCOPY N/A 11/20/2023    Procedure: COLONOSCOPY WITH COLD SNARE POLYECTOMY;  Surgeon: Kenny Urena MD;  Location: [facility] ENDOSCOPY;  Service: Gastroenterology;  Laterality: N/A;  COLON POLYP    CYSTOSCOPY  01/10/2014    Cysto ivan retrograde pyelogram urthral bx.    LUMBAR EPIDURAL INJECTION      L2-S2  going to try ablasion in future    NECK SURGERY  2018?    PROSTATE BIOPSY  2018    PROSTATE BIOPSY N/A 8/8/2024    Procedure: MRI fusion transrectal ultrasound-guided prostate biopsy;  Surgeon: Zechariah Clay MD;  Location: [facility] MAIN OR;  Service: Urology;  Laterality: N/A;    SHOULDER ARTHROSCOPY W/ LABRAL REPAIR Left     SHOULDER SURGERY  2008    TRIGGER POINT INJECTION  2021    L5-S1    VASECTOMY      WISDOM TOOTH EXTRACTION         Medications:  Medications Prior to Admission   Medication Sig Dispense Refill Last Dose/Taking    ascorbic acid (VITAMIN C) 1000 MG tablet Take 1 tablet by mouth Daily.   11/7/2024 Evening    aspirin 81  MG EC tablet Take 1 tablet by mouth Daily.   11/7/2024 Evening    Coenzyme Q10 (CoQ10) 100 MG capsule    11/7/2024 Evening    enalapril (VASOTEC) 20 MG tablet Take 1 tablet by mouth twice daily (Patient taking differently: Take 1 tablet by mouth Every Night.) 180 tablet 0 11/7/2024 Evening    furosemide (LASIX) 40 MG tablet Take 1 tablet by mouth.   11/8/2024 Morning    Leqvio 284 MG/1.5ML solution prefilled syringe EVERY 6 MTHS   Past Month    ofloxacin (OCUFLOX) 0.3 % ophthalmic solution USES FOR EARS INSTEAD OF EYES   Taking    Testosterone Cypionate (DEPOTESTOTERONE CYPIONATE) 200 MG/ML injection INJECT 0.3 ML UNDER THE SKIN Q 5 DAYS AS DIRECTED 10 mL 0 11/8/2024 Morning    VASCEPA 1 g capsule capsule Take 2 g by mouth 2 (Two) Times a Day With Meals. 360 capsule 3 11/7/2024 Evening    verapamil ER (VERELAN) 360 MG 24 hr capsule Take 1 capsule by mouth Every Night.   11/7/2024 Evening    potassium chloride (KLOR-CON M20) 20 MEQ CR tablet Take 1 tablet by mouth Daily.       Tobramycin-dexAMETHasone (TobraDex ST) 0.3-0.05 % suspension 3-4 drops in affected ear twice a day 5 mL 1 Unknown         Current Facility-Administered Medications:     acetaminophen (TYLENOL) tablet 650 mg, 650 mg, Oral, Q4H PRN **OR** acetaminophen (TYLENOL) 160 MG/5ML oral solution 650 mg, 650 mg, Oral, Q4H PRN **OR** acetaminophen (TYLENOL) suppository 650 mg, 650 mg, Rectal, Q4H PRN, Marjorie Vivas MD    Calcium Replacement - Follow Nurse / BPA Driven Protocol, , Does not apply, PRNOrtega Jawed, MD    enalapril (VASOTEC) tablet 20 mg, 20 mg, Oral, Nightly, Marjorie Vivas MD, 20 mg at 11/08/24 2109    furosemide (LASIX) tablet 40 mg, 40 mg, Oral, Daily, Marjorie Vivas MD, 40 mg at 11/09/24 0818    Magnesium Standard Dose Replacement - Follow Nurse / BPA Driven Protocol, , Does not apply, PRN, Marjorie Vivas MD    morphine injection 2 mg, 2 mg, Intravenous, Q4H PRN, Marjorie Vivas MD    nitroglycerin (NITROSTAT) SL tablet 0.4 mg, 0.4 mg,  Sublingual, Q5 Min PRN, Marjorie Vivas MD    ondansetron (ZOFRAN) injection 4 mg, 4 mg, Intravenous, Q6H PRN, Marjorie Vivas MD    Phosphorus Replacement - Follow Nurse / BPA Driven Protocol, , Does not apply, Ortega WALSH Jawed, MD    piperacillin-tazobactam (ZOSYN) 3.375 g IVPB in 100 mL NS MBP (CD), 3.375 g, Intravenous, Q8H, Marjorie Vivas MD, 3.375 g at 11/09/24 1206    Potassium Replacement - Follow Nurse / BPA Driven Protocol, , Does not apply, PRNOrtega Jawed, MD    sodium chloride 0.9 % flush 10 mL, 10 mL, Intravenous, Q12H, Marjorie Vivas MD, 10 mL at 11/08/24 2109    sodium chloride 0.9 % flush 10 mL, 10 mL, Intravenous, PRN, Marjorie Vivas MD    sodium chloride 0.9 % infusion 40 mL, 40 mL, Intravenous, PRN, Marjorie Vivas MD    verapamil SR (CALAN-SR) CR tablet 360 mg, 360 mg, Oral, Nightly, Marjorie Vivas MD, 360 mg at 11/08/24 2109     Allergies:   Allergies   Allergen Reactions    Beta Adrenergic Blockers Unknown - High Severity    Crestor [Rosuvastatin Calcium] Myalgia    Keflex [Cephalexin] Other (See Comments)     hiccups    Livalo [Pitavastatin] Myalgia    Losartan Other (See Comments)     Intolerance due to fatigue, depression    Naproxen Other (See Comments)     Chest pain       Social History:   Social History     Socioeconomic History    Marital status:    Tobacco Use    Smoking status: Never     Passive exposure: Never    Smokeless tobacco: Never   Vaping Use    Vaping status: Never Used   Substance and Sexual Activity    Alcohol use: Yes     Alcohol/week: 2.0 standard drinks of alcohol     Types: 2 Cans of beer per week     Comment: 4 beers a week    Drug use: Never    Sexual activity: Yes     Partners: Female     Birth control/protection: Surgical       Family History:   Family History   Problem Relation Age of Onset    Arthritis Mother     Alzheimer's disease Mother 80    Arthritis Father     Heart disease Father     Colon cancer Father 85    Hypertension Father 80    Cancer Father          Colon cx in his 90s    Stroke Father         Mild in his 80s       Review of Systems:  Per HPI    Physical Exam:   Vitals:    11/09/24 1304   BP: 143/88   Pulse:    Resp: 20   Temp: 98.2 °F (36.8 °C)   SpO2:      BMI: Body mass index is 32.59 kg/m².   106 kg (233 lb 11 oz)      Intake/Output Summary (Last 24 hours) at 11/9/2024 1929  Last data filed at 11/9/2024 1804  Gross per 24 hour   Intake 480 ml   Output --   Net 480 ml       GENERAL: no acute distress, awake and alert  RESPIRATORY: symmetric excursion bilaterally, normal work of breathing  CARDIOVASCULAR: Regular rate, well perfused  GASTROINTESTINAL: Soft, mild epigastric/right upper quadrant tenderness but no peritoneal signs      Pertinent labs:   Results from last 7 days   Lab Units 11/08/24  1936   WBC 10*3/mm3 11.79*   HEMOGLOBIN g/dL 18.9*   HEMATOCRIT % 54.4*   PLATELETS 10*3/mm3 226     Results from last 7 days   Lab Units 11/08/24  1936   SODIUM mmol/L 138   POTASSIUM mmol/L 4.2   CHLORIDE mmol/L 103   CO2 mmol/L 23.6   BUN mg/dL 16   CREATININE mg/dL 1.04   CALCIUM mg/dL 9.4   BILIRUBIN mg/dL 0.5   ALK PHOS U/L 71   ALT (SGPT) U/L 43*   AST (SGOT) U/L 25   GLUCOSE mg/dL 82       IMAGING:  CT chest from 10/31/2024 reviewed showing inflammatory change around the gallbladder with cholelithiasis.          Reagan Hernandez MD  General and Endoscopic Surgery  [facility]    [address], [address]  [place] [address]  P: [phone]  F: [phone]

## 2024-11-10 NOTE — OP NOTE
OPERATIVE REPORT     DATE OF OPERATION: 11/10/24     SURGEON:   Reagan Hernandez MD    Assistant: Haven Narayan RNFA was responsible for performing the following activities: Retraction, Suturing, Closing, Placing Dressing, and Held/Positioned Camera and their skilled assistance was necessary for the success of this case.      PREOPERATIVE DIAGNOSIS: Acute on chronic cholecystitis    POSTOPERATIVE DIAGNOSIS: Acute on chronic cholecystitis    PROCEDURE PERFORMED: Robotic assisted cholecystectomy with intraoperative cholangiogram    ANESTHESIA: General    SPECIMEN: Gallbladder    DRAINS: None    BLOOD LOSS: Minimal    INDICATIONS FOR OPERATION: Mr. Jimmie Jamil is a 69 y.o. year old with a 1 month history of left sided chest pain.  He has undergone workup with CTA of his chest which demonstrated cholelithiasis with surrounding edema/pericholecystic fluid.  He underwent a cardiac workup which was without abnormality.  I recommended proceeding with robotic assisted cholecystectomy with intraoperative cholangiogram. All risks (including bleeding, infection, damage to surrounding structures, bile duct injury, and bile leak), benefits, and alternatives were explained to the patient and he agreed and wished to proceed.  Informed consent was obtained.    FINDINGS:   Critical view of safety prior to performing cholangiogram  Normal intraoperative cholangiogram with retrohepatic filling of left and right hepatic duct, brisk emptying of contrast into duodenum without filling defect.    OPERATIVE REPORT: The patient was taken to the operating room, transferred onto the operating room table, and underwent general endotracheal anesthesia without incident. The patient was prepped and draped in the usual sterile fashion.  Preoperative antibiotics were given, and a timeout was performed.  Half percent Marcaine with epinephrine was and injected into the skin and subcutaneous tissues prior to all incisions.  After confirmation  of an orogastric tube being placed, a veress needle was inserted at gallagher's point.  A reassuring saline drop test was performed.  The abdomen was insufflated to 15 mmHg.  The viscera underlying the veress needle was inspected for evidence of injury and the veress needle withdrawn.  The abdomen was entered using an optiview technique through the left rectus near the level of the umbilicus.  2 additional 8 mm trocars were placed in the right abdomen near the level of the umbilicus. An additional trocar was placed in the left upper quadrant. The [employer] robot system was docked and the remainder of the procedure was completed with assistance from the [employer] robot.  The omentum was taken down off the gallbladder with the cautery.  The gallbladder was retracted cranially and laterally to allow for adequate visualization of the cystic triangle.  The area around the infundibulum was dissected with hook cautery and blunt dissection until the cystic duct and artery were identified. A critical view of safety was obtained. A cholangiogram was done by placing a clip on the cystic duct and incising it just distal to this.  A cholangiogram catheter was introduced with an Angiocath needle and directed into the cystic duct.  A clip was applied.  With fluoroscopy contrast was injected and confirmed the anatomy and that there were no stones present.  Contrast was seen going into the right and left hepatic ducts as well as the duodenum.  The cholangiogram catheter was then removed.  An additional clip was placed on the bile duct side and was transected.  The cystic artery was also clipped and divided.  Hook electrocautery was then used to remove the gallbladder from the liver bed.  The gallbladder was placed into a bag. Hemostasis was obtained with the hook cautery. The gallbladder was removed through the trocar site placed through the left rectus muscle. The remaining ports removed under direct visualization.  The incisions were  then closed with 4-0 monocryl sutures and Dermabond.  All needle and lap counts were correct at the end of the case.  The patient was awoken from general endotracheal anesthesia and taken to the recovery area for further monitoring.    Reagan Hernandez M.D.  General and Endoscopic Surgery  [facility]    [address] [place] [address], [address]  [place], [place], [address]  P: [phone]  F: [phone]

## 2024-11-10 NOTE — PROGRESS NOTES
REASON FOR CONSULTATION: Polycythemia                             HISTORY OF PRESENT ILLNESS:  The patient is a 69 y.o. year old male who is here for an opinion about the above issue.     The patient is a 69-year-old male with a somewhat complex medical history inclusive of coronary artery disease, cardiac murmur, echo 11/2023 with EF of 61%, aortic stenosis history of prostate cancer status postbiopsy being managed expectantly, cervical spinal stenosis.     He also has a history of polycythemia undergoing therapy with testosterone injections and symptoms associated with low testosterone.     He gives a history of being on testosterone for 10 years as a result of excessively low levels and being quite symptomatic.  In discussions with his urologist he has been followed carefully considering his history of prostate cancer with PSAs on a regular basis and prostate examinations including potential MRI if his PSA excessively increased.  Further history includes erythrocytosis/polycythemia for as long as 40 years followed by a number of physicians but never hematology.  He started phlebotomy  or blood donations, again, as long as 30+ years ago as did his brother who had similar findings of elevated hemoglobin hematocrit.  His wife indicates that he is often plethoric and particularly  demonstrates ruddiness in the face that will indicate to them both that he needs a phlebotomy.        Recently symptoms of chest pain dyspnea worsened and he was seen by cardiology 10/31/2024 with CT scans negative but demonstrating evidence of cholelithiasis with mild pericholecystic inflammatory changes-?  Chronic cholecystitis.  He was referred to general surgery with a subsequent request to cardiology for clearance.  His symptoms however prompted assessment for cardiac catheterization.    Interval history:  11/10/2024  T97.8, pulse 65, respirate 16, /76  Patient going for cholecystectomy this morning  H&H 19.4 and 55.3 with  white count 11,620, platelet count 233,000, BUN/creatinine of 14 and 1.36,  PSA of 6.410    Past Medical History, Past Surgical History, Social History, Family History have been reviewed and are without significant changes except as mentioned.      Medications:  The current medication list was reviewed in the EMR    ALLERGIES:    Allergies   Allergen Reactions    Beta Adrenergic Blockers Unknown - High Severity    Crestor [Rosuvastatin Calcium] Myalgia    Keflex [Cephalexin] Other (See Comments)     hiccups    Livalo [Pitavastatin] Myalgia    Losartan Other (See Comments)     Intolerance due to fatigue, depression    Naproxen Other (See Comments)     Chest pain       Objective      Vitals:    11/09/24 1304 11/09/24 2002 11/10/24 0009 11/10/24 0712   BP: 143/88 133/79 106/64 121/77   BP Location: Right arm Right arm Right arm Right arm   Patient Position: Lying Lying Lying Lying   Pulse:   63 70   Resp: 20 20 20 20   Temp: 98.2 °F (36.8 °C) 98.4 °F (36.9 °C) 98.1 °F (36.7 °C) 98.1 °F (36.7 °C)   TempSrc: Oral Oral Oral Oral   SpO2:   95% 94%   Weight:       Height:              No data to display                Physical Exam  Constitutional:       Appearance: He is obese.   HENT:      Nose: Nose normal.      Mouth/Throat:      Mouth: Mucous membranes are moist.      Pharynx: Oropharynx is clear.   Eyes:      Extraocular Movements: Extraocular movements intact.      Conjunctiva/sclera: Conjunctivae normal.      Pupils: Pupils are equal, round, and reactive to light.   Cardiovascular:      Rate and Rhythm: Normal rate and regular rhythm.      Pulses: Normal pulses.      Heart sounds: Normal heart sounds.   Pulmonary:      Effort: Pulmonary effort is normal.      Breath sounds: Normal breath sounds.   Abdominal:      Tenderness: There is abdominal tenderness.   Musculoskeletal:         General: Normal range of motion.   Skin:     Findings: Erythema (General plethora facies) present.   Neurological:      General:  No focal deficit present.      Mental Status: He is oriented to person, place, and time.   Psychiatric:         Mood and Affect: Mood normal.         RECENT LABS:  Hematology WBC   Date Value Ref Range Status   11/10/2024 11.62 (H) 3.40 - 10.80 10*3/mm3 Final     RBC   Date Value Ref Range Status   11/10/2024 6.61 (H) 4.14 - 5.80 10*6/mm3 Final     Hemoglobin   Date Value Ref Range Status   11/10/2024 19.4 (H) 13.0 - 17.7 g/dL Final     Hematocrit   Date Value Ref Range Status   11/10/2024 55.3 (H) 37.5 - 51.0 % Final     Platelets   Date Value Ref Range Status   11/10/2024 233 140 - 450 10*3/mm3 Final              Assessment & Plan     *History of polycythemia with known underlying etiology with ongoing testosterone placement placement therapy.  Again this is long-term in nature possibly 30+ years.  He has never had a hematology assessment but has had carbon monoxide exposures on the job as a  beginning in his late teens and 20s and, later, testosterone replacement therapy for at least 10+ years.  He periodically has phlebotomy as result  Studies will be drawn today-EPO level, LDH, JAK2 analysis with reflex, MPL analysis  His current studies do not warrant immediate phlebotomy if he is undergoing abdominal surgery in the next several days  Cholecystectomy-11/10/2024     *History of prostate cancer  [place] grade 3+3 (grade group 1) with biopsy 9/3/2024.  Repeat PSA level-6.410     *Admission with findings consistent with possible cholecystitis  Undergoing HIDA scan currently  Empiric antibiotic therapy  Cholecystectomy 11/10/2024     *Cardiac history  Coronary disease with abnormal calcification, no obstructive disease by cath 10/21/2021, known aortic valve stenosis, hypertension  Echo 10/31/2024 with LV SF of 55.4%, GLS of -20.5%, bicuspid aortic valve with fused right side and left-sided cusp, calcification noted, aortic valve area of 1.01 cm², trace tricuspid valve regurgitation, ascending aorta not  well-seen  Reviewed by cardiology prior to surgery     *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

## 2024-11-11 ENCOUNTER — APPOINTMENT (OUTPATIENT)
Dept: GENERAL RADIOLOGY | Facility: HOSPITAL | Age: 69
End: 2024-11-11
Payer: MEDICARE

## 2024-11-11 PROBLEM — D72.829 LEUKOCYTOSIS: Status: ACTIVE | Noted: 2024-11-11

## 2024-11-11 LAB
ALBUMIN SERPL-MCNC: 3.4 G/DL (ref 3.5–5.2)
ALBUMIN/GLOB SERPL: 1.3 G/DL
ALP SERPL-CCNC: 55 U/L (ref 39–117)
ALT SERPL W P-5'-P-CCNC: 52 U/L (ref 1–41)
ANION GAP SERPL CALCULATED.3IONS-SCNC: 13.4 MMOL/L (ref 5–15)
AST SERPL-CCNC: 43 U/L (ref 1–40)
BASOPHILS # BLD AUTO: 0.05 10*3/MM3 (ref 0–0.2)
BASOPHILS NFR BLD AUTO: 0.2 % (ref 0–1.5)
BILIRUB SERPL-MCNC: 0.8 MG/DL (ref 0–1.2)
BUN SERPL-MCNC: 19 MG/DL (ref 8–23)
BUN/CREAT SERPL: 14.1 (ref 7–25)
CALCIUM SPEC-SCNC: 8.8 MG/DL (ref 8.6–10.5)
CHLORIDE SERPL-SCNC: 99 MMOL/L (ref 98–107)
CO2 SERPL-SCNC: 20.6 MMOL/L (ref 22–29)
CREAT SERPL-MCNC: 1.35 MG/DL (ref 0.76–1.27)
DEPRECATED RDW RBC AUTO: 39.5 FL (ref 37–54)
EGFRCR SERPLBLD CKD-EPI 2021: 57.5 ML/MIN/1.73
EOSINOPHIL # BLD AUTO: 0 10*3/MM3 (ref 0–0.4)
EOSINOPHIL NFR BLD AUTO: 0 % (ref 0.3–6.2)
ERYTHROCYTE [DISTWIDTH] IN BLOOD BY AUTOMATED COUNT: 12.8 % (ref 12.3–15.4)
GLOBULIN UR ELPH-MCNC: 2.7 GM/DL
GLUCOSE SERPL-MCNC: 220 MG/DL (ref 65–99)
HCT VFR BLD AUTO: 54.6 % (ref 37.5–51)
HGB BLD-MCNC: 18.2 G/DL (ref 13–17.7)
IMM GRANULOCYTES # BLD AUTO: 0.26 10*3/MM3 (ref 0–0.05)
IMM GRANULOCYTES NFR BLD AUTO: 0.9 % (ref 0–0.5)
LDH SERPL-CCNC: 304 U/L (ref 135–225)
LYMPHOCYTES # BLD AUTO: 1.42 10*3/MM3 (ref 0.7–3.1)
LYMPHOCYTES NFR BLD AUTO: 5.2 % (ref 19.6–45.3)
MCH RBC QN AUTO: 28.5 PG (ref 26.6–33)
MCHC RBC AUTO-ENTMCNC: 33.3 G/DL (ref 31.5–35.7)
MCV RBC AUTO: 85.4 FL (ref 79–97)
MONOCYTES # BLD AUTO: 2.43 10*3/MM3 (ref 0.1–0.9)
MONOCYTES NFR BLD AUTO: 8.8 % (ref 5–12)
NEUTROPHILS NFR BLD AUTO: 23.36 10*3/MM3 (ref 1.7–7)
NEUTROPHILS NFR BLD AUTO: 84.9 % (ref 42.7–76)
NRBC BLD AUTO-RTO: 0 /100 WBC (ref 0–0.2)
PLATELET # BLD AUTO: 275 10*3/MM3 (ref 140–450)
PMV BLD AUTO: 9.8 FL (ref 6–12)
POTASSIUM SERPL-SCNC: 4.8 MMOL/L (ref 3.5–5.2)
PROCALCITONIN SERPL-MCNC: 0.53 NG/ML (ref 0–0.25)
PROT SERPL-MCNC: 6.1 G/DL (ref 6–8.5)
RBC # BLD AUTO: 6.39 10*6/MM3 (ref 4.14–5.8)
SODIUM SERPL-SCNC: 133 MMOL/L (ref 136–145)
WBC NRBC COR # BLD AUTO: 27.52 10*3/MM3 (ref 3.4–10.8)

## 2024-11-11 PROCEDURE — 71046 X-RAY EXAM CHEST 2 VIEWS: CPT

## 2024-11-11 PROCEDURE — 99232 SBSQ HOSP IP/OBS MODERATE 35: CPT | Performed by: NURSE PRACTITIONER

## 2024-11-11 PROCEDURE — 80053 COMPREHEN METABOLIC PANEL: CPT | Performed by: SURGERY

## 2024-11-11 PROCEDURE — 99024 POSTOP FOLLOW-UP VISIT: CPT | Performed by: SURGERY

## 2024-11-11 PROCEDURE — 87040 BLOOD CULTURE FOR BACTERIA: CPT | Performed by: INTERNAL MEDICINE

## 2024-11-11 PROCEDURE — 83615 LACTATE (LD) (LDH) ENZYME: CPT | Performed by: SURGERY

## 2024-11-11 PROCEDURE — 25810000003 SODIUM CHLORIDE 0.9 % SOLUTION: Performed by: INTERNAL MEDICINE

## 2024-11-11 PROCEDURE — 25010000002 PIPERACILLIN SOD-TAZOBACTAM PER 1 G: Performed by: INTERNAL MEDICINE

## 2024-11-11 PROCEDURE — 84145 PROCALCITONIN (PCT): CPT | Performed by: INTERNAL MEDICINE

## 2024-11-11 PROCEDURE — 85025 COMPLETE CBC W/AUTO DIFF WBC: CPT | Performed by: SURGERY

## 2024-11-11 PROCEDURE — 99232 SBSQ HOSP IP/OBS MODERATE 35: CPT | Performed by: INTERNAL MEDICINE

## 2024-11-11 RX ADMIN — SODIUM CHLORIDE 100 ML/HR: 9 INJECTION, SOLUTION INTRAVENOUS at 04:21

## 2024-11-11 RX ADMIN — Medication 10 ML: at 20:20

## 2024-11-11 RX ADMIN — Medication 10 ML: at 09:47

## 2024-11-11 RX ADMIN — PIPERACILLIN AND TAZOBACTAM 3.38 G: 3; .375 INJECTION, POWDER, LYOPHILIZED, FOR SOLUTION INTRAVENOUS at 16:06

## 2024-11-11 RX ADMIN — ACETAMINOPHEN 1000 MG: 500 TABLET ORAL at 17:17

## 2024-11-11 RX ADMIN — ACETAMINOPHEN 1000 MG: 500 TABLET ORAL at 22:36

## 2024-11-11 RX ADMIN — PIPERACILLIN AND TAZOBACTAM 3.38 G: 3; .375 INJECTION, POWDER, LYOPHILIZED, FOR SOLUTION INTRAVENOUS at 22:36

## 2024-11-11 RX ADMIN — ACETAMINOPHEN 1000 MG: 500 TABLET ORAL at 10:01

## 2024-11-11 RX ADMIN — ACETAMINOPHEN 1000 MG: 500 TABLET ORAL at 00:35

## 2024-11-11 RX ADMIN — VERAPAMIL HYDROCHLORIDE 360 MG: 120 TABLET, FILM COATED, EXTENDED RELEASE ORAL at 20:19

## 2024-11-11 RX ADMIN — PIPERACILLIN AND TAZOBACTAM 3.38 G: 3; .375 INJECTION, POWDER, FOR SOLUTION INTRAVENOUS at 09:30

## 2024-11-11 NOTE — PLAN OF CARE
Goal Outcome Evaluation:           Problem: Adult Inpatient Plan of Care  Goal: Optimal Comfort and Wellbeing  Outcome: Progressing  Intervention: Provide Person-Centered Care  Recent Flowsheet Documentation  Taken 11/11/2024 1400 by Mercedez Parry RN  Trust Relationship/Rapport:   care explained   choices provided   thoughts/feelings acknowledged  Taken 11/11/2024 0811 by Mercedez Parry RN  Trust Relationship/Rapport:   care explained   choices provided   thoughts/feelings acknowledged     Problem: Fall Injury Risk  Goal: Absence of Fall and Fall-Related Injury  Outcome: Progressing  Intervention: Identify and Manage Contributors  Recent Flowsheet Documentation  Taken 11/11/2024 1400 by Mercedez Parry RN  Medication Review/Management: medications reviewed  Taken 11/11/2024 1215 by Mercedez Parry RN  Medication Review/Management: medications reviewed  Taken 11/11/2024 1016 by Mercedez Parry RN  Medication Review/Management: medications reviewed  Taken 11/11/2024 0811 by Mercedez Parry RN  Medication Review/Management: medications reviewed  Intervention: Promote Injury-Free Environment  Recent Flowsheet Documentation  Taken 11/11/2024 1400 by Mercedez Parry RN  Safety Promotion/Fall Prevention:   clutter free environment maintained   fall prevention program maintained   nonskid shoes/slippers when out of bed   room organization consistent   safety round/check completed  Taken 11/11/2024 1215 by Mercedez Parry RN  Safety Promotion/Fall Prevention:   clutter free environment maintained   fall prevention program maintained   nonskid shoes/slippers when out of bed   room organization consistent   safety round/check completed  Taken 11/11/2024 1016 by Mercedez Parry RN  Safety Promotion/Fall Prevention:   clutter free environment maintained   fall prevention program maintained   nonskid shoes/slippers when out of bed   room organization consistent   safety  round/check completed  Taken 11/11/2024 0811 by Mercedez Parry RN  Safety Promotion/Fall Prevention:   clutter free environment maintained   fall prevention program maintained   nonskid shoes/slippers when out of bed   room organization consistent   safety round/check completed     Problem: Adult Inpatient Plan of Care  Goal: Absence of Hospital-Acquired Illness or Injury  Intervention: Identify and Manage Fall Risk  Recent Flowsheet Documentation  Taken 11/11/2024 1400 by Mercedez Parry RN  Safety Promotion/Fall Prevention:   clutter free environment maintained   fall prevention program maintained   nonskid shoes/slippers when out of bed   room organization consistent   safety round/check completed  Taken 11/11/2024 1215 by Mercedez Parry RN  Safety Promotion/Fall Prevention:   clutter free environment maintained   fall prevention program maintained   nonskid shoes/slippers when out of bed   room organization consistent   safety round/check completed  Taken 11/11/2024 1016 by Mercedez Parry RN  Safety Promotion/Fall Prevention:   clutter free environment maintained   fall prevention program maintained   nonskid shoes/slippers when out of bed   room organization consistent   safety round/check completed  Taken 11/11/2024 0811 by Mercedez Parry RN  Safety Promotion/Fall Prevention:   clutter free environment maintained   fall prevention program maintained   nonskid shoes/slippers when out of bed   room organization consistent   safety round/check completed  Intervention: Prevent Skin Injury  Recent Flowsheet Documentation  Taken 11/11/2024 1400 by Mercedez Parry RN  Body Position: position changed independently  Taken 11/11/2024 1215 by Mercedez Parry RN  Body Position: position changed independently  Taken 11/11/2024 1016 by Mercedez Parry RN  Body Position: position changed independently  Taken 11/11/2024 0811 by Mercedez Parry RN  Body Position:  position changed independently  Intervention: Prevent and Manage VTE (Venous Thromboembolism) Risk  Recent Flowsheet Documentation  Taken 11/11/2024 0811 by Mercedez Parry RN  VTE Prevention/Management:   bilateral   SCDs (sequential compression devices) off  Intervention: Prevent Infection  Recent Flowsheet Documentation  Taken 11/11/2024 1400 by Mercedez Parry RN  Infection Prevention:   hand hygiene promoted   rest/sleep promoted  Taken 11/11/2024 1215 by Mercedez Parry RN  Infection Prevention:   hand hygiene promoted   rest/sleep promoted  Taken 11/11/2024 1016 by Mercedez Parry RN  Infection Prevention:   hand hygiene promoted   rest/sleep promoted  Taken 11/11/2024 0811 by Mercedez Parry RN  Infection Prevention:   hand hygiene promoted   rest/sleep promoted     Problem: Comorbidity Management  Goal: Blood Pressure in Desired Range  Intervention: Maintain Blood Pressure Management  Recent Flowsheet Documentation  Taken 11/11/2024 1400 by Mercedez Parry RN  Medication Review/Management: medications reviewed  Taken 11/11/2024 1215 by Mercedez Parry RN  Medication Review/Management: medications reviewed  Taken 11/11/2024 1016 by Mercedez Parry RN  Medication Review/Management: medications reviewed  Taken 11/11/2024 0811 by Mercedez Parry RN  Medication Review/Management: medications reviewed

## 2024-11-11 NOTE — PROGRESS NOTES
REASON FOR CONSULTATION: Polycythemia                            INTERVAL HISTORY:  The patient underwent a robot-assisted laparoscopic cholecystectomy on 11/10/2024.  postoperative labs show white blood cell count 27.5, hemoglobin 18.2, hematocrit 54.6% platelets 275.  The patient is having some trouble being weaned off oxygen but in no acute distress.    HISTORY OF PRESENT ILLNESS:  The patient is a 69-year-old male with a somewhat complex medical history inclusive of coronary artery disease, cardiac murmur, echo 11/2023 with EF of 61%, aortic stenosis history of prostate cancer status post biopsy being managed expectantly, cervical spinal stenosis.     He also has a history of secondary polycythemia undergoing therapy with testosterone injections.  Further history includes erythrocytosis/polycythemia for as long as 40 years followed by a number of physicians but never hematology.  He started phlebotomy  or blood donations, again, as long as 30+ years ago as did his brother who had similar findings of elevated hemoglobin hematocrit.  His wife indicates that he is often plethoric and particularly  demonstrates ruddiness in the face that will indicate to them both that he needs a phlebotomy.       Recently symptoms of chest pain dyspnea worsened and he was seen by cardiology 10/31/2024 with CT scans negative but demonstrating evidence of cholelithiasis with mild pericholecystic inflammatory changes-?  Chronic cholecystitis.  He was referred to general surgery with a subsequent request to cardiology for clearance.  His symptoms however prompted assessment for cardiac catheterization.        Past Medical History, Past Surgical History, Social History, Family History have been reviewed and are without significant changes except as mentioned.      Medications:  The current medication list was reviewed in the EMR    ALLERGIES:    Allergies   Allergen Reactions    Beta Adrenergic Blockers Unknown - High Severity     Crestor [Rosuvastatin Calcium] Myalgia    Keflex [Cephalexin] Other (See Comments)     hiccups    Livalo [Pitavastatin] Myalgia    Losartan Other (See Comments)     Intolerance due to fatigue, depression    Naproxen Other (See Comments)     Chest pain       Objective      Vitals:    11/10/24 1450 11/10/24 1500 11/10/24 1920 11/10/24 2355   BP: 156/74 137/66 150/89 126/70   BP Location:   Right arm Right arm   Patient Position:   Sitting Lying   Pulse: 94 101     Resp:   18 20   Temp:   98.6 °F (37 °C) 98.2 °F (36.8 °C)   TempSrc:   Oral Oral   SpO2: 97% 93% 92%    Weight:       Height:              No data to display                CONSTITUTIONAL: pleasant well-developed adult man lying in bed no distress  HEENT: no icterus, no thrush, moist membranes  LYMPH: no cervical or supraclavicular lad  CV: RRR, S1S2, no murmur  RESP: cta bilat, no wheezing, no rales  GI: soft, nontender, no splenomegaly, +BS, laparoscopic scars  MUSC: no edema, normal gait  NEURO: alert and oriented x3, normal strength  PSYCH: normal mood and affect      RECENT LABS:  Hematology WBC   Date Value Ref Range Status   11/11/2024 27.52 (H) 3.40 - 10.80 10*3/mm3 Final     RBC   Date Value Ref Range Status   11/11/2024 6.39 (H) 4.14 - 5.80 10*6/mm3 Final     Hemoglobin   Date Value Ref Range Status   11/11/2024 18.2 (H) 13.0 - 17.7 g/dL Final     Hematocrit   Date Value Ref Range Status   11/11/2024 54.6 (H) 37.5 - 51.0 % Final     Platelets   Date Value Ref Range Status   11/11/2024 275 140 - 450 10*3/mm3 Final              Assessment & Plan     *History of polycythemia likely secondary to testosterone placement placement therapy.  Again this is long-term in nature possibly 30+ years.  He has never had a hematology assessment but has had carbon monoxide exposures on the job as a  beginning in his late teens and 20s and, later, testosterone replacement therapy for at least 10+ years.  He periodically has had phlebotom   Studies drawn  11/10/2024-EPO level, LDH, JAK2 analysis with reflex, MPL analysis  Current hemoglobin 18.2/hematocrit 54.6%     *History of prostate cancer  [place] grade 3+3 (grade group 1) with biopsy 9/3/2024.  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  Await results of erythropoietin, flow cytometry, JAK2 V6 17F/reflex MPL if indicated (will likely need to be followed up outpatient)    11/11/2024      CC:

## 2024-11-11 NOTE — CONSULTS
Patient Identification:  Jimmie Jamil  69 y.o.  male  1955  [id number]          LOS 3    Requesting physician: Dr. Kebede    Reason for Consult:  Dyspnea on exertion with hypoxia    History of Present Illness:     69-year-old male with a history of aortic stenosis, CAD, chronic diastolic dysfunction, hypertension, hyperlipidemia, and polycythemia (gets phlebotomy several times per year) who initially presented to the hospital on 11/8/2024 due to ongoing chest pain and acute on chronic cholecystitis. Cardiology cleared him for surgery and he underwent robotic-assisted cholecystectomy on 11/10/2024.  To this hospitalization, he has been having progressive dyspnea on exertion for the past several weeks he reports.  He has had some intermittent chest pain, notably on the left side.  Sounds like he has lived a pretty active life.  In his early years, he used to do a lot of cave activities, spelunking.  He also was an avid diver.  He has never been diagnosed with asthma or COPD that he is aware of.  He does not use inhalers at home.  He does not have any autoimmune disorders or family history of autoimmune disease that he is aware of.  He was a  for about 40 years he reports, he may have been exposed to flame retarding's or other chemicals at that time.  He has a dog at home, but no other animals, no birds.  He is a non-smoker.    This morning, he had significant dyspnea on exertion and had some desats.  But this afternoon, he was able to walk around the nurses station without significant desaturations and he felt better.      Past Medical History:  Past Medical History:   Diagnosis Date    Aortic stenosis     Arthritis of back 2015    Benign essential hypertension     Bladder problem     Cervical disc disorder 2018    Cervical radiculopathy 03/16/2017    Cervical spinal stenosis 03/16/2017    Cervicalgia 03/16/2017    Coronary artery disease     diastolic dysfunction    ED (erectile  dysfunction)     Greater trochanteric bursitis of right hip 07/09/2018    High blood pressure     HL (hearing loss) 01 Oct 2021    Hyperlipidemia     Hypertension     Leg pain     Leg swelling     Low back pain     Low back strain 2015    Lumbago 03/16/2017    Lumbosacral disc disease 2018    Neck pain     Paresthesia 03/16/2017    left hand/leg    Periarthritis of shoulder 2021    Polycythemia     Right hip pain     Rotator cuff syndrome 09/01/2021    Urinary bladder incontinence 03/16/2017       Past Surgical History:  Past Surgical History:   Procedure Laterality Date    ANTERIOR CERVICAL FUSION  04/12/2017    C3-4    CARDIAC CATHETERIZATION      everything okay clearance for neck surgery    CARDIAC CATHETERIZATION N/A 10/13/2021    Procedure: Left Heart Cath;  Surgeon: Anahi Perales MD;  Location: [facility] CATH INVASIVE LOCATION;  Service: Cardiovascular;  Laterality: N/A;    CARDIAC CATHETERIZATION N/A 10/13/2021    Procedure: Coronary angiography;  Surgeon: Anahi Perales MD;  [facility];  Service: Cardiovascular;  Laterality: N/A;    CARDIAC CATHETERIZATION N/A 10/13/2021    Procedure: Left ventriculography;  Surgeon: Anahi Perales MD;  [facility];  Service: Cardiovascular;  Laterality: N/A;    CERVICAL DISC SURGERY      C2-3 fusion    CHOLECYSTECTOMY N/A 11/10/2024    Procedure: Robotic assisted cholecystectomy with intraoperative cholangiogram;  Surgeon: Reagan Hernandez MD;  Location: [facility] OR;  Service: Robotics - DaVinci;  Laterality: N/A;    CIRCUMCISION      COLONOSCOPY      COLONOSCOPY N/A 11/20/2023    Procedure: COLONOSCOPY WITH COLD SNARE POLYECTOMY;  Surgeon: Kenny Urena MD;  Location: [facility] ENDOSCOPY;  Service: Gastroenterology;  Laterality: N/A;  COLON POLYP    CYSTOSCOPY  01/10/2014    Cysto ivan retrograde pyelogram urthral bx.    LUMBAR EPIDURAL INJECTION      L2-S2  going to try ablasion in future    NECK  SURGERY  2018?    PROSTATE BIOPSY  2018    PROSTATE BIOPSY N/A 8/8/2024    Procedure: MRI fusion transrectal ultrasound-guided prostate biopsy;  Surgeon: Zechariah Clay MD;  Location: [facility] MAIN OR;  Service: Urology;  Laterality: N/A;    SHOULDER ARTHROSCOPY W/ LABRAL REPAIR Left     SHOULDER SURGERY  2008    TRIGGER POINT INJECTION  2021    L5-S1    VASECTOMY      WISDOM TOOTH EXTRACTION          Home Meds:  Medications Prior to Admission   Medication Sig Dispense Refill Last Dose/Taking    ascorbic acid (VITAMIN C) 1000 MG tablet Take 1 tablet by mouth Daily.   11/7/2024 Evening    aspirin 81 MG EC tablet Take 1 tablet by mouth Daily.   11/7/2024 Evening    Coenzyme Q10 (CoQ10) 100 MG capsule    11/7/2024 Evening    enalapril (VASOTEC) 20 MG tablet Take 1 tablet by mouth twice daily (Patient taking differently: Take 1 tablet by mouth Every Night.) 180 tablet 0 11/7/2024 Evening    furosemide (LASIX) 40 MG tablet Take 1 tablet by mouth.   11/8/2024 Morning    Leqvio 284 MG/1.5ML solution prefilled syringe EVERY 6 MTHS   Past Month    ofloxacin (OCUFLOX) 0.3 % ophthalmic solution USES FOR EARS INSTEAD OF EYES   Taking    Testosterone Cypionate (DEPOTESTOTERONE CYPIONATE) 200 MG/ML injection INJECT 0.3 ML UNDER THE SKIN Q 5 DAYS AS DIRECTED 10 mL 0 11/8/2024 Morning    VASCEPA 1 g capsule capsule Take 2 g by mouth 2 (Two) Times a Day With Meals. 360 capsule 3 11/7/2024 Evening    verapamil ER (VERELAN) 360 MG 24 hr capsule Take 1 capsule by mouth Every Night.   11/7/2024 Evening    potassium chloride (KLOR-CON M20) 20 MEQ CR tablet Take 1 tablet by mouth Daily.       Tobramycin-dexAMETHasone (TobraDex ST) 0.3-0.05 % suspension 3-4 drops in affected ear twice a day 5 mL 1 Unknown         Allergies:  Allergies   Allergen Reactions    Beta Adrenergic Blockers Unknown - High Severity    Crestor [Rosuvastatin Calcium] Myalgia    Keflex [Cephalexin] Other (See Comments)     hiccups    Livalo [Pitavastatin] Myalgia     "Losartan Other (See Comments)     Intolerance due to fatigue, depression    Naproxen Other (See Comments)     Chest pain       Social History:   Social History     Socioeconomic History    Marital status:    Tobacco Use    Smoking status: Never     Passive exposure: Never    Smokeless tobacco: Never   Vaping Use    Vaping status: Never Used   Substance and Sexual Activity    Alcohol use: Yes     Alcohol/week: 2.0 standard drinks of alcohol     Types: 2 Cans of beer per week     Comment: 4 beers a week    Drug use: Never    Sexual activity: Yes     Partners: Female     Birth control/protection: Surgical       Family History:  Family History   Problem Relation Age of Onset    Arthritis Mother     Alzheimer's disease Mother 80    Arthritis Father     Heart disease Father     Colon cancer Father 85    Hypertension Father 80    Cancer Father         Colon cx in his 90s    Stroke Father         Mild in his 80s       Review of Systems:  Review of Systems   Respiratory:  Positive for shortness of breath.    All other systems reviewed and are negative.       Objective:    PHYSICAL EXAM:    /79 (BP Location: Right arm, Patient Position: Lying)   Pulse 82   Temp 98.4 °F (36.9 °C) (Oral)   Resp 18   Ht 180.3 cm (71\")   Wt 106 kg (233 lb 11 oz)   SpO2 94%   BMI 32.59 kg/m²  Body mass index is 32.59 kg/m². 94% 106 kg (233 lb 11 oz)    Physical Exam  Constitutional:       Appearance: Normal appearance.   HENT:      Head: Normocephalic and atraumatic.      Nose: Nose normal.      Mouth/Throat:      Mouth: Mucous membranes are moist.      Pharynx: Oropharynx is clear.   Eyes:      Extraocular Movements: Extraocular movements intact.      Conjunctiva/sclera: Conjunctivae normal.   Cardiovascular:      Rate and Rhythm: Normal rate and regular rhythm.      Pulses: Normal pulses.      Heart sounds: Normal heart sounds. No murmur heard.  Pulmonary:      Effort: Pulmonary effort is normal. No respiratory distress.      " Breath sounds: Normal breath sounds. No stridor. No wheezing or rales.   Abdominal:      General: Abdomen is flat. Bowel sounds are normal.      Palpations: Abdomen is soft.      Tenderness: There is no abdominal tenderness.   Skin:     General: Skin is warm and dry.   Neurological:      General: No focal deficit present.      Mental Status: He is alert and oriented to person, place, and time.            Results Review:   I have personally reviewed the results from last note by [facility] physician to 9/27/2024 22:51 EDT and agree with these findings:  [x]  Laboratory accordion  [x]  Microbiology  [x]  Radiology  [x]  EKG/Telemetry   [x]  Cardiology/Vascular   [x]  Pathology  []  Old records  []  Other:       Medication Review:  I have reviewed the current MAR.  Antibiotics  piperacillin-tazobactam (ZOSYN) 3.375 g IVPB in 100 mL NS MBP (CD)     Scheduled Medications  acetaminophen, 1,000 mg, Oral, Q6H  [Held by provider] enalapril, 20 mg, Oral, Nightly  piperacillin-tazobactam, 3.375 g, Intravenous, Q8H  sodium chloride, 10 mL, Intravenous, Q12H  verapamil SR, 360 mg, Oral, Nightly      ICU Drips  sodium chloride, 100 mL/hr, Last Rate: 100 mL/hr (11/11/24 0421)      PRN Medications    Calcium Replacement - Follow Nurse / BPA Driven Protocol    Magnesium Standard Dose Replacement - Follow Nurse / BPA Driven Protocol    Morphine    nitroglycerin    ondansetron    Phosphorus Replacement - Follow Nurse / BPA Driven Protocol    Potassium Replacement - Follow Nurse / BPA Driven Protocol    sodium chloride    sodium chloride    traMADol    Lines, Drains & Airways       Active LDAs       Name Placement date Placement time Site Days    Peripheral IV 11/10/24 1002 Posterior;Right Hand 11/10/24  1002  Hand  1    Peripheral IV 11/10/24 1005 Anterior;Left Forearm 11/10/24  1005  Forearm  1                    Diet Orders (active) (From admission, onward)       Start     Ordered    11/10/24 1518  Diet: Regular/House, Cardiac; Healthy  Heart (2-3 Na+); Fluid Consistency: Thin (IDDSI 0)  Diet Effective Now         11/10/24 1517                    Assessment:  Cholecystitis  Dyspnea on exertion  Prostate cancer  Polycythemia  Hypertension      Plan:  -I suspect the etiology of his hypoxemia is likely related to some minor atelectasis that may have occurred after his procedure yesterday.  He seems to be doing better this afternoon and is on room air currently.  -Reviewed chest x-ray, no evidence of pneumonia, minimal right pleural effusion.  -Recommend aggressive use of incentive spirometry to help open up his lungs improve his presumed atelectasis  -Reviewed the CT from last week, really no evidence of ILD or major abnormalities.  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  Nothing to do at this time.  -His symptoms do not seem consistent with asthma or COPD.  I think there is probably limited role for bronchodilators at this time.    Patient was okay with taking a conservative approach to treatment.  We will try the incentive spirometer and see how he does over the next 24 hours.  If he does well, then he is okay for discharge from my standpoint tomorrow.  We can get him scheduled in the pulmonary clinic in 2 to 3 weeks after he has had time to recover from his surgery. If he is still dyspneic, then we can consider HRCT and CPET for further evaluation of his dyspnea at that time.       Jerson Fisher MD  [facility], [facility]  Pulmonary and Critical Care Medicine    Electronically signed by Jerson Fisher MD, 11/11/24, 5:28 PM EST.  Part of this note may be an electronic transcription/translation of spoken language to printed text using the Dragon Dictation System.

## 2024-11-11 NOTE — PROGRESS NOTES
[facility] Clinical Pharmacy Services: Piperacillin-Tazobactam Consult    Pt Name: Jimmie Jamil   : 1955    Indication: Intra-Abdominal Infection    Relevant clinical history and objective data reviewed:     Allergies as of 2024 - Reviewed 2024   Allergen Reaction Noted    Beta adrenergic blockers Unknown - High Severity 2020    Crestor [rosuvastatin calcium] Myalgia 2020    Keflex [cephalexin] Other (See Comments) 2020    Livalo [pitavastatin] Myalgia 2020    Losartan Other (See Comments) 2020    Naproxen Other (See Comments) 2020        Weight: 106 kg (233 lb 11 oz)  BMI (Calculated): 32.6  Temp:  [97.8 °F (36.6 °C)-98.6 °F (37 °C)] 98.4 °F (36.9 °C)   Results from last 7 days   Lab Units 24  0436 11/10/24  0533 24  1936   CREATININE mg/dL 1.35* 1.36* 1.04     Estimated Creatinine Clearance: 65.8 mL/min (A) (by C-G formula based on SCr of 1.35 mg/dL (H)).    Assessment/Plan  Estimated CrCl >20 mL/min at this time; weight <120 kg; BMI <40 kg/m2  Dose piperacillin-tazobactam 3.375 g IV every 8 hours     Pharmacy will continue to follow daily while on piperacillin-tazobactam and adjust as needed. Thank you for this consult.    Lucio Olsen III, PharmD  Clinical Pharmacist

## 2024-11-11 NOTE — PLAN OF CARE
Problem: Adult Inpatient Plan of Care  Goal: Plan of Care Review  Outcome: Progressing  Flowsheets (Taken 11/11/2024 0605)  Progress: improving  Outcome Evaluation: No complains of pain, fall prevention protocol maintained. No signs of bleeding n lap sites. For possible discharge today.  Plan of Care Reviewed With:   patient   spouse   Goal Outcome Evaluation:  Plan of Care Reviewed With: patient, spouse        Progress: improving  Outcome Evaluation: No complains of pain, fall prevention protocol maintained. No signs of bleeding n lap sites. For possible discharge today.

## 2024-11-11 NOTE — NURSING NOTE
Pt resting in bed on RA. IS has been given and pt educated. Bed in lowest position with siderails up X2. Call light within reach. RN will continue to monitor.

## 2024-11-11 NOTE — PROGRESS NOTES
IMPRESSION & PLAN:  69-year-old gentleman status post robotic assisted cholecystectomy with intraoperative cholangiogram on 11/10/2024.  Continue regular diet as tolerated.  Postoperative leukocytosis to 27,000.  Continue to monitor.  Antibiotics reordered and blood cultures obtained per [facility] for leukocytosis.  Management of antibiotic will be deferred to them pending blood cultures as there is no indication for postoperative antibiotics related to the cholecystectomy.      CC:  No chief complaint on file.        HPI: He reports pain is well-controlled.  His left chest pain is better but not fully resolved.  Tolerating regular diet thus far.  Difficulty weaning from oxygen and pulmonology has been consulted.        PE:    VS:   Vitals:    11/11/24 0734   BP: 126/65   Pulse: 67   Resp: 18   Temp: 98.4 °F (36.9 °C)   SpO2: 95%          Intake/Output Summary (Last 24 hours) at 11/11/2024 1209  Last data filed at 11/11/2024 0811  Gross per 24 hour   Intake 480 ml   Output --   Net 480 ml        CONST: Awake, alert  LUNGS: symmetric excursion, normal inspiratory effort  CV: regular rate, well perfused  Abdomen: Soft, appropriately tender to palpation, incisions well-approximated, left rectus incision with some subcutaneous hematoma versus seroma, no current evidence of hernia      LABS:  Results from last 7 days   Lab Units 11/11/24  0436 11/10/24  0533 11/08/24  1936   WBC 10*3/mm3 27.52* 11.62* 11.79*   HEMOGLOBIN g/dL 18.2* 19.4* 18.9*   HEMATOCRIT % 54.6* 55.3* 54.4*   PLATELETS 10*3/mm3 275 233 226     Results from last 7 days   Lab Units 11/11/24  0436 11/10/24  0533 11/08/24  1936   SODIUM mmol/L 133* 135* 138   POTASSIUM mmol/L 4.8 4.4 4.2   CHLORIDE mmol/L 99 99 103   CO2 mmol/L 20.6* 24.7 23.6   BUN mg/dL 19 14 16   CREATININE mg/dL 1.35* 1.36* 1.04   CALCIUM mg/dL 8.8 9.2 9.4   BILIRUBIN mg/dL 0.8 1.1 0.5   ALK PHOS U/L 55 65 71   ALT (SGPT) U/L 52* 40 43*   AST (SGOT) U/L 43* 22 25   GLUCOSE mg/dL 220* 91 82

## 2024-11-11 NOTE — PROGRESS NOTES
"[facility]    Patient Name: Jimmie Jamil  :1955  69 y.o.  LOS: 3  Encounter Provider: HAN Morrison      Patient Care Team:  Nicko Fontaine APRN as PCP - General (Nurse Practitioner)  Zechariah Clay MD as Consulting Physician (Urology)    Chief Complaint: Follow-up chest pain, WHITE    Interval History: Patient continues to have chronic left-sided chest pain that was present over a month ago when he was evaluated cardiology team.  Notes that pain is nonexertional.  Patient has had successful bowel movement postop       Objective   Vital Signs  Temp:  [98.2 °F (36.8 °C)-98.6 °F (37 °C)] 98.4 °F (36.9 °C)  Heart Rate:  [] 67  Resp:  [18-20] 18  BP: (126-197)/(62-89) 126/65    Intake/Output Summary (Last 24 hours) at 2024 1140  Last data filed at 2024 0811  Gross per 24 hour   Intake 480 ml   Output --   Net 480 ml     Flowsheet Rows      Flowsheet Row First Filed Value   Admission Height 180.3 cm (71\") Documented at 2024 1658   Admission Weight 106 kg (233 lb 11 oz) Documented at 2024 1658              Constitutional:       Appearance: Normal appearance. Well-developed.   Eyes:      Conjunctiva/sclera: Conjunctivae normal.   Neck:      Vascular: No carotid bruit.   Pulmonary:      Effort: Pulmonary effort is normal.      Breath sounds: Normal breath sounds.   Cardiovascular:      Normal rate. Regular rhythm. Normal S1. Normal S2.       Murmurs: There is no murmur.      No gallop.  No click. No rub.   Edema:     Peripheral edema absent.   Musculoskeletal: Normal range of motion. Skin:     General: Skin is warm and dry.   Neurological:      Mental Status: Alert and oriented to person, place, and time.      GCS: GCS eye subscore is 4. GCS verbal subscore is 5. GCS motor subscore is 6.   Psychiatric:         Speech: Speech normal.         Behavior: Behavior normal.         Thought Content: Thought content normal.         Judgment: Judgment " "normal.           Pertinent Test Results:  Results from last 7 days   Lab Units 11/11/24  0436 11/10/24  0533 11/08/24  1936   SODIUM mmol/L 133* 135* 138   POTASSIUM mmol/L 4.8 4.4 4.2   CHLORIDE mmol/L 99 99 103   CO2 mmol/L 20.6* 24.7 23.6   BUN mg/dL 19 14 16   CREATININE mg/dL 1.35* 1.36* 1.04   GLUCOSE mg/dL 220* 91 82   CALCIUM mg/dL 8.8 9.2 9.4   AST (SGOT) U/L 43* 22 25   ALT (SGPT) U/L 52* 40 43*     Results from last 7 days   Lab Units 11/08/24 2120 11/08/24 1936   HSTROP T ng/L 11 11     Results from last 7 days   Lab Units 11/11/24  0436 11/10/24  0533 11/08/24  1936   WBC 10*3/mm3 27.52* 11.62* 11.79*   HEMOGLOBIN g/dL 18.2* 19.4* 18.9*   HEMATOCRIT % 54.6* 55.3* 54.4*   PLATELETS 10*3/mm3 275 233 226     Results from last 7 days   Lab Units 11/08/24 1936   INR  0.99               Invalid input(s): \"LDLCALC\"  Results from last 7 days   Lab Units 11/08/24 1936   PROBNP pg/mL 95.2               Medication Review:   acetaminophen, 1,000 mg, Oral, Q6H  [Held by provider] enalapril, 20 mg, Oral, Nightly  piperacillin-tazobactam, 3.375 g, Intravenous, Q8H  sodium chloride, 10 mL, Intravenous, Q12H  verapamil SR, 360 mg, Oral, Nightly         sodium chloride, 100 mL/hr, Last Rate: 100 mL/hr (11/11/24 0421)        Assessment & Plan     Active Hospital Problems    Diagnosis  POA    Leukocytosis [D72.829]  No    Cholelithiasis [K80.20]  Yes    Cholecystitis [K81.9]  Yes    Chest pain [R07.9]  Yes    Dyspnea on exertion [R06.09]  Yes    Prostate cancer [C61]  Yes    Polycythemia [D75.1]  Yes    Essential hypertension [I10]  Yes      Resolved Hospital Problems   No resolved problems to display.        Chest pain  Atypical in the setting of cholecystitis  Myocardial perfusion stress test this admission was negative for evidence of ischemia  Was present at appointment with primary cardiology team approximately 1 month ago and is unchanged  WHITE  Unclear etiology  Echocardiogram with normal LVEF and moderate " aortic valve stenosis  Aortic valve stenosis  Moderate by gradient on most recent echocardiogram  Cholecystitis  Status post cholecystectomy 11/10/2024  Polycythemia  Hematology following    Patient is stable from cardiovascular standpoint.  We will sign off.  Patient was evaluated for routine visit with our primary cardiology team 1 month ago, keep scheduled follow-up.  Call the office for any new or worsening symptoms.           HAN Morrison  [facility]   [facility]  [address] [place] [address] [address]  [place] [address]  Office: [phone]    11/11/24  11:40 EST

## 2024-11-11 NOTE — PROGRESS NOTES
Name: Jimmie Jamil ADMIT: 2024   : 1955  PCP: Nicko Fontaine APRN    MRN: [id number] LOS: 3 days   AGE/SEX: 69 y.o. male  ROOM: [facility]     Subjective   Subjective   Still with mild left-sided chest pain.  Still with dyspnea on exertion associated with drop in O2 sats.  No coughing.  No wheeze.  No palpitation.  No ankle edema.  No fever or chills.  Positive mild abdominal pain at the laparoscopic surgical wound sites.  No nausea or vomiting.  Positive flatus.  No bowel movement for the last 24 hours.    Review of Systems  .  No dysuria or hematuria.     Objective   Objective   Vital Signs  Temp:  [97.8 °F (36.6 °C)-98.6 °F (37 °C)] 98.4 °F (36.9 °C)  Heart Rate:  [] 67  Resp:  [16-20] 18  BP: (126-197)/(62-89) 126/65  SpO2:  [88 %-97 %] 95 %  on  Flow (L/min) (Oxygen Therapy):  [3-4] 3;   Device (Oxygen Therapy): room air    Intake/Output Summary (Last 24 hours) at 2024 0810  Last data filed at 11/10/2024 2355  Gross per 24 hour   Intake 240 ml   Output --   Net 240 ml     Body mass index is 32.59 kg/m².      24  1658   Weight: 106 kg (233 lb 11 oz)     Physical Exam  General.  Middle-aged gentleman.  Obese.  Alert and oriented x 4.  In no apparent pain/distress/diaphoresis.  Normal mood and affect.  Eyes.  Pupils equal round and reactive.  Intact extraocular musculature.  No pallor or jaundice.  Oral cavity.  Moist mucous membrane.  Neck.  Supple.  No JVD.  No lymphadenopathy or thyromegaly.  Cardiovascular.  Regular rate and rhythm with grade 2 systolic murmur.  Chest.  Clear to auscultation bilaterally with no added sounds.  Poor bilateral air entry.  Abdomen.  Soft lax.  No tenderness.  No organomegaly.  No guarding or rebound.  Healthy laparoscopic cholecystectomy scar.  Positive bowel sounds.  Extremities.  No clubbing/cyanosis/edema.  CNS.  No acute focal neurological deficits.      Results Review:      Results from last 7 days   Lab Units 24  0947  "11/10/24  0533 11/08/24 1936   SODIUM mmol/L 133* 135* 138   POTASSIUM mmol/L 4.8 4.4 4.2   CHLORIDE mmol/L 99 99 103   CO2 mmol/L 20.6* 24.7 23.6   BUN mg/dL 19 14 16   CREATININE mg/dL 1.35* 1.36* 1.04   GLUCOSE mg/dL 220* 91 82   CALCIUM mg/dL 8.8 9.2 9.4   AST (SGOT) U/L 43* 22 25   ALT (SGPT) U/L 52* 40 43*     Estimated Creatinine Clearance: 65.8 mL/min (A) (by C-G formula based on SCr of 1.35 mg/dL (H)).  Results from last 7 days   Lab Units 11/08/24 1936   HEMOGLOBIN A1C % 5.70*         Results from last 7 days   Lab Units 11/08/24 2120 11/08/24 1936   HSTROP T ng/L 11 11     Results from last 7 days   Lab Units 11/08/24 1936   PROBNP pg/mL 95.2                   Invalid input(s): \"LDLCALC\"  Results from last 7 days   Lab Units 11/11/24  0436 11/10/24  0533 11/08/24 1936   WBC 10*3/mm3 27.52* 11.62* 11.79*   HEMOGLOBIN g/dL 18.2* 19.4* 18.9*   HEMATOCRIT % 54.6* 55.3* 54.4*   PLATELETS 10*3/mm3 275 233 226   MCV fL 85.4 83.7 85.0   MCH pg 28.5 29.3 29.5   MCHC g/dL 33.3 35.1 34.7   RDW % 12.8 13.1 13.7   RDW-SD fl 39.5 39.6 40.2   MPV fL 9.8 9.7 9.3   NEUTROPHIL % % 84.9* 54.3 54.6   LYMPHOCYTE % % 5.2* 25.1 26.8   MONOCYTES % % 8.8 15.5* 13.5*   EOSINOPHIL % % 0.0* 1.5 1.1   BASOPHIL % % 0.2 0.9 0.9   IMM GRAN % % 0.9* 2.7* 3.1*   NEUTROS ABS 10*3/mm3 23.36* 6.32 6.43   LYMPHS ABS 10*3/mm3 1.42 2.92 3.16*   MONOS ABS 10*3/mm3 2.43* 1.80* 1.59*   EOS ABS 10*3/mm3 0.00 0.17 0.13   BASOS ABS 10*3/mm3 0.05 0.10 0.11   IMMATURE GRANS (ABS) 10*3/mm3 0.26* 0.31* 0.37*   NRBC /100 WBC 0.0 0.0 0.0     Results from last 7 days   Lab Units 11/08/24  1936   INR  0.99         Results from last 7 days   Lab Units 11/08/24 1936   PROCALCITONIN ng/mL 0.06   LACTATE mmol/L 1.3                               Imaging:  Imaging Results (Last 24 Hours)       Procedure Component Value Units Date/Time    FL Cholangiogram Operative [[id number]] Collected: 11/10/24 1618     Updated: 11/10/24 1624    Narrative:      FL " CHOLANGIOGRAM OPERATIVE-        INDICATION: Surgery     COMPARISON: CT abdomen pelvis October 12, 2021     TECHNIQUE: 1 static fluoroscopic image and 1 fluoroscopy cine loop  provided. Fluoroscopy time: 29.0 seconds.     FINDINGS:      Small catheter placed in the cystic duct. Intraoperative cholangiogram.  No filling defects seen in the CBD. Contrast passes in the duodenum.  Suspect duodenal diverticulum.       Impression:      Fluoroscopic guidance for intraoperative cholangiogram.     This report was finalized on 11/10/2024 4:20 PM by Dr. Zane Donnelly M.D on Workstation: [id number]                  I reviewed the patient's new clinical results / labs / tests / procedures      Assessment/Plan     Active Hospital Problems    Diagnosis  POA    Leukocytosis [D72.829]  No    Cholelithiasis [K80.20]  Yes    Cholecystitis [K81.9]  Yes    Chest pain [R07.9]  Yes    Dyspnea on exertion [R06.09]  Yes    Prostate cancer [C61]  Yes    Polycythemia [D75.1]  Yes    Essential hypertension [I10]  Yes      Resolved Hospital Problems   No resolved problems to display.           Cholelithiasis and acute on chronic cholecystitis.  Status post laparoscopic cholecystectomy 11/10/2024 with negative intraoperative cholangiogram..  Benign GI examination.  Mildly elevated liver function test continue to  monitor CMP.  Zosyn DC'd by surgery.  Will resume Zosyn secondary to significant leukocytosis diet advanced.  Postoperative leukocytosis.  No fever.  Will resume Zosyn.  Check procalcitonin and blood cultures.  Monitor CBC.  Atypical chest pain and exertional dyspnea with exertional hypoxemia/coronary calcification/history of hypertension.  Most recent echo on 10/2024 revealed a normal ejection fraction with moderate AS and left ventricular hypertrophy.  Chest pain is atypical.  EKG without acute ischemic changes.  Stress test was negative for ischemia and with normal ejection fraction.  CTA of the chest revealed no aortic  dissection or PE.  Chest pain could be referred chest pain.  Good blood pressure control.  Exertional dyspnea could be secondary to AS.  Continue verapamil.  Will hold Vasotec secondary to acute renal failure.  Lasix on hold secondary to acute kidney failure..  Status post cardiology consult..  Will consult pulmonary for exertional dyspnea and hypoxemia.  History of prostate cancer.  PSA is elevated.  Hematology oncology is following.  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  Hematology oncology consulted and they are checking LDH/JAK2/ epo level/flow cytometry/MPL mutation.  Hematology oncology indicates no need for phlebotomy at this time..  History of HTLV-1.  Hematology oncology rechecking.  Glucose intolerance.  Diet at discharge.  A1c mildly elevated  Acute kidney failure.  Occurred postoperatively.  Mostly hypovolemia.  Lasix held yesterday.  Will hold Vasotec.  Will continue IV fluid.  Continue to monitor renal function.  Continue to monitor renal function and put in output.    VTE prophylaxis.  Sequential compression device.    Discussed my findings and plan of treatment with the patient/wife.  Disposition.  Home in 1 to 2 days if leukocytosis improves and kidney function improves and if okay with surgery    Froilan Kebede MD  [facility]  11/11/24  08:10 EST

## 2024-11-12 DIAGNOSIS — D75.1 POLYCYTHEMIA: Primary | ICD-10-CM

## 2024-11-12 LAB
ALBUMIN SERPL-MCNC: 3.3 G/DL (ref 3.5–5.2)
ALBUMIN/GLOB SERPL: 1.2 G/DL
ALP SERPL-CCNC: 63 U/L (ref 39–117)
ALT SERPL W P-5'-P-CCNC: 46 U/L (ref 1–41)
ANION GAP SERPL CALCULATED.3IONS-SCNC: 11 MMOL/L (ref 5–15)
AST SERPL-CCNC: 31 U/L (ref 1–40)
BASOPHILS # BLD AUTO: 0.04 10*3/MM3 (ref 0–0.2)
BASOPHILS NFR BLD AUTO: 0.2 % (ref 0–1.5)
BILIRUB SERPL-MCNC: 0.6 MG/DL (ref 0–1.2)
BUN SERPL-MCNC: 16 MG/DL (ref 8–23)
BUN/CREAT SERPL: 14.3 (ref 7–25)
CALCIUM SPEC-SCNC: 8.7 MG/DL (ref 8.6–10.5)
CHLORIDE SERPL-SCNC: 105 MMOL/L (ref 98–107)
CO2 SERPL-SCNC: 19 MMOL/L (ref 22–29)
CREAT SERPL-MCNC: 1.12 MG/DL (ref 0.76–1.27)
CYTO UR: NORMAL
DEPRECATED RDW RBC AUTO: 40.1 FL (ref 37–54)
EGFRCR SERPLBLD CKD-EPI 2021: 72 ML/MIN/1.73
EOSINOPHIL # BLD AUTO: 0.03 10*3/MM3 (ref 0–0.4)
EOSINOPHIL NFR BLD AUTO: 0.1 % (ref 0.3–6.2)
EPO SERPL-ACNC: 5.5 MIU/ML (ref 2.6–18.5)
ERYTHROCYTE [DISTWIDTH] IN BLOOD BY AUTOMATED COUNT: 13 % (ref 12.3–15.4)
GLOBULIN UR ELPH-MCNC: 2.7 GM/DL
GLUCOSE SERPL-MCNC: 198 MG/DL (ref 65–99)
HCT VFR BLD AUTO: 50.8 % (ref 37.5–51)
HGB BLD-MCNC: 17.1 G/DL (ref 13–17.7)
IMM GRANULOCYTES # BLD AUTO: 0.27 10*3/MM3 (ref 0–0.05)
IMM GRANULOCYTES NFR BLD AUTO: 1.1 % (ref 0–0.5)
LAB AP CASE REPORT: NORMAL
LDH SERPL-CCNC: 190 U/L (ref 135–225)
LYMPHOCYTES # BLD AUTO: 1.8 10*3/MM3 (ref 0.7–3.1)
LYMPHOCYTES NFR BLD AUTO: 7.2 % (ref 19.6–45.3)
MCH RBC QN AUTO: 28.6 PG (ref 26.6–33)
MCHC RBC AUTO-ENTMCNC: 33.7 G/DL (ref 31.5–35.7)
MCV RBC AUTO: 85.1 FL (ref 79–97)
MONOCYTES # BLD AUTO: 2.26 10*3/MM3 (ref 0.1–0.9)
MONOCYTES NFR BLD AUTO: 9.1 % (ref 5–12)
NEUTROPHILS NFR BLD AUTO: 20.54 10*3/MM3 (ref 1.7–7)
NEUTROPHILS NFR BLD AUTO: 82.3 % (ref 42.7–76)
NRBC BLD AUTO-RTO: 0 /100 WBC (ref 0–0.2)
PATH REPORT.FINAL DX SPEC: NORMAL
PATH REPORT.GROSS SPEC: NORMAL
PLATELET # BLD AUTO: 214 10*3/MM3 (ref 140–450)
PMV BLD AUTO: 10 FL (ref 6–12)
POTASSIUM SERPL-SCNC: 4.1 MMOL/L (ref 3.5–5.2)
PROCALCITONIN SERPL-MCNC: 0.37 NG/ML (ref 0–0.25)
PROT SERPL-MCNC: 6 G/DL (ref 6–8.5)
RBC # BLD AUTO: 5.97 10*6/MM3 (ref 4.14–5.8)
REF LAB TEST METHOD: NORMAL
SODIUM SERPL-SCNC: 135 MMOL/L (ref 136–145)
WBC NRBC COR # BLD AUTO: 24.94 10*3/MM3 (ref 3.4–10.8)

## 2024-11-12 PROCEDURE — 99232 SBSQ HOSP IP/OBS MODERATE 35: CPT | Performed by: INTERNAL MEDICINE

## 2024-11-12 PROCEDURE — 99024 POSTOP FOLLOW-UP VISIT: CPT | Performed by: SURGERY

## 2024-11-12 PROCEDURE — 83615 LACTATE (LD) (LDH) ENZYME: CPT | Performed by: SURGERY

## 2024-11-12 PROCEDURE — 84145 PROCALCITONIN (PCT): CPT | Performed by: HOSPITALIST

## 2024-11-12 PROCEDURE — 80053 COMPREHEN METABOLIC PANEL: CPT | Performed by: SURGERY

## 2024-11-12 PROCEDURE — 85025 COMPLETE CBC W/AUTO DIFF WBC: CPT | Performed by: SURGERY

## 2024-11-12 PROCEDURE — 25010000002 PIPERACILLIN SOD-TAZOBACTAM PER 1 G: Performed by: INTERNAL MEDICINE

## 2024-11-12 RX ORDER — ENALAPRIL MALEATE 20 MG/1
20 TABLET ORAL DAILY
Status: DISCONTINUED | OUTPATIENT
Start: 2024-11-12 | End: 2024-11-13 | Stop reason: HOSPADM

## 2024-11-12 RX ORDER — ENALAPRIL MALEATE 20 MG/1
20 TABLET ORAL DAILY
Status: DISCONTINUED | OUTPATIENT
Start: 2024-11-13 | End: 2024-11-12

## 2024-11-12 RX ADMIN — ENALAPRIL MALEATE 20 MG: 20 TABLET ORAL at 18:21

## 2024-11-12 RX ADMIN — VERAPAMIL HYDROCHLORIDE 360 MG: 120 TABLET, FILM COATED, EXTENDED RELEASE ORAL at 20:48

## 2024-11-12 RX ADMIN — ACETAMINOPHEN 1000 MG: 500 TABLET ORAL at 18:21

## 2024-11-12 RX ADMIN — Medication 10 ML: at 20:49

## 2024-11-12 RX ADMIN — ACETAMINOPHEN 1000 MG: 500 TABLET ORAL at 09:10

## 2024-11-12 RX ADMIN — PIPERACILLIN AND TAZOBACTAM 3.38 G: 3; .375 INJECTION, POWDER, LYOPHILIZED, FOR SOLUTION INTRAVENOUS at 06:17

## 2024-11-12 RX ADMIN — Medication 10 ML: at 18:21

## 2024-11-12 NOTE — PROGRESS NOTES
[facility]    [phone]   LOS: 4 days     Name: Jimmie Jamil  Age/Sex: 69 y.o. male  :  1955        PCP: Nicko Fontaine APRN  No chief complaint on file.     Subjective   No new issues right now.  Still complaining of right shoulder pain right scapular pain and right chest pain.  Tolerated surgery well denies abdominal pain today.  No nausea vomiting tolerating p.o.  General: No Fever or Chills, Cardiac: No Chest Pain or Palpitations, Resp: No Cough or SOA, GI: No Nausea, Vomiting, or Diarrhea, and Other: No bleeding    acetaminophen, 1,000 mg, Oral, Q6H  [START ON 2024] enalapril, 20 mg, Oral, Daily  sodium chloride, 10 mL, Intravenous, Q12H  verapamil SR, 360 mg, Oral, Nightly           Objective   Vital Signs  Temp:  [98.2 °F (36.8 °C)-98.4 °F (36.9 °C)] 98.4 °F (36.9 °C)  Heart Rate:  [74-84] 74  Resp:  [18-22] 20  BP: (145-182)/(61-89) 145/61  Body mass index is 32.59 kg/m².    Intake/Output Summary (Last 24 hours) at 2024 1450  Last data filed at 2024 1327  Gross per 24 hour   Intake 720 ml   Output --   Net 720 ml       Physical Exam  Vitals reviewed.   Constitutional:       General: He is not in acute distress.     Appearance: He is obese.   Cardiovascular:      Rate and Rhythm: Normal rate and regular rhythm.   Pulmonary:      Effort: Pulmonary effort is normal. No respiratory distress.      Breath sounds: Normal breath sounds.   Abdominal:      General: Bowel sounds are normal. There is no distension.      Palpations: Abdomen is soft.   Neurological:      General: No focal deficit present.      Mental Status: He is alert and oriented to person, place, and time. Mental status is at baseline.           Results Review:       I reviewed the patient's new clinical results.  Results from last 7 days   Lab Units 24  0523 24  0436 11/10/24  0533 24  1936   WBC 10*3/mm3 24.94* 27.52* 11.62* 11.79*   HEMOGLOBIN g/dL 17.1 18.2* 19.4* 18.9*  Diagnosis:     L RTC repair (supra/infra/subscap), SLAP repair, biceps tenodesis     Referring Provider: Shiv  Date of Evaluation:    4/27/2023    Precautions:   See protocol Next MD visit:   none scheduled  Date of Surgery: 4/25/2023   [facility]     # Auth Visits: 28          Subjective: Feel like I turned a corner, a little extra rest seemed to help. Pain: 1/10 \"just an ache\"      Objective:     AROM:   Shoulder    Flexion: R 170; L 160  Abduction: R 180; L 140  ER: R 90; L 60  IR: R 60; L 50      PROM:   Shoulder    Flexion: R 170; L 170  Abduction: R 180; L 160  ER: R 90; L 80  IR: R 60; L 50        Assessment: Well challenged with all strength activity. Functional ROM.     Goals:   (To be met in 30 visits)   Pt will report improved ability to sleep without waking due to shoulder pain  Pt will improve shoulder flexion AROM to >170 degrees to be able to reach into overhead cabinets without pain or restriction  Pt will improve shoulder abduction AROM to >170 degrees to improve ability to don deodorant, don/doff shirts, and wash hair  Pt will increase shoulder AROM ER to 80 to reach and fasten seatbelt   Pt will increase shoulder AROM IR to 60 to be able to reach in back pocket, tuck in shirt, and turn steering wheel without pain  Pt will improve shoulder strength throughout to 5/5 to improve function with ADLs including lifting and carrying objects  Pt will demonstrate increased mid/low trap strength to 5/5 to promote improved shoulder mechanics and stabilization with ADL such as lifting and reaching   Pt will be independent and compliant with comprehensive HEP to maintain progress achieved in PT  Post QuickDASH Outcome Score  Post Score: 13.64 % (6/23/2023  8:26 AM)    18.18 % improvement    Plan: Reassess    Date: 7/11/23  TX#: 21/30 Date: 7/18/23  TX#: 22/30 Date: 7/20/23  TX#: 23/30 Date: 7/24/23  TX#: 24/30 ate: 7/27/23  TX#: 25/30 Date: 8/2/23  TX#: 25/30   PLATELETS 10*3/mm3 214 275 233 226     Results from last 7 days   Lab Units 11/12/24  0523 11/11/24  0436 11/10/24  0533 11/08/24  1936   SODIUM mmol/L 135* 133* 135* 138   POTASSIUM mmol/L 4.1 4.8 4.4 4.2   CHLORIDE mmol/L 105 99 99 103   CO2 mmol/L 19.0* 20.6* 24.7 23.6   BUN mg/dL 16 19 14 16   CREATININE mg/dL 1.12 1.35* 1.36* 1.04   CALCIUM mg/dL 8.7 8.8 9.2 9.4   Estimated Creatinine Clearance: 79.3 mL/min (by C-G formula based on SCr of 1.12 mg/dL).      Assessment & Plan   Active Hospital Problems    Diagnosis  POA    Leukocytosis [D72.829]  No    Cholelithiasis [K80.20]  Yes    Cholecystitis [K81.9]  Yes    Chest pain [R07.9]  Yes    Dyspnea on exertion [R06.09]  Yes    Prostate cancer [C61]  Yes    Polycythemia [D75.1]  Yes    Essential hypertension [I10]  Yes      Resolved Hospital Problems   No resolved problems to display.       PLAN  This is a 69-year-old gentleman with a history of prostate cancer polycythemia and hypertension who presents to the hospital with cholelithiasis and cholecystitis and is status post laparoscopic cholecystectomy.  -I do not really know what to think about the leukocytosis.  Discussed with general surgery today.  His surgery was pretty unremarkable and his gallbladder really did not look that bad.  Have very low concern for possible infection.  -He did receive a dose of dexamethasone which could be driving leukocytosis he could also be someone that just briskly responds to changes with demargination leukemoid reaction.  -He remains afebrile.  -Initially had discussed the possibility of placing on ertapenem given his history of ESBL infections but I think for now we can just discontinue antibiotics altogether after my discussion with Dr. Hernandez today.  Will monitor his blood cultures and his symptoms and hopefully his white count trends down on his own.  -Mild hyponatremia and metabolic acidosis likely related to volume expansion.  -Renal function slightly trending back to  THERAPEUTIC EX  UBE 5/5  PROM L shoulder all planes 12'  SB flexion rolling on wall AAROM flexion 2x10  Wall push ups on ball 3x10  Prone row 3# 3x10  Prone extension 10x3 3#  Prone hor abd 1# 3x10  SL ER 3# 3x10  SL abd 3# 3x10  SL flex 3# 3x10  Tband row grn 3x10  Standing flexion 2# 3x10 UBE 5/5  PROM L shoulder all planes 12'  SB flexion rolling on wall AAROM flexion 2x10  Wall push ups on ball 3x10  Prone row 4# 3x10  Prone extension 10x3 3#  Prone hor abd 2# 3x10  SL ER 3# 3x10  SL abd 3# 3x10  SL flex 3# 3x10  Tabd row 3x10 grn  Tband ext 3x10 grn  Standing flex 2# 3x10  Standing OH press 2#  3x10 THERAPEUTIC EX  UBE 5/5  PROM L shoulder all planes 12'  SB flexion rolling on wall AAROM flexion 2x10  Wall push ups on ball 3x10  Prone row 5# 3x10  Prone extension 10x3 3#  Prone hor abd 2# 3x10  SL ER 3# 3x10  SL abd 3# 3x10  SL flex 3# 3x10  Tabd row 3x10 grn  Tband ext 3x10 grn  Standing flex 2# 3x10  Standing OH press 2#  3x10 THERAPEUTIC EX  UBE 5/5  PROM L shoulder all planes 12'  SB flexion rolling on wall AAROM flexion 2x10  Wall push ups on ball 3x10  Prone row 5# 3x10  Prone extension 10x3 3#  Prone hor abd 2# 3x10  SL ER 3# 3x10  SL abd 3# 3x10  SL flex 3# 3x10  Tband row 3x10 grn  Tband ext 3x10 grn  Standing flex 2# 3x10  Standing OH press 2#  3x10 THERAPEUTIC EX  UBE 5/5  PROM L shoulder all planes 12'  SB flexion rolling on wall AAROM flexion 2x10  Wall push ups on ball 3x10  Prone row 5# 3x10  Prone extension 10x3 3#  Prone hor abd 2# 3x10  SL ER 3# 3x10  Tband row 3x10 grn  Tband ext 3x10 grn  Standing flex 2# 3x10  Standing OH press 2#  3x10  3 pos body blade (neutral,flex,abd) 2x30\" ea THERAPEUTIC EX  UBE 5/5  PROM L shoulder all planes 12'  SB flexion rolling on wall AAROM flexion 2x10  Wall push ups on ball 3x10  Prone row 5# 3x10  Prone extension 10x3 4#  Prone hor abd 3# 3x10  SL ER 3# 3x10  Tband row 3x10 grn  Tband ext 3x10 grn  Standing flex 2# 3x10  Standing OH press 2#  3x10  3 pos body blade (neutral,flex,abd) 2x30\" ea   MANUAL  [address] mobs inf/post glide grade 3 3' ea                        HEP: Codman's, table slides, submax isometrics    Charges: therapeutic ex 3      Total Timed Treatment: 45 min  Total Treatment Time: 53 min normal  -Mechanical DVT prophylaxis  -Full code      Disposition  Expected Discharge Date: 11/14/2024; Expected Discharge Time:    Hopefully home in next 24 to 48 hours    Osmani Leavitt MD  [facility]  11/12/24  14:50 EST

## 2024-11-12 NOTE — PROGRESS NOTES
[facility]  [phone]  Dr. Jerson Fisher    Subjective:  LOS: 4    No acute events overnight.  His white count is coming down without antibiotics.  He has been on room air for about 24 hours now and doing well.    Objective:  Vital Signs past 24hrs    Temp range: Temp (24hrs), Av.3 °F (36.8 °C), Min:98.2 °F (36.8 °C), Max:98.4 °F (36.9 °C)    BP range: BP: (145-182)/(61-89) 145/61  Pulse range: Heart Rate:  [74-84] 74  Resp rate range: Resp:  [18-22] 20  106 kg (233 lb 11 oz); Body mass index is 32.59 kg/m².    Device (Oxygen Therapy): room air     Oxygen range:SpO2:  [93 %-95 %] 93 %            Intake/Output Summary (Last 24 hours) at 2024 1754  Last data filed at 2024 1750  Gross per 24 hour   Intake 720 ml   Output --   Net 720 ml       Physical Exam  Constitutional:       Appearance: Normal appearance.   HENT:      Head: Normocephalic and atraumatic.      Nose: Nose normal.      Mouth/Throat:      Mouth: Mucous membranes are moist.      Pharynx: Oropharynx is clear.   Eyes:      Extraocular Movements: Extraocular movements intact.      Conjunctiva/sclera: Conjunctivae normal.   Cardiovascular:      Rate and Rhythm: Normal rate and regular rhythm.      Pulses: Normal pulses.      Heart sounds: Normal heart sounds. No murmur heard.  Pulmonary:      Effort: Pulmonary effort is normal. No respiratory distress.      Breath sounds: Normal breath sounds. No stridor. No wheezing or rales.   Abdominal:      General: Abdomen is flat. Bowel sounds are normal.      Palpations: Abdomen is soft.      Tenderness: There is no abdominal tenderness.   Skin:     General: Skin is warm and dry.   Neurological:      General: No focal deficit present.      Mental Status: He is alert and oriented to person, place, and time.            Result Review:  I have reviewed the results from last note by [facility] physician and agree with these findings:  [x]  Laboratory accordion  [x]  Microbiology  [x]   Radiology  [x]  EKG/Telemetry   [x]  Cardiology/Vascular   [x]  Pathology  []  Old records  []  Other:    Medication Review:  I have reviewed the current MAR.  Antibiotics  This patient does not have an active medication from one of the medication groupers.     Scheduled Medications  acetaminophen, 1,000 mg, Oral, Q6H  enalapril, 20 mg, Oral, Daily  sodium chloride, 10 mL, Intravenous, Q12H  verapamil SR, 360 mg, Oral, Nightly      ICU Drips     PRN Medications    Calcium Replacement - Follow Nurse / BPA Driven Protocol    Magnesium Standard Dose Replacement - Follow Nurse / BPA Driven Protocol    Morphine    nitroglycerin    ondansetron    Phosphorus Replacement - Follow Nurse / BPA Driven Protocol    Potassium Replacement - Follow Nurse / BPA Driven Protocol    sodium chloride    sodium chloride    traMADol    Lines, Drains & Airways       Active LDAs       Name Placement date Placement time Site Days    Peripheral IV 11/10/24 1002 Posterior;Right Hand 11/10/24  1002  Hand  2    Peripheral IV 11/10/24 1005 Anterior;Left Forearm 11/10/24  1005  Forearm  2                    Diet Orders (active) (From admission, onward)       Start     Ordered    11/10/24 1518  Diet: Regular/House, Cardiac; Healthy Heart (2-3 Na+); Fluid Consistency: Thin (IDDSI 0)  Diet Effective Now         11/10/24 1517                      Assessment:  Acute hypoxic respiratory failure secondary to atelectasis  Cholecystitis  Dyspnea on exertion  Prostate cancer  Polycythemia  Hypertension  Leukocytosis, reactive    Plan of Treatment  -Patient has been doing an excellent job with using the incentive spirometer  -He has been on room air for about 24 hours now.  -He has been able to walk around the nurses station several times with no desats.  -From a pulmonary perspective, I would recommend holding off on antibiotics.  His elevated white count is likely just reactive to surgery.  -From a pulmonary perspective, I am okay with discharge patient is  ready.  Pulmonary will sign off at this time.  If there are any questions or concerns, please call us.  I will get him scheduled a follow-up appointment in the pulmonary clinic for further evaluation of his dyspnea in 2 to 4 weeks after he has recovered from surgery to see if further workup is needed.      Jerson Fisher MD  [facility], [facility]  Pulmonary and Critical Care Medicine    Electronically signed by Jerson Fisher MD, 11/12/24, 5:54 PM EST.  Part of this note may be an electronic transcription/translation of spoken language to printed text using the Dragon Dictation System.

## 2024-11-12 NOTE — CASE MANAGEMENT/SOCIAL WORK
Discharge Planning Assessment  [facility]     Patient Name: Jimmie Jamil  MRN: [id number]  Today's Date: 11/12/2024    Admit Date: 11/8/2024    Plan: Home, family to transport   Discharge Needs Assessment       Row Name 11/12/24 1339       Living Environment    People in Home spouse    Current Living Arrangements home    Primary Care Provided by self    Provides Primary Care For no one    Family Caregiver if Needed spouse    Able to Return to Prior Arrangements yes       Resource/Environmental Concerns    Resource/Environmental Concerns home accessibility    Home Accessibility Concerns stairs to enter home       Transition Planning    Patient/Family Anticipates Transition to home with family    Patient/Family Anticipated Services at Transition none    Transportation Anticipated family or friend will provide       Discharge Needs Assessment    Equipment Currently Used at Home none    Concerns to be Addressed denies needs/concerns at this time;no discharge needs identified                   Discharge Plan       Row Name 11/12/24 9672       Plan    Plan Home, family to transport    Patient/Family in Agreement with Plan yes    Plan Comments CCP spoke with patient and spouse at bedside; explained role, verified facesheet, and discussed dc plan. Patient uses no DME and is independent for mobility at bedside. He lives with his spouse in a 1 level home with 3 steps to enter. He has no history of HH or SNF. Patient denies any HH or DME needs and reports plan is home via family transport. SANTINO, LATISHAW                  Continued Care and Services - Admitted Since 11/8/2024    No active coordination exists for this encounter.       Expected Discharge Date and Time       Expected Discharge Date Expected Discharge Time    Nov 14, 2024            Demographic Summary       Row Name 11/12/24 1339       General Information    Admission Type inpatient    Arrived From home    Referral Source admission list    Reason for Consult  discharge planning    Preferred Language English       Contact Information    Permission Granted to Share Info With family/designee                   Functional Status       Row Name 11/12/24 1113       Functional Status    Usual Activity Tolerance good    Current Activity Tolerance good       Functional Status, IADL    Medications independent    Meal Preparation independent    Housekeeping independent    Laundry independent    Shopping independent       Mental Status    General Appearance WDL WDL                   Psychosocial    No documentation.                  Abuse/Neglect    No documentation.                  Legal    No documentation.                  Substance Abuse    No documentation.                  Patient Forms    No documentation.                     NELLY Newton

## 2024-11-12 NOTE — PROGRESS NOTES
IMPRESSION & PLAN:  69-year-old gentleman status post robotic assisted cholecystectomy with intraoperative cholangiogram on 11/10/2024.  Discussed with Dr. Leavitt.  We will stop antibiotics and monitor leukocytosis.  Okay to shower.      CC:  No chief complaint on file.        HPI: Continues to improve clinically.  Pain is well-controlled.  He has a little bit of right shoulder and clavicle pain today.  Tolerating regular diet without difficulty.  Now off oxygen.      PE:    VS:   Vitals:    11/12/24 0700   BP: (!) 182/73   Pulse:    Resp: 22   Temp: 98.2 °F (36.8 °C)   SpO2:           Intake/Output Summary (Last 24 hours) at 11/12/2024 1130  Last data filed at 11/12/2024 0800  Gross per 24 hour   Intake 930 ml   Output --   Net 930 ml        CONST: Awake, alert  LUNGS: symmetric excursion, normal inspiratory effort  CV: regular rate, well perfused  Abdomen: Soft, appropriately tender to palpation, incisions well-approximated      LABS:  Results from last 7 days   Lab Units 11/12/24  0523 11/11/24  0436 11/10/24  0533 11/08/24  1936   WBC 10*3/mm3 24.94* 27.52* 11.62* 11.79*   HEMOGLOBIN g/dL 17.1 18.2* 19.4* 18.9*   HEMATOCRIT % 50.8 54.6* 55.3* 54.4*   PLATELETS 10*3/mm3 214 275 233 226     Results from last 7 days   Lab Units 11/12/24 0523 11/11/24 0436 11/10/24  0533   SODIUM mmol/L 135* 133* 135*   POTASSIUM mmol/L 4.1 4.8 4.4   CHLORIDE mmol/L 105 99 99   CO2 mmol/L 19.0* 20.6* 24.7   BUN mg/dL 16 19 14   CREATININE mg/dL 1.12 1.35* 1.36*   CALCIUM mg/dL 8.7 8.8 9.2   BILIRUBIN mg/dL 0.6 0.8 1.1   ALK PHOS U/L 63 55 65   ALT (SGPT) U/L 46* 52* 40   AST (SGOT) U/L 31 43* 22   GLUCOSE mg/dL 198* 220* 91

## 2024-11-12 NOTE — PROGRESS NOTES
REASON FOR CONSULTATION: Polycythemia                            INTERVAL HISTORY:  The patient underwent a robot-assisted laparoscopic cholecystectomy on 11/10/2024.  He complains of shortness of breath with exertion and states his O2 sats were 85% when up to bathroom.  He has pain around his collarbone reproducible.  He denies pleuritic chest pain.    HISTORY OF PRESENT ILLNESS:  The patient is a 69-year-old male with a somewhat complex medical history inclusive of coronary artery disease, cardiac murmur, echo 11/2023 with EF of 61%, aortic stenosis history of prostate cancer status post biopsy being managed expectantly, cervical spinal stenosis.     He also has a history of secondary polycythemia undergoing therapy with testosterone injections.  Further history includes erythrocytosis/polycythemia for as long as 40 years followed by a number of physicians but never hematology.  He started phlebotomy  or blood donations, again, as long as 30+ years ago as did his brother who had similar findings of elevated hemoglobin hematocrit.  His wife indicates that he is often plethoric and particularly  demonstrates ruddiness in the face that will indicate to them both that he needs a phlebotomy.       Recently symptoms of chest pain dyspnea worsened and he was seen by cardiology 10/31/2024 with CT scans negative but demonstrating evidence of cholelithiasis with mild pericholecystic inflammatory changes-?  Chronic cholecystitis.  He was referred to general surgery with a subsequent request to cardiology for clearance.  His symptoms however prompted assessment for cardiac catheterization.        Past Medical History, Past Surgical History, Social History, Family History have been reviewed and are without significant changes except as mentioned.      Medications:  The current medication list was reviewed in the EMR    ALLERGIES:    Allergies   Allergen Reactions    Beta Adrenergic Blockers Unknown - High Severity     Crestor [Rosuvastatin Calcium] Myalgia    Keflex [Cephalexin] Other (See Comments)     hiccups    Livalo [Pitavastatin] Myalgia    Losartan Other (See Comments)     Intolerance due to fatigue, depression    Naproxen Other (See Comments)     Chest pain       Objective      Vitals:    11/11/24 1343 11/11/24 1900 11/11/24 2100 11/12/24 0700   BP: 159/79 173/81 163/89 (!) 182/73  Comment: attempted to retake, pt states it will be the same RN to be notified   BP Location: Right arm Right arm Right arm Right arm   Patient Position: Lying Lying Lying Lying   Pulse: 82 84 74    Resp: 18 18 18 22   Temp: 98.4 °F (36.9 °C) 98.4 °F (36.9 °C) 98.2 °F (36.8 °C) 98.2 °F (36.8 °C)   TempSrc: Oral Oral Oral Oral   SpO2: 94% 95% 93%    Weight:       Height:              No data to display                CONSTITUTIONAL: pleasant well-developed adult man lying in bed no distress  HEENT: no icterus, no thrush, moist membranes  LYMPH: no cervical or supraclavicular lad  CV: RRR, S1S2, no murmur  RESP: cta bilat, no wheezing, no rales  GI: soft, nontender, no splenomegaly, +BS, laparoscopic scars  MUSC: no edema, normal gait  NEURO: alert and oriented x3, normal strength  PSYCH: normal mood and affect    Exam unchanged-11/12/2024  RECENT LABS:  Hematology WBC   Date Value Ref Range Status   11/12/2024 24.94 (H) 3.40 - 10.80 10*3/mm3 Final     RBC   Date Value Ref Range Status   11/12/2024 5.97 (H) 4.14 - 5.80 10*6/mm3 Final     Hemoglobin   Date Value Ref Range Status   11/12/2024 17.1 13.0 - 17.7 g/dL Final     Hematocrit   Date Value Ref Range Status   11/12/2024 50.8 37.5 - 51.0 % Final     Platelets   Date Value Ref Range Status   11/12/2024 214 140 - 450 10*3/mm3 Final              Assessment & Plan     *History of polycythemia likely secondary to testosterone placement placement therapy.  Again this is long-term in nature possibly 30+ years.  He has never had a hematology assessment but has had carbon monoxide exposures on the job  as a  beginning in his late teens and 20s and, later, testosterone replacement therapy for at least 10+ years.  He periodically has had phlebotom   Studies drawn 11/10/2024-EPO level, LDH, JAK2 analysis with reflex, MPL analysis  Current hemoglobin 17.1/hematocrit 17.1%     *History of prostate cancer  Steve grade 3+3 (grade group 1) with biopsy 9/3/2024.  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

## 2024-11-13 ENCOUNTER — READMISSION MANAGEMENT (OUTPATIENT)
Dept: CALL CENTER | Facility: HOSPITAL | Age: 69
End: 2024-11-13
Payer: MEDICARE

## 2024-11-13 VITALS
TEMPERATURE: 97.3 F | OXYGEN SATURATION: 93 % | BODY MASS INDEX: 32.72 KG/M2 | WEIGHT: 233.69 LBS | RESPIRATION RATE: 24 BRPM | HEIGHT: 71 IN | DIASTOLIC BLOOD PRESSURE: 71 MMHG | HEART RATE: 74 BPM | SYSTOLIC BLOOD PRESSURE: 141 MMHG

## 2024-11-13 LAB
ALBUMIN SERPL-MCNC: 3.4 G/DL (ref 3.5–5.2)
ALBUMIN/GLOB SERPL: 1.2 G/DL
ALP SERPL-CCNC: 59 U/L (ref 39–117)
ALT SERPL W P-5'-P-CCNC: 35 U/L (ref 1–41)
ANION GAP SERPL CALCULATED.3IONS-SCNC: 10 MMOL/L (ref 5–15)
AST SERPL-CCNC: 20 U/L (ref 1–40)
BASOPHILS # BLD AUTO: 0.12 10*3/MM3 (ref 0–0.2)
BASOPHILS NFR BLD AUTO: 0.7 % (ref 0–1.5)
BILIRUB SERPL-MCNC: 0.5 MG/DL (ref 0–1.2)
BUN SERPL-MCNC: 10 MG/DL (ref 8–23)
BUN/CREAT SERPL: 9.6 (ref 7–25)
CALCIUM SPEC-SCNC: 9.1 MG/DL (ref 8.6–10.5)
CHLORIDE SERPL-SCNC: 105 MMOL/L (ref 98–107)
CO2 SERPL-SCNC: 23 MMOL/L (ref 22–29)
CREAT SERPL-MCNC: 1.04 MG/DL (ref 0.76–1.27)
DEPRECATED RDW RBC AUTO: 40.8 FL (ref 37–54)
EGFRCR SERPLBLD CKD-EPI 2021: 78.7 ML/MIN/1.73
EOSINOPHIL # BLD AUTO: 0.17 10*3/MM3 (ref 0–0.4)
EOSINOPHIL NFR BLD AUTO: 1 % (ref 0.3–6.2)
ERYTHROCYTE [DISTWIDTH] IN BLOOD BY AUTOMATED COUNT: 13.1 % (ref 12.3–15.4)
GLOBULIN UR ELPH-MCNC: 2.8 GM/DL
GLUCOSE SERPL-MCNC: 97 MG/DL (ref 65–99)
HCT VFR BLD AUTO: 51.6 % (ref 37.5–51)
HGB BLD-MCNC: 17.6 G/DL (ref 13–17.7)
IMM GRANULOCYTES # BLD AUTO: 0.38 10*3/MM3 (ref 0–0.05)
IMM GRANULOCYTES NFR BLD AUTO: 2.2 % (ref 0–0.5)
LDH SERPL-CCNC: 236 U/L (ref 135–225)
LYMPHOCYTES # BLD AUTO: 2.64 10*3/MM3 (ref 0.7–3.1)
LYMPHOCYTES NFR BLD AUTO: 15.6 % (ref 19.6–45.3)
MAGNESIUM SERPL-MCNC: 2.2 MG/DL (ref 1.6–2.4)
MCH RBC QN AUTO: 29.3 PG (ref 26.6–33)
MCHC RBC AUTO-ENTMCNC: 34.1 G/DL (ref 31.5–35.7)
MCV RBC AUTO: 86 FL (ref 79–97)
MONOCYTES # BLD AUTO: 2.88 10*3/MM3 (ref 0.1–0.9)
MONOCYTES NFR BLD AUTO: 17 % (ref 5–12)
NEUTROPHILS NFR BLD AUTO: 10.76 10*3/MM3 (ref 1.7–7)
NEUTROPHILS NFR BLD AUTO: 63.5 % (ref 42.7–76)
NRBC BLD AUTO-RTO: 0 /100 WBC (ref 0–0.2)
PLATELET # BLD AUTO: 235 10*3/MM3 (ref 140–450)
PMV BLD AUTO: 9.9 FL (ref 6–12)
POTASSIUM SERPL-SCNC: 4.1 MMOL/L (ref 3.5–5.2)
PROT SERPL-MCNC: 6.2 G/DL (ref 6–8.5)
RBC # BLD AUTO: 6 10*6/MM3 (ref 4.14–5.8)
SODIUM SERPL-SCNC: 138 MMOL/L (ref 136–145)
WBC NRBC COR # BLD AUTO: 16.95 10*3/MM3 (ref 3.4–10.8)

## 2024-11-13 PROCEDURE — 80053 COMPREHEN METABOLIC PANEL: CPT | Performed by: SURGERY

## 2024-11-13 PROCEDURE — 83735 ASSAY OF MAGNESIUM: CPT | Performed by: HOSPITALIST

## 2024-11-13 PROCEDURE — 85025 COMPLETE CBC W/AUTO DIFF WBC: CPT | Performed by: SURGERY

## 2024-11-13 PROCEDURE — 83615 LACTATE (LD) (LDH) ENZYME: CPT | Performed by: SURGERY

## 2024-11-13 RX ORDER — TRAMADOL HYDROCHLORIDE 50 MG/1
50 TABLET ORAL EVERY 6 HOURS PRN
Qty: 10 TABLET | Refills: 0 | Status: SHIPPED | OUTPATIENT
Start: 2024-11-13 | End: 2024-11-15

## 2024-11-13 RX ORDER — ACETAMINOPHEN 500 MG
1000 TABLET ORAL EVERY 6 HOURS
Start: 2024-11-13

## 2024-11-13 RX ADMIN — Medication 10 ML: at 08:20

## 2024-11-13 RX ADMIN — ACETAMINOPHEN 1000 MG: 500 TABLET ORAL at 00:06

## 2024-11-13 RX ADMIN — ENALAPRIL MALEATE 20 MG: 20 TABLET ORAL at 08:20

## 2024-11-13 RX ADMIN — ACETAMINOPHEN 1000 MG: 500 TABLET ORAL at 06:41

## 2024-11-13 NOTE — PLAN OF CARE
Problem: Adult Inpatient Plan of Care  Goal: Plan of Care Review  Outcome: Met  Flowsheets (Taken 11/13/2024 1240)  Outcome Evaluation: pt discharged home with spouse, vss, will continue to monitor

## 2024-11-13 NOTE — OUTREACH NOTE
Prep Survey      Flowsheet Row Responses   [facility] facility patient discharged from? [place]   Is LACE score < 7 ? No   Eligibility Readm Mgmt   Discharge diagnosis Robotic assisted cholecystectomy with intraoperative cholangiogram   Does the patient have one of the following disease processes/diagnoses(primary or secondary)? General Surgery   Prep survey completed? Yes            Rita EBRNARD - Registered Nurse

## 2024-11-13 NOTE — CASE MANAGEMENT/SOCIAL WORK
Case Management Discharge Note      Final Note: Home         Selected Continued Care - Discharged on 11/13/2024 Admission date: 11/8/2024 - Discharge disposition: Home or Self Care      Destination    No services have been selected for the patient.                Durable Medical Equipment    No services have been selected for the patient.                Dialysis/Infusion    No services have been selected for the patient.                Home Medical Care    No services have been selected for the patient.                Therapy    No services have been selected for the patient.                Community Resources    No services have been selected for the patient.                Community & DME    No services have been selected for the patient.                    Transportation Services  Private: Car    Final Discharge Disposition Code: 01 - home or self-care

## 2024-11-13 NOTE — DISCHARGE SUMMARY
Patient Name: Jimmie Jamil  : 1955  MRN: [id number]    Date of Admission: 2024  Date of Discharge:  2024  Primary Care Physician: Nicko Fontaine APRN      Chief Complaint:   No chief complaint on file.      Discharge Diagnoses     Active Hospital Problems    Diagnosis  POA    Leukocytosis [D72.829]  No    Cholelithiasis [K80.20]  Yes    Cholecystitis [K81.9]  Yes    Chest pain [R07.9]  Yes    Dyspnea on exertion [R06.09]  Yes    Prostate cancer [C61]  Yes    Polycythemia [D75.1]  Yes    Essential hypertension [I10]  Yes      Resolved Hospital Problems   No resolved problems to display.        Hospital Course     Mr. Jamil is a 69 y.o. male with a history of prostate cancer, polycythemia and hypertension abdominal pain who presented to [facility] initially complaining of abdominal pain.  Please see the admitting history and physical for further details.  He was found to have cholelithiasis and cholecystitis and was admitted to the hospital for further evaluation and treatment.  He was admitted to the hospital started on IV fluids and antibiotics.  He was taken to the operating room for laparoscopic cholecystectomy.  The procedure was tolerated well without any concerning findings or complications.  He had a pretty significant leukocytosis on admission that was somewhat persistent.  There were concerns regarding possible underlying complications but given his red relative clinical stability and his lack of other symptoms antibiotics were discontinued and he was monitored.  His white blood cell count has trended down and at this point he is pretty symptom-free and stable to discharge home.  His pains been controlled with Tylenol and tramadol and this will be continued at discharge.  My suspicion is that his leukocytosis was probably multifactorial from a demargination reaction/leukemoid reaction from his infection/surgery as well as a reaction to the dexamethasone given  in the ER.  He should follow-up with his primary care physician in the next 2 weeks and follow-up with general surgery in 2 weeks as directed.        Day of Discharge     Subjective:  Denies abdominal pain nausea vomiting or diarrhea today.  Eager to go home    Physical Exam:  Temp:  [97.3 °F (36.3 °C)-98.4 °F (36.9 °C)] 97.3 °F (36.3 °C)  Heart Rate:  [74-84] 74  Resp:  [18-24] 24  BP: (121-153)/(59-84) 141/71  Body mass index is 32.59 kg/m².  Physical Exam  Constitutional:       General: He is not in acute distress.     Appearance: He is not ill-appearing.   Cardiovascular:      Rate and Rhythm: Normal rate and regular rhythm.   Pulmonary:      Effort: No respiratory distress.      Breath sounds: Normal breath sounds.   Abdominal:      General: There is no distension.      Palpations: Abdomen is soft.      Tenderness: There is abdominal tenderness.   Neurological:      Mental Status: He is alert and oriented to person, place, and time. Mental status is at baseline.         Consultants     Consult Orders (all) (From admission, onward)       Start     Ordered    11/11/24 0826  Inpatient Pulmonology Consult  Once        Specialty:  Pulmonary Disease  Provider:  Bessy Schultz MD    11/11/24 0825 11/08/24 1926  Inpatient Cardiology Consult  Once        Specialty:  Cardiology  Provider:  Nikki Duncan MD    11/08/24 1928 11/08/24 1926  Inpatient Hematology & Oncology Consult  Once        Specialty:  Hematology and Oncology  Provider:  Debra Rothman MD    11/08/24 1928 11/08/24 1925  Inpatient General Surgery Consult  Once        Specialty:  General Surgery  Provider:  Wallace Bang Jr., MD    11/08/24 1928                  Procedures     Robotic assisted cholecystectomy with intraoperative cholangiogram    Imaging Results (All)       Procedure Component Value Units Date/Time    XR Chest 2 View [[id number]] Collected: 11/11/24 1448     Updated: 11/11/24 1457    Narrative:      XR CHEST 2 VW-     HISTORY:  Male who is 69 years-old, dyspnea     TECHNIQUE: Frontal and lateral views of the chest     COMPARISON: 10/12/2021     FINDINGS: The heart size is borderline. Pulmonary vasculature is  unremarkable. No focal pulmonary consolidation. Minimal right pleural  effusion. No pneumothorax. No acute osseous process.       Impression:      Minimal right pleural effusion. No focal pulmonary  consolidation. Borderline heart size.     This report was finalized on 11/11/2024 2:54 PM by Dr. Merritt Shields M.D on Workstation: [id number]       FL Cholangiogram Operative [[id number]] Collected: 11/10/24 1618     Updated: 11/10/24 1624    Narrative:      FL CHOLANGIOGRAM OPERATIVE-        INDICATION: Surgery     COMPARISON: CT abdomen pelvis October 12, 2021     TECHNIQUE: 1 static fluoroscopic image and 1 fluoroscopy cine loop  provided. Fluoroscopy time: 29.0 seconds.     FINDINGS:      Small catheter placed in the cystic duct. Intraoperative cholangiogram.  No filling defects seen in the CBD. Contrast passes in the duodenum.  Suspect duodenal diverticulum.       Impression:      Fluoroscopic guidance for intraoperative cholangiogram.     This report was finalized on 11/10/2024 4:20 PM by Dr. Zane Donnelly M.D on Workstation: [id number]             Results for orders placed during the hospital encounter of 11/04/22    Duplex Carotid Ultrasound CAR    Interpretation Summary    Bilateral internal carotid artery atherosclerotic plaque noted without any significant stenosis.    Results for orders placed during the hospital encounter of 10/31/24    Adult Transthoracic Echo Complete W/ Cont if Necessary Per Protocol    Interpretation Summary    Left ventricular systolic function is normal. Calculated left ventricular EF = 55.4% Normal global longitudinal LV strain (GLS) = -20.5%. Left ventricle strain data was reviewed by the physician and found to be accurate. Normal left ventricular cavity size noted. Left ventricular wall  thickness is consistent with mild concentric hypertrophy. All left ventricular wall segments contract normally. Left ventricular diastolic function was normal.    A bicuspid aortic valve is suggested with fused right-sided and left-sided cusps. There is severe calcification of the aortic valve. Trace aortic valve regurgitation is present. Moderate aortic valve stenosis is present. Aortic valve area is 1.01 cm2. Peak velocity of the flow distal to the aortic valve is 287.3 cm/s. Aortic valve maximum pressure gradient is 33.0 mmHg. Aortic valve mean pressure gradient is 18.2 mmHg. Aortic valve dimensionless index is 0.32 .    No mitral valve regurgitation or significant stenosis is present. There is severe, bileaflet mitral valve thickening present.    Trace tricuspid valve regurgitation is present. Estimated right ventricular systolic pressure from tricuspid regurgitation is normal (<35 mmHg). Calculated right ventricular systolic pressure from tricuspid regurgitation is 23 mmHg.    The ascending aorta is not well-seen. There echo linear densities in the proximal to mid ascending aorta. Cannot rule out dissection.    Pertinent Labs     Results from last 7 days   Lab Units 11/13/24  0453 11/12/24  0523 11/11/24  0436 11/10/24  0533   WBC 10*3/mm3 16.95* 24.94* 27.52* 11.62*   HEMOGLOBIN g/dL 17.6 17.1 18.2* 19.4*   PLATELETS 10*3/mm3 235 214 275 233     Results from last 7 days   Lab Units 11/13/24  0453 11/12/24  0523 11/11/24  0436 11/10/24  0533   SODIUM mmol/L 138 135* 133* 135*   POTASSIUM mmol/L 4.1 4.1 4.8 4.4   CHLORIDE mmol/L 105 105 99 99   CO2 mmol/L 23.0 19.0* 20.6* 24.7   BUN mg/dL 10 16 19 14   CREATININE mg/dL 1.04 1.12 1.35* 1.36*   GLUCOSE mg/dL 97 198* 220* 91   EGFR mL/min/1.73 78.7 72.0 57.5* 57.0*     Results from last 7 days   Lab Units 11/13/24  0453 11/12/24  0523 11/11/24  0436 11/10/24  0533   ALBUMIN g/dL 3.4* 3.3* 3.4* 3.7   BILIRUBIN mg/dL 0.5 0.6 0.8 1.1   ALK PHOS U/L 59 63 55 65   AST  "(SGOT) U/L 20 31 43* 22   ALT (SGPT) U/L 35 46* 52* 40     Results from last 7 days   Lab Units 11/13/24  0453 11/12/24  0523 11/11/24  0436 11/10/24  0533   CALCIUM mg/dL 9.1 8.7 8.8 9.2   ALBUMIN g/dL 3.4* 3.3* 3.4* 3.7   MAGNESIUM mg/dL 2.2  --   --   --        Results from last 7 days   Lab Units 11/08/24 2120 11/08/24  1936   HSTROP T ng/L 11 11   PROBNP pg/mL  --  95.2           Invalid input(s): \"LDLCALC\"  Results from last 7 days   Lab Units 11/11/24  0925 11/11/24  0916   BLOODCX  No growth at 2 days No growth at 2 days         Test Results Pending at Discharge     Pending Results       Procedure [Order ID] Specimen - Date/Time    HTLV-I / II Antibodies, Qual With Confirmation [[id number]] Collected: 11/09/24 1413    Specimen: Blood Updated: 11/09/24 1434    JAK2 Mutation Analysis, Qual [[id number]] Collected: 11/09/24 1412    Specimen: Blood Updated: 11/09/24 1435    MPL Mutation Analysis [[id number]] Collected: 11/09/24 1412    Specimen: Blood Updated: 11/09/24 1435              Discharge Details        Discharge Medications        New Medications        Instructions Start Date   acetaminophen 500 MG tablet  Commonly known as: TYLENOL   1,000 mg, Oral, Every 6 Hours      traMADol 50 MG tablet  Commonly known as: ULTRAM   50 mg, Oral, Every 6 Hours PRN             Changes to Medications        Instructions Start Date   enalapril 20 MG tablet  Commonly known as: VASOTEC  What changed: when to take this   20 mg, Oral, 2 Times Daily             Continue These Medications        Instructions Start Date   ascorbic acid 1000 MG tablet  Commonly known as: VITAMIN C   1,000 mg, Daily      aspirin 81 MG EC tablet   81 mg, Daily      CoQ10 100 MG capsule   No dose, route, or frequency recorded.      furosemide 40 MG tablet  Commonly known as: LASIX   40 mg      Leqvio 284 MG/1.5ML solution prefilled syringe  Generic drug: Inclisiran Sodium   EVERY 6 MTHS      ofloxacin 0.3 % ophthalmic solution  Commonly known as: " OCUFLOX   USES FOR EARS INSTEAD OF EYES      Testosterone Cypionate 200 MG/ML injection  Commonly known as: DEPOTESTOTERONE CYPIONATE   INJECT 0.3 ML UNDER THE SKIN Q 5 DAYS AS DIRECTED      TobraDex ST 0.3-0.05 % suspension  Generic drug: Tobramycin-dexAMETHasone   3-4 drops in affected ear twice a day      Vascepa 1 g capsule capsule  Generic drug: icosapent ethyl   2 g, Oral, 2 Times Daily With Meals      verapamil  MG 24 hr capsule  Commonly known as: VERELAN   360 mg, Nightly             Stop These Medications      potassium chloride 20 MEQ CR tablet  Commonly known as: KLOR-CON M20              Allergies   Allergen Reactions    Beta Adrenergic Blockers Unknown - High Severity    Crestor [Rosuvastatin Calcium] Myalgia    Keflex [Cephalexin] Other (See Comments)     hiccups    Livalo [Pitavastatin] Myalgia    Losartan Other (See Comments)     Intolerance due to fatigue, depression    Naproxen Other (See Comments)     Chest pain       Discharge Disposition:  Home or Self Care      Discharge Diet:  No active diet order      Discharge Activity:   Activity Instructions       Activity as Tolerated              CODE STATUS:    Code Status and Medical Interventions: CPR (Attempt to Resuscitate); Full Support   Ordered at: 11/08/24 1928     Code Status (Patient has no pulse and is not breathing):    CPR (Attempt to Resuscitate)     Medical Interventions (Patient has pulse or is breathing):    Full Support       Future Appointments   Date Time Provider Department Center   11/19/2024 10:15 AM Zechariah Clay MD [facility] [place] [facility]TR[facility]   12/6/2024  8:30 AM LAB CHAIR 3 [facility] JUDY  LAB [facility] [place]   12/6/2024  9:00 AM Zane Lentz MD [facility] [place] CBC [facility] [place]   3/31/2025  2:00 PM Frank Solomon MD [facility] [place]NT [facility]     Additional Instructions for the Follow-ups that You Need to Schedule       Discharge Follow-up with PCP   As directed       Currently Documented PCP:    Nicko Fontaine APRN    PCP Phone  Number:    [phone]     Follow Up Details: 1-2 weeks        Discharge Follow-up with Specified Provider: Dr Hernandez; 2 Weeks   As directed      To: Dr Hernandez   Follow Up: 2 Weeks               Follow-up Information       Nicko Fontaine APRN .    Specialty: Nurse Practitioner  Why: 1-2 weeks  Contact information:  [address] [place], [place] [id number] [phone]               Nicko Fontaine APRN .    Specialty: Nurse Practitioner  Contact information:  [address] [place], [place] [address] [phone]                             Additional Instructions for the Follow-ups that You Need to Schedule       Discharge Follow-up with PCP   As directed       Currently Documented PCP:    Nicko Fontaine APRN    PCP Phone Number:    [phone]     Follow Up Details: 1-2 weeks        Discharge Follow-up with Specified Provider: Dr Hernandez; 2 Weeks   As directed      To: Dr Hernandez   Follow Up: 2 Weeks            Time Spent on Discharge:  Greater than 30 minutes      Osmani Leavitt MD  [facility]  11/13/24  14:35 EST

## 2024-11-15 LAB
HTLV I+II AB SER QL IA: NORMAL
JAK2 P.V617F BLD/T QL: NORMAL
LAB DIRECTOR NAME PROVIDER: NORMAL
LABORATORY COMMENT REPORT: NORMAL

## 2024-11-16 LAB
BACTERIA SPEC AEROBE CULT: NORMAL
BACTERIA SPEC AEROBE CULT: NORMAL

## 2024-11-17 NOTE — PROGRESS NOTES
Chief Complaint    Urologic complaint    Subjective          Jimmie Jamil presents to [facility]  History of Present Illness        69-year-old  gentleman       Hypogonadism   BPH - 2/15/2022  Rezum   ED  cT1c prostatic adenocarcinoma            Recent Cholecystectomy      Getting over this.    follows up after increased his testosterone to 0.3 mL  Depo testosterone subcu every 5 days in 1/21.  Gets a 10 mL vial about every 5 months.        3/24   H/H  18/53,   1.2, GFR 62      No GH      Voiding okay.  No change  Nocturia x2  No prostate meds.       Libido ok /no fatigue    Patient does not do well when he is off his testosterone replacement.      No family history of prostate cancer.    Previous    Has used Cialis 20 mg in the past, is having no trouble currently.    doing phlebotomy/donating blood about every 6 months.    History of increased H/H -has been doing well for a while    2/15/2022  Rezum - 4 cm prostate    Testosterone has run high in the past.  We have decreased his dose from 0.5 x 1 point.    Endocrinologist that he saw earlier that  had recommended therapeutic phlebotomy has retired.    Flomax  - could not tolerate, helped urination.  Nasal stuffiness, unusual feeling    Was on 0.5 mL IM Depo-Testosterone every 9 days before.     Has been on injections for several years    Sildenafil could not tolerate secondary to  side effects      No cardiopulmonary history.  Patient does not smoke.  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  Removed.  Normal bladder otherwise and normal retrogrades  Pathology negative    started initially for decreased libido and fatigue.  Did try AndroGel and patches in the past and did not like these b/c of side effects.    9/21 creatinine 1.4, GFR 51    Total testosterone      10/24    654  9/24     946  7/24    567  3/24    552   8/23    634  3/23    496            469  3/22   587      596  20  159    824      668      806  10/19  995   683     251     928     504 - every 10 days  3/19   826    713    1069    933       563   246   >1500 -  dose was cut in half        cT1c prostatic adenocarcinoma          6.4        10/24  - [facility] - recommended active surveillance with continued TRT -with close monitoring    2024 MRI fusion  Right apex - 3+3, 1/2, 10%  Right mid - 3+3, 1/2, 7%    2024 MRI prostate - 49 g  PI- RADS 4 - 1.3 x 1.4 cm-- right posterior lateral peripheral zone near apex  PI-RADS 3 - left mid gland-1.4 x 1.5 cm  Some chronic prostatitis          8.7      PSAd  0.17  3/24      9.0      3/23      5.6       6.4       4.7   MRI prostate -49 g -negative, chronic prostatitis    4.92   5.46    4.28  3/19  4.0   prostate biopsy 43 g - negative    6.75    2.54          Past History:  Medical History: has a past medical history of Aortic stenosis, Arthritis of back (), Benign essential hypertension, Bladder problem, Cervical disc disorder (), Cervical radiculopathy (2017), Cervical spinal stenosis (2017), Cervicalgia (2017), Coronary artery disease, ED (erectile dysfunction), Greater trochanteric bursitis of right hip (2018), High blood pressure, HL (hearing loss) (01 Oct 2021), Hyperlipidemia, Hypertension, Leg pain, Leg swelling, Low back pain, Low back strain (), Lumbago (2017), Lumbosacral disc disease (), Neck pain, Paresthesia (2017), Periarthritis of shoulder (), Polycythemia, Right hip pain, Rotator cuff syndrome (2021), and Urinary bladder incontinence (2017).   Surgical History: has a past surgical history that includes Cardiac catheterization; Cervical disc surgery; Lumbar epidural injection; Vasectomy; Circumcision, non-; Shoulder arthroscopy w/ labral  repair (Left); Colonoscopy; [place] tooth extraction; Anterior Cervical Fusion (04/12/2017); Cystoscopy (01/10/2014); Prostate biopsy (2018); Cardiac catheterization (N/A, 10/13/2021); Cardiac catheterization (N/A, 10/13/2021); Cardiac catheterization (N/A, 10/13/2021); Neck surgery (2018?); Shoulder surgery (2008); Trigger point injection (2021); Colonoscopy (N/A, 11/20/2023); Prostate biopsy (N/A, 8/8/2024); and Cholecystectomy (N/A, 11/10/2024).   Family History:          Objective     Vital Signs:   There were no vitals taken for this visit.             Assessment and Plan    Diagnoses and all orders for this visit:    1. Hypogonadism in male (Primary)    2. Erectile dysfunction due to arterial insufficiency    3. Prostate cancer                Hypogonadism     Coming off testosterone replacement per patient is not an option.  We discussed this in detail and he understands the risk.Wants to stay on TRT.  Understands the risk      Cont  0.3 mL daily.  Depo testosterone subcu every 5 days.     likes to use 1 - 10 mL vial and get refill after.               Low risk clinical T1c prostatic adenocarcinoma      Follows up today after second opinion.  He saw you okay.  They did recommend active surveillance and continued TRT.  Risk and benefits been discussed and patient is agreeable to this      We will plan on PSA in 3 months    Plan on PSA and MRI prostate in 6-month

## 2024-11-18 ENCOUNTER — READMISSION MANAGEMENT (OUTPATIENT)
Dept: CALL CENTER | Facility: HOSPITAL | Age: 69
End: 2024-11-18
Payer: MEDICARE

## 2024-11-18 NOTE — OUTREACH NOTE
General Surgery Week 1 Survey      Flowsheet Row Responses   [facility] patient discharged from? [place]   Does the patient have one of the following disease processes/diagnoses(primary or secondary)? General Surgery   Week 1 attempt successful? No   Unsuccessful attempts Attempt 1            ANDREIA ORTIZ - Registered Nurse

## 2024-11-19 ENCOUNTER — OFFICE VISIT (OUTPATIENT)
Dept: UROLOGY | Age: 69
End: 2024-11-19
Payer: MEDICARE

## 2024-11-19 VITALS — BODY MASS INDEX: 32.62 KG/M2 | WEIGHT: 233 LBS | HEIGHT: 71 IN

## 2024-11-19 DIAGNOSIS — N52.01 ERECTILE DYSFUNCTION DUE TO ARTERIAL INSUFFICIENCY: ICD-10-CM

## 2024-11-19 DIAGNOSIS — C61 PROSTATE CANCER: ICD-10-CM

## 2024-11-19 DIAGNOSIS — E29.1 HYPOGONADISM IN MALE: Primary | ICD-10-CM

## 2024-11-19 PROCEDURE — 1160F RVW MEDS BY RX/DR IN RCRD: CPT | Performed by: UROLOGY

## 2024-11-19 PROCEDURE — 1159F MED LIST DOCD IN RCRD: CPT | Performed by: UROLOGY

## 2024-11-19 PROCEDURE — 99214 OFFICE O/P EST MOD 30 MIN: CPT | Performed by: UROLOGY

## 2024-11-20 LAB
CITATION REF LAB TEST: NORMAL
LAB DIRECTOR NAME PROVIDER: NORMAL
MPL GENE MUT TESTED BLD/T: NORMAL
MPL P.W515L+W515K+S505N BLD/T QL: NORMAL
SERVICE CMNT-IMP: NORMAL

## 2024-12-02 ENCOUNTER — READMISSION MANAGEMENT (OUTPATIENT)
Dept: CALL CENTER | Facility: HOSPITAL | Age: 69
End: 2024-12-02
Payer: MEDICARE

## 2024-12-02 NOTE — OUTREACH NOTE
General Surgery Week 3 Survey      Flowsheet Row Responses   [facility] patient discharged from? [place]   Does the patient have one of the following disease processes/diagnoses(primary or secondary)? General Surgery   Week 3 attempt successful? No   Unsuccessful attempts Attempt 1            Rita BERNARD - Registered Nurse

## 2024-12-05 ENCOUNTER — READMISSION MANAGEMENT (OUTPATIENT)
Dept: CALL CENTER | Facility: HOSPITAL | Age: 69
End: 2024-12-05
Payer: MEDICARE

## 2024-12-05 NOTE — OUTREACH NOTE
General Surgery Week 3 Survey      Flowsheet Row Responses   [facility] patient discharged from? [place]   Does the patient have one of the following disease processes/diagnoses(primary or secondary)? General Surgery   Week 3 attempt successful? Yes   Call start time 1450   Call end time 1452   Discharge diagnosis Robotic assisted cholecystectomy with intraoperative cholangiogram   Person spoke with today (if not patient) and relationship spouse   Meds reviewed with patient/caregiver? Yes   Is the patient taking all medications as directed (includes completed medication regime)? Yes   Does the patient have a follow up appointment scheduled with their surgeon? Yes   Has the patient kept scheduled appointments due by today? Yes   Comments Saw pulmology .  f/u with surgeon in next week.   Psychosocial issues? No   Did the patient receive a copy of their discharge instructions? Yes   Nursing interventions Reviewed instructions with patient   What is the patient's perception of their health status since discharge? Improving   Week 3 call completed? Yes   Graduated Yes   Is the patient interested in additional calls from an ambulatory ? No   Would this patient benefit from a Referral to Amb Social Work? No   Wrap up additional comments spouse reports patient is doing great.  Denies needs.   Call end time 1452            LUIS KAPOOR - Registered Nurse

## 2024-12-06 ENCOUNTER — INFUSION (OUTPATIENT)
Dept: ONCOLOGY | Facility: HOSPITAL | Age: 69
End: 2024-12-06
Payer: MEDICARE

## 2024-12-06 ENCOUNTER — OFFICE VISIT (OUTPATIENT)
Dept: ONCOLOGY | Facility: CLINIC | Age: 69
End: 2024-12-06
Payer: MEDICARE

## 2024-12-06 ENCOUNTER — LAB (OUTPATIENT)
Dept: LAB | Facility: HOSPITAL | Age: 69
End: 2024-12-06
Payer: MEDICARE

## 2024-12-06 VITALS
RESPIRATION RATE: 16 BRPM | HEIGHT: 71 IN | TEMPERATURE: 98 F | DIASTOLIC BLOOD PRESSURE: 74 MMHG | HEART RATE: 61 BPM | BODY MASS INDEX: 35.11 KG/M2 | OXYGEN SATURATION: 96 % | SYSTOLIC BLOOD PRESSURE: 121 MMHG | WEIGHT: 250.8 LBS

## 2024-12-06 DIAGNOSIS — D72.829 LEUKOCYTOSIS, UNSPECIFIED TYPE: Primary | ICD-10-CM

## 2024-12-06 DIAGNOSIS — D75.1 POLYCYTHEMIA: Primary | ICD-10-CM

## 2024-12-06 DIAGNOSIS — D75.1 POLYCYTHEMIA: ICD-10-CM

## 2024-12-06 LAB
BASOPHILS # BLD AUTO: 0.1 10*3/MM3 (ref 0–0.2)
BASOPHILS NFR BLD AUTO: 0.9 % (ref 0–1.5)
DEPRECATED RDW RBC AUTO: 39.6 FL (ref 37–54)
EOSINOPHIL # BLD AUTO: 0.14 10*3/MM3 (ref 0–0.4)
EOSINOPHIL NFR BLD AUTO: 1.2 % (ref 0.3–6.2)
ERYTHROCYTE [DISTWIDTH] IN BLOOD BY AUTOMATED COUNT: 12.8 % (ref 12.3–15.4)
HCT VFR BLD AUTO: 54.3 % (ref 37.5–51)
HGB BLD-MCNC: 18.5 G/DL (ref 13–17.7)
IMM GRANULOCYTES # BLD AUTO: 0.27 10*3/MM3 (ref 0–0.05)
IMM GRANULOCYTES NFR BLD AUTO: 2.4 % (ref 0–0.5)
LYMPHOCYTES # BLD AUTO: 3.58 10*3/MM3 (ref 0.7–3.1)
LYMPHOCYTES NFR BLD AUTO: 31.4 % (ref 19.6–45.3)
MCH RBC QN AUTO: 28.9 PG (ref 26.6–33)
MCHC RBC AUTO-ENTMCNC: 34.1 G/DL (ref 31.5–35.7)
MCV RBC AUTO: 84.8 FL (ref 79–97)
MONOCYTES # BLD AUTO: 2.06 10*3/MM3 (ref 0.1–0.9)
MONOCYTES NFR BLD AUTO: 18.1 % (ref 5–12)
NEUTROPHILS NFR BLD AUTO: 46 % (ref 42.7–76)
NEUTROPHILS NFR BLD AUTO: 5.24 10*3/MM3 (ref 1.7–7)
NRBC BLD AUTO-RTO: 0 /100 WBC (ref 0–0.2)
PLATELET # BLD AUTO: 279 10*3/MM3 (ref 140–450)
PMV BLD AUTO: 9.7 FL (ref 6–12)
RBC # BLD AUTO: 6.4 10*6/MM3 (ref 4.14–5.8)
WBC NRBC COR # BLD AUTO: 11.39 10*3/MM3 (ref 3.4–10.8)

## 2024-12-06 PROCEDURE — 99195 PHLEBOTOMY: CPT

## 2024-12-06 PROCEDURE — 36415 COLL VENOUS BLD VENIPUNCTURE: CPT

## 2024-12-06 PROCEDURE — 85025 COMPLETE CBC W/AUTO DIFF WBC: CPT

## 2024-12-06 RX ORDER — SODIUM CHLORIDE 9 MG/ML
250 INJECTION, SOLUTION INTRAVENOUS ONCE
OUTPATIENT
Start: 2024-12-06

## 2024-12-06 RX ORDER — SODIUM CHLORIDE 9 MG/ML
250 INJECTION, SOLUTION INTRAVENOUS ONCE
Status: CANCELLED | OUTPATIENT
Start: 2024-12-06

## 2024-12-06 NOTE — LETTER
December 6, 2024     HAN Helm  [address] [address]    Patient: Jimmie Jamil   YOB: 1955   Date of Visit: 12/6/2024     Dear HAN Helm:       Thank you for referring Jimmie Jamil to me for evaluation. Below are the relevant portions of my assessment and plan of care.    If you have questions, please do not hesitate to call me. I look forward to following Jimmie along with you.         Sincerely,        Zane Lentz MD        CC: MD Tor Ordonez, Zane JARVIS MD  12/06/24 2784  Incomplete      REASON FOR CONSULTATION: Polycythemia, erythrocytosis-presumed secondary to testosterone therapy                             HISTORY OF PRESENT ILLNESS:  The patient is a 69-year-old male with a somewhat complex medical history inclusive of coronary artery disease, cardiac murmur, echo 11/2023 with EF of 61%, aortic stenosis history of prostate cancer status post biopsy being managed expectantly, cervical spinal stenosis.     He also has a history of secondary polycythemia undergoing therapy with testosterone injections.  Further history includes erythrocytosis/polycythemia for as long as 40 years followed by a number of physicians but never hematology.  He started phlebotomy  or blood donations, again, as long as 30+ years ago as did his brother who had similar findings of elevated hemoglobin hematocrit.  His wife indicates that he is often plethoric and particularly  demonstrates ruddiness in the face that will indicate to them both that he needs a phlebotomy.       Recently symptoms of chest pain dyspnea worsened and he was seen by cardiology 10/31/2024 with CT scans negative but demonstrating evidence of cholelithiasis with mild pericholecystic inflammatory changes-?  Chronic cholecystitis.  He was referred to general surgery with a subsequent request to cardiology for clearance.  His symptoms however prompted assessment for  "cardiac catheterization.    The patient was assessed as he proceeded to and underwent a robotic assisted cholecystectomy with intraoperative cholangiogram.  This went well with findings of acute calculus cholecystitis.  We have followed him during his hospitalization.  Studies include LDH of 304 obtain 11/11/2024, EPO level 5.5, HTLV testing was not able to be completed, MPL mutation analysis was negative, JAK2 V6 17F was negative for mutation, flow cytometry negative for abnormality, PSA of 6.410.    At discharge the patient CBC including H&H is 17.6 and 51.6 and when seen 12/6/2024 H&H are 18.5 and 54.3.  As result of this finding and the patient's continued use of testosterone we have offered phlebotomy in office today.        Past Medical History, Past Surgical History, Social History, Family History have been reviewed and are without significant changes except as mentioned.      Medications:  The current medication list was reviewed in the EMR    ALLERGIES:    Allergies   Allergen Reactions   • Beta Adrenergic Blockers Unknown - High Severity   • Crestor [Rosuvastatin Calcium] Myalgia   • Keflex [Cephalexin] Other (See Comments)     hiccups   • Livalo [Pitavastatin] Myalgia   • Losartan Other (See Comments)     Intolerance due to fatigue, depression   • Naproxen Other (See Comments)     Chest pain       Objective     Vitals:    12/06/24 1226   BP: 121/74   Pulse: 61   Resp: 16   Temp: 98 °F (36.7 °C)   TempSrc: Oral   SpO2: 96%   Weight: 114 kg (250 lb 12.8 oz)   Height: 180.3 cm (70.98\")   PainSc: 0-No pain           12/6/2024    12:23 PM   Current Status   ECOG score 0       CONSTITUTIONAL: pleasant well-developed adult man, moderate plethoric  HEENT: no icterus, no thrush, moist membranes  LYMPH: no cervical or supraclavicular lad  CV: RRR, S1S2, no murmur  RESP: cta bilat, no wheezing, no rales  GI: soft, nontender, no splenomegaly, +BS, laparoscopic scars  MUSC: no edema, normal gait  NEURO: alert and " oriented x3, normal strength  PSYCH: normal mood and affect    Exam unchanged-11/12/2024  RECENT LABS:  Hematology WBC   Date Value Ref Range Status   12/06/2024 11.39 (H) 3.40 - 10.80 10*3/mm3 Final     RBC   Date Value Ref Range Status   12/06/2024 6.40 (H) 4.14 - 5.80 10*6/mm3 Final     Hemoglobin   Date Value Ref Range Status   12/06/2024 18.5 (H) 13.0 - 17.7 g/dL Final     Hematocrit   Date Value Ref Range Status   12/06/2024 54.3 (H) 37.5 - 51.0 % Final     Platelets   Date Value Ref Range Status   12/06/2024 279 140 - 450 10*3/mm3 Final                Assessment & Plan    *History of polycythemia likely secondary to testosterone placement placement therapy.  Again this is long-term in nature possibly 30+ years.  He has never had a hematology assessment but has had carbon monoxide exposures on the job as a  beginning in his late teens and 20s and, later, testosterone replacement therapy for at least 10+ years.  He periodically has had phlebotomy   Studies drawn 11/10/2024-EPO level, LDH, JAK2 analysis with reflex, MPL analysis  Current hemoglobin 17.1/hematocrit 17.1%  Studies include LDH of 304 obtain 11/11/2024, EPO level 5.5, HTLV testing was not able to be completed, MPL mutation analysis was negative, JAK2 V6 17F was negative for mutation, flow cytometry negative for abnormality, PSA of 6.410.  Patient's erythrocytosis secondary to testosterone use       *History of prostate cancer  Steve grade 3+3 (grade group 1) with biopsy 9/3/2024.  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-testing inconclusive  Repeat antibodies requested in office 12/6/2024    *Difficulty weaning from O2-pulmonary medicine consulted  Status post assessment by pulmonary medicine, currently stable    Hematology plan/recommendations:  *Again findings as above supportive of erythrocytosis secondary to ongoing testosterone therapy  *The patient is currently a candidate for phlebotomy which we will provide in office today  *We will recheck  HTLV antibodies  *3 months MD, possible phlebotomy    12/6/2024      CC:            Zane Lentz MD  12/06/24 6204  Sign when Signing Visit      REASON FOR CONSULTATION: Polycythemia                            INTERVAL HISTORY:  The patient underwent a robot-assisted laparoscopic cholecystectomy on 11/10/2024.  He complains of shortness of breath with exertion and states his O2 sats were 85% when up to bathroom.  He has pain around his collarbone reproducible.  He denies pleuritic chest pain.    HISTORY OF PRESENT ILLNESS:  The patient is a 69-year-old male with a somewhat complex medical history inclusive of coronary artery disease, cardiac murmur, echo 11/2023 with EF of 61%, aortic stenosis history of prostate cancer status post biopsy being managed expectantly, cervical spinal stenosis.     He also has a history of secondary polycythemia undergoing therapy with testosterone injections.  Further history includes erythrocytosis/polycythemia for as long as 40 years followed by a number of physicians but never hematology.  He started phlebotomy  or blood donations, again, as long as 30+ years ago as did his brother who had similar findings of elevated hemoglobin hematocrit.  His wife indicates that he is often plethoric and particularly  demonstrates ruddiness in the face that will indicate to them  "both that he needs a phlebotomy.       Recently symptoms of chest pain dyspnea worsened and he was seen by cardiology 10/31/2024 with CT scans negative but demonstrating evidence of cholelithiasis with mild pericholecystic inflammatory changes-?  Chronic cholecystitis.  He was referred to general surgery with a subsequent request to cardiology for clearance.  His symptoms however prompted assessment for cardiac catheterization.    The patient was assessed as he proceeded to and underwent a robotic assisted cholecystectomy with intraoperative cholangiogram.  This went well with findings of acute calculus cholecystitis.  We have followed him during his hospitalization.  Studies include LDH of 304 obtain 11/11/2024, EPO level 5.5, HTLV testing was not able to be completed, MPL mutation analysis was negative, JAK2 V6 17F was negative for mutation, flow cytometry negative for abnormality, PSA of 6.410.    At discharge the patient CBC including H&H is 17.6 and 51.6 and when seen 12/6/2024 H&H are 18.5 and 54.3.        Past Medical History, Past Surgical History, Social History, Family History have been reviewed and are without significant changes except as mentioned.      Medications:  The current medication list was reviewed in the EMR    ALLERGIES:    Allergies   Allergen Reactions   • Beta Adrenergic Blockers Unknown - High Severity   • Crestor [Rosuvastatin Calcium] Myalgia   • Keflex [Cephalexin] Other (See Comments)     hiccups   • Livalo [Pitavastatin] Myalgia   • Losartan Other (See Comments)     Intolerance due to fatigue, depression   • Naproxen Other (See Comments)     Chest pain       Objective     Vitals:    12/06/24 1226   BP: 121/74   Pulse: 61   Resp: 16   Temp: 98 °F (36.7 °C)   TempSrc: Oral   SpO2: 96%   Weight: 114 kg (250 lb 12.8 oz)   Height: 180.3 cm (70.98\")   PainSc: 0-No pain           12/6/2024    12:23 PM   Current Status   ECOG score 0       CONSTITUTIONAL: pleasant well-developed adult man " lying in bed no distress  HEENT: no icterus, no thrush, moist membranes  LYMPH: no cervical or supraclavicular lad  CV: RRR, S1S2, no murmur  RESP: cta bilat, no wheezing, no rales  GI: soft, nontender, no splenomegaly, +BS, laparoscopic scars  MUSC: no edema, normal gait  NEURO: alert and oriented x3, normal strength  PSYCH: normal mood and affect    Exam unchanged-11/12/2024  RECENT LABS:  Hematology WBC   Date Value Ref Range Status   12/06/2024 11.39 (H) 3.40 - 10.80 10*3/mm3 Final     RBC   Date Value Ref Range Status   12/06/2024 6.40 (H) 4.14 - 5.80 10*6/mm3 Final     Hemoglobin   Date Value Ref Range Status   12/06/2024 18.5 (H) 13.0 - 17.7 g/dL Final     Hematocrit   Date Value Ref Range Status   12/06/2024 54.3 (H) 37.5 - 51.0 % Final     Platelets   Date Value Ref Range Status   12/06/2024 279 140 - 450 10*3/mm3 Final                Assessment & Plan    *History of polycythemia likely secondary to testosterone placement placement therapy.  Again this is long-term in nature possibly 30+ years.  He has never had a hematology assessment but has had carbon monoxide exposures on the job as a  beginning in his late teens and 20s and, later, testosterone replacement therapy for at least 10+ years.  He periodically has had phlebotomy   Studies drawn 11/10/2024-EPO level, LDH, JAK2 analysis with reflex, MPL analysis  Current hemoglobin 17.1/hematocrit 17.1%  Studies include LDH of 304 obtain 11/11/2024, EPO level 5.5, HTLV testing was not able to be completed, MPL mutation analysis was negative, JAK2 V6 17F was negative for mutation, flow cytometry negative for abnormality, PSA of 6.410.       *History of prostate cancer  Steve grade 3+3 (grade group 1) with biopsy 9/3/2024.  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

## 2024-12-06 NOTE — PROGRESS NOTES
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     He also has a history of secondary polycythemia undergoing therapy with testosterone injections.  Further history includes erythrocytosis/polycythemia for as long as 40 years followed by a number of physicians but never hematology.  He started phlebotomy  or blood donations, again, as long as 30+ years ago as did his brother who had similar findings of elevated hemoglobin hematocrit.  His wife indicates that he is often plethoric and particularly  demonstrates ruddiness in the face that will indicate to them both that he needs a phlebotomy.       Recently symptoms of chest pain dyspnea worsened and he was seen by cardiology 10/31/2024 with CT scans negative but demonstrating evidence of cholelithiasis with mild pericholecystic inflammatory changes-?  Chronic cholecystitis.  He was referred to general surgery with a subsequent request to cardiology for clearance.  His symptoms however prompted assessment for cardiac catheterization.    The patient was assessed as he proceeded to and underwent a robotic assisted cholecystectomy with intraoperative cholangiogram.  This went well with findings of acute calculus cholecystitis.  We have followed him during his hospitalization.  Studies include LDH of 304 obtain 11/11/2024, EPO level 5.5, HTLV testing was not able to be completed, MPL mutation analysis was negative, JAK2 V6 17F was negative for mutation, flow cytometry negative for abnormality, PSA of 6.410.    At discharge the patient CBC including H&H is 17.6 and 51.6 and when seen 12/6/2024 H&H  "are 18.5 and 54.3.  As result of this finding and the patient's continued use of testosterone we have offered phlebotomy in office today.        Past Medical History, Past Surgical History, Social History, Family History have been reviewed and are without significant changes except as mentioned.      Medications:  The current medication list was reviewed in the EMR    ALLERGIES:    Allergies   Allergen Reactions    Beta Adrenergic Blockers Unknown - High Severity    Crestor [Rosuvastatin Calcium] Myalgia    Keflex [Cephalexin] Other (See Comments)     hiccups    Livalo [Pitavastatin] Myalgia    Losartan Other (See Comments)     Intolerance due to fatigue, depression    Naproxen Other (See Comments)     Chest pain       Objective      Vitals:    12/06/24 1226   BP: 121/74   Pulse: 61   Resp: 16   Temp: 98 °F (36.7 °C)   TempSrc: Oral   SpO2: 96%   Weight: 114 kg (250 lb 12.8 oz)   Height: 180.3 cm (70.98\")   PainSc: 0-No pain           12/6/2024    12:23 PM   Current Status   ECOG score 0       CONSTITUTIONAL: pleasant well-developed adult man, moderate plethoric  HEENT: no icterus, no thrush, moist membranes  LYMPH: no cervical or supraclavicular lad  CV: RRR, S1S2, no murmur  RESP: cta bilat, no wheezing, no rales  GI: soft, nontender, no splenomegaly, +BS, laparoscopic scars  MUSC: no edema, normal gait  NEURO: alert and oriented x3, normal strength  PSYCH: normal mood and affect    Exam unchanged-11/12/2024  RECENT LABS:  Hematology WBC   Date Value Ref Range Status   12/06/2024 11.39 (H) 3.40 - 10.80 10*3/mm3 Final     RBC   Date Value Ref Range Status   12/06/2024 6.40 (H) 4.14 - 5.80 10*6/mm3 Final     Hemoglobin   Date Value Ref Range Status   12/06/2024 18.5 (H) 13.0 - 17.7 g/dL Final     Hematocrit   Date Value Ref Range Status   12/06/2024 54.3 (H) 37.5 - 51.0 % Final     Platelets   Date Value Ref Range Status   12/06/2024 279 140 - 450 10*3/mm3 Final                Assessment & Plan     *History of " polycythemia likely secondary to testosterone placement placement therapy.  Again this is long-term in nature possibly 30+ years.  He has never had a hematology assessment but has had carbon monoxide exposures on the job as a  beginning in his late teens and 20s and, later, testosterone replacement therapy for at least 10+ years.  He periodically has had phlebotomy   Studies drawn 11/10/2024-EPO level, LDH, JAK2 analysis with reflex, MPL analysis  Current hemoglobin 17.1/hematocrit 17.1%  Studies include LDH of 304 obtain 11/11/2024, EPO level 5.5, HTLV testing was not able to be completed, MPL mutation analysis was negative, JAK2 V6 17F was negative for mutation, flow cytometry negative for abnormality, PSA of 6.410.  Patient's erythrocytosis secondary to testosterone use       *History of prostate cancer  Arena grade 3+3 (grade group 1) with biopsy 9/3/2024.  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

## 2024-12-07 LAB — HTLV I+II AB SER QL IA: NEGATIVE

## 2024-12-13 ENCOUNTER — TELEPHONE (OUTPATIENT)
Dept: UROLOGY | Age: 69
End: 2024-12-13
Payer: MEDICARE

## 2024-12-13 DIAGNOSIS — E29.1 HYPOGONADISM IN MALE: ICD-10-CM

## 2024-12-13 RX ORDER — TESTOSTERONE CYPIONATE 200 MG/ML
INJECTION, SOLUTION INTRAMUSCULAR
Qty: 10 ML | Refills: 0 | Status: SHIPPED | OUTPATIENT
Start: 2024-12-13

## 2024-12-13 NOTE — TELEPHONE ENCOUNTER
PATIENT'S WIFE, NIMA, CALLED AND ASKED FOR REFILL ON TESTOSTERONE.  PATIENT HAD AN APPOINTMENT WITH DR. LIGHT IN JUNE, AND HE HAS AN APPOINTMENT SCHEDULED WITH HIM IN FEBRUARY.      #[phone]

## 2024-12-18 ENCOUNTER — OFFICE VISIT (OUTPATIENT)
Dept: SURGERY | Facility: CLINIC | Age: 69
End: 2024-12-18
Payer: MEDICARE

## 2024-12-18 VITALS
HEART RATE: 87 BPM | OXYGEN SATURATION: 94 % | HEIGHT: 71 IN | DIASTOLIC BLOOD PRESSURE: 82 MMHG | WEIGHT: 250 LBS | SYSTOLIC BLOOD PRESSURE: 142 MMHG | BODY MASS INDEX: 35 KG/M2

## 2024-12-18 DIAGNOSIS — K81.9 CHOLECYSTITIS: Primary | ICD-10-CM

## 2024-12-18 NOTE — PROGRESS NOTES
ASSESSMENT/PLAN:    69-year-old gentleman status post robotic assisted cholecystectomy with intraoperative cholangiogram on 11/10/2024.  Pathology reviewed with him demonstrating acute calculus cholecystitis.  He is healing well.  Incisions are in good order.  One of the right sided abdominal incisions does have a small piece of suture which is starting to work through the skin.  Advised him he could trim this at the skin level with a nail clipper once it is poking out.  He can follow-up with me on an as-needed basis should any new issues or concerns arise       CC:     Chief Complaint   Patient presents with    Post-op     Robotic assisted cholecystectomy with intraoperative cholangiogram 11/10/24        HPI:    He reports he is doing well.  Having no issues since surgery.    SOCIAL HISTORY:   Social Drivers of Health     Tobacco Use: Low Risk  (12/18/2024)    Patient History     Smoking Tobacco Use: Never     Smokeless Tobacco Use: Never     Passive Exposure: Never   Alcohol Use: Not At Risk (11/8/2024)    AUDIT-C     Frequency of Alcohol Consumption: 2-3 times a week     Average Number of Drinks: 1 or 2     Frequency of Binge Drinking: Never   Financial Resource Strain: Not on file   Food Insecurity: Not on file   Transportation Needs: Not on file   Physical Activity: Not on file   Stress: Not on file   Social Connections: Not on file   Interpersonal Safety: Not At Risk (12/6/2024)    Abuse Screen     Unsafe at Home or Work/School: no     Feels Threatened by Someone?: no     Does Anyone Keep You from Contacting Others or Doint Things Outside the Home?: no     Physical Sign of Abuse Present: no   Depression: Not at risk (12/6/2024)    PHQ-2     PHQ-2 Score: 0   Housing Stability: Unknown (11/12/2024)    Housing Stability     Current Living Arrangements: home     Potentially Unsafe Housing Conditions: Not on file   Utilities: Not on file   Health Literacy: Unknown (11/12/2024)    Education     Help with school or  training?: Not on file     Preferred Language: English   Employment: Not on file   Disabilities: Not At Risk (11/8/2024)    Disabilities     Concentrating, Remembering, or Making Decisions Difficulty: no     Doing Errands Independently Difficulty: no        FAMILY HISTORY:    Family History   Problem Relation Age of Onset    Arthritis Mother     Alzheimer's disease Mother 80    Arthritis Father     Heart disease Father     Colon cancer Father 85    Hypertension Father 80    Cancer Father         Colon cx in his 90s    Stroke Father         Mild in his 80s        OTHER SURGERY:  Past Surgical History:   Procedure Laterality Date    ANTERIOR CERVICAL FUSION  04/12/2017    C3-4    CARDIAC CATHETERIZATION      everything okay clearance for neck surgery    CARDIAC CATHETERIZATION N/A 10/13/2021    Procedure: Left Heart Cath;  Surgeon: Anahi Perales MD;  Location: [facility] CATH INVASIVE LOCATION;  Service: Cardiovascular;  Laterality: N/A;    CARDIAC CATHETERIZATION N/A 10/13/2021    Procedure: Coronary angiography;  Surgeon: Anahi Perales MD;  [facility];  Service: Cardiovascular;  Laterality: N/A;    CARDIAC CATHETERIZATION N/A 10/13/2021    Procedure: Left ventriculography;  Surgeon: Anahi Perales MD;  Location: [facility] CATH INVASIVE LOCATION;  Service: Cardiovascular;  Laterality: N/A;    CERVICAL DISC SURGERY      C2-3 fusion    CHOLECYSTECTOMY N/A 11/10/2024    Procedure: Robotic assisted cholecystectomy with intraoperative cholangiogram;  Surgeon: Reagan Hernandez MD;  Location: [facility] OR;  Service: Robotics - DaVinci;  Laterality: N/A;    CIRCUMCISION      COLONOSCOPY      COLONOSCOPY N/A 11/20/2023    Procedure: COLONOSCOPY WITH COLD SNARE POLYECTOMY;  Surgeon: Kenny Urena MD;  Location: [facility] ENDOSCOPY;  Service: Gastroenterology;  Laterality: N/A;  COLON POLYP    CYSTOSCOPY  01/10/2014    Cysto ivan retrograde pyelogram urthral bx.    LUMBAR EPIDURAL INJECTION       L2-S2  going to try ablasion in future    NECK SURGERY  2018?    PROSTATE BIOPSY  2018    PROSTATE BIOPSY N/A 08/08/2024    Procedure: MRI fusion transrectal ultrasound-guided prostate biopsy;  Surgeon: Zechariah Clay MD;  Location: [facility] MAIN OR;  Service: Urology;  Laterality: N/A;    PROSTATE SURGERY  2021    SHOULDER ARTHROSCOPY W/ LABRAL REPAIR Left     SHOULDER SURGERY  2008    TRIGGER POINT INJECTION  2021    L5-S1    VASECTOMY  1990    WISDOM TOOTH EXTRACTION          PAST MEDICAL HISTORY:    Past Medical History:   Diagnosis Date    Aortic stenosis     Arthritis of back 2015    Benign essential hypertension     Bladder problem     BPH (benign prostatic hyperplasia)     Cancer     Cervical disc disorder 2018    Cervical radiculopathy 03/16/2017    Cervical spinal stenosis 03/16/2017    Cervicalgia 03/16/2017    Cholelithiasis 2022    Colon polyp 2023    Sm tubular adenoma removed during colonoscopy    Coronary artery disease     diastolic dysfunction    DJD (degenerative joint disease)     ED (erectile dysfunction)     Greater trochanteric bursitis of right hip 07/09/2018    High blood pressure     HL (hearing loss) 01 Oct 2021    Hormone disorder 2010    Hyperlipidemia     Hypertension     Leg pain     Leg swelling     Low back pain     Low back strain 2015    Lumbago 03/16/2017    Lumbosacral disc disease 2018    Neck pain     Paresthesia 03/16/2017    left hand/leg    Periarthritis of shoulder 2021    Polycythemia     Right hip pain     Rotator cuff syndrome 09/01/2021    Urinary bladder incontinence 03/16/2017        MEDICATIONS:   Current Outpatient Medications on File Prior to Visit   Medication Sig Dispense Refill    ascorbic acid (VITAMIN C) 1000 MG tablet Take 1 tablet by mouth Daily.      aspirin 81 MG EC tablet Take 1 tablet by mouth Daily.      Coenzyme Q10 (CoQ10) 100 MG capsule       enalapril (VASOTEC) 20 MG tablet Take 1 tablet by mouth twice daily 180 tablet 0    furosemide (LASIX)  "40 MG tablet Take 1 tablet by mouth.      Leqvio 284 MG/1.5ML solution prefilled syringe EVERY 6 MTHS      ofloxacin (OCUFLOX) 0.3 % ophthalmic solution USES FOR EARS INSTEAD OF EYES      Testosterone Cypionate (DEPOTESTOTERONE CYPIONATE) 200 MG/ML injection INJECT 0.3 ML UNDER THE SKIN Q 5 DAYS AS DIRECTED 10 mL 0    VASCEPA 1 g capsule capsule Take 2 g by mouth 2 (Two) Times a Day With Meals. 360 capsule 3    verapamil ER (VERELAN) 360 MG 24 hr capsule Take 1 capsule by mouth Every Night. Patient stated taking 420mg      [DISCONTINUED] acetaminophen (TYLENOL) 500 MG tablet Take 2 tablets by mouth Every 6 (Six) Hours.      [DISCONTINUED] Tobramycin-dexAMETHasone (TobraDex ST) 0.3-0.05 % suspension 3-4 drops in affected ear twice a day 5 mL 1     No current facility-administered medications on file prior to visit.        ALLERGIES:   Allergies   Allergen Reactions    Beta Adrenergic Blockers Unknown - High Severity    Crestor [Rosuvastatin Calcium] Myalgia    Keflex [Cephalexin] Other (See Comments)     hiccups    Livalo [Pitavastatin] Myalgia    Losartan Other (See Comments)     Intolerance due to fatigue, depression    Naproxen Other (See Comments)     Chest pain        Body mass index is 34.89 kg/m².  180.3 cm (70.98\")  113 kg (250 lb)             Reagan Hernandez M.D.  General and Endoscopic Surgery  [facility] [place], operated by [facility]    [address], [address]  [place], [place], [address]  P: [phone]  F: [phone]     "

## 2024-12-27 RX ORDER — VERAPAMIL HYDROCHLORIDE 240 MG/1
480 TABLET, FILM COATED, EXTENDED RELEASE ORAL NIGHTLY
Qty: 180 TABLET | Refills: 0 | Status: SHIPPED | OUTPATIENT
Start: 2024-12-27

## 2025-01-21 ENCOUNTER — LAB (OUTPATIENT)
Dept: LAB | Facility: HOSPITAL | Age: 70
End: 2025-01-21
Payer: MEDICARE

## 2025-01-21 DIAGNOSIS — H92.12 OTORRHEA OF LEFT EAR: ICD-10-CM

## 2025-01-21 PROCEDURE — 86335 IMMUNFIX E-PHORSIS/URINE/CSF: CPT

## 2025-01-24 LAB — B2 TRANSFERRIN FLD QL: NOT DETECTED

## 2025-02-11 ENCOUNTER — TELEPHONE (OUTPATIENT)
Dept: UROLOGY | Age: 70
End: 2025-02-11
Payer: MEDICARE

## 2025-02-11 NOTE — TELEPHONE ENCOUNTER
PATIENT'S WIFE, NIMA, CALLED TO SEE IF LAB ORDERS ARE PLACED FOR APPOINTMENT.  I TOLD HER A PSA ORDER HAS BEEN PLACED.  SHE SAID HER  SAID DR. LIGHT WANTED A CBC AND TESTOSTERONE.    #[phone]

## 2025-02-12 ENCOUNTER — LAB (OUTPATIENT)
Facility: HOSPITAL | Age: 70
End: 2025-02-12
Payer: MEDICARE

## 2025-02-12 DIAGNOSIS — E29.1 HYPOGONADISM IN MALE: ICD-10-CM

## 2025-02-12 DIAGNOSIS — E29.1 HYPOGONADISM IN MALE: Primary | ICD-10-CM

## 2025-02-12 DIAGNOSIS — C61 PROSTATE CANCER: ICD-10-CM

## 2025-02-12 LAB
DEPRECATED RDW RBC AUTO: 39 FL (ref 37–54)
ERYTHROCYTE [DISTWIDTH] IN BLOOD BY AUTOMATED COUNT: 12.9 % (ref 12.3–15.4)
HCT VFR BLD AUTO: 54.1 % (ref 37.5–51)
HGB BLD-MCNC: 18.8 G/DL (ref 13–17.7)
MCH RBC QN AUTO: 29.2 PG (ref 26.6–33)
MCHC RBC AUTO-ENTMCNC: 34.8 G/DL (ref 31.5–35.7)
MCV RBC AUTO: 84 FL (ref 79–97)
PLATELET # BLD AUTO: 288 10*3/MM3 (ref 140–450)
PMV BLD AUTO: 10.1 FL (ref 6–12)
PSA SERPL-MCNC: 9.24 NG/ML (ref 0–4)
RBC # BLD AUTO: 6.44 10*6/MM3 (ref 4.14–5.8)
TESTOST SERPL-MCNC: 693 NG/DL (ref 193–740)
WBC NRBC COR # BLD AUTO: 11.86 10*3/MM3 (ref 3.4–10.8)

## 2025-02-12 PROCEDURE — 84403 ASSAY OF TOTAL TESTOSTERONE: CPT

## 2025-02-12 PROCEDURE — 85027 COMPLETE CBC AUTOMATED: CPT

## 2025-02-12 PROCEDURE — 84153 ASSAY OF PSA TOTAL: CPT

## 2025-02-12 PROCEDURE — 36415 COLL VENOUS BLD VENIPUNCTURE: CPT

## 2025-02-12 NOTE — TELEPHONE ENCOUNTER
Spoke to patients wife. I informed her that dr mariscal last note does not mention the CBC or Testosterone, only PSA. Patient would feel better about getting the CBC and Testosterone drawn just in case as this is what he typically follows up with.

## 2025-02-16 NOTE — PROGRESS NOTES
Chief Complaint    Urologic complaint    Subjective          Jimmie Jamil presents to [facility] UROLOGY  History of Present Illness        69-year-old  gentleman       Hypogonadism   BPH - 2/15/2022  [facility]   ED  cT1c prostatic adenocarcinoma          2/25   H/H 18/54 -patient is scheduled for phlebotomy in 3/25 -being followed by medical oncology in [facility]  2/25   693   TT      follows up after increased his testosterone to 0.3 mL  Depo testosterone subcu every 5 days in 1/21.  Gets a 10 mL vial about every 5 months.        3/24   H/H  18/53,   1.2, GFR 62      No GH      Voiding okay.  No change  Nocturia x2  No prostate meds.       No cardiopulmonary history.  Patient does not smoke.  Aspirin 325 daily          No family history of prostate cancer.    Previous    Has used Cialis 20 mg in the past, is having no trouble currently.    doing phlebotomy/donating blood about every 6 months.    History of increased H/H -has been doing well for a while    2/15/2022  Rezum - 4 cm prostate    Testosterone has run high in the past.  We have decreased his dose from 0.5 x 1 point.    Endocrinologist that he saw earlier that  had recommended therapeutic phlebotomy has retired.    Flomax  - could not tolerate, helped urination.  Nasal stuffiness, unusual feeling    Was on 0.5 mL IM Depo-Testosterone every 9 days before.     Has been on injections for several years    Sildenafil could not tolerate secondary to  side effects          1/14  cystoscopy with bilateral retrograde pyelograms and urethral biopsy polypoid tumor in urethra.  Removed.  Normal bladder otherwise and normal retrogrades  Pathology negative    started initially for decreased libido and fatigue.  Did try AndroGel and patches in the past and did not like these b/c of side effects.    9/21 creatinine 1.4, GFR 51    Total testosterone      10/24    654  9/24     946  7/24    567  3/24    552   8/23    634  3/23    496        9/22    469  3/22   587  9/21    596  12/30/20  159  12/20  824  8/20    668  2/20    806  10/19  995  11/19 683  8/19   251  7/19   928  6/19   504 - every 10 days  3/19   826  12/18  713  7/18  1069  7/18  933      6/17 563  6/16 246  2/16 >1500 -  dose was cut in half        cT1c prostatic adenocarcinoma    2/25      9.2   11/24    6.4        10/24  - [place] - recommended active surveillance with continued TRT -with close monitoring -patient stated coming off TRT is not an option he understands the risk and will not stop this    8/8/2024 MRI fusion  Right apex - 3+3, 1/2, 10%  Right mid - 3+3, 1/2, 7%    7/23/2024 MRI prostate - 49 g  PI- RADS 4 - 1.3 x 1.4 cm-- right posterior lateral peripheral zone near apex  PI-RADS 3 - left mid gland-1.4 x 1.5 cm  Some chronic prostatitis    6/24      8.7      PSAd  0.17  3/24      9.0      3/23      5.6  9/22     6.4  9/21     4.7  2/21 MRI prostate -49 g -negative, chronic prostatitis  12/20  4.92  12/20 5.46  6/19  4.28  3/19  4.0  8/18 prostate biopsy 43 g - negative  7/18  6.75  2/16  2.54          Past History:  Medical History: has a past medical history of Aortic stenosis, Arthritis of back (2015), Benign essential hypertension, Bladder problem, BPH (benign prostatic hyperplasia), Cancer, Cervical disc disorder (2018), Cervical radiculopathy (03/16/2017), Cervical spinal stenosis (03/16/2017), Cervicalgia (03/16/2017), Cholelithiasis (2022), Colon polyp (2023), Coronary artery disease, DJD (degenerative joint disease), ED (erectile dysfunction), Greater trochanteric bursitis of right hip (07/09/2018), High blood pressure, HL (hearing loss) (01 Oct 2021), Hormone disorder (2010), Hyperlipidemia, Hypertension, Leg pain, Leg swelling, Low back pain, Low back strain (2015), Lumbago (03/16/2017), Lumbosacral disc disease (2018), Neck pain, Paresthesia (03/16/2017), Periarthritis of shoulder (2021), Polycythemia, Right hip pain, Rotator cuff syndrome (09/01/2021), and  Urinary bladder incontinence (2017).   Surgical History: has a past surgical history that includes Cardiac catheterization; Cervical disc surgery; Lumbar epidural injection; Vasectomy (); Circumcision, non-; Shoulder arthroscopy w/ labral repair (Left); Colonoscopy; [place] tooth extraction; Anterior Cervical Fusion (2017); Cystoscopy (01/10/2014); Prostate biopsy (); Cardiac catheterization (N/A, 10/13/2021); Cardiac catheterization (N/A, 10/13/2021); Cardiac catheterization (N/A, 10/13/2021); Neck surgery (2018?); Shoulder surgery (); Trigger point injection (); Colonoscopy (N/A, 2023); Prostate biopsy (N/A, 2024); Cholecystectomy (N/A, 11/10/2024); and Prostate surgery ().   Family History:          Objective     Vital Signs:   There were no vitals taken for this visit.             Assessment and Plan    Diagnoses and all orders for this visit:    1. Hypogonadism in male (Primary)    2. Prostate cancer    3. Benign prostatic hyperplasia with lower urinary tract symptoms, symptom details unspecified                Hypogonadism     Coming off testosterone replacement per patient is not an option for pt.  We discussed this in detail and he understands the risk.Wants to stay on TRT.  Understands the risk      Cont  0.3 mL daily.  Depo testosterone subcu every 5 days.     likes to use 1 - 10 mL vial and get refill after.             Low risk clinical T1c prostatic adenocarcinoma      PSA is a little higher but we will continue to monitor, he has had been this high in the past    Follow-up in 6 months with PSA and MRI prostate

## 2025-02-18 ENCOUNTER — OFFICE VISIT (OUTPATIENT)
Dept: UROLOGY | Age: 70
End: 2025-02-18
Payer: MEDICARE

## 2025-02-18 VITALS — BODY MASS INDEX: 34.89 KG/M2 | WEIGHT: 250 LBS

## 2025-02-18 DIAGNOSIS — E29.1 HYPOGONADISM IN MALE: Primary | ICD-10-CM

## 2025-02-18 DIAGNOSIS — N40.1 BENIGN PROSTATIC HYPERPLASIA WITH LOWER URINARY TRACT SYMPTOMS, SYMPTOM DETAILS UNSPECIFIED: ICD-10-CM

## 2025-02-18 DIAGNOSIS — C61 PROSTATE CANCER: ICD-10-CM

## 2025-03-17 ENCOUNTER — OFFICE VISIT (OUTPATIENT)
Dept: OTOLARYNGOLOGY | Facility: CLINIC | Age: 70
End: 2025-03-17
Payer: MEDICARE

## 2025-03-17 VITALS
SYSTOLIC BLOOD PRESSURE: 133 MMHG | DIASTOLIC BLOOD PRESSURE: 78 MMHG | HEART RATE: 75 BPM | TEMPERATURE: 98.2 F | OXYGEN SATURATION: 97 %

## 2025-03-17 DIAGNOSIS — H92.12 OTORRHEA OF LEFT EAR: Primary | ICD-10-CM

## 2025-03-17 PROCEDURE — 87186 SC STD MICRODIL/AGAR DIL: CPT | Performed by: OTOLARYNGOLOGY

## 2025-03-17 PROCEDURE — 87070 CULTURE OTHR SPECIMN AEROBIC: CPT | Performed by: OTOLARYNGOLOGY

## 2025-03-17 PROCEDURE — 3078F DIAST BP <80 MM HG: CPT | Performed by: OTOLARYNGOLOGY

## 2025-03-17 PROCEDURE — 99213 OFFICE O/P EST LOW 20 MIN: CPT | Performed by: OTOLARYNGOLOGY

## 2025-03-17 PROCEDURE — 87205 SMEAR GRAM STAIN: CPT | Performed by: OTOLARYNGOLOGY

## 2025-03-17 PROCEDURE — 3075F SYST BP GE 130 - 139MM HG: CPT | Performed by: OTOLARYNGOLOGY

## 2025-03-17 PROCEDURE — 87077 CULTURE AEROBIC IDENTIFY: CPT | Performed by: OTOLARYNGOLOGY

## 2025-03-17 RX ORDER — POTASSIUM CHLORIDE 1500 MG/1
20 TABLET, EXTENDED RELEASE ORAL DAILY
COMMUNITY

## 2025-03-17 RX ORDER — OFLOXACIN 3 MG/ML
SOLUTION/ DROPS OPHTHALMIC
Qty: 10 ML | Refills: 3 | Status: SHIPPED | OUTPATIENT
Start: 2025-03-17 | End: 2025-03-20

## 2025-03-17 NOTE — PROGRESS NOTES
Patient Name: Jimmie Jamil   Visit Date: 03/17/2025   Patient ID: [id number]  Provider: Frank Solomon MD    Sex: male  Location: [facility]   YOB: 1955  Location Address: [address] [place] [address], [address],?[place]?[id number]    Primary Care Provider Nicko Fontaine APRN  Location Phone: [phone]    Referring Provider: No ref. provider found        Chief Complaint  6 month follow up    History of Present Illness  Jimmie Jamil is a 69 y.o. male who returns today for follow-up of chronic serous otitis media and eustachian tube dysfunction.  He was originally seen on 11/19/2021 at which time he reported predominantly left-sided serous effusions over the last 10 to 15 years.  They typically occur after upper respiratory tract infection.  He has seen 2 separate ENTs in the past and has undergone multiple ear tube placements. CT scan of the head without contrast on 10/12/2021 revealed a partial right mastoid middle ear effusion.  The sinuses were unremarkable.  Examination that day revealed a slightly retracted right tympanic membrane with serous effusion and a left T-tube in place and patent.  After a thorough discussion he elected to pursue right myringotomy and tube placement which was performed in clinic. Audiogram on 6/26/2023 revealed right normal downsloping to mild to moderate sensorineural hearing loss and left mild downsloping to moderate mixed loss with a conductive component from 250 to 1000 Hz.  SRT is 25 on the right and 35 on the left.  Speech discrimination is 84% on the right and 93% on the left at 65 dB.  A seal could not be obtained on the right and was consistent with a type B tympanogram and expanded volume on the left.    He returns today for follow-up having last been seen on 8/12/2024 at which time his left-sided otorrhea had resolved.  Beta-2 transferrin testing on 1/21/2025 was negative but the volume of the sample was small.  He tells me he  continues to experience intermittent left-sided otorrhea which will often clear up for a few weeks prior to returning.  It is often yellow-tinged.  He denies any otalgia but does feel as though his hearing is significantly affected.      Past Medical History:   Diagnosis Date    Aortic stenosis     Arthritis of back 2015    Benign essential hypertension     Bladder problem     BPH (benign prostatic hyperplasia)     Cancer 2023    Prostate    Cervical disc disorder 2018    Cervical radiculopathy 03/16/2017    Cervical spinal stenosis 03/16/2017    Cervicalgia 03/16/2017    Cholelithiasis 2022    Colon polyp 2023    Sm tubular adenoma removed during colonoscopy    Coronary artery disease     diastolic dysfunction    DJD (degenerative joint disease)     ED (erectile dysfunction)     Elevated PSA 2016    Been a little high for many years    Erectile dysfunction 2016    Greater trochanteric bursitis of right hip 07/09/2018    Heart murmur 2021    High blood pressure     HL (hearing loss) 01 Oct 2021    Hormone disorder 2010    Hyperlipidemia 2010    Hypertension     Leg pain     Leg swelling     Low back pain     Low back strain 2015    Lumbago 03/16/2017    Lumbosacral disc disease 2018    Neck pain     Paresthesia 03/16/2017    left hand/leg    Periarthritis of shoulder 2021    Polycythemia     Prostatitis 2024    Right hip pain     Rotator cuff syndrome 09/01/2021    Urinary bladder incontinence 03/16/2017       Past Surgical History:   Procedure Laterality Date    ANTERIOR CERVICAL FUSION  04/12/2017    C3-4    CARDIAC CATHETERIZATION      everything okay clearance for neck surgery    CARDIAC CATHETERIZATION N/A 10/13/2021    Procedure: Left Heart Cath;  Surgeon: Anahi Perales MD;  Location: [facility] CATH INVASIVE LOCATION;  Service: Cardiovascular;  Laterality: N/A;    CARDIAC CATHETERIZATION N/A 10/13/2021    Procedure: Coronary angiography;  Surgeon: Anahi Perales MD;  Location: [facility] CATH INVASIVE LOCATION;   Service: Cardiovascular;  Laterality: N/A;    CARDIAC CATHETERIZATION N/A 10/13/2021    Procedure: Left ventriculography;  Surgeon: Anahi Perales MD;  Location: [facility] CATH INVASIVE LOCATION;  Service: Cardiovascular;  Laterality: N/A;    CERVICAL DISC SURGERY      C2-3 fusion    CHOLECYSTECTOMY N/A 11/10/2024    Procedure: Robotic assisted cholecystectomy with intraoperative cholangiogram;  Surgeon: Reagan Hernandez MD;  Location: [facility] OR;  Service: Robotics - DaVinci;  Laterality: N/A;    CIRCUMCISION      COLONOSCOPY      COLONOSCOPY N/A 11/20/2023    Procedure: COLONOSCOPY WITH COLD SNARE POLYECTOMY;  Surgeon: Kenny Urena MD;  Location: [facility] ENDOSCOPY;  Service: Gastroenterology;  Laterality: N/A;  COLON POLYP    CYSTOSCOPY  01/10/2014    Cysto ivan retrograde pyelogram urthral bx.    LUMBAR EPIDURAL INJECTION      L2-S2  going to try ablasion in future    NECK SURGERY  2018?    PROSTATE BIOPSY  2018    PROSTATE BIOPSY N/A 08/08/2024    Procedure: MRI fusion transrectal ultrasound-guided prostate biopsy;  Surgeon: Zechariah Clay MD;  Location: [facility] MAIN OR;  Service: Urology;  Laterality: N/A;    PROSTATE SURGERY  2022    [facility]    SHOULDER ARTHROSCOPY W/ LABRAL REPAIR Left     SHOULDER SURGERY  2008    TRIGGER POINT INJECTION  2021    L5-S1    VASECTOMY      WISDOM TOOTH EXTRACTION           Current Outpatient Medications:     ascorbic acid (VITAMIN C) 1000 MG tablet, Take 1 tablet by mouth Daily., Disp: , Rfl:     aspirin 81 MG EC tablet, Take 1 tablet by mouth Daily., Disp: , Rfl:     Coenzyme Q10 (CoQ10) 100 MG capsule, , Disp: , Rfl:     enalapril (VASOTEC) 20 MG tablet, Take 1 tablet by mouth twice daily, Disp: 180 tablet, Rfl: 0    furosemide (LASIX) 40 MG tablet, Take 1 tablet by mouth., Disp: , Rfl:     Leqvio 284 MG/1.5ML solution prefilled syringe, EVERY 6 MTHS, Disp: , Rfl:     ofloxacin (OCUFLOX) 0.3 % ophthalmic solution, 4 drops in left ear twice daily x 14 days, Disp:  10 mL, Rfl: 3    potassium chloride (KLOR-CON M20) 20 MEQ CR tablet, Take 1 tablet by mouth Daily., Disp: , Rfl:     Testosterone Cypionate (DEPOTESTOTERONE CYPIONATE) 200 MG/ML injection, INJECT 0.3 ML UNDER THE SKIN Q 5 DAYS AS DIRECTED, Disp: 10 mL, Rfl: 0    VASCEPA 1 g capsule capsule, Take 2 g by mouth 2 (Two) Times a Day With Meals., Disp: 360 capsule, Rfl: 3    verapamil ER (VERELAN) 360 MG 24 hr capsule, Take 1 capsule by mouth Every Night. Patient stated taking 420mg, Disp: , Rfl:     verapamil SR (CALAN-SR) 240 MG CR tablet, TAKE 2 TABLETS BY MOUTH ONCE DAILY AT NIGHT, Disp: 180 tablet, Rfl: 0     Allergies   Allergen Reactions    Beta Adrenergic Blockers Unknown - High Severity    Crestor [Rosuvastatin Calcium] Myalgia    Keflex [Cephalexin] Other (See Comments)     hiccups    Livalo [Pitavastatin] Myalgia    Losartan Other (See Comments)     Intolerance due to fatigue, depression    Naproxen Other (See Comments)     Chest pain       Social History     Tobacco Use    Smoking status: Never     Passive exposure: Never    Smokeless tobacco: Never   Vaping Use    Vaping status: Never Used   Substance Use Topics    Alcohol use: Yes     Alcohol/week: 2.0 standard drinks of alcohol     Types: 2 Cans of beer per week     Comment: 4 beers a week    Drug use: Never        Objective     Vital Signs:   /78   Pulse 75   Temp 98.2 °F (36.8 °C)   SpO2 97%       Physical Exam    General: Well developed, well nourished patient of stated age in no acute distress. Voice is strong and clear.   Head: Normocephalic and atraumatic.  Face: No lesions.  Bilateral parotid and submandibular glands are unremarkable.  Stensen's and Warthin's ducts are productive of clear saliva bilaterally.  House-Brackmann I/VI     bilaterally.   muscles and temporomandibular joint nontender to palpation.  No TMJ crepitus.  Eyes: PERRLA, sclerae anicteric, no conjunctival injection. Extra ocular movements are intact and full. No  nystagmus.   Ears: Auricles are normal in appearance. Bilateral external auditory canals are unremarkable.  Right tympanic membrane with T-tube in place and patent.  No evidence of otorrhea.  Left tympanic membrane with T-tube in place and patent but coated with slimy appearing otorrhea.  This was removed using the operating microscope and alligator forceps and sent for culture.  Ciprodex drops were instilled and the middle ear suctioned.  No evidence of otorrhea.  Hearing normal to conversational voice.   Nose: External nose is normal in appearance. Bilateral nares are patent with normal appearing mucosa. Septum midline. Turbinates are unremarkable. No lesions.   Oral Cavity: Lips are normal in appearance. Oral mucosa is unremarkable. Gingiva is unremarkable. Normal dentition for age. Tongue is unremarkable with good movement. Hard palate is unremarkable.   Oropharynx: Soft palate is unremarkable with full movement. Uvula is unremarkable. Bilateral tonsils are unremarkable. Posterior oropharynx is unremarkable.    Larynx and hypopharynx: Deferred secondary to gag reflex.  Neck: Supple.  No mass.  Nontender to palpation.  Trachea midline. Thyroid normal size and without nodules to palpation.   Lymphatic: No lymphadenopathy upon palpation.   Psychiatric: Appropriate affect, cooperative   Neurologic: Oriented x 3, strength symmetric in all extremities, Cranial Nerves II-XII are grossly intact to confrontation   Skin: Warm and dry. No rashes.    Procedures   Result Review :               Assessment and Plan    Diagnoses and all orders for this visit:    1. Otorrhea of left ear (Primary)  -     ofloxacin (OCUFLOX) 0.3 % ophthalmic solution; 4 drops in left ear twice daily x 14 days  Dispense: 10 mL; Refill: 3  -     Wound Culture - Wound, Ear, Left        Impressions and findings were discussed.  Unfortunately, he continues to experience intermittent left-sided otorrhea which significantly affects his hearing.   Examination today reveals the left T-tube to be in place and patent but coated with slimy otorrhea.  We discussed the pathophysiology and natural history of chronic otorrhea.  Options for management were discussed including continued medical management versus removal of the tube with continued medical management.  After a thorough discussion he elected to pursue tube removal as there was concern for potential biofilm.  He will be placed on ofloxacin drops pending his culture results.  He was given ample time to ask questions, all of which were answered to his satisfaction.    Follow Up   No follow-ups on file.  Patient was given instructions and counseling regarding his condition or for health maintenance advice. Please see specific information pulled into the AVS if appropriate.

## 2025-03-18 ENCOUNTER — TELEPHONE (OUTPATIENT)
Dept: OTOLARYNGOLOGY | Facility: CLINIC | Age: 70
End: 2025-03-18
Payer: MEDICARE

## 2025-03-18 DIAGNOSIS — D75.1 POLYCYTHEMIA: Primary | ICD-10-CM

## 2025-03-18 NOTE — TELEPHONE ENCOUNTER
Patient's wife called stating he received his ear drops from the pharmacy but not the Bactrim that was discussed. He would like the script sent in to his [facility] in [place] that's listed. Thank you.

## 2025-03-19 DIAGNOSIS — L92.9 GRANULATION TISSUE OF EAR CANAL: ICD-10-CM

## 2025-03-19 DIAGNOSIS — H92.12 OTORRHEA OF LEFT EAR: ICD-10-CM

## 2025-03-20 ENCOUNTER — OFFICE VISIT (OUTPATIENT)
Dept: ONCOLOGY | Facility: CLINIC | Age: 70
End: 2025-03-20
Payer: MEDICARE

## 2025-03-20 ENCOUNTER — LAB (OUTPATIENT)
Dept: LAB | Facility: HOSPITAL | Age: 70
End: 2025-03-20
Payer: MEDICARE

## 2025-03-20 ENCOUNTER — INFUSION (OUTPATIENT)
Dept: ONCOLOGY | Facility: HOSPITAL | Age: 70
End: 2025-03-20
Payer: MEDICARE

## 2025-03-20 ENCOUNTER — DOCUMENTATION (OUTPATIENT)
Dept: OTOLARYNGOLOGY | Facility: CLINIC | Age: 70
End: 2025-03-20
Payer: MEDICARE

## 2025-03-20 VITALS
HEIGHT: 71 IN | BODY MASS INDEX: 34.2 KG/M2 | DIASTOLIC BLOOD PRESSURE: 76 MMHG | RESPIRATION RATE: 16 BRPM | WEIGHT: 244.3 LBS | HEART RATE: 64 BPM | TEMPERATURE: 98 F | OXYGEN SATURATION: 97 % | SYSTOLIC BLOOD PRESSURE: 129 MMHG

## 2025-03-20 DIAGNOSIS — R76.8 POSITIVE HTLV-1 ANTIBODY: Primary | ICD-10-CM

## 2025-03-20 DIAGNOSIS — D75.1 POLYCYTHEMIA: ICD-10-CM

## 2025-03-20 DIAGNOSIS — H92.12 OTORRHEA OF LEFT EAR: Primary | ICD-10-CM

## 2025-03-20 DIAGNOSIS — D75.1 POLYCYTHEMIA: Primary | ICD-10-CM

## 2025-03-20 DIAGNOSIS — R76.8 POSITIVE HTLV-1 ANTIBODY: ICD-10-CM

## 2025-03-20 LAB
BACTERIA SPEC AEROBE CULT: ABNORMAL
BASOPHILS # BLD AUTO: 0.11 10*3/MM3 (ref 0–0.2)
BASOPHILS NFR BLD AUTO: 0.9 % (ref 0–1.5)
DEPRECATED RDW RBC AUTO: 41.1 FL (ref 37–54)
EOSINOPHIL # BLD AUTO: 0.13 10*3/MM3 (ref 0–0.4)
EOSINOPHIL NFR BLD AUTO: 1.1 % (ref 0.3–6.2)
ERYTHROCYTE [DISTWIDTH] IN BLOOD BY AUTOMATED COUNT: 14.1 % (ref 12.3–15.4)
GRAM STN SPEC: ABNORMAL
GRAM STN SPEC: ABNORMAL
HCT VFR BLD AUTO: 58 % (ref 37.5–51)
HGB BLD-MCNC: 19.1 G/DL (ref 13–17.7)
IMM GRANULOCYTES # BLD AUTO: 0.16 10*3/MM3 (ref 0–0.05)
IMM GRANULOCYTES NFR BLD AUTO: 1.4 % (ref 0–0.5)
LYMPHOCYTES # BLD AUTO: 3.48 10*3/MM3 (ref 0.7–3.1)
LYMPHOCYTES NFR BLD AUTO: 29.6 % (ref 19.6–45.3)
MCH RBC QN AUTO: 28 PG (ref 26.6–33)
MCHC RBC AUTO-ENTMCNC: 32.9 G/DL (ref 31.5–35.7)
MCV RBC AUTO: 85 FL (ref 79–97)
MONOCYTES # BLD AUTO: 1.7 10*3/MM3 (ref 0.1–0.9)
MONOCYTES NFR BLD AUTO: 14.5 % (ref 5–12)
NEUTROPHILS NFR BLD AUTO: 52.5 % (ref 42.7–76)
NEUTROPHILS NFR BLD AUTO: 6.16 10*3/MM3 (ref 1.7–7)
NRBC BLD AUTO-RTO: 0 /100 WBC (ref 0–0.2)
PLATELET # BLD AUTO: 283 10*3/MM3 (ref 140–450)
PMV BLD AUTO: 9.5 FL (ref 6–12)
RBC # BLD AUTO: 6.82 10*6/MM3 (ref 4.14–5.8)
WBC NRBC COR # BLD AUTO: 11.74 10*3/MM3 (ref 3.4–10.8)

## 2025-03-20 PROCEDURE — 85025 COMPLETE CBC W/AUTO DIFF WBC: CPT

## 2025-03-20 PROCEDURE — 99195 PHLEBOTOMY: CPT

## 2025-03-20 PROCEDURE — 3078F DIAST BP <80 MM HG: CPT | Performed by: INTERNAL MEDICINE

## 2025-03-20 PROCEDURE — 1126F AMNT PAIN NOTED NONE PRSNT: CPT | Performed by: INTERNAL MEDICINE

## 2025-03-20 PROCEDURE — 36415 COLL VENOUS BLD VENIPUNCTURE: CPT

## 2025-03-20 PROCEDURE — 99214 OFFICE O/P EST MOD 30 MIN: CPT | Performed by: INTERNAL MEDICINE

## 2025-03-20 PROCEDURE — 3074F SYST BP LT 130 MM HG: CPT | Performed by: INTERNAL MEDICINE

## 2025-03-20 RX ORDER — TOBRAMYCIN / DEXAMETHASONE 3; .5 MG/ML; MG/ML
4 SUSPENSION/ DROPS OPHTHALMIC 2 TIMES DAILY
Qty: 5 ML | Refills: 1 | Status: SHIPPED | OUTPATIENT
Start: 2025-03-20 | End: 2025-03-30

## 2025-03-20 RX ORDER — SULFAMETHOXAZOLE AND TRIMETHOPRIM 800; 160 MG/1; MG/1
1 TABLET ORAL 2 TIMES DAILY
Qty: 20 TABLET | Refills: 0 | OUTPATIENT
Start: 2025-03-20 | End: 2025-03-30

## 2025-03-20 RX ORDER — SODIUM CHLORIDE 9 MG/ML
250 INJECTION, SOLUTION INTRAVENOUS ONCE
OUTPATIENT
Start: 2025-03-20

## 2025-03-20 NOTE — PROGRESS NOTES
I spoke with the patient regarding his culture results which grew out Pseudomonas aeruginosa.  It was resistant to levofloxacin and intermediately susceptible to ciprofloxacin.  It is very susceptible to tobramycin and we discussed switching his drops to TobraDex.  He will follow-up with me in 1 week.

## 2025-03-20 NOTE — PROGRESS NOTES
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     He also has a history of secondary polycythemia undergoing therapy with testosterone injections.  Further history includes erythrocytosis/polycythemia for as long as 40 years followed by a number of physicians but never hematology.  He started phlebotomy  or blood donations, again, as long as 30+ years ago as did his brother who had similar findings of elevated hemoglobin hematocrit.  His wife indicates that he is often plethoric and particularly  demonstrates ruddiness in the face that will indicate to them both that he needs a phlebotomy.       Recently symptoms of chest pain dyspnea worsened and he was seen by cardiology 10/31/2024 with CT scans negative but demonstrating evidence of cholelithiasis with mild pericholecystic inflammatory changes-?  Chronic cholecystitis.  He was referred to general surgery with a subsequent request to cardiology for clearance.  His symptoms however prompted assessment for cardiac catheterization.    The patient was assessed as he proceeded to and underwent a robotic assisted cholecystectomy with intraoperative cholangiogram.  This went well with findings of acute calculus cholecystitis.  We have followed him during his hospitalization.  Studies include LDH of 304 obtain 11/11/2024, EPO level 5.5, HTLV testing was not able to be completed, MPL mutation analysis was negative, JAK2 V6 17F was negative for mutation, flow cytometry negative for abnormality, PSA of 6.410.    At discharge the patient CBC including H&H is 17.6 and 51.6 and when seen 12/6/2024 H&H  "are 18.5 and 54.3.  As result of this finding and the patient's continued use of testosterone we have offered phlebotomy in office today.    The patient was phlebotomized and is not seen back, approximately 3 months later, with repeat levels including H&H of 19.1 and 58.0 with white count of thousand 740 and platelet count of 2 83,000.  He continues testosterone replacement therapy unchanged doses indicating that his serum testosterone level is usually kept above 600.  Again he had considered blood in addition to the [place] at home but has been, previously, denied 1 HTLV-1 testing was found to be positive.  We had antibodies assessed 12/6/2024 with negative findings and plan HTLV 1/2 qualitative RT-PCR testing to completely clear him for donation.  Otherwise we will proceed with phlebotomy in office today.        Past Medical History, Past Surgical History, Social History, Family History have been reviewed and are without significant changes except as mentioned.      Medications:  The current medication list was reviewed in the EMR    ALLERGIES:    Allergies   Allergen Reactions    Beta Adrenergic Blockers Unknown - High Severity    Crestor [Rosuvastatin Calcium] Myalgia    Keflex [Cephalexin] Other (See Comments)     hiccups    Livalo [Pitavastatin] Myalgia    Losartan Other (See Comments)     Intolerance due to fatigue, depression    Naproxen Other (See Comments)     Chest pain       Objective      Vitals:    03/20/25 1505   BP: 129/76   Pulse: 64   Resp: 16   Temp: 98 °F (36.7 °C)   TempSrc: Oral   SpO2: 97%   Weight: 111 kg (244 lb 4.8 oz)   Height: 180.3 cm (70.98\")   PainSc: 0-No pain             3/20/2025     3:05 PM   Current Status   ECOG score 0       CONSTITUTIONAL: pleasant well-developed adult man, moderate plethoric  HEENT: no icterus, no thrush, moist membranes  LYMPH: no cervical or supraclavicular lad  CV: RRR, S1S2, no murmur  RESP: cta bilat, no wheezing, no rales  GI: soft, nontender, no " splenomegaly, +BS, laparoscopic scars  MUSC: no edema, normal gait  NEURO: alert and oriented x3, normal strength  PSYCH: normal mood and affect    Exam unchanged-11/12/2024  RECENT LABS:  Hematology WBC   Date Value Ref Range Status   03/20/2025 11.74 (H) 3.40 - 10.80 10*3/mm3 Final     RBC   Date Value Ref Range Status   03/20/2025 6.82 (H) 4.14 - 5.80 10*6/mm3 Final     Hemoglobin   Date Value Ref Range Status   03/20/2025 19.1 (H) 13.0 - 17.7 g/dL Final     Hematocrit   Date Value Ref Range Status   03/20/2025 58.0 (H) 37.5 - 51.0 % Final     Platelets   Date Value Ref Range Status   03/20/2025 283 140 - 450 10*3/mm3 Final                  Assessment & Plan     *History of polycythemia likely secondary to testosterone placement placement therapy.  Again this is long-term in nature possibly 30+ years.  He has never had a hematology assessment but has had carbon monoxide exposures on the job as a  beginning in his late teens and 20s and, later, testosterone replacement therapy for at least 10+ years.  He periodically has had phlebotomy   Studies drawn 11/10/2024-EPO level, LDH, JAK2 analysis with reflex, MPL analysis  Current hemoglobin 17.1/hematocrit 17.1%  Studies include LDH of 304 obtain 11/11/2024, EPO level 5.5, HTLV testing was not able to be completed, MPL mutation analysis was negative, JAK2 V6 17F was negative for mutation, flow cytometry negative for abnormality, PSA of 6.410.  Patient's erythrocytosis secondary to testosterone use  The patient was phlebotomized and is not seen back, approximately 3 months later, with repeat levels including H&H of 19.1 and 58.0 with white count of thousand 740 and platelet count of 2 83,000.  He continues testosterone replacement therapy unchanged doses indicating that his serum testosterone level is usually kept above 600.  Again he had considered blood in addition to the [place] at home but has been, previously, denied 1 HTLV-1 testing was found to be  positive.  We had antibodies assessed 12/6/2024 with negative findings and plan HTLV 1/2 qualitative RT-PCR testing to completely clear him for donation.  Otherwise we will proceed with phlebotomy in office today.         *History of prostate cancer  Steve grade 3+3 (grade group 1) with biopsy 9/3/2024.  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  The patient states that his father has continued to be supported by this treatment and he wishes to continue.  *The patient is currently a candidate for phlebotomy which we will provide in office today  *We will further check his HTLV status as described above with qualitative RT-PCR analysis.  *He then pursue packed RBC donation at home every 2 months  *4 months MD, possible phlebotomy    3/20/2025      CC:

## 2025-03-20 NOTE — LETTER
March 20, 2025     HAN Helm  [address] [address]    Patient: Jimmie Jamil   YOB: 1955   Date of Visit: 3/20/2025     Dear HAN Helm:       Thank you for referring Jimmie Jamil to me for evaluation. Below are the relevant portions of my assessment and plan of care.    If you have questions, please do not hesitate to call me. I look forward to following Jimmie along with you.         Sincerely,        Zane Lentz MD        CC: MD Tor Ordonez Michael D., MD  03/20/25 9201  Sign when Signing Visit      REASON FOR CONSULTATION: Polycythemia, erythrocytosis-presumed secondary to testosterone therapy                             HISTORY OF PRESENT ILLNESS:  The patient is a 69-year-old male with a somewhat complex medical history inclusive of coronary artery disease, cardiac murmur, echo 11/2023 with EF of 61%, aortic stenosis history of prostate cancer status post biopsy being managed expectantly, cervical spinal stenosis.     He also has a history of secondary polycythemia undergoing therapy with testosterone injections.  Further history includes erythrocytosis/polycythemia for as long as 40 years followed by a number of physicians but never hematology.  He started phlebotomy  or blood donations, again, as long as 30+ years ago as did his brother who had similar findings of elevated hemoglobin hematocrit.  His wife indicates that he is often plethoric and particularly  demonstrates ruddiness in the face that will indicate to them both that he needs a phlebotomy.       Recently symptoms of chest pain dyspnea worsened and he was seen by cardiology 10/31/2024 with CT scans negative but demonstrating evidence of cholelithiasis with mild pericholecystic inflammatory changes-?  Chronic cholecystitis.  He was referred to general surgery with a subsequent request to cardiology for clearance.  His symptoms however prompted  assessment for cardiac catheterization.    The patient was assessed as he proceeded to and underwent a robotic assisted cholecystectomy with intraoperative cholangiogram.  This went well with findings of acute calculus cholecystitis.  We have followed him during his hospitalization.  Studies include LDH of 304 obtain 11/11/2024, EPO level 5.5, HTLV testing was not able to be completed, MPL mutation analysis was negative, JAK2 V6 17F was negative for mutation, flow cytometry negative for abnormality, PSA of 6.410.    At discharge the patient CBC including H&H is 17.6 and 51.6 and when seen 12/6/2024 H&H are 18.5 and 54.3.  As result of this finding and the patient's continued use of testosterone we have offered phlebotomy in office today.    The patient was phlebotomized and is not seen back, approximately 3 months later, with repeat levels including H&H of 19.1 and 58.0 with white count of thousand 740 and platelet count of 2 83,000.  He continues testosterone replacement therapy unchanged doses indicating that his serum testosterone level is usually kept above 600.  Again he had considered blood in addition to the [facility] at home but has been, previously, denied 1 HTLV-1 testing was found to be positive.  We had antibodies assessed 12/6/2024 with negative findings and plan HTLV 1/2 qualitative RT-PCR testing to completely clear him for donation.  Otherwise we will proceed with phlebotomy in office today.        Past Medical History, Past Surgical History, Social History, Family History have been reviewed and are without significant changes except as mentioned.      Medications:  The current medication list was reviewed in the EMR    ALLERGIES:    Allergies   Allergen Reactions   • Beta Adrenergic Blockers Unknown - High Severity   • Crestor [Rosuvastatin Calcium] Myalgia   • Keflex [Cephalexin] Other (See Comments)     hiccups   • Livalo [Pitavastatin] Myalgia   • Losartan Other (See Comments)     Intolerance due  "to fatigue, depression   • Naproxen Other (See Comments)     Chest pain       Objective     Vitals:    03/20/25 1505   BP: 129/76   Pulse: 64   Resp: 16   Temp: 98 °F (36.7 °C)   TempSrc: Oral   SpO2: 97%   Weight: 111 kg (244 lb 4.8 oz)   Height: 180.3 cm (70.98\")   PainSc: 0-No pain             3/20/2025     3:05 PM   Current Status   ECOG score 0       CONSTITUTIONAL: pleasant well-developed adult man, moderate plethoric  HEENT: no icterus, no thrush, moist membranes  LYMPH: no cervical or supraclavicular lad  CV: RRR, S1S2, no murmur  RESP: cta bilat, no wheezing, no rales  GI: soft, nontender, no splenomegaly, +BS, laparoscopic scars  MUSC: no edema, normal gait  NEURO: alert and oriented x3, normal strength  PSYCH: normal mood and affect    Exam unchanged-11/12/2024  RECENT LABS:  Hematology WBC   Date Value Ref Range Status   03/20/2025 11.74 (H) 3.40 - 10.80 10*3/mm3 Final     RBC   Date Value Ref Range Status   03/20/2025 6.82 (H) 4.14 - 5.80 10*6/mm3 Final     Hemoglobin   Date Value Ref Range Status   03/20/2025 19.1 (H) 13.0 - 17.7 g/dL Final     Hematocrit   Date Value Ref Range Status   03/20/2025 58.0 (H) 37.5 - 51.0 % Final     Platelets   Date Value Ref Range Status   03/20/2025 283 140 - 450 10*3/mm3 Final                  Assessment & Plan    *History of polycythemia likely secondary to testosterone placement placement therapy.  Again this is long-term in nature possibly 30+ years.  He has never had a hematology assessment but has had carbon monoxide exposures on the job as a  beginning in his late teens and 20s and, later, testosterone replacement therapy for at least 10+ years.  He periodically has had phlebotomy   Studies drawn 11/10/2024-EPO level, LDH, JAK2 analysis with reflex, MPL analysis  Current hemoglobin 17.1/hematocrit 17.1%  Studies include LDH of 304 obtain 11/11/2024, EPO level 5.5, HTLV testing was not able to be completed, MPL mutation analysis was negative, JAK2 V6 " 17F was negative for mutation, flow cytometry negative for abnormality, PSA of 6.410.  Patient's erythrocytosis secondary to testosterone use  The patient was phlebotomized and is not seen back, approximately 3 months later, with repeat levels including H&H of 19.1 and 58.0 with white count of thousand 740 and platelet count of 2 83,000.  He continues testosterone replacement therapy unchanged doses indicating that his serum testosterone level is usually kept above 600.  Again he had considered blood in addition to the [facility] at home but has been, previously, denied 1 HTLV-1 testing was found to be positive.  We had antibodies assessed 12/6/2024 with negative findings and plan HTLV 1/2 qualitative RT-PCR testing to completely clear him for donation.  Otherwise we will proceed with phlebotomy in office today.         *History of prostate cancer  Verden grade 3+3 (grade group 1) with biopsy 9/3/2024.  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  The patient states that his father has continued to be supported by this treatment and he wishes to continue.  *The patient is currently a candidate for phlebotomy which we will provide in office today  *We will further check his HTLV status as described above with qualitative RT-PCR analysis.  *He then pursue packed RBC donation at home every 2 months  *4 months MD, possible phlebotomy    3/20/2025      CC:

## 2025-03-21 RX ORDER — VERAPAMIL HYDROCHLORIDE 240 MG/1
480 TABLET, FILM COATED, EXTENDED RELEASE ORAL NIGHTLY
Qty: 180 TABLET | Refills: 1 | Status: SHIPPED | OUTPATIENT
Start: 2025-03-21

## 2025-03-26 ENCOUNTER — OFFICE VISIT (OUTPATIENT)
Dept: OTOLARYNGOLOGY | Facility: CLINIC | Age: 70
End: 2025-03-26
Payer: MEDICARE

## 2025-03-26 VITALS
TEMPERATURE: 98.6 F | OXYGEN SATURATION: 97 % | HEART RATE: 70 BPM | DIASTOLIC BLOOD PRESSURE: 65 MMHG | SYSTOLIC BLOOD PRESSURE: 107 MMHG

## 2025-03-26 DIAGNOSIS — H69.91 ETD (EUSTACHIAN TUBE DYSFUNCTION), RIGHT: ICD-10-CM

## 2025-03-26 DIAGNOSIS — H92.12 OTORRHEA OF LEFT EAR: Primary | ICD-10-CM

## 2025-03-26 DIAGNOSIS — H65.21 CHRONIC SEROUS OTITIS MEDIA OF RIGHT EAR: ICD-10-CM

## 2025-03-26 PROCEDURE — 99213 OFFICE O/P EST LOW 20 MIN: CPT | Performed by: OTOLARYNGOLOGY

## 2025-03-26 PROCEDURE — 3074F SYST BP LT 130 MM HG: CPT | Performed by: OTOLARYNGOLOGY

## 2025-03-26 PROCEDURE — 3078F DIAST BP <80 MM HG: CPT | Performed by: OTOLARYNGOLOGY

## 2025-03-26 RX ORDER — TOBRAMYCIN AND DEXAMETHASONE 3; 1 MG/ML; MG/ML
SUSPENSION/ DROPS OPHTHALMIC
COMMUNITY
Start: 2025-03-20

## 2025-03-26 NOTE — PROGRESS NOTES
Patient Name: Jimmie Jamil   Visit Date: 03/26/2025   Patient ID: [id number]  Provider: Frank Solomon MD    Sex: male  Location: [facility]   YOB: 1955  Location Address: [address] [place] [address], [address],?[place]?[address]    Primary Care Provider Nicko Fontaine APRN  Location Phone: [phone]    Referring Provider: No ref. provider found        Chief Complaint  1 week follow up    History of Present Illness  Jimmie Jamil is a 69 y.o. male who returns today for follow-up of chronic serous otitis media and eustachian tube dysfunction.  He was originally seen on 11/19/2021 at which time he reported predominantly left-sided serous effusions over the last 10 to 15 years.  They typically occur after upper respiratory tract infection.  He has seen 2 separate ENTs in the past and has undergone multiple ear tube placements. CT scan of the head without contrast on 10/12/2021 revealed a partial right mastoid middle ear effusion.  The sinuses were unremarkable.  Examination that day revealed a slightly retracted right tympanic membrane with serous effusion and a left T-tube in place and patent.  After a thorough discussion he elected to pursue right myringotomy and tube placement which was performed in clinic. Audiogram on 6/26/2023 revealed right normal downsloping to mild to moderate sensorineural hearing loss and left mild downsloping to moderate mixed loss with a conductive component from 250 to 1000 Hz.  SRT is 25 on the right and 35 on the left.  Speech discrimination is 84% on the right and 93% on the left at 65 dB.  A seal could not be obtained on the right and was consistent with a type B tympanogram and expanded volume on the left. Beta-2 transferrin testing on 1/21/2025 was negative but the volume of the sample was small.      He was last seen on 3/17/2025 at which time he elected to have his left T-tube removed.  The tube was sent for culture and  sensitivities and grew out Pseudomonas aeruginosa.  He was started on TobraDex drops.  He tells me he has not noticed any otorrhea or otalgia.  He cannot tell if the drops are getting in the left ear as he has only been able to taste them once.      Past Medical History:   Diagnosis Date    Aortic stenosis     Arthritis of back 2015    Benign essential hypertension     Bladder problem     BPH (benign prostatic hyperplasia)     Cancer 2023    Prostate    Cervical disc disorder 2018    Cervical radiculopathy 03/16/2017    Cervical spinal stenosis 03/16/2017    Cervicalgia 03/16/2017    Cholelithiasis 2022    Colon polyp 2023    Sm tubular adenoma removed during colonoscopy    Coronary artery disease     diastolic dysfunction    DJD (degenerative joint disease)     ED (erectile dysfunction)     Elevated PSA 2016    Been a little high for many years    Erectile dysfunction 2016    Greater trochanteric bursitis of right hip 07/09/2018    Heart murmur 2021    High blood pressure     HL (hearing loss) 01 Oct 2021    Hormone disorder 2010    Hyperlipidemia 2010    Hypertension     Leg pain     Leg swelling     Low back pain     Low back strain 2015    Lumbago 03/16/2017    Lumbosacral disc disease 2018    Neck pain     Paresthesia 03/16/2017    left hand/leg    Periarthritis of shoulder 2021    Polycythemia     Prostatitis 2024    Right hip pain     Rotator cuff syndrome 09/01/2021    Urinary bladder incontinence 03/16/2017       Past Surgical History:   Procedure Laterality Date    ANTERIOR CERVICAL FUSION  04/12/2017    C3-4    CARDIAC CATHETERIZATION      everything okay clearance for neck surgery    CARDIAC CATHETERIZATION N/A 10/13/2021    Procedure: Left Heart Cath;  Surgeon: Anahi Perales MD;  Location: [facility] INVASIVE LOCATION;  Service: Cardiovascular;  Laterality: N/A;    CARDIAC CATHETERIZATION N/A 10/13/2021    Procedure: Coronary angiography;  Surgeon: Anahi Perales MD;  Location: [facility]  INVASIVE LOCATION;  Service: Cardiovascular;  Laterality: N/A;    CARDIAC CATHETERIZATION N/A 10/13/2021    Procedure: Left ventriculography;  Surgeon: Anahi Perales MD;  Location: [facility] CATH INVASIVE LOCATION;  Service: Cardiovascular;  Laterality: N/A;    CERVICAL DISC SURGERY      C2-3 fusion    CHOLECYSTECTOMY N/A 11/10/2024    Procedure: Robotic assisted cholecystectomy with intraoperative cholangiogram;  Surgeon: Reagan Hernandez MD;  Location: [facility] OR;  Service: Robotics - DaVinci;  Laterality: N/A;    CIRCUMCISION      COLONOSCOPY      COLONOSCOPY N/A 11/20/2023    Procedure: COLONOSCOPY WITH COLD SNARE POLYECTOMY;  Surgeon: Kenny Urena MD;  Location: [facility] ENDOSCOPY;  Service: Gastroenterology;  Laterality: N/A;  COLON POLYP    CYSTOSCOPY  01/10/2014    Cysto ivan retrograde pyelogram urthral bx.    LUMBAR EPIDURAL INJECTION      L2-S2  going to try ablasion in future    NECK SURGERY  2018?    PROSTATE BIOPSY  2018    PROSTATE BIOPSY N/A 08/08/2024    Procedure: MRI fusion transrectal ultrasound-guided prostate biopsy;  Surgeon: Zechariah Clay MD;  Location: [facility] MAIN OR;  Service: Urology;  Laterality: N/A;    PROSTATE SURGERY  2022    [facility]    SHOULDER ARTHROSCOPY W/ LABRAL REPAIR Left     SHOULDER SURGERY  2008    TRIGGER POINT INJECTION  2021    L5-S1    VASECTOMY      WISDOM TOOTH EXTRACTION           Current Outpatient Medications:     ascorbic acid (VITAMIN C) 1000 MG tablet, Take 1 tablet by mouth Daily., Disp: , Rfl:     aspirin 81 MG EC tablet, Take 1 tablet by mouth Daily., Disp: , Rfl:     Coenzyme Q10 (CoQ10) 100 MG capsule, , Disp: , Rfl:     enalapril (VASOTEC) 20 MG tablet, Take 1 tablet by mouth twice daily, Disp: 180 tablet, Rfl: 0    furosemide (LASIX) 40 MG tablet, Take 1 tablet by mouth., Disp: , Rfl:     Leqvio 284 MG/1.5ML solution prefilled syringe, EVERY 6 MTHS, Disp: , Rfl:     potassium chloride (KLOR-CON M20) 20 MEQ CR tablet, Take 1 tablet by mouth  Daily., Disp: , Rfl:     Testosterone Cypionate (DEPOTESTOTERONE CYPIONATE) 200 MG/ML injection, INJECT 0.3 ML UNDER THE SKIN Q 5 DAYS AS DIRECTED, Disp: 10 mL, Rfl: 0    tobramycin-dexAMETHasone (TOBRADEX) 0.3-0.1 % ophthalmic suspension, INSTILL 3-4 DROPS INTO EAR(S) AS DIRECTED 2 TIMES DAILY FOR 10 DAYS, Disp: , Rfl:     VASCEPA 1 g capsule capsule, Take 2 g by mouth 2 (Two) Times a Day With Meals., Disp: 360 capsule, Rfl: 3    verapamil ER (VERELAN) 360 MG 24 hr capsule, Take 1 capsule by mouth Every Night. Patient stated taking 420mg, Disp: , Rfl:     verapamil SR (CALAN-SR) 240 MG CR tablet, TAKE 2 TABLETS BY MOUTH ONCE DAILY AT NIGHT, Disp: 180 tablet, Rfl: 1     Allergies   Allergen Reactions    Beta Adrenergic Blockers Unknown - High Severity    Crestor [Rosuvastatin Calcium] Myalgia    Keflex [Cephalexin] Other (See Comments)     hiccups    Livalo [Pitavastatin] Myalgia    Losartan Other (See Comments)     Intolerance due to fatigue, depression    Naproxen Other (See Comments)     Chest pain       Social History     Tobacco Use    Smoking status: Never     Passive exposure: Never    Smokeless tobacco: Never   Vaping Use    Vaping status: Never Used   Substance Use Topics    Alcohol use: Yes     Alcohol/week: 2.0 standard drinks of alcohol     Types: 2 Cans of beer per week     Comment: 4 beers a week    Drug use: Never        Objective     Vital Signs:   /65   Pulse 70   Temp 98.6 °F (37 °C)   SpO2 97%       Physical Exam    General: Well developed, well nourished patient of stated age in no acute distress. Voice is strong and clear.   Head: Normocephalic and atraumatic.  Face: No lesions.  Bilateral parotid and submandibular glands are unremarkable.  Stensen's and Warthin's ducts are productive of clear saliva bilaterally.  House-Brackmann I/VI     bilaterally.   muscles and temporomandibular joint nontender to palpation.  No TMJ crepitus.  Eyes: PERRLA, sclerae anicteric, no  conjunctival injection. Extra ocular movements are intact and full. No nystagmus.   Ears: Auricles are normal in appearance. Bilateral external auditory canals are unremarkable.  Right tympanic membrane with T-tube in place and patent.  No evidence of otorrhea.  Left tympanic membrane with a stable anterior superior perforation.  The middle ear appears dry.  No evidence of otorrhea.  Hearing normal to conversational voice.   Nose: External nose is normal in appearance. Bilateral nares are patent with normal appearing mucosa. Septum midline. Turbinates are unremarkable. No lesions.   Oral Cavity: Lips are normal in appearance. Oral mucosa is unremarkable. Gingiva is unremarkable. Normal dentition for age. Tongue is unremarkable with good movement. Hard palate is unremarkable.   Oropharynx: Soft palate is unremarkable with full movement. Uvula is unremarkable. Bilateral tonsils are unremarkable. Posterior oropharynx is unremarkable.    Larynx and hypopharynx: Deferred secondary to gag reflex.  Neck: Supple.  No mass.  Nontender to palpation.  Trachea midline. Thyroid normal size and without nodules to palpation.   Lymphatic: No lymphadenopathy upon palpation.   Psychiatric: Appropriate affect, cooperative   Neurologic: Oriented x 3, strength symmetric in all extremities, Cranial Nerves II-XII are grossly intact to confrontation   Skin: Warm and dry. No rashes.    Procedures   Result Review :               Assessment and Plan    Diagnoses and all orders for this visit:    1. Otorrhea of left ear (Primary)    2. Chronic serous otitis media of right ear    3. ETD (Eustachian tube dysfunction), right          Impressions and findings were discussed.  Currently, he is doing well and without otorrhea.  We discussed continuing the TobraDex drops over the next 2 days and maintaining strict water precautions.  He was given ample time to ask questions, all of which were answered to his satisfaction.    Follow Up   Return in  about 4 weeks (around 4/23/2025).  Patient was given instructions and counseling regarding his condition or for health maintenance advice. Please see specific information pulled into the AVS if appropriate.

## 2025-04-08 DIAGNOSIS — E29.1 HYPOGONADISM IN MALE: ICD-10-CM

## 2025-04-08 RX ORDER — TESTOSTERONE CYPIONATE 200 MG/ML
INJECTION, SOLUTION INTRAMUSCULAR
Qty: 10 ML | Refills: 0 | Status: SHIPPED | OUTPATIENT
Start: 2025-04-08

## 2025-04-28 ENCOUNTER — OFFICE VISIT (OUTPATIENT)
Dept: OTOLARYNGOLOGY | Facility: CLINIC | Age: 70
End: 2025-04-28
Payer: MEDICARE

## 2025-04-28 VITALS
OXYGEN SATURATION: 96 % | DIASTOLIC BLOOD PRESSURE: 75 MMHG | SYSTOLIC BLOOD PRESSURE: 160 MMHG | HEART RATE: 82 BPM | TEMPERATURE: 96.5 F

## 2025-04-28 DIAGNOSIS — H65.21 CHRONIC SEROUS OTITIS MEDIA OF RIGHT EAR: Primary | ICD-10-CM

## 2025-04-28 DIAGNOSIS — H69.91 ETD (EUSTACHIAN TUBE DYSFUNCTION), RIGHT: ICD-10-CM

## 2025-04-28 PROCEDURE — 99213 OFFICE O/P EST LOW 20 MIN: CPT | Performed by: OTOLARYNGOLOGY

## 2025-04-28 PROCEDURE — 3077F SYST BP >= 140 MM HG: CPT | Performed by: OTOLARYNGOLOGY

## 2025-04-28 PROCEDURE — 3078F DIAST BP <80 MM HG: CPT | Performed by: OTOLARYNGOLOGY

## 2025-04-28 RX ORDER — OFLOXACIN 3 MG/ML
4 SOLUTION/ DROPS OPHTHALMIC 2 TIMES DAILY
Qty: 10 ML | Refills: 2 | Status: SHIPPED | OUTPATIENT
Start: 2025-04-28 | End: 2025-05-05

## 2025-04-28 NOTE — PROGRESS NOTES
Patient Name: Jimmie Jamil   Visit Date: 04/28/2025   Patient ID: [id number]  Provider: Frank Solomon MD    Sex: male  [facility] [facility]   YOB: 1955  Location Address: [address] [place] [address], [address] [place] [address],?[id number]    Primary Care Provider Nicko Fontaine APRN  Location Phone: [phone]    Referring Provider: No ref. provider found        Chief Complaint  4 week follow up    History of Present Illness  Jimmie Jamil is a 69 y.o. male who returns today for follow-up of chronic serous otitis media and eustachian tube dysfunction.  He was originally seen on 11/19/2021 at which time he reported predominantly left-sided serous effusions over the last 10 to 15 years.  They typically occur after upper respiratory tract infection.  He has seen 2 separate ENTs in the past and has undergone multiple ear tube placements. CT scan of the head without contrast on 10/12/2021 revealed a partial right mastoid middle ear effusion.  The sinuses were unremarkable.  Examination that day revealed a slightly retracted right tympanic membrane with serous effusion and a left T-tube in place and patent.  After a thorough discussion he elected to pursue right myringotomy and tube placement which was performed in clinic. Audiogram on 6/26/2023 revealed right normal downsloping to mild to moderate sensorineural hearing loss and left mild downsloping to moderate mixed loss with a conductive component from 250 to 1000 Hz.  SRT is 25 on the right and 35 on the left.  Speech discrimination is 84% on the right and 93% on the left at 65 dB.  A seal could not be obtained on the right and was consistent with a type B tympanogram and expanded volume on the left. Beta-2 transferrin testing on 1/21/2025 was negative but the volume of the sample was small.      He was last seen on 3/26/2025 at which time he was doing well without otorrhea.  There was still a small perforation where the tube was  previously in the left tympanic membrane.  He tells me that his left ear has felt plugged more recently and his hearing on that side diminished.  He has not noticed any otorrhea or otalgia.  Past Medical History:   Diagnosis Date    Aortic stenosis     Arthritis of back 2015    Benign essential hypertension     Bladder problem     BPH (benign prostatic hyperplasia)     Cancer 2023    Prostate    Cervical disc disorder 2018    Cervical radiculopathy 03/16/2017    Cervical spinal stenosis 03/16/2017    Cervicalgia 03/16/2017    Cholelithiasis 2022    Colon polyp 2023    Sm tubular adenoma removed during colonoscopy    Coronary artery disease     diastolic dysfunction    DJD (degenerative joint disease)     ED (erectile dysfunction)     Elevated PSA 2016    Been a little high for many years    Erectile dysfunction 2016    Greater trochanteric bursitis of right hip 07/09/2018    Heart murmur 2021    High blood pressure     HL (hearing loss) 01 Oct 2021    Hormone disorder 2010    Hyperlipidemia 2010    Hypertension     Leg pain     Leg swelling     Low back pain     Low back strain 2015    Lumbago 03/16/2017    Lumbosacral disc disease 2018    Neck pain     Paresthesia 03/16/2017    left hand/leg    Periarthritis of shoulder 2021    Polycythemia     Prostatitis 2024    Right hip pain     Rotator cuff syndrome 09/01/2021    Urinary bladder incontinence 03/16/2017       Past Surgical History:   Procedure Laterality Date    ANTERIOR CERVICAL FUSION  04/12/2017    C3-4    CARDIAC CATHETERIZATION      everything okay clearance for neck surgery    CARDIAC CATHETERIZATION N/A 10/13/2021    Procedure: Left Heart Cath;  Surgeon: Anahi Perales MD;  Location:  [facility] CATH INVASIVE [facility];  Service: Cardiovascular;  Laterality: N/A;    CARDIAC CATHETERIZATION N/A 10/13/2021    Procedure: Coronary angiography;  Surgeon: Anahi Perales MD;  Location:  [facility];  Service: Cardiovascular;  Laterality: N/A;     CARDIAC CATHETERIZATION N/A 10/13/2021    Procedure: Left ventriculography;  Surgeon: Anahi Perales MD;  Location: [facility] CATH INVASIVE LOCATION;  Service: Cardiovascular;  Laterality: N/A;    CERVICAL DISC SURGERY      C2-3 fusion    CHOLECYSTECTOMY N/A 11/10/2024    Procedure: Robotic assisted cholecystectomy with intraoperative cholangiogram;  Surgeon: Reagan Hernandez MD;  Location: [facility] OR;  Service: Robotics - DaVinci;  Laterality: N/A;    CIRCUMCISION      COLONOSCOPY      COLONOSCOPY N/A 11/20/2023    Procedure: COLONOSCOPY WITH COLD SNARE POLYECTOMY;  Surgeon: Kenny Urena MD;  Location: [facility] ENDOSCOPY;  Service: Gastroenterology;  Laterality: N/A;  COLON POLYP    CYSTOSCOPY  01/10/2014    Cysto ivan retrograde pyelogram urthral bx.    LUMBAR EPIDURAL INJECTION      L2-S2  going to try ablasion in future    NECK SURGERY  2018?    PROSTATE BIOPSY  2018    PROSTATE BIOPSY N/A 08/08/2024    Procedure: MRI fusion transrectal ultrasound-guided prostate biopsy;  Surgeon: Zechariah Clay MD;  Location: [facility] MAIN OR;  Service: Urology;  Laterality: N/A;    PROSTATE SURGERY  2022    [facility]    SHOULDER ARTHROSCOPY W/ LABRAL REPAIR Left     SHOULDER SURGERY  2008    TRIGGER POINT INJECTION  2021    L5-S1    VASECTOMY      WISDOM TOOTH EXTRACTION           Current Outpatient Medications:     ascorbic acid (VITAMIN C) 1000 MG tablet, Take 1 tablet by mouth Daily., Disp: , Rfl:     aspirin 81 MG EC tablet, Take 1 tablet by mouth Daily., Disp: , Rfl:     Coenzyme Q10 (CoQ10) 100 MG capsule, , Disp: , Rfl:     enalapril (VASOTEC) 20 MG tablet, Take 1 tablet by mouth twice daily, Disp: 180 tablet, Rfl: 0    furosemide (LASIX) 40 MG tablet, Take 1 tablet by mouth., Disp: , Rfl:     Leqvio 284 MG/1.5ML solution prefilled syringe, EVERY 6 MTHS, Disp: , Rfl:     potassium chloride (KLOR-CON M20) 20 MEQ CR tablet, Take 1 tablet by mouth Daily., Disp: , Rfl:     Testosterone Cypionate (DEPOTESTOTERONE  CYPIONATE) 200 MG/ML injection, INJECT 0.3 ML UNDER THE SKIN EVERY 5 DAYS AS DIRECTED, Disp: 10 mL, Rfl: 0    tobramycin-dexAMETHasone (TOBRADEX) 0.3-0.1 % ophthalmic suspension, INSTILL 3-4 DROPS INTO EAR(S) AS DIRECTED 2 TIMES DAILY FOR 10 DAYS, Disp: , Rfl:     VASCEPA 1 g capsule capsule, Take 2 g by mouth 2 (Two) Times a Day With Meals., Disp: 360 capsule, Rfl: 3    verapamil SR (CALAN-SR) 240 MG CR tablet, TAKE 2 TABLETS BY MOUTH ONCE DAILY AT NIGHT, Disp: 180 tablet, Rfl: 1    ofloxacin (Ocuflox) 0.3 % ophthalmic solution, Administer 4 drops into ear(s) as directed by provider 2 (Two) Times a Day for 7 days., Disp: 10 mL, Rfl: 2    verapamil ER (VERELAN) 360 MG 24 hr capsule, Take 1 capsule by mouth Every Night. Patient stated taking 420mg, Disp: , Rfl:      Allergies   Allergen Reactions    Beta Adrenergic Blockers Unknown - High Severity    Crestor [Rosuvastatin Calcium] Myalgia    Keflex [Cephalexin] Other (See Comments)     hiccups    Livalo [Pitavastatin] Myalgia    Losartan Other (See Comments)     Intolerance due to fatigue, depression    Naproxen Other (See Comments)     Chest pain       Social History     Tobacco Use    Smoking status: Never     Passive exposure: Never    Smokeless tobacco: Never   Vaping Use    Vaping status: Never Used   Substance Use Topics    Alcohol use: Yes     Alcohol/week: 2.0 standard drinks of alcohol     Types: 2 Cans of beer per week     Comment: 4 beers a week    Drug use: Never        Objective     Vital Signs:   /75   Pulse 82   Temp 96.5 °F (35.8 °C)   SpO2 96%       Physical Exam    General: Well developed, well nourished patient of stated age in no acute distress. Voice is strong and clear.   Head: Normocephalic and atraumatic.  Face: No lesions.  Bilateral parotid and submandibular glands are unremarkable.  Stensen's and Warthin's ducts are productive of clear saliva bilaterally.  House-Brackmann I/VI     bilaterally.   muscles and  temporomandibular joint nontender to palpation.  No TMJ crepitus.  Eyes: PERRLA, sclerae anicteric, no conjunctival injection. Extra ocular movements are intact and full. No nystagmus.   Ears: Auricles are normal in appearance. Bilateral external auditory canals are unremarkable.  Right tympanic membrane with T-tube in place and patent.  No evidence of otorrhea.  Left tympanic membrane with a stable anterior superior perforation which is plugged with desiccated mucus.  This was removed using the operating microscope and a combination of straight pick and #5 suction.  The middle ear appears dry.  Hearing normal to conversational voice.   Nose: External nose is normal in appearance. Bilateral nares are patent with normal appearing mucosa. Septum midline. Turbinates are unremarkable. No lesions.   Oral Cavity: Lips are normal in appearance. Oral mucosa is unremarkable. Gingiva is unremarkable. Normal dentition for age. Tongue is unremarkable with good movement. Hard palate is unremarkable.   Oropharynx: Soft palate is unremarkable with full movement. Uvula is unremarkable. Bilateral tonsils are unremarkable. Posterior oropharynx is unremarkable.    Larynx and hypopharynx: Deferred secondary to gag reflex.  Neck: Supple.  No mass.  Nontender to palpation.  Trachea midline. Thyroid normal size and without nodules to palpation.   Lymphatic: No lymphadenopathy upon palpation.   Psychiatric: Appropriate affect, cooperative   Neurologic: Oriented x 3, strength symmetric in all extremities, Cranial Nerves II-XII are grossly intact to confrontation   Skin: Warm and dry. No rashes.    Procedures   Result Review :               Assessment and Plan    Diagnoses and all orders for this visit:    1. Chronic serous otitis media of right ear (Primary)  -     ofloxacin (Ocuflox) 0.3 % ophthalmic solution; Administer 4 drops into ear(s) as directed by provider 2 (Two) Times a Day for 7 days.  Dispense: 10 mL; Refill: 2  -      "Audiometry With Tympanometry; Future    2. ETD (Eustachian tube dysfunction), right  -     ofloxacin (Ocuflox) 0.3 % ophthalmic solution; Administer 4 drops into ear(s) as directed by provider 2 (Two) Times a Day for 7 days.  Dispense: 10 mL; Refill: 2  -     Audiometry With Tympanometry; Future      Impressions and findings were discussed.  Currently, he feels as though the hearing is diminished in his left ear.  Examination today reveals his perforation to be plugged with desiccated mucus.  This was removed using the operating microscope and a combination of straight pick and #5 suction.  The middle ear mucosa appears dry.  His right T-tube is in place and patent.  He is curious if eustachian tube dilation may be helpful for his symptoms.  We discussed that I am doubtful this would be much help for him given the severity of his eustachian tube dysfunction.  At this time, we will proceed with an updated audiogram and he will be provided with ofloxacin drops to use on an as-needed basis if he feels as though the left ear becomes \"plugged\".  He was given ample time to ask questions, all of which were answered to his satisfaction.    Follow Up   No follow-ups on file.  Patient was given instructions and counseling regarding his condition or for health maintenance advice. Please see specific information pulled into the AVS if appropriate.    "

## 2025-04-30 ENCOUNTER — PROCEDURE VISIT (OUTPATIENT)
Dept: OTOLARYNGOLOGY | Facility: CLINIC | Age: 70
End: 2025-04-30
Payer: MEDICARE

## 2025-04-30 DIAGNOSIS — H90.A32 MIXED CONDUCTIVE AND SENSORINEURAL HEARING LOSS OF LEFT EAR WITH RESTRICTED HEARING OF RIGHT EAR: ICD-10-CM

## 2025-04-30 DIAGNOSIS — H69.91 ETD (EUSTACHIAN TUBE DYSFUNCTION), RIGHT: Primary | ICD-10-CM

## 2025-04-30 DIAGNOSIS — H65.21 CHRONIC SEROUS OTITIS MEDIA OF RIGHT EAR: ICD-10-CM

## 2025-04-30 DIAGNOSIS — H90.A31 MIXED CONDUCTIVE AND SENSORINEURAL HEARING LOSS OF RIGHT EAR WITH RESTRICTED HEARING OF LEFT EAR: ICD-10-CM

## 2025-04-30 DIAGNOSIS — H92.12 OTORRHEA OF LEFT EAR: ICD-10-CM

## 2025-04-30 PROCEDURE — 92557 COMPREHENSIVE HEARING TEST: CPT | Performed by: AUDIOLOGIST

## 2025-04-30 PROCEDURE — 92567 TYMPANOMETRY: CPT | Performed by: AUDIOLOGIST

## 2025-05-01 NOTE — PROGRESS NOTES
AUDIOMETRIC EVALUATION      Name:  Jimmie Jamil  :  1955  Age:  69 y.o.  Date of Evaluation:  2025       History:  Mr. Jamil is seen today for a hearing evaluation due to history of PE tubes noise exposure.    Audiologic Information:  Concerns for Hearing: Yes  PETs: No  Other otologic surgical history: None  Aural Pressure/Fullness: No  Otalgia: None  Otorrhea: No  Tinnitus: Yes  Dizziness: No  Noise Exposure: Yes  Family history of hearing loss: No  Head trauma requiring hospital stay: No  Chemotherapy: No  Other significant history: No    EVALUATION:    See audiogram    RESULTS:    Otoscopic Evaluation:        NOTE: Testing completed after ears were examined by Dr. Solomon    Tympanometry (226 Hz):  Right: Type B, Large ECV - Consistent with TM Perforation  Left: Type B, Large ECV - Consistent with TM Perforation    IMPRESSIONS:  Pure tone thresholds for the right ear shows a mild to severe mixed hearing loss.  Pure tone thresholds for the left ear shows a moderate to severe mixed hearing loss.  Patient was counseled with regard to the findings.    Amplification needs:  Patient could benefit from amplification if they feel increased communication difficulties.    RECOMMENDATIONS/PLAN:  Follow-up recommendations of Dr. Solomon.  Hearing aid evaluation and counseling upon medical clearance and patient motivation. Patient provided contact information for an audiologist in [facility].  Discussed results and recommendations with patient. Questions were addressed and the patient was encouraged to contact our department should concerns arise.          Matthieu Skelton M.S, [facility]  Licensed Audiologist

## 2025-07-21 ENCOUNTER — TELEPHONE (OUTPATIENT)
Dept: OTOLARYNGOLOGY | Facility: CLINIC | Age: 70
End: 2025-07-21

## 2025-08-07 ENCOUNTER — HOSPITAL ENCOUNTER (OUTPATIENT)
Dept: MRI IMAGING | Facility: HOSPITAL | Age: 70
Discharge: HOME | End: 2025-08-07
Admitting: UROLOGY
Payer: MEDICARE

## 2025-08-07 DIAGNOSIS — C61 PROSTATE CANCER: ICD-10-CM

## 2025-08-07 PROCEDURE — 72197 MRI PELVIS W/O & W/DYE: CPT

## 2025-08-07 PROCEDURE — 25510000001 GADOPICLENOL 0.5 MMOL/ML SOLUTION: Performed by: UROLOGY

## 2025-08-07 PROCEDURE — A9573 GADOPICLENOL 0.5 MMOL/ML SOLUTION: HCPCS | Performed by: UROLOGY

## 2025-08-07 RX ADMIN — GADOPICLENOL 10 ML: 485.1 INJECTION INTRAVENOUS at 12:29

## 2025-08-11 ENCOUNTER — LAB (OUTPATIENT)
Facility: HOSPITAL | Age: 70
End: 2025-08-11
Payer: MEDICARE

## 2025-08-11 DIAGNOSIS — E29.1 HYPOGONADISM IN MALE: ICD-10-CM

## 2025-08-11 DIAGNOSIS — C61 PROSTATE CANCER: ICD-10-CM

## 2025-08-11 DIAGNOSIS — D75.1 POLYCYTHEMIA: ICD-10-CM

## 2025-08-11 LAB
ALBUMIN SERPL-MCNC: 3.8 G/DL (ref 3.5–5.2)
ALBUMIN/GLOB SERPL: 1.4 G/DL
ALP SERPL-CCNC: 66 U/L (ref 39–117)
ALT SERPL W P-5'-P-CCNC: 33 U/L (ref 1–41)
ANION GAP SERPL CALCULATED.3IONS-SCNC: 10.9 MMOL/L (ref 5–15)
AST SERPL-CCNC: 33 U/L (ref 1–40)
BASOPHILS # BLD AUTO: 0.09 10*3/MM3 (ref 0–0.2)
BASOPHILS NFR BLD AUTO: 0.8 % (ref 0–1.5)
BILIRUB SERPL-MCNC: 0.5 MG/DL (ref 0–1.2)
BUN SERPL-MCNC: 10 MG/DL (ref 8–23)
BUN/CREAT SERPL: 7.6 (ref 7–25)
CALCIUM SPEC-SCNC: 9.3 MG/DL (ref 8.6–10.5)
CHLORIDE SERPL-SCNC: 105 MMOL/L (ref 98–107)
CO2 SERPL-SCNC: 22.1 MMOL/L (ref 22–29)
CREAT SERPL-MCNC: 1.31 MG/DL (ref 0.76–1.27)
DEPRECATED RDW RBC AUTO: 43.5 FL (ref 37–54)
EGFRCR SERPLBLD CKD-EPI 2021: 59.3 ML/MIN/1.73
EOSINOPHIL # BLD AUTO: 0.08 10*3/MM3 (ref 0–0.4)
EOSINOPHIL NFR BLD AUTO: 0.7 % (ref 0.3–6.2)
ERYTHROCYTE [DISTWIDTH] IN BLOOD BY AUTOMATED COUNT: 15.1 % (ref 12.3–15.4)
GLOBULIN UR ELPH-MCNC: 2.8 GM/DL
GLUCOSE SERPL-MCNC: 107 MG/DL (ref 65–99)
HCT VFR BLD AUTO: 53.7 % (ref 37.5–51)
HGB BLD-MCNC: 18.8 G/DL (ref 13–17.7)
IMM GRANULOCYTES # BLD AUTO: 0.34 10*3/MM3 (ref 0–0.05)
IMM GRANULOCYTES NFR BLD AUTO: 3.1 % (ref 0–0.5)
LYMPHOCYTES # BLD AUTO: 2.55 10*3/MM3 (ref 0.7–3.1)
LYMPHOCYTES NFR BLD AUTO: 23.2 % (ref 19.6–45.3)
MCH RBC QN AUTO: 29.6 PG (ref 26.6–33)
MCHC RBC AUTO-ENTMCNC: 35 G/DL (ref 31.5–35.7)
MCV RBC AUTO: 84.4 FL (ref 79–97)
MONOCYTES # BLD AUTO: 1.33 10*3/MM3 (ref 0.1–0.9)
MONOCYTES NFR BLD AUTO: 12.1 % (ref 5–12)
NEUTROPHILS NFR BLD AUTO: 6.59 10*3/MM3 (ref 1.7–7)
NEUTROPHILS NFR BLD AUTO: 60.1 % (ref 42.7–76)
NRBC BLD AUTO-RTO: 0 /100 WBC (ref 0–0.2)
PLATELET # BLD AUTO: 276 10*3/MM3 (ref 140–450)
PMV BLD AUTO: 9.9 FL (ref 6–12)
POTASSIUM SERPL-SCNC: 4.5 MMOL/L (ref 3.5–5.2)
PROT SERPL-MCNC: 6.6 G/DL (ref 6–8.5)
PSA SERPL-MCNC: 7.35 NG/ML (ref 0–4)
RBC # BLD AUTO: 6.36 10*6/MM3 (ref 4.14–5.8)
SODIUM SERPL-SCNC: 138 MMOL/L (ref 136–145)
TESTOST SERPL-MCNC: 1084 NG/DL (ref 193–740)
WBC NRBC COR # BLD AUTO: 10.98 10*3/MM3 (ref 3.4–10.8)

## 2025-08-11 PROCEDURE — 85025 COMPLETE CBC W/AUTO DIFF WBC: CPT

## 2025-08-11 PROCEDURE — 84403 ASSAY OF TOTAL TESTOSTERONE: CPT

## 2025-08-11 PROCEDURE — 84153 ASSAY OF PSA TOTAL: CPT

## 2025-08-11 PROCEDURE — 80053 COMPREHEN METABOLIC PANEL: CPT

## 2025-08-11 PROCEDURE — 36415 COLL VENOUS BLD VENIPUNCTURE: CPT

## 2025-08-12 ENCOUNTER — RESULTS FOLLOW-UP (OUTPATIENT)
Facility: HOSPITAL | Age: 70
End: 2025-08-12
Payer: MEDICARE

## 2025-08-18 ENCOUNTER — TELEPHONE (OUTPATIENT)
Dept: UROLOGY | Age: 70
End: 2025-08-18

## 2025-08-18 ENCOUNTER — OFFICE VISIT (OUTPATIENT)
Dept: UROLOGY | Age: 70
End: 2025-08-18
Payer: MEDICARE

## 2025-08-18 DIAGNOSIS — E29.1 HYPOGONADISM IN MALE: ICD-10-CM

## 2025-08-18 DIAGNOSIS — N40.1 BENIGN PROSTATIC HYPERPLASIA WITH LOWER URINARY TRACT SYMPTOMS, SYMPTOM DETAILS UNSPECIFIED: Primary | ICD-10-CM

## 2025-08-18 DIAGNOSIS — C61 PROSTATE CANCER: ICD-10-CM

## 2025-08-18 PROCEDURE — 1160F RVW MEDS BY RX/DR IN RCRD: CPT | Performed by: UROLOGY

## 2025-08-18 PROCEDURE — 99214 OFFICE O/P EST MOD 30 MIN: CPT | Performed by: UROLOGY

## 2025-08-18 PROCEDURE — 1159F MED LIST DOCD IN RCRD: CPT | Performed by: UROLOGY

## 2025-08-18 PROCEDURE — G2211 COMPLEX E/M VISIT ADD ON: HCPCS | Performed by: UROLOGY

## 2025-08-18 RX ORDER — TESTOSTERONE CYPIONATE 200 MG/ML
INJECTION, SOLUTION INTRAMUSCULAR
Qty: 10 ML | Refills: 0 | Status: SHIPPED | OUTPATIENT
Start: 2025-08-18

## (undated) DEVICE — SEAL

## (undated) DEVICE — SHEET,DRAPE,70X85,STERILE: Brand: MEDLINE

## (undated) DEVICE — THE STERILE LIGHT HANDLE COVER IS USED WITH STERIS SURGICAL LIGHTING AND VISUALIZATION SYSTEMS.

## (undated) DEVICE — SUCTION IRRIGATOR: Brand: ENDOWRIST

## (undated) DEVICE — THE SINGLE USE ETRAP – POLYP TRAP IS USED FOR SUCTION RETRIEVAL OF ENDOSCOPICALLY REMOVED POLYPS.: Brand: ETRAP

## (undated) DEVICE — CATH IV INSYTE AUTOGARD 14G 1 1/2IN ORNG

## (undated) DEVICE — CONN JET HYDRA H20 AUXILIARY DISP

## (undated) DEVICE — GLIDESHEATH SLENDER STAINLESS STEEL KIT: Brand: GLIDESHEATH SLENDER

## (undated) DEVICE — SUT MNCRYL PLS ANTIB UD 4/0 PS2 18IN

## (undated) DEVICE — COVER,C-ARM,41X74: Brand: MEDLINE

## (undated) DEVICE — SNAR POLYP CAPTIFLEX XS/OVL 11X2.4MM 240CM 1P/U

## (undated) DEVICE — GOWN,NON-REINFORCED,SIRUS,SET IN SLV,XL: Brand: MEDLINE

## (undated) DEVICE — CATH DIAG IMPULSE FR4 5F 100CM

## (undated) DEVICE — TROC BLADLES AIRSEAL/OPTI THRD 8X120MM 1P/U

## (undated) DEVICE — LAPAROVUE VISIBILITY SYSTEM LAPAROSCOPIC SOLUTIONS: Brand: LAPAROVUE

## (undated) DEVICE — DRAPE,REIN 53X77,STERILE: Brand: MEDLINE

## (undated) DEVICE — CATH URETRL SOF/FLEX OPN/END 4F 70CM

## (undated) DEVICE — BLADELESS OBTURATOR: Brand: WECK VISTA

## (undated) DEVICE — TISSUE RETRIEVAL SYSTEM: Brand: INZII RETRIEVAL SYSTEM

## (undated) DEVICE — PK CATH CARD 40

## (undated) DEVICE — GLV SURG PREMIERPRO ORTHO LTX PF SZ7.5 BRN

## (undated) DEVICE — MARKER,SKIN,WI/RULER AND LABELS: Brand: MEDLINE

## (undated) DEVICE — ADHS SKIN SURG TISS VISC PREMIERPRO EXOFIN HI/VISC FAST/DRY

## (undated) DEVICE — LINER SURG CANSTR SXN S/RIGD 1500CC

## (undated) DEVICE — SYR LL TP 10ML STRL

## (undated) DEVICE — ANTIBACTERIAL UNDYED BRAIDED (POLYGLACTIN 910), SYNTHETIC ABSORBABLE SURGICAL SUTURE: Brand: COATED VICRYL

## (undated) DEVICE — LOU LAP CHOLE: Brand: MEDLINE INDUSTRIES, INC.

## (undated) DEVICE — ARM DRAPE

## (undated) DEVICE — Device

## (undated) DEVICE — TOWEL,OR,DSP,ST,BLUE,STD,4/PK,20PK/CS: Brand: MEDLINE

## (undated) DEVICE — SOL IRRG H2O PL/BG 1000ML STRL

## (undated) DEVICE — ENDOPATH PNEUMONEEDLE INSUFFLATION NEEDLES WITH LUER LOCK CONNECTORS 120MM: Brand: ENDOPATH

## (undated) DEVICE — SYR LUERLOK 20CC BX/50

## (undated) DEVICE — EXTENSION SET, MALE LUER LOCK ADAPTER WITH RETRACTABLE COLLAR

## (undated) DEVICE — MAX-CORE® DISPOSABLE CORE BIOPSY INSTRUMENT, 18G X 25CM: Brand: MAX-CORE

## (undated) DEVICE — STPCK 3WY D201 DISCOFIX

## (undated) DEVICE — DRSNG TELFA PAD NONADH STR 1S 3X4IN

## (undated) DEVICE — CATH VENT MIV RADL PIG ST TIP 5F 110CM

## (undated) DEVICE — ST TBG AIRSEAL BIF FLTR W/ACT/CHARCOAL/FLTR

## (undated) DEVICE — DEV SUT GRSPR CLOSUR 15CM 14G

## (undated) DEVICE — CATH DIAG IMPULSE FL3.5 5F 100CM

## (undated) DEVICE — GW EMR FIX EXCHG J STD .035 3MM 260CM

## (undated) DEVICE — APPL CHLORAPREP HI/LITE 26ML ORNG

## (undated) DEVICE — KT MANIFLD CARDIAC

## (undated) DEVICE — SOLIDIFIER LIQLOC PLS 1500CC BT

## (undated) DEVICE — Device: Brand: DEFENDO AIR/WATER/SUCTION AND BIOPSY VALVE

## (undated) DEVICE — COLUMN DRAPE